# Patient Record
Sex: FEMALE | Race: WHITE | NOT HISPANIC OR LATINO | Employment: OTHER | URBAN - METROPOLITAN AREA
[De-identification: names, ages, dates, MRNs, and addresses within clinical notes are randomized per-mention and may not be internally consistent; named-entity substitution may affect disease eponyms.]

---

## 2017-04-27 ENCOUNTER — TRANSCRIBE ORDERS (OUTPATIENT)
Dept: ENDOCRINOLOGY | Facility: CLINIC | Age: 57
End: 2017-04-27

## 2017-04-27 DIAGNOSIS — E05.90 HYPERTHYROIDISM: Primary | ICD-10-CM

## 2019-01-01 ENCOUNTER — HOSPITAL ENCOUNTER (INPATIENT)
Facility: HOSPITAL | Age: 59
LOS: 11 days | Discharge: HOME HEALTH | End: 2019-12-27
Attending: INTERNAL MEDICINE | Admitting: INTERNAL MEDICINE

## 2019-01-01 ENCOUNTER — LAB REQUISITION (OUTPATIENT)
Dept: LAB | Facility: HOSPITAL | Age: 59
End: 2019-01-01

## 2019-01-01 VITALS
OXYGEN SATURATION: 98 % | RESPIRATION RATE: 18 BRPM | DIASTOLIC BLOOD PRESSURE: 69 MMHG | HEART RATE: 84 BPM | SYSTOLIC BLOOD PRESSURE: 112 MMHG | BODY MASS INDEX: 20.23 KG/M2 | TEMPERATURE: 98.2 F | WEIGHT: 114.2 LBS | HEIGHT: 63 IN

## 2019-01-01 DIAGNOSIS — J41.0 SIMPLE CHRONIC BRONCHITIS (HCC): ICD-10-CM

## 2019-01-01 DIAGNOSIS — I10 ESSENTIAL (PRIMARY) HYPERTENSION: ICD-10-CM

## 2019-01-01 DIAGNOSIS — K74.5 BILIARY CIRRHOSIS (HCC): Primary | ICD-10-CM

## 2019-01-01 LAB
ALBUMIN SERPL-MCNC: 2.01 G/DL (ref 3.5–5.2)
ALBUMIN SERPL-MCNC: 2.45 G/DL (ref 3.5–5.2)
ALBUMIN SERPL-MCNC: 2.56 G/DL (ref 3.5–5.2)
ALBUMIN SERPL-MCNC: 2.56 G/DL (ref 3.5–5.2)
ALBUMIN/GLOB SERPL: 0.5 G/DL
ALBUMIN/GLOB SERPL: 0.5 G/DL
ALBUMIN/GLOB SERPL: 0.7 G/DL
ALBUMIN/GLOB SERPL: 0.8 G/DL
ALP SERPL-CCNC: 245 U/L (ref 39–117)
ALP SERPL-CCNC: 250 U/L (ref 39–117)
ALP SERPL-CCNC: 260 U/L (ref 39–117)
ALP SERPL-CCNC: 321 U/L (ref 39–117)
ALT SERPL W P-5'-P-CCNC: 46 U/L (ref 1–33)
ALT SERPL W P-5'-P-CCNC: 52 U/L (ref 1–33)
ALT SERPL W P-5'-P-CCNC: 65 U/L (ref 1–33)
ALT SERPL W P-5'-P-CCNC: 80 U/L (ref 1–33)
ANION GAP SERPL CALCULATED.3IONS-SCNC: 10 MMOL/L (ref 5–15)
ANION GAP SERPL CALCULATED.3IONS-SCNC: 11.4 MMOL/L (ref 5–15)
ANION GAP SERPL CALCULATED.3IONS-SCNC: 13.2 MMOL/L (ref 5–15)
ANION GAP SERPL CALCULATED.3IONS-SCNC: 8.2 MMOL/L (ref 5–15)
ANISOCYTOSIS BLD QL: ABNORMAL
AST SERPL-CCNC: 110 U/L (ref 1–32)
AST SERPL-CCNC: 71 U/L (ref 1–32)
AST SERPL-CCNC: 78 U/L (ref 1–32)
AST SERPL-CCNC: 89 U/L (ref 1–32)
BASOPHILS # BLD AUTO: 0.14 10*3/MM3 (ref 0–0.2)
BASOPHILS # BLD AUTO: 0.14 10*3/MM3 (ref 0–0.2)
BASOPHILS # BLD AUTO: 0.2 10*3/MM3 (ref 0–0.2)
BASOPHILS # BLD MANUAL: 0.23 10*3/MM3 (ref 0–0.2)
BASOPHILS NFR BLD AUTO: 1.2 % (ref 0–1.5)
BASOPHILS NFR BLD AUTO: 1.6 % (ref 0–1.5)
BASOPHILS NFR BLD AUTO: 1.6 % (ref 0–1.5)
BASOPHILS NFR BLD AUTO: 3 % (ref 0–1.5)
BILIRUB SERPL-MCNC: 0.6 MG/DL (ref 0.2–1.2)
BILIRUB SERPL-MCNC: 0.7 MG/DL (ref 0.2–1.2)
BILIRUB SERPL-MCNC: 0.9 MG/DL (ref 0.2–1.2)
BILIRUB SERPL-MCNC: 1.2 MG/DL (ref 0.2–1.2)
BUN BLD-MCNC: 11 MG/DL (ref 6–20)
BUN BLD-MCNC: 14 MG/DL (ref 6–20)
BUN BLD-MCNC: 21 MG/DL (ref 6–20)
BUN BLD-MCNC: 21 MG/DL (ref 6–20)
BUN/CREAT SERPL: 13.9 (ref 7–25)
BUN/CREAT SERPL: 17.9 (ref 7–25)
BUN/CREAT SERPL: 25.6 (ref 7–25)
BUN/CREAT SERPL: 27.6 (ref 7–25)
CALCIUM SPEC-SCNC: 7.9 MG/DL (ref 8.6–10.5)
CALCIUM SPEC-SCNC: 8 MG/DL (ref 8.6–10.5)
CALCIUM SPEC-SCNC: 8.2 MG/DL (ref 8.6–10.5)
CALCIUM SPEC-SCNC: 8.2 MG/DL (ref 8.6–10.5)
CHLORIDE SERPL-SCNC: 88 MMOL/L (ref 98–107)
CHLORIDE SERPL-SCNC: 91 MMOL/L (ref 98–107)
CHLORIDE SERPL-SCNC: 93 MMOL/L (ref 98–107)
CHLORIDE SERPL-SCNC: 98 MMOL/L (ref 98–107)
CO2 SERPL-SCNC: 21.8 MMOL/L (ref 22–29)
CO2 SERPL-SCNC: 22.6 MMOL/L (ref 22–29)
CO2 SERPL-SCNC: 22.8 MMOL/L (ref 22–29)
CO2 SERPL-SCNC: 27 MMOL/L (ref 22–29)
CREAT BLD-MCNC: 0.76 MG/DL (ref 0.57–1)
CREAT BLD-MCNC: 0.78 MG/DL (ref 0.57–1)
CREAT BLD-MCNC: 0.79 MG/DL (ref 0.57–1)
CREAT BLD-MCNC: 0.82 MG/DL (ref 0.57–1)
DEPRECATED RDW RBC AUTO: 56.6 FL (ref 37–54)
DEPRECATED RDW RBC AUTO: 57.6 FL (ref 37–54)
DEPRECATED RDW RBC AUTO: 65.3 FL (ref 37–54)
DEPRECATED RDW RBC AUTO: 66.6 FL (ref 37–54)
EOSINOPHIL # BLD AUTO: 0.02 10*3/MM3 (ref 0–0.4)
EOSINOPHIL # BLD AUTO: 0.37 10*3/MM3 (ref 0–0.4)
EOSINOPHIL # BLD AUTO: 0.4 10*3/MM3 (ref 0–0.4)
EOSINOPHIL # BLD MANUAL: 0.08 10*3/MM3 (ref 0–0.4)
EOSINOPHIL NFR BLD AUTO: 0.2 % (ref 0.3–6.2)
EOSINOPHIL NFR BLD AUTO: 3.2 % (ref 0.3–6.2)
EOSINOPHIL NFR BLD AUTO: 4.3 % (ref 0.3–6.2)
EOSINOPHIL NFR BLD MANUAL: 1 % (ref 0.3–6.2)
ERYTHROCYTE [DISTWIDTH] IN BLOOD BY AUTOMATED COUNT: 18.5 % (ref 12.3–15.4)
ERYTHROCYTE [DISTWIDTH] IN BLOOD BY AUTOMATED COUNT: 19.6 % (ref 12.3–15.4)
ERYTHROCYTE [DISTWIDTH] IN BLOOD BY AUTOMATED COUNT: 21.1 % (ref 12.3–15.4)
ERYTHROCYTE [DISTWIDTH] IN BLOOD BY AUTOMATED COUNT: 21.3 % (ref 12.3–15.4)
GFR SERPL CREATININE-BSD FRML MDRD: 72 ML/MIN/1.73
GFR SERPL CREATININE-BSD FRML MDRD: 75 ML/MIN/1.73
GFR SERPL CREATININE-BSD FRML MDRD: 76 ML/MIN/1.73
GFR SERPL CREATININE-BSD FRML MDRD: 78 ML/MIN/1.73
GIANT PLATELETS: ABNORMAL
GLOBULIN UR ELPH-MCNC: 3.2 GM/DL
GLOBULIN UR ELPH-MCNC: 3.6 GM/DL
GLOBULIN UR ELPH-MCNC: 4.3 GM/DL
GLOBULIN UR ELPH-MCNC: 4.7 GM/DL
GLUCOSE BLD-MCNC: 245 MG/DL (ref 65–99)
GLUCOSE BLD-MCNC: 302 MG/DL (ref 65–99)
GLUCOSE BLD-MCNC: 414 MG/DL (ref 65–99)
GLUCOSE BLD-MCNC: 442 MG/DL (ref 65–99)
GLUCOSE BLD-MCNC: 615 MG/DL (ref 65–99)
GLUCOSE BLDC GLUCOMTR-MCNC: 120 MG/DL (ref 70–130)
GLUCOSE BLDC GLUCOMTR-MCNC: 165 MG/DL (ref 70–130)
GLUCOSE BLDC GLUCOMTR-MCNC: 178 MG/DL (ref 70–130)
GLUCOSE BLDC GLUCOMTR-MCNC: 191 MG/DL (ref 70–130)
GLUCOSE BLDC GLUCOMTR-MCNC: 207 MG/DL (ref 70–130)
GLUCOSE BLDC GLUCOMTR-MCNC: 213 MG/DL (ref 70–130)
GLUCOSE BLDC GLUCOMTR-MCNC: 220 MG/DL (ref 70–130)
GLUCOSE BLDC GLUCOMTR-MCNC: 220 MG/DL (ref 70–130)
GLUCOSE BLDC GLUCOMTR-MCNC: 223 MG/DL (ref 70–130)
GLUCOSE BLDC GLUCOMTR-MCNC: 228 MG/DL (ref 70–130)
GLUCOSE BLDC GLUCOMTR-MCNC: 238 MG/DL (ref 70–130)
GLUCOSE BLDC GLUCOMTR-MCNC: 242 MG/DL (ref 70–130)
GLUCOSE BLDC GLUCOMTR-MCNC: 253 MG/DL (ref 70–130)
GLUCOSE BLDC GLUCOMTR-MCNC: 254 MG/DL (ref 70–130)
GLUCOSE BLDC GLUCOMTR-MCNC: 254 MG/DL (ref 70–130)
GLUCOSE BLDC GLUCOMTR-MCNC: 258 MG/DL (ref 70–130)
GLUCOSE BLDC GLUCOMTR-MCNC: 262 MG/DL (ref 70–130)
GLUCOSE BLDC GLUCOMTR-MCNC: 263 MG/DL (ref 70–130)
GLUCOSE BLDC GLUCOMTR-MCNC: 269 MG/DL (ref 70–130)
GLUCOSE BLDC GLUCOMTR-MCNC: 269 MG/DL (ref 70–130)
GLUCOSE BLDC GLUCOMTR-MCNC: 274 MG/DL (ref 70–130)
GLUCOSE BLDC GLUCOMTR-MCNC: 289 MG/DL (ref 70–130)
GLUCOSE BLDC GLUCOMTR-MCNC: 299 MG/DL (ref 70–130)
GLUCOSE BLDC GLUCOMTR-MCNC: 304 MG/DL (ref 70–130)
GLUCOSE BLDC GLUCOMTR-MCNC: 318 MG/DL (ref 70–130)
GLUCOSE BLDC GLUCOMTR-MCNC: 326 MG/DL (ref 70–130)
GLUCOSE BLDC GLUCOMTR-MCNC: 331 MG/DL (ref 70–130)
GLUCOSE BLDC GLUCOMTR-MCNC: 331 MG/DL (ref 70–130)
GLUCOSE BLDC GLUCOMTR-MCNC: 338 MG/DL (ref 70–130)
GLUCOSE BLDC GLUCOMTR-MCNC: 340 MG/DL (ref 70–130)
GLUCOSE BLDC GLUCOMTR-MCNC: 343 MG/DL (ref 70–130)
GLUCOSE BLDC GLUCOMTR-MCNC: 344 MG/DL (ref 70–130)
GLUCOSE BLDC GLUCOMTR-MCNC: 344 MG/DL (ref 70–130)
GLUCOSE BLDC GLUCOMTR-MCNC: 349 MG/DL (ref 70–130)
GLUCOSE BLDC GLUCOMTR-MCNC: 350 MG/DL (ref 70–130)
GLUCOSE BLDC GLUCOMTR-MCNC: 350 MG/DL (ref 70–130)
GLUCOSE BLDC GLUCOMTR-MCNC: 354 MG/DL (ref 70–130)
GLUCOSE BLDC GLUCOMTR-MCNC: 357 MG/DL (ref 70–130)
GLUCOSE BLDC GLUCOMTR-MCNC: 362 MG/DL (ref 70–130)
GLUCOSE BLDC GLUCOMTR-MCNC: 363 MG/DL (ref 70–130)
GLUCOSE BLDC GLUCOMTR-MCNC: 367 MG/DL (ref 70–130)
GLUCOSE BLDC GLUCOMTR-MCNC: 368 MG/DL (ref 70–130)
GLUCOSE BLDC GLUCOMTR-MCNC: 379 MG/DL (ref 70–130)
GLUCOSE BLDC GLUCOMTR-MCNC: 380 MG/DL (ref 70–130)
GLUCOSE BLDC GLUCOMTR-MCNC: 382 MG/DL (ref 70–130)
GLUCOSE BLDC GLUCOMTR-MCNC: 382 MG/DL (ref 70–130)
GLUCOSE BLDC GLUCOMTR-MCNC: 398 MG/DL (ref 70–130)
GLUCOSE BLDC GLUCOMTR-MCNC: 421 MG/DL (ref 70–130)
GLUCOSE BLDC GLUCOMTR-MCNC: 425 MG/DL (ref 70–130)
GLUCOSE BLDC GLUCOMTR-MCNC: 445 MG/DL (ref 70–130)
GLUCOSE BLDC GLUCOMTR-MCNC: 457 MG/DL (ref 70–130)
GLUCOSE BLDC GLUCOMTR-MCNC: 468 MG/DL (ref 70–130)
HCT VFR BLD AUTO: 27.3 % (ref 34–46.6)
HCT VFR BLD AUTO: 28.8 % (ref 34–46.6)
HCT VFR BLD AUTO: 30.9 % (ref 34–46.6)
HCT VFR BLD AUTO: 33.4 % (ref 34–46.6)
HGB BLD-MCNC: 10.6 G/DL (ref 12–15.9)
HGB BLD-MCNC: 8.4 G/DL (ref 12–15.9)
HGB BLD-MCNC: 9.3 G/DL (ref 12–15.9)
HGB BLD-MCNC: 9.7 G/DL (ref 12–15.9)
HYPOCHROMIA BLD QL: ABNORMAL
IMM GRANULOCYTES # BLD AUTO: 0.11 10*3/MM3 (ref 0–0.05)
IMM GRANULOCYTES # BLD AUTO: 0.13 10*3/MM3 (ref 0–0.05)
IMM GRANULOCYTES # BLD AUTO: 0.14 10*3/MM3 (ref 0–0.05)
IMM GRANULOCYTES NFR BLD AUTO: 1 % (ref 0–0.5)
IMM GRANULOCYTES NFR BLD AUTO: 1.2 % (ref 0–0.5)
IMM GRANULOCYTES NFR BLD AUTO: 1.3 % (ref 0–0.5)
LYMPHOCYTES # BLD AUTO: 0.98 10*3/MM3 (ref 0.7–3.1)
LYMPHOCYTES # BLD AUTO: 1.81 10*3/MM3 (ref 0.7–3.1)
LYMPHOCYTES # BLD AUTO: 2.13 10*3/MM3 (ref 0.7–3.1)
LYMPHOCYTES # BLD MANUAL: 1.44 10*3/MM3 (ref 0.7–3.1)
LYMPHOCYTES NFR BLD AUTO: 14.6 % (ref 19.6–45.3)
LYMPHOCYTES NFR BLD AUTO: 24.5 % (ref 19.6–45.3)
LYMPHOCYTES NFR BLD AUTO: 8.4 % (ref 19.6–45.3)
LYMPHOCYTES NFR BLD MANUAL: 19 % (ref 19.6–45.3)
LYMPHOCYTES NFR BLD MANUAL: 9 % (ref 5–12)
MCH RBC QN AUTO: 26 PG (ref 26.6–33)
MCH RBC QN AUTO: 26.6 PG (ref 26.6–33)
MCH RBC QN AUTO: 26.9 PG (ref 26.6–33)
MCH RBC QN AUTO: 27 PG (ref 26.6–33)
MCHC RBC AUTO-ENTMCNC: 30.8 G/DL (ref 31.5–35.7)
MCHC RBC AUTO-ENTMCNC: 31.4 G/DL (ref 31.5–35.7)
MCHC RBC AUTO-ENTMCNC: 31.7 G/DL (ref 31.5–35.7)
MCHC RBC AUTO-ENTMCNC: 32.3 G/DL (ref 31.5–35.7)
MCV RBC AUTO: 82.3 FL (ref 79–97)
MCV RBC AUTO: 84.5 FL (ref 79–97)
MCV RBC AUTO: 84.8 FL (ref 79–97)
MCV RBC AUTO: 86.1 FL (ref 79–97)
MONOCYTES # BLD AUTO: 0.68 10*3/MM3 (ref 0.1–0.9)
MONOCYTES # BLD AUTO: 0.87 10*3/MM3 (ref 0.1–0.9)
MONOCYTES # BLD AUTO: 1.2 10*3/MM3 (ref 0.1–0.9)
MONOCYTES # BLD AUTO: 1.31 10*3/MM3 (ref 0.1–0.9)
MONOCYTES NFR BLD AUTO: 10.5 % (ref 5–12)
MONOCYTES NFR BLD AUTO: 13.8 % (ref 5–12)
MONOCYTES NFR BLD AUTO: 7.5 % (ref 5–12)
NEUTROPHILS # BLD AUTO: 4.74 10*3/MM3 (ref 1.7–7)
NEUTROPHILS # BLD AUTO: 5.16 10*3/MM3 (ref 1.7–7)
NEUTROPHILS # BLD AUTO: 8.58 10*3/MM3 (ref 1.7–7)
NEUTROPHILS # BLD AUTO: 9.47 10*3/MM3 (ref 1.7–7)
NEUTROPHILS NFR BLD AUTO: 54.5 % (ref 42.7–76)
NEUTROPHILS NFR BLD AUTO: 69.1 % (ref 42.7–76)
NEUTROPHILS NFR BLD AUTO: 81.5 % (ref 42.7–76)
NEUTROPHILS NFR BLD MANUAL: 65 % (ref 42.7–76)
NEUTS BAND NFR BLD MANUAL: 3 % (ref 0–5)
NRBC BLD AUTO-RTO: 0 /100 WBC (ref 0–0.2)
PLATELET # BLD AUTO: 153 10*3/MM3 (ref 140–450)
PLATELET # BLD AUTO: 178 10*3/MM3 (ref 140–450)
PLATELET # BLD AUTO: 182 10*3/MM3 (ref 140–450)
PLATELET # BLD AUTO: 209 10*3/MM3 (ref 140–450)
PMV BLD AUTO: 10.7 FL (ref 6–12)
PMV BLD AUTO: 10.9 FL (ref 6–12)
PMV BLD AUTO: 11.2 FL (ref 6–12)
PMV BLD AUTO: 11.5 FL (ref 6–12)
POIKILOCYTOSIS BLD QL SMEAR: ABNORMAL
POTASSIUM BLD-SCNC: 4.3 MMOL/L (ref 3.5–5.2)
POTASSIUM BLD-SCNC: 4.4 MMOL/L (ref 3.5–5.2)
POTASSIUM BLD-SCNC: 4.5 MMOL/L (ref 3.5–5.2)
POTASSIUM BLD-SCNC: 4.6 MMOL/L (ref 3.5–5.2)
PROT SERPL-MCNC: 5.6 G/DL (ref 6–8.5)
PROT SERPL-MCNC: 6.2 G/DL (ref 6–8.5)
PROT SERPL-MCNC: 6.3 G/DL (ref 6–8.5)
PROT SERPL-MCNC: 7.3 G/DL (ref 6–8.5)
RBC # BLD AUTO: 3.23 10*6/MM3 (ref 3.77–5.28)
RBC # BLD AUTO: 3.5 10*6/MM3 (ref 3.77–5.28)
RBC # BLD AUTO: 3.59 10*6/MM3 (ref 3.77–5.28)
RBC # BLD AUTO: 3.94 10*6/MM3 (ref 3.77–5.28)
SCAN SLIDE: NORMAL
SMALL PLATELETS BLD QL SMEAR: ADEQUATE
SODIUM BLD-SCNC: 124 MMOL/L (ref 136–145)
SODIUM BLD-SCNC: 127 MMOL/L (ref 136–145)
SODIUM BLD-SCNC: 128 MMOL/L (ref 136–145)
SODIUM BLD-SCNC: 128 MMOL/L (ref 136–145)
TSH SERPL DL<=0.05 MIU/L-ACNC: 3.42 UIU/ML (ref 0.27–4.2)
WBC NRBC COR # BLD: 11.62 10*3/MM3 (ref 3.4–10.8)
WBC NRBC COR # BLD: 12.43 10*3/MM3 (ref 3.4–10.8)
WBC NRBC COR # BLD: 7.59 10*3/MM3 (ref 3.4–10.8)
WBC NRBC COR # BLD: 8.69 10*3/MM3 (ref 3.4–10.8)

## 2019-01-01 PROCEDURE — 97530 THERAPEUTIC ACTIVITIES: CPT

## 2019-01-01 PROCEDURE — 97110 THERAPEUTIC EXERCISES: CPT

## 2019-01-01 PROCEDURE — 97535 SELF CARE MNGMENT TRAINING: CPT

## 2019-01-01 PROCEDURE — 85025 COMPLETE CBC W/AUTO DIFF WBC: CPT | Performed by: FAMILY MEDICINE

## 2019-01-01 PROCEDURE — 63710000001 INSULIN DETEMIR PER 5 UNITS: Performed by: INTERNAL MEDICINE

## 2019-01-01 PROCEDURE — 63710000001 INSULIN ASPART PER 5 UNITS: Performed by: INTERNAL MEDICINE

## 2019-01-01 PROCEDURE — 94799 UNLISTED PULMONARY SVC/PX: CPT

## 2019-01-01 PROCEDURE — 97116 GAIT TRAINING THERAPY: CPT

## 2019-01-01 PROCEDURE — 82962 GLUCOSE BLOOD TEST: CPT

## 2019-01-01 PROCEDURE — G0108 DIAB MANAGE TRN  PER INDIV: HCPCS

## 2019-01-01 PROCEDURE — 85025 COMPLETE CBC W/AUTO DIFF WBC: CPT | Performed by: INTERNAL MEDICINE

## 2019-01-01 PROCEDURE — 82947 ASSAY GLUCOSE BLOOD QUANT: CPT | Performed by: INTERNAL MEDICINE

## 2019-01-01 PROCEDURE — 97162 PT EVAL MOD COMPLEX 30 MIN: CPT

## 2019-01-01 PROCEDURE — 80053 COMPREHEN METABOLIC PANEL: CPT | Performed by: INTERNAL MEDICINE

## 2019-01-01 PROCEDURE — 94640 AIRWAY INHALATION TREATMENT: CPT

## 2019-01-01 PROCEDURE — 85007 BL SMEAR W/DIFF WBC COUNT: CPT | Performed by: INTERNAL MEDICINE

## 2019-01-01 PROCEDURE — 80050 GENERAL HEALTH PANEL: CPT | Performed by: INTERNAL MEDICINE

## 2019-01-01 PROCEDURE — 80053 COMPREHEN METABOLIC PANEL: CPT | Performed by: FAMILY MEDICINE

## 2019-01-01 PROCEDURE — 97166 OT EVAL MOD COMPLEX 45 MIN: CPT

## 2019-01-01 RX ORDER — LACTULOSE 10 G/15ML
10 SOLUTION ORAL 2 TIMES DAILY
Status: DISCONTINUED | OUTPATIENT
Start: 2019-01-01 | End: 2019-01-01 | Stop reason: HOSPADM

## 2019-01-01 RX ORDER — OXYCODONE HYDROCHLORIDE 10 MG/1
10 TABLET ORAL 3 TIMES DAILY
COMMUNITY
End: 2020-01-01 | Stop reason: SDUPTHER

## 2019-01-01 RX ORDER — PANTOPRAZOLE SODIUM 40 MG/1
40 TABLET, DELAYED RELEASE ORAL
Status: DISCONTINUED | OUTPATIENT
Start: 2019-01-01 | End: 2019-01-01 | Stop reason: HOSPADM

## 2019-01-01 RX ORDER — ACETAMINOPHEN 325 MG/1
650 TABLET ORAL EVERY 6 HOURS PRN
Qty: 30 TABLET | Refills: 0 | Status: SHIPPED | OUTPATIENT
Start: 2019-01-01 | End: 2020-01-01

## 2019-01-01 RX ORDER — FERROUS SULFATE 325(65) MG
325 TABLET ORAL
Status: DISCONTINUED | OUTPATIENT
Start: 2019-01-01 | End: 2019-01-01 | Stop reason: HOSPADM

## 2019-01-01 RX ORDER — OXYCODONE HYDROCHLORIDE 5 MG/1
5 TABLET ORAL EVERY 4 HOURS PRN
Qty: 21 TABLET | Refills: 0 | Status: SHIPPED | OUTPATIENT
Start: 2019-01-01 | End: 2020-01-01 | Stop reason: DRUGHIGH

## 2019-01-01 RX ORDER — BLOOD-GLUCOSE METER
1 KIT MISCELLANEOUS AS NEEDED
Qty: 1 EACH | Refills: 0 | Status: SHIPPED | OUTPATIENT
Start: 2019-01-01 | End: 2020-01-01

## 2019-01-01 RX ORDER — SPIRONOLACTONE 100 MG/1
100 TABLET, FILM COATED ORAL DAILY
Status: DISCONTINUED | OUTPATIENT
Start: 2019-01-01 | End: 2019-01-01 | Stop reason: HOSPADM

## 2019-01-01 RX ORDER — ONDANSETRON 4 MG/1
4 TABLET, FILM COATED ORAL EVERY 8 HOURS PRN
COMMUNITY
End: 2020-01-01 | Stop reason: HOSPADM

## 2019-01-01 RX ORDER — GUAIFENESIN 200 MG/10ML
100 LIQUID ORAL 3 TIMES DAILY PRN
Qty: 236 ML | Refills: 0 | Status: SHIPPED | OUTPATIENT
Start: 2019-01-01 | End: 2020-01-01

## 2019-01-01 RX ORDER — ALBUTEROL SULFATE 2.5 MG/3ML
2.5 SOLUTION RESPIRATORY (INHALATION) EVERY 6 HOURS PRN
COMMUNITY
End: 2019-01-01 | Stop reason: HOSPADM

## 2019-01-01 RX ORDER — PANTOPRAZOLE SODIUM 40 MG/1
40 TABLET, DELAYED RELEASE ORAL
Qty: 60 TABLET | Refills: 0 | Status: SHIPPED | OUTPATIENT
Start: 2019-01-01 | End: 2020-01-01

## 2019-01-01 RX ORDER — PEN NEEDLE, DIABETIC 31 GX5/16"
1 NEEDLE, DISPOSABLE MISCELLANEOUS 2 TIMES DAILY
Qty: 100 EACH | Refills: 0 | Status: SHIPPED | OUTPATIENT
Start: 2019-01-01 | End: 2020-01-01

## 2019-01-01 RX ORDER — BUSPIRONE HYDROCHLORIDE 15 MG/1
15 TABLET ORAL 2 TIMES DAILY
COMMUNITY
End: 2020-01-01 | Stop reason: HOSPADM

## 2019-01-01 RX ORDER — FERROUS SULFATE 325(65) MG
325 TABLET ORAL
Qty: 30 TABLET | Refills: 0 | Status: SHIPPED | OUTPATIENT
Start: 2019-01-01 | End: 2020-01-01

## 2019-01-01 RX ORDER — AMIODARONE HYDROCHLORIDE 200 MG/1
200 TABLET ORAL
Status: DISCONTINUED | OUTPATIENT
Start: 2019-01-01 | End: 2019-01-01 | Stop reason: HOSPADM

## 2019-01-01 RX ORDER — FUROSEMIDE 40 MG/1
40 TABLET ORAL DAILY
COMMUNITY
End: 2019-01-01 | Stop reason: HOSPADM

## 2019-01-01 RX ORDER — NICOTINE POLACRILEX 4 MG
15 LOZENGE BUCCAL
Status: DISCONTINUED | OUTPATIENT
Start: 2019-01-01 | End: 2019-01-01 | Stop reason: HOSPADM

## 2019-01-01 RX ORDER — CASTOR OIL AND BALSAM, PERU 788; 87 MG/G; MG/G
1 OINTMENT TOPICAL EVERY 12 HOURS SCHEDULED
Qty: 60 G | Refills: 0 | Status: SHIPPED | OUTPATIENT
Start: 2019-01-01 | End: 2020-01-01 | Stop reason: ALTCHOICE

## 2019-01-01 RX ORDER — DICYCLOMINE HYDROCHLORIDE 10 MG/1
10 CAPSULE ORAL DAILY
Status: DISCONTINUED | OUTPATIENT
Start: 2019-01-01 | End: 2019-01-01 | Stop reason: HOSPADM

## 2019-01-01 RX ORDER — OXYBUTYNIN CHLORIDE 5 MG/1
5 TABLET, EXTENDED RELEASE ORAL DAILY
COMMUNITY
End: 2019-01-01 | Stop reason: HOSPADM

## 2019-01-01 RX ORDER — INSULIN GLARGINE 100 [IU]/ML
40 INJECTION, SOLUTION SUBCUTANEOUS NIGHTLY
COMMUNITY
End: 2019-01-01 | Stop reason: HOSPADM

## 2019-01-01 RX ORDER — LISINOPRIL 20 MG/1
20 TABLET ORAL DAILY
COMMUNITY
End: 2019-01-01 | Stop reason: HOSPADM

## 2019-01-01 RX ORDER — ALBUTEROL SULFATE 90 UG/1
2 AEROSOL, METERED RESPIRATORY (INHALATION) EVERY 4 HOURS
COMMUNITY
End: 2020-01-01

## 2019-01-01 RX ORDER — DEXTROSE MONOHYDRATE 25 G/50ML
25 INJECTION, SOLUTION INTRAVENOUS
Status: DISCONTINUED | OUTPATIENT
Start: 2019-01-01 | End: 2019-01-01 | Stop reason: HOSPADM

## 2019-01-01 RX ORDER — GABAPENTIN 300 MG/1
600 CAPSULE ORAL 3 TIMES DAILY
Status: DISCONTINUED | OUTPATIENT
Start: 2019-01-01 | End: 2019-01-01 | Stop reason: HOSPADM

## 2019-01-01 RX ORDER — ALBUTEROL SULFATE 2.5 MG/3ML
2.5 SOLUTION RESPIRATORY (INHALATION) EVERY 6 HOURS PRN
Status: DISCONTINUED | OUTPATIENT
Start: 2019-01-01 | End: 2019-01-01

## 2019-01-01 RX ORDER — PEN NEEDLE, DIABETIC 31 GX5/16"
1 NEEDLE, DISPOSABLE MISCELLANEOUS 2 TIMES DAILY
Qty: 100 EACH | Refills: 0 | Status: SHIPPED | OUTPATIENT
Start: 2019-01-01 | End: 2019-01-01 | Stop reason: SDUPTHER

## 2019-01-01 RX ORDER — MONTELUKAST SODIUM 10 MG/1
10 TABLET ORAL NIGHTLY
COMMUNITY
End: 2019-01-01 | Stop reason: HOSPADM

## 2019-01-01 RX ORDER — FUROSEMIDE 40 MG/1
40 TABLET ORAL
Status: DISCONTINUED | OUTPATIENT
Start: 2019-01-01 | End: 2019-01-01 | Stop reason: HOSPADM

## 2019-01-01 RX ORDER — LACTULOSE 10 G/15ML
10 SOLUTION ORAL 2 TIMES DAILY
Qty: 236 ML | Refills: 0 | Status: SHIPPED | OUTPATIENT
Start: 2019-01-01 | End: 2020-01-01 | Stop reason: HOSPADM

## 2019-01-01 RX ORDER — AMIODARONE HYDROCHLORIDE 200 MG/1
200 TABLET ORAL
Qty: 30 TABLET | Refills: 0 | Status: SHIPPED | OUTPATIENT
Start: 2019-01-01 | End: 2020-01-01 | Stop reason: HOSPADM

## 2019-01-01 RX ORDER — DICYCLOMINE HYDROCHLORIDE 10 MG/1
10 CAPSULE ORAL 3 TIMES DAILY
COMMUNITY
End: 2019-01-01 | Stop reason: HOSPADM

## 2019-01-01 RX ORDER — ASPIRIN 81 MG/1
81 TABLET, CHEWABLE ORAL DAILY
Status: DISCONTINUED | OUTPATIENT
Start: 2019-01-01 | End: 2019-01-01 | Stop reason: HOSPADM

## 2019-01-01 RX ORDER — OMEPRAZOLE 20 MG/1
20 CAPSULE, DELAYED RELEASE ORAL DAILY
COMMUNITY
End: 2019-01-01 | Stop reason: HOSPADM

## 2019-01-01 RX ORDER — LANCETS
EACH MISCELLANEOUS
Qty: 102 EACH | Refills: 0 | Status: SHIPPED | OUTPATIENT
Start: 2019-01-01 | End: 2020-01-01 | Stop reason: ALTCHOICE

## 2019-01-01 RX ORDER — FLUTICASONE PROPIONATE 50 MCG
2 SPRAY, SUSPENSION (ML) NASAL DAILY
Status: DISCONTINUED | OUTPATIENT
Start: 2019-01-01 | End: 2019-01-01 | Stop reason: HOSPADM

## 2019-01-01 RX ORDER — CASTOR OIL AND BALSAM, PERU 788; 87 MG/G; MG/G
OINTMENT TOPICAL EVERY 12 HOURS SCHEDULED
Status: DISCONTINUED | OUTPATIENT
Start: 2019-01-01 | End: 2019-01-01 | Stop reason: HOSPADM

## 2019-01-01 RX ORDER — FUROSEMIDE 40 MG/1
40 TABLET ORAL 2 TIMES DAILY
Qty: 60 TABLET | Refills: 0 | Status: SHIPPED | OUTPATIENT
Start: 2019-01-01 | End: 2020-01-01

## 2019-01-01 RX ORDER — TRAZODONE HYDROCHLORIDE 100 MG/1
100 TABLET ORAL NIGHTLY
COMMUNITY
End: 2019-01-01 | Stop reason: HOSPADM

## 2019-01-01 RX ORDER — ACETAMINOPHEN 325 MG/1
650 TABLET ORAL EVERY 6 HOURS PRN
Status: DISCONTINUED | OUTPATIENT
Start: 2019-01-01 | End: 2019-01-01 | Stop reason: HOSPADM

## 2019-01-01 RX ORDER — HYDROCODONE BITARTRATE AND ACETAMINOPHEN 10; 325 MG/1; MG/1
1 TABLET ORAL EVERY 8 HOURS PRN
Status: DISCONTINUED | OUTPATIENT
Start: 2019-01-01 | End: 2019-01-01

## 2019-01-01 RX ORDER — OXYCODONE HYDROCHLORIDE 5 MG/1
10 TABLET ORAL EVERY 8 HOURS PRN
Status: DISCONTINUED | OUTPATIENT
Start: 2019-01-01 | End: 2019-01-01 | Stop reason: HOSPADM

## 2019-01-01 RX ORDER — GABAPENTIN 600 MG/1
600 TABLET ORAL 3 TIMES DAILY
COMMUNITY
End: 2020-01-01 | Stop reason: SDUPTHER

## 2019-01-01 RX ORDER — SPIRONOLACTONE 100 MG/1
100 TABLET, FILM COATED ORAL DAILY
COMMUNITY
End: 2020-01-01 | Stop reason: HOSPADM

## 2019-01-01 RX ORDER — ONDANSETRON 4 MG/1
4 TABLET, FILM COATED ORAL EVERY 6 HOURS PRN
Status: DISCONTINUED | OUTPATIENT
Start: 2019-01-01 | End: 2019-01-01 | Stop reason: HOSPADM

## 2019-01-01 RX ORDER — DICYCLOMINE HYDROCHLORIDE 10 MG/1
10 CAPSULE ORAL DAILY
Qty: 30 CAPSULE | Refills: 0 | Status: SHIPPED | OUTPATIENT
Start: 2019-01-01 | End: 2020-01-01 | Stop reason: HOSPADM

## 2019-01-01 RX ADMIN — CASTOR OIL AND BALSAM, PERU: 788; 87 OINTMENT TOPICAL at 08:33

## 2019-01-01 RX ADMIN — INSULIN ASPART 4 UNITS: 100 INJECTION, SOLUTION INTRAVENOUS; SUBCUTANEOUS at 20:38

## 2019-01-01 RX ADMIN — INSULIN DETEMIR 13 UNITS: 100 INJECTION, SOLUTION SUBCUTANEOUS at 20:20

## 2019-01-01 RX ADMIN — FERROUS SULFATE TAB 325 MG (65 MG ELEMENTAL FE) 325 MG: 325 (65 FE) TAB at 17:23

## 2019-01-01 RX ADMIN — OXYCODONE HYDROCHLORIDE 10 MG: 5 TABLET ORAL at 14:13

## 2019-01-01 RX ADMIN — LACTULOSE 10 G: 10 SOLUTION ORAL at 10:24

## 2019-01-01 RX ADMIN — INSULIN DETEMIR 13 UNITS: 100 INJECTION, SOLUTION SUBCUTANEOUS at 09:35

## 2019-01-01 RX ADMIN — OXYCODONE HYDROCHLORIDE 10 MG: 5 TABLET ORAL at 18:23

## 2019-01-01 RX ADMIN — GABAPENTIN 600 MG: 300 CAPSULE ORAL at 16:45

## 2019-01-01 RX ADMIN — INSULIN ASPART 7 UNITS: 100 INJECTION, SOLUTION INTRAVENOUS; SUBCUTANEOUS at 18:36

## 2019-01-01 RX ADMIN — FERROUS SULFATE TAB 325 MG (65 MG ELEMENTAL FE) 325 MG: 325 (65 FE) TAB at 09:28

## 2019-01-01 RX ADMIN — OXYCODONE HYDROCHLORIDE 10 MG: 5 TABLET ORAL at 06:17

## 2019-01-01 RX ADMIN — INSULIN ASPART 5 UNITS: 100 INJECTION, SOLUTION INTRAVENOUS; SUBCUTANEOUS at 21:36

## 2019-01-01 RX ADMIN — INSULIN ASPART 7 UNITS: 100 INJECTION, SOLUTION INTRAVENOUS; SUBCUTANEOUS at 06:32

## 2019-01-01 RX ADMIN — AMIODARONE HYDROCHLORIDE 200 MG: 200 TABLET ORAL at 08:30

## 2019-01-01 RX ADMIN — OXYCODONE HYDROCHLORIDE 10 MG: 5 TABLET ORAL at 08:32

## 2019-01-01 RX ADMIN — INSULIN DETEMIR 10 UNITS: 100 INJECTION, SOLUTION SUBCUTANEOUS at 09:06

## 2019-01-01 RX ADMIN — LACTULOSE 10 G: 10 SOLUTION ORAL at 20:15

## 2019-01-01 RX ADMIN — FERROUS SULFATE TAB 325 MG (65 MG ELEMENTAL FE) 325 MG: 325 (65 FE) TAB at 11:37

## 2019-01-01 RX ADMIN — DICYCLOMINE HYDROCHLORIDE 10 MG: 10 CAPSULE ORAL at 09:28

## 2019-01-01 RX ADMIN — ONDANSETRON 4 MG: 4 TABLET ORAL at 08:42

## 2019-01-01 RX ADMIN — CASTOR OIL AND BALSAM, PERU: 788; 87 OINTMENT TOPICAL at 08:24

## 2019-01-01 RX ADMIN — AMIODARONE HYDROCHLORIDE 200 MG: 200 TABLET ORAL at 09:28

## 2019-01-01 RX ADMIN — PANTOPRAZOLE SODIUM 40 MG: 40 TABLET, DELAYED RELEASE ORAL at 08:19

## 2019-01-01 RX ADMIN — GABAPENTIN 600 MG: 300 CAPSULE ORAL at 16:59

## 2019-01-01 RX ADMIN — INSULIN ASPART 4 UNITS: 100 INJECTION, SOLUTION INTRAVENOUS; SUBCUTANEOUS at 20:24

## 2019-01-01 RX ADMIN — FLUTICASONE PROPIONATE 2 SPRAY: 50 SPRAY, METERED NASAL at 08:52

## 2019-01-01 RX ADMIN — OXYCODONE HYDROCHLORIDE 10 MG: 5 TABLET ORAL at 01:51

## 2019-01-01 RX ADMIN — SPIRONOLACTONE 100 MG: 100 TABLET ORAL at 09:28

## 2019-01-01 RX ADMIN — FERROUS SULFATE TAB 325 MG (65 MG ELEMENTAL FE) 325 MG: 325 (65 FE) TAB at 21:49

## 2019-01-01 RX ADMIN — INSULIN ASPART 4 UNITS: 100 INJECTION, SOLUTION INTRAVENOUS; SUBCUTANEOUS at 11:37

## 2019-01-01 RX ADMIN — OXYCODONE HYDROCHLORIDE 10 MG: 5 TABLET ORAL at 22:28

## 2019-01-01 RX ADMIN — FUROSEMIDE 40 MG: 40 TABLET ORAL at 17:00

## 2019-01-01 RX ADMIN — FERROUS SULFATE TAB 325 MG (65 MG ELEMENTAL FE) 325 MG: 325 (65 FE) TAB at 17:14

## 2019-01-01 RX ADMIN — CASTOR OIL AND BALSAM, PERU 1 EACH: 788; 87 OINTMENT TOPICAL at 20:59

## 2019-01-01 RX ADMIN — FERROUS SULFATE TAB 325 MG (65 MG ELEMENTAL FE) 325 MG: 325 (65 FE) TAB at 08:19

## 2019-01-01 RX ADMIN — HYDROCODONE BITARTRATE AND ACETAMINOPHEN 1 TABLET: 10; 325 TABLET ORAL at 06:03

## 2019-01-01 RX ADMIN — LACTULOSE 10 G: 10 SOLUTION ORAL at 09:07

## 2019-01-01 RX ADMIN — PANTOPRAZOLE SODIUM 40 MG: 40 TABLET, DELAYED RELEASE ORAL at 09:28

## 2019-01-01 RX ADMIN — OXYCODONE HYDROCHLORIDE 10 MG: 5 TABLET ORAL at 14:03

## 2019-01-01 RX ADMIN — LACTULOSE 10 G: 10 SOLUTION ORAL at 08:24

## 2019-01-01 RX ADMIN — INSULIN ASPART 7 UNITS: 100 INJECTION, SOLUTION INTRAVENOUS; SUBCUTANEOUS at 06:30

## 2019-01-01 RX ADMIN — GABAPENTIN 600 MG: 300 CAPSULE ORAL at 21:36

## 2019-01-01 RX ADMIN — INSULIN ASPART 5 UNITS: 100 INJECTION, SOLUTION INTRAVENOUS; SUBCUTANEOUS at 17:06

## 2019-01-01 RX ADMIN — FLUTICASONE PROPIONATE 2 SPRAY: 50 SPRAY, METERED NASAL at 08:33

## 2019-01-01 RX ADMIN — PANTOPRAZOLE SODIUM 40 MG: 40 TABLET, DELAYED RELEASE ORAL at 16:46

## 2019-01-01 RX ADMIN — ONDANSETRON 4 MG: 4 TABLET ORAL at 20:45

## 2019-01-01 RX ADMIN — DICYCLOMINE HYDROCHLORIDE 10 MG: 10 CAPSULE ORAL at 09:00

## 2019-01-01 RX ADMIN — FUROSEMIDE 40 MG: 40 TABLET ORAL at 18:12

## 2019-01-01 RX ADMIN — INSULIN ASPART 6 UNITS: 100 INJECTION, SOLUTION INTRAVENOUS; SUBCUTANEOUS at 11:40

## 2019-01-01 RX ADMIN — GABAPENTIN 600 MG: 300 CAPSULE ORAL at 20:26

## 2019-01-01 RX ADMIN — FLUTICASONE PROPIONATE 2 SPRAY: 50 SPRAY, METERED NASAL at 08:24

## 2019-01-01 RX ADMIN — INSULIN ASPART 6 UNITS: 100 INJECTION, SOLUTION INTRAVENOUS; SUBCUTANEOUS at 07:40

## 2019-01-01 RX ADMIN — GABAPENTIN 600 MG: 300 CAPSULE ORAL at 21:12

## 2019-01-01 RX ADMIN — INSULIN DETEMIR 10 UNITS: 100 INJECTION, SOLUTION SUBCUTANEOUS at 20:00

## 2019-01-01 RX ADMIN — CASTOR OIL AND BALSAM, PERU: 788; 87 OINTMENT TOPICAL at 20:46

## 2019-01-01 RX ADMIN — GABAPENTIN 600 MG: 300 CAPSULE ORAL at 09:06

## 2019-01-01 RX ADMIN — INSULIN DETEMIR 13 UNITS: 100 INJECTION, SOLUTION SUBCUTANEOUS at 20:31

## 2019-01-01 RX ADMIN — OXYCODONE HYDROCHLORIDE 10 MG: 5 TABLET ORAL at 00:28

## 2019-01-01 RX ADMIN — INSULIN ASPART 3 UNITS: 100 INJECTION, SOLUTION INTRAVENOUS; SUBCUTANEOUS at 20:16

## 2019-01-01 RX ADMIN — ASPIRIN 81 MG: 81 TABLET, CHEWABLE ORAL at 08:24

## 2019-01-01 RX ADMIN — ASPIRIN 81 MG: 81 TABLET, CHEWABLE ORAL at 09:00

## 2019-01-01 RX ADMIN — DICYCLOMINE HYDROCHLORIDE 10 MG: 10 CAPSULE ORAL at 08:19

## 2019-01-01 RX ADMIN — FERROUS SULFATE TAB 325 MG (65 MG ELEMENTAL FE) 325 MG: 325 (65 FE) TAB at 18:29

## 2019-01-01 RX ADMIN — FERROUS SULFATE TAB 325 MG (65 MG ELEMENTAL FE) 325 MG: 325 (65 FE) TAB at 11:20

## 2019-01-01 RX ADMIN — FERROUS SULFATE TAB 325 MG (65 MG ELEMENTAL FE) 325 MG: 325 (65 FE) TAB at 12:34

## 2019-01-01 RX ADMIN — GABAPENTIN 600 MG: 300 CAPSULE ORAL at 08:24

## 2019-01-01 RX ADMIN — ASPIRIN 81 MG: 81 TABLET, CHEWABLE ORAL at 08:30

## 2019-01-01 RX ADMIN — FERROUS SULFATE TAB 325 MG (65 MG ELEMENTAL FE) 325 MG: 325 (65 FE) TAB at 11:39

## 2019-01-01 RX ADMIN — INSULIN DETEMIR 10 UNITS: 100 INJECTION, SOLUTION SUBCUTANEOUS at 22:05

## 2019-01-01 RX ADMIN — INSULIN ASPART 5 UNITS: 100 INJECTION, SOLUTION INTRAVENOUS; SUBCUTANEOUS at 20:58

## 2019-01-01 RX ADMIN — OXYCODONE HYDROCHLORIDE 10 MG: 5 TABLET ORAL at 09:35

## 2019-01-01 RX ADMIN — INSULIN ASPART 6 UNITS: 100 INJECTION, SOLUTION INTRAVENOUS; SUBCUTANEOUS at 08:19

## 2019-01-01 RX ADMIN — AMIODARONE HYDROCHLORIDE 200 MG: 200 TABLET ORAL at 08:39

## 2019-01-01 RX ADMIN — PANTOPRAZOLE SODIUM 40 MG: 40 TABLET, DELAYED RELEASE ORAL at 07:36

## 2019-01-01 RX ADMIN — INSULIN DETEMIR 10 UNITS: 100 INJECTION, SOLUTION SUBCUTANEOUS at 20:47

## 2019-01-01 RX ADMIN — OXYCODONE HYDROCHLORIDE 10 MG: 5 TABLET ORAL at 16:39

## 2019-01-01 RX ADMIN — PANTOPRAZOLE SODIUM 40 MG: 40 TABLET, DELAYED RELEASE ORAL at 17:07

## 2019-01-01 RX ADMIN — FERROUS SULFATE TAB 325 MG (65 MG ELEMENTAL FE) 325 MG: 325 (65 FE) TAB at 17:27

## 2019-01-01 RX ADMIN — BUSPIRONE HYDROCHLORIDE 15 MG: 10 TABLET ORAL at 09:07

## 2019-01-01 RX ADMIN — ONDANSETRON 4 MG: 4 TABLET ORAL at 09:44

## 2019-01-01 RX ADMIN — INSULIN ASPART 4 UNITS: 100 INJECTION, SOLUTION INTRAVENOUS; SUBCUTANEOUS at 12:01

## 2019-01-01 RX ADMIN — LACTULOSE 10 G: 10 SOLUTION ORAL at 21:41

## 2019-01-01 RX ADMIN — LACTULOSE 10 G: 10 SOLUTION ORAL at 20:59

## 2019-01-01 RX ADMIN — LACTULOSE 10 G: 10 SOLUTION ORAL at 21:52

## 2019-01-01 RX ADMIN — AMIODARONE HYDROCHLORIDE 200 MG: 200 TABLET ORAL at 08:24

## 2019-01-01 RX ADMIN — FUROSEMIDE 40 MG: 40 TABLET ORAL at 09:00

## 2019-01-01 RX ADMIN — INSULIN ASPART 6 UNITS: 100 INJECTION, SOLUTION INTRAVENOUS; SUBCUTANEOUS at 12:15

## 2019-01-01 RX ADMIN — PANTOPRAZOLE SODIUM 40 MG: 40 TABLET, DELAYED RELEASE ORAL at 08:39

## 2019-01-01 RX ADMIN — MAGNESIUM HYDROXIDE 5 ML: 2400 SUSPENSION ORAL at 10:10

## 2019-01-01 RX ADMIN — INSULIN ASPART 3 UNITS: 100 INJECTION, SOLUTION INTRAVENOUS; SUBCUTANEOUS at 12:40

## 2019-01-01 RX ADMIN — CASTOR OIL AND BALSAM, PERU: 788; 87 OINTMENT TOPICAL at 09:27

## 2019-01-01 RX ADMIN — LACTULOSE 10 G: 10 SOLUTION ORAL at 09:27

## 2019-01-01 RX ADMIN — FLUTICASONE PROPIONATE 2 SPRAY: 50 SPRAY, METERED NASAL at 10:24

## 2019-01-01 RX ADMIN — ASPIRIN 81 MG: 81 TABLET, CHEWABLE ORAL at 09:28

## 2019-01-01 RX ADMIN — FERROUS SULFATE TAB 325 MG (65 MG ELEMENTAL FE) 325 MG: 325 (65 FE) TAB at 09:00

## 2019-01-01 RX ADMIN — INSULIN ASPART 4 UNITS: 100 INJECTION, SOLUTION INTRAVENOUS; SUBCUTANEOUS at 12:34

## 2019-01-01 RX ADMIN — FLUTICASONE PROPIONATE 2 SPRAY: 50 SPRAY, METERED NASAL at 08:40

## 2019-01-01 RX ADMIN — ACETAMINOPHEN 650 MG: 325 TABLET ORAL at 20:14

## 2019-01-01 RX ADMIN — LACTULOSE 10 G: 10 SOLUTION ORAL at 20:24

## 2019-01-01 RX ADMIN — INSULIN ASPART 4 UNITS: 100 INJECTION, SOLUTION INTRAVENOUS; SUBCUTANEOUS at 16:44

## 2019-01-01 RX ADMIN — OXYCODONE HYDROCHLORIDE 10 MG: 5 TABLET ORAL at 01:08

## 2019-01-01 RX ADMIN — ACETAMINOPHEN 650 MG: 325 TABLET ORAL at 11:09

## 2019-01-01 RX ADMIN — GABAPENTIN 600 MG: 300 CAPSULE ORAL at 16:39

## 2019-01-01 RX ADMIN — GABAPENTIN 600 MG: 300 CAPSULE ORAL at 16:06

## 2019-01-01 RX ADMIN — INSULIN ASPART 5 UNITS: 100 INJECTION, SOLUTION INTRAVENOUS; SUBCUTANEOUS at 12:15

## 2019-01-01 RX ADMIN — PANTOPRAZOLE SODIUM 40 MG: 40 TABLET, DELAYED RELEASE ORAL at 16:42

## 2019-01-01 RX ADMIN — FERROUS SULFATE TAB 325 MG (65 MG ELEMENTAL FE) 325 MG: 325 (65 FE) TAB at 12:15

## 2019-01-01 RX ADMIN — INSULIN DETEMIR 13 UNITS: 100 INJECTION, SOLUTION SUBCUTANEOUS at 09:13

## 2019-01-01 RX ADMIN — INSULIN DETEMIR 10 UNITS: 100 INJECTION, SOLUTION SUBCUTANEOUS at 08:28

## 2019-01-01 RX ADMIN — FERROUS SULFATE TAB 325 MG (65 MG ELEMENTAL FE) 325 MG: 325 (65 FE) TAB at 18:12

## 2019-01-01 RX ADMIN — FUROSEMIDE 40 MG: 40 TABLET ORAL at 08:52

## 2019-01-01 RX ADMIN — FUROSEMIDE 40 MG: 40 TABLET ORAL at 09:07

## 2019-01-01 RX ADMIN — INSULIN DETEMIR 10 UNITS: 100 INJECTION, SOLUTION SUBCUTANEOUS at 20:46

## 2019-01-01 RX ADMIN — AMIODARONE HYDROCHLORIDE 200 MG: 200 TABLET ORAL at 08:19

## 2019-01-01 RX ADMIN — CASTOR OIL AND BALSAM, PERU: 788; 87 OINTMENT TOPICAL at 20:30

## 2019-01-01 RX ADMIN — LACTULOSE 10 G: 10 SOLUTION ORAL at 08:18

## 2019-01-01 RX ADMIN — INSULIN DETEMIR 10 UNITS: 100 INJECTION, SOLUTION SUBCUTANEOUS at 21:12

## 2019-01-01 RX ADMIN — PANTOPRAZOLE SODIUM 40 MG: 40 TABLET, DELAYED RELEASE ORAL at 08:33

## 2019-01-01 RX ADMIN — AMIODARONE HYDROCHLORIDE 200 MG: 200 TABLET ORAL at 08:51

## 2019-01-01 RX ADMIN — GABAPENTIN 600 MG: 300 CAPSULE ORAL at 21:49

## 2019-01-01 RX ADMIN — FERROUS SULFATE TAB 325 MG (65 MG ELEMENTAL FE) 325 MG: 325 (65 FE) TAB at 08:24

## 2019-01-01 RX ADMIN — INSULIN ASPART 7 UNITS: 100 INJECTION, SOLUTION INTRAVENOUS; SUBCUTANEOUS at 17:14

## 2019-01-01 RX ADMIN — FERROUS SULFATE TAB 325 MG (65 MG ELEMENTAL FE) 325 MG: 325 (65 FE) TAB at 08:54

## 2019-01-01 RX ADMIN — FUROSEMIDE 40 MG: 40 TABLET ORAL at 08:19

## 2019-01-01 RX ADMIN — OXYCODONE HYDROCHLORIDE 10 MG: 5 TABLET ORAL at 08:28

## 2019-01-01 RX ADMIN — GABAPENTIN 600 MG: 300 CAPSULE ORAL at 17:14

## 2019-01-01 RX ADMIN — FERROUS SULFATE TAB 325 MG (65 MG ELEMENTAL FE) 325 MG: 325 (65 FE) TAB at 12:01

## 2019-01-01 RX ADMIN — FLUTICASONE PROPIONATE 2 SPRAY: 50 SPRAY, METERED NASAL at 09:00

## 2019-01-01 RX ADMIN — CASTOR OIL AND BALSAM, PERU 1 EACH: 788; 87 OINTMENT TOPICAL at 09:02

## 2019-01-01 RX ADMIN — FERROUS SULFATE TAB 325 MG (65 MG ELEMENTAL FE) 325 MG: 325 (65 FE) TAB at 08:23

## 2019-01-01 RX ADMIN — INSULIN DETEMIR 13 UNITS: 100 INJECTION, SOLUTION SUBCUTANEOUS at 08:32

## 2019-01-01 RX ADMIN — AMIODARONE HYDROCHLORIDE 200 MG: 200 TABLET ORAL at 09:08

## 2019-01-01 RX ADMIN — SPIRONOLACTONE 100 MG: 100 TABLET ORAL at 08:30

## 2019-01-01 RX ADMIN — INSULIN ASPART 7 UNITS: 100 INJECTION, SOLUTION INTRAVENOUS; SUBCUTANEOUS at 11:02

## 2019-01-01 RX ADMIN — BUSPIRONE HYDROCHLORIDE 15 MG: 10 TABLET ORAL at 08:53

## 2019-01-01 RX ADMIN — CASTOR OIL AND BALSAM, PERU: 788; 87 OINTMENT TOPICAL at 21:36

## 2019-01-01 RX ADMIN — LACTULOSE 10 G: 10 SOLUTION ORAL at 10:10

## 2019-01-01 RX ADMIN — OXYCODONE HYDROCHLORIDE 10 MG: 5 TABLET ORAL at 00:29

## 2019-01-01 RX ADMIN — INSULIN ASPART 5 UNITS: 100 INJECTION, SOLUTION INTRAVENOUS; SUBCUTANEOUS at 07:37

## 2019-01-01 RX ADMIN — OXYCODONE HYDROCHLORIDE 10 MG: 5 TABLET ORAL at 16:40

## 2019-01-01 RX ADMIN — GABAPENTIN 600 MG: 300 CAPSULE ORAL at 20:24

## 2019-01-01 RX ADMIN — OXYCODONE HYDROCHLORIDE 10 MG: 5 TABLET ORAL at 00:44

## 2019-01-01 RX ADMIN — INSULIN ASPART 5 UNITS: 100 INJECTION, SOLUTION INTRAVENOUS; SUBCUTANEOUS at 16:39

## 2019-01-01 RX ADMIN — GABAPENTIN 600 MG: 300 CAPSULE ORAL at 20:14

## 2019-01-01 RX ADMIN — FUROSEMIDE 40 MG: 40 TABLET ORAL at 08:24

## 2019-01-01 RX ADMIN — LACTULOSE 10 G: 10 SOLUTION ORAL at 21:12

## 2019-01-01 RX ADMIN — INSULIN ASPART 3 UNITS: 100 INJECTION, SOLUTION INTRAVENOUS; SUBCUTANEOUS at 16:59

## 2019-01-01 RX ADMIN — GABAPENTIN 600 MG: 300 CAPSULE ORAL at 08:30

## 2019-01-01 RX ADMIN — FUROSEMIDE 40 MG: 40 TABLET ORAL at 17:22

## 2019-01-01 RX ADMIN — FLUTICASONE PROPIONATE 2 SPRAY: 50 SPRAY, METERED NASAL at 08:19

## 2019-01-01 RX ADMIN — SPIRONOLACTONE 100 MG: 100 TABLET ORAL at 08:19

## 2019-01-01 RX ADMIN — INSULIN ASPART 3 UNITS: 100 INJECTION, SOLUTION INTRAVENOUS; SUBCUTANEOUS at 20:41

## 2019-01-01 RX ADMIN — FUROSEMIDE 40 MG: 40 TABLET ORAL at 17:07

## 2019-01-01 RX ADMIN — GABAPENTIN 600 MG: 300 CAPSULE ORAL at 08:53

## 2019-01-01 RX ADMIN — FLUTICASONE PROPIONATE 2 SPRAY: 50 SPRAY, METERED NASAL at 08:53

## 2019-01-01 RX ADMIN — GABAPENTIN 600 MG: 300 CAPSULE ORAL at 09:27

## 2019-01-01 RX ADMIN — INSULIN DETEMIR 10 UNITS: 100 INJECTION, SOLUTION SUBCUTANEOUS at 08:33

## 2019-01-01 RX ADMIN — FERROUS SULFATE TAB 325 MG (65 MG ELEMENTAL FE) 325 MG: 325 (65 FE) TAB at 09:08

## 2019-01-01 RX ADMIN — GABAPENTIN 600 MG: 300 CAPSULE ORAL at 08:19

## 2019-01-01 RX ADMIN — GABAPENTIN 600 MG: 300 CAPSULE ORAL at 16:50

## 2019-01-01 RX ADMIN — ONDANSETRON 4 MG: 4 TABLET ORAL at 10:02

## 2019-01-01 RX ADMIN — LACTULOSE 10 G: 10 SOLUTION ORAL at 08:19

## 2019-01-01 RX ADMIN — PANTOPRAZOLE SODIUM 40 MG: 40 TABLET, DELAYED RELEASE ORAL at 17:14

## 2019-01-01 RX ADMIN — GABAPENTIN 600 MG: 300 CAPSULE ORAL at 20:43

## 2019-01-01 RX ADMIN — SPIRONOLACTONE 100 MG: 100 TABLET ORAL at 08:59

## 2019-01-01 RX ADMIN — FERROUS SULFATE TAB 325 MG (65 MG ELEMENTAL FE) 325 MG: 325 (65 FE) TAB at 17:07

## 2019-01-01 RX ADMIN — GABAPENTIN 600 MG: 300 CAPSULE ORAL at 20:39

## 2019-01-01 RX ADMIN — ASPIRIN 81 MG: 81 TABLET, CHEWABLE ORAL at 08:19

## 2019-01-01 RX ADMIN — FERROUS SULFATE TAB 325 MG (65 MG ELEMENTAL FE) 325 MG: 325 (65 FE) TAB at 11:57

## 2019-01-01 RX ADMIN — DICYCLOMINE HYDROCHLORIDE 10 MG: 10 CAPSULE ORAL at 08:39

## 2019-01-01 RX ADMIN — OXYCODONE HYDROCHLORIDE 10 MG: 5 TABLET ORAL at 01:06

## 2019-01-01 RX ADMIN — BUSPIRONE HYDROCHLORIDE 15 MG: 10 TABLET ORAL at 09:00

## 2019-01-01 RX ADMIN — INSULIN ASPART 5 UNITS: 100 INJECTION, SOLUTION INTRAVENOUS; SUBCUTANEOUS at 20:26

## 2019-01-01 RX ADMIN — BUSPIRONE HYDROCHLORIDE 15 MG: 10 TABLET ORAL at 08:33

## 2019-01-01 RX ADMIN — INSULIN ASPART 6 UNITS: 100 INJECTION, SOLUTION INTRAVENOUS; SUBCUTANEOUS at 11:57

## 2019-01-01 RX ADMIN — ALBUTEROL SULFATE 2.5 MG: 2.5 SOLUTION RESPIRATORY (INHALATION) at 19:43

## 2019-01-01 RX ADMIN — OXYCODONE HYDROCHLORIDE 10 MG: 5 TABLET ORAL at 10:02

## 2019-01-01 RX ADMIN — SPIRONOLACTONE 100 MG: 100 TABLET ORAL at 08:52

## 2019-01-01 RX ADMIN — BUSPIRONE HYDROCHLORIDE 15 MG: 10 TABLET ORAL at 08:19

## 2019-01-01 RX ADMIN — ONDANSETRON 4 MG: 4 TABLET ORAL at 10:11

## 2019-01-01 RX ADMIN — ASPIRIN 81 MG: 81 TABLET, CHEWABLE ORAL at 08:53

## 2019-01-01 RX ADMIN — PANTOPRAZOLE SODIUM 40 MG: 40 TABLET, DELAYED RELEASE ORAL at 16:39

## 2019-01-01 RX ADMIN — CASTOR OIL AND BALSAM, PERU: 788; 87 OINTMENT TOPICAL at 08:40

## 2019-01-01 RX ADMIN — BUSPIRONE HYDROCHLORIDE 15 MG: 10 TABLET ORAL at 08:39

## 2019-01-01 RX ADMIN — LACTULOSE 10 G: 10 SOLUTION ORAL at 20:38

## 2019-01-01 RX ADMIN — FUROSEMIDE 40 MG: 40 TABLET ORAL at 08:33

## 2019-01-01 RX ADMIN — PANTOPRAZOLE SODIUM 40 MG: 40 TABLET, DELAYED RELEASE ORAL at 08:23

## 2019-01-01 RX ADMIN — SPIRONOLACTONE 100 MG: 100 TABLET ORAL at 09:07

## 2019-01-01 RX ADMIN — CASTOR OIL AND BALSAM, PERU: 788; 87 OINTMENT TOPICAL at 20:15

## 2019-01-01 RX ADMIN — FERROUS SULFATE TAB 325 MG (65 MG ELEMENTAL FE) 325 MG: 325 (65 FE) TAB at 07:36

## 2019-01-01 RX ADMIN — OXYCODONE HYDROCHLORIDE 10 MG: 5 TABLET ORAL at 00:34

## 2019-01-01 RX ADMIN — ASPIRIN 81 MG: 81 TABLET, CHEWABLE ORAL at 09:07

## 2019-01-01 RX ADMIN — FUROSEMIDE 40 MG: 40 TABLET ORAL at 09:28

## 2019-01-01 RX ADMIN — OXYCODONE HYDROCHLORIDE 10 MG: 5 TABLET ORAL at 08:40

## 2019-01-01 RX ADMIN — GABAPENTIN 600 MG: 300 CAPSULE ORAL at 16:31

## 2019-01-01 RX ADMIN — INSULIN ASPART 6 UNITS: 100 INJECTION, SOLUTION INTRAVENOUS; SUBCUTANEOUS at 17:22

## 2019-01-01 RX ADMIN — FERROUS SULFATE TAB 325 MG (65 MG ELEMENTAL FE) 325 MG: 325 (65 FE) TAB at 17:22

## 2019-01-01 RX ADMIN — GABAPENTIN 600 MG: 300 CAPSULE ORAL at 20:59

## 2019-01-01 RX ADMIN — DICYCLOMINE HYDROCHLORIDE 10 MG: 10 CAPSULE ORAL at 08:33

## 2019-01-01 RX ADMIN — LACTULOSE 10 G: 10 SOLUTION ORAL at 08:23

## 2019-01-01 RX ADMIN — FUROSEMIDE 40 MG: 40 TABLET ORAL at 08:30

## 2019-01-01 RX ADMIN — GABAPENTIN 600 MG: 300 CAPSULE ORAL at 08:39

## 2019-01-01 RX ADMIN — GABAPENTIN 600 MG: 300 CAPSULE ORAL at 09:00

## 2019-01-01 RX ADMIN — INSULIN ASPART 6 UNITS: 100 INJECTION, SOLUTION INTRAVENOUS; SUBCUTANEOUS at 17:07

## 2019-01-01 RX ADMIN — FUROSEMIDE 40 MG: 40 TABLET ORAL at 08:23

## 2019-01-01 RX ADMIN — CASTOR OIL AND BALSAM, PERU 1 EACH: 788; 87 OINTMENT TOPICAL at 08:47

## 2019-01-01 RX ADMIN — INSULIN ASPART 6 UNITS: 100 INJECTION, SOLUTION INTRAVENOUS; SUBCUTANEOUS at 06:37

## 2019-01-01 RX ADMIN — HYDROCODONE BITARTRATE AND ACETAMINOPHEN 1 TABLET: 10; 325 TABLET ORAL at 21:51

## 2019-01-01 RX ADMIN — LACTULOSE 10 G: 10 SOLUTION ORAL at 20:27

## 2019-01-01 RX ADMIN — INSULIN ASPART 5 UNITS: 100 INJECTION, SOLUTION INTRAVENOUS; SUBCUTANEOUS at 16:41

## 2019-01-01 RX ADMIN — PANTOPRAZOLE SODIUM 40 MG: 40 TABLET, DELAYED RELEASE ORAL at 16:45

## 2019-01-01 RX ADMIN — INSULIN ASPART 5 UNITS: 100 INJECTION, SOLUTION INTRAVENOUS; SUBCUTANEOUS at 16:34

## 2019-01-01 RX ADMIN — GABAPENTIN 600 MG: 300 CAPSULE ORAL at 10:09

## 2019-01-01 RX ADMIN — INSULIN DETEMIR 13 UNITS: 100 INJECTION, SOLUTION SUBCUTANEOUS at 20:25

## 2019-01-01 RX ADMIN — CASTOR OIL AND BALSAM, PERU 1 EACH: 788; 87 OINTMENT TOPICAL at 20:25

## 2019-01-01 RX ADMIN — DICYCLOMINE HYDROCHLORIDE 10 MG: 10 CAPSULE ORAL at 08:24

## 2019-01-01 RX ADMIN — OXYCODONE HYDROCHLORIDE 10 MG: 5 TABLET ORAL at 16:59

## 2019-01-01 RX ADMIN — FUROSEMIDE 40 MG: 40 TABLET ORAL at 17:27

## 2019-01-01 RX ADMIN — FUROSEMIDE 40 MG: 40 TABLET ORAL at 17:13

## 2019-01-01 RX ADMIN — OXYCODONE HYDROCHLORIDE 10 MG: 5 TABLET ORAL at 08:18

## 2019-01-01 RX ADMIN — INSULIN DETEMIR 10 UNITS: 100 INJECTION, SOLUTION SUBCUTANEOUS at 08:21

## 2019-01-01 RX ADMIN — BUSPIRONE HYDROCHLORIDE 15 MG: 10 TABLET ORAL at 08:24

## 2019-01-01 RX ADMIN — PANTOPRAZOLE SODIUM 40 MG: 40 TABLET, DELAYED RELEASE ORAL at 08:24

## 2019-01-01 RX ADMIN — GABAPENTIN 600 MG: 300 CAPSULE ORAL at 20:45

## 2019-01-01 RX ADMIN — OXYCODONE HYDROCHLORIDE 10 MG: 5 TABLET ORAL at 17:07

## 2019-01-01 RX ADMIN — LACTULOSE 10 G: 10 SOLUTION ORAL at 08:53

## 2019-01-01 RX ADMIN — DICYCLOMINE HYDROCHLORIDE 10 MG: 10 CAPSULE ORAL at 08:29

## 2019-01-01 RX ADMIN — LACTULOSE 10 G: 10 SOLUTION ORAL at 20:43

## 2019-01-01 RX ADMIN — INSULIN ASPART 5 UNITS: 100 INJECTION, SOLUTION INTRAVENOUS; SUBCUTANEOUS at 09:11

## 2019-01-01 RX ADMIN — DICYCLOMINE HYDROCHLORIDE 10 MG: 10 CAPSULE ORAL at 08:23

## 2019-01-01 RX ADMIN — CASTOR OIL AND BALSAM, PERU 1 EACH: 788; 87 OINTMENT TOPICAL at 20:38

## 2019-01-01 RX ADMIN — INSULIN DETEMIR 13 UNITS: 100 INJECTION, SOLUTION SUBCUTANEOUS at 20:40

## 2019-01-01 RX ADMIN — FERROUS SULFATE TAB 325 MG (65 MG ELEMENTAL FE) 325 MG: 325 (65 FE) TAB at 11:58

## 2019-01-01 RX ADMIN — FERROUS SULFATE TAB 325 MG (65 MG ELEMENTAL FE) 325 MG: 325 (65 FE) TAB at 12:40

## 2019-01-01 RX ADMIN — INSULIN ASPART 3 UNITS: 100 INJECTION, SOLUTION INTRAVENOUS; SUBCUTANEOUS at 09:31

## 2019-01-01 RX ADMIN — PANTOPRAZOLE SODIUM 40 MG: 40 TABLET, DELAYED RELEASE ORAL at 08:53

## 2019-01-01 RX ADMIN — FERROUS SULFATE TAB 325 MG (65 MG ELEMENTAL FE) 325 MG: 325 (65 FE) TAB at 08:39

## 2019-01-01 RX ADMIN — SPIRONOLACTONE 100 MG: 100 TABLET ORAL at 08:24

## 2019-01-01 RX ADMIN — LACTULOSE 10 G: 10 SOLUTION ORAL at 20:45

## 2019-01-01 RX ADMIN — OXYCODONE HYDROCHLORIDE 10 MG: 5 TABLET ORAL at 16:45

## 2019-01-01 RX ADMIN — GABAPENTIN 600 MG: 300 CAPSULE ORAL at 16:33

## 2019-01-01 RX ADMIN — OXYCODONE HYDROCHLORIDE 10 MG: 5 TABLET ORAL at 09:00

## 2019-01-01 RX ADMIN — DICYCLOMINE HYDROCHLORIDE 10 MG: 10 CAPSULE ORAL at 08:53

## 2019-01-01 RX ADMIN — FERROUS SULFATE TAB 325 MG (65 MG ELEMENTAL FE) 325 MG: 325 (65 FE) TAB at 17:38

## 2019-01-01 RX ADMIN — SPIRONOLACTONE 100 MG: 100 TABLET ORAL at 08:39

## 2019-01-01 RX ADMIN — LACTULOSE 10 G: 10 SOLUTION ORAL at 10:23

## 2019-01-01 RX ADMIN — FLUTICASONE PROPIONATE 2 SPRAY: 50 SPRAY, METERED NASAL at 09:26

## 2019-01-01 RX ADMIN — FUROSEMIDE 40 MG: 40 TABLET ORAL at 17:38

## 2019-01-01 RX ADMIN — ONDANSETRON 4 MG: 4 TABLET ORAL at 09:57

## 2019-01-01 RX ADMIN — PANTOPRAZOLE SODIUM 40 MG: 40 TABLET, DELAYED RELEASE ORAL at 17:22

## 2019-01-01 RX ADMIN — INSULIN DETEMIR 13 UNITS: 100 INJECTION, SOLUTION SUBCUTANEOUS at 08:34

## 2019-01-01 RX ADMIN — FUROSEMIDE 40 MG: 40 TABLET ORAL at 08:39

## 2019-01-01 RX ADMIN — SPIRONOLACTONE 100 MG: 100 TABLET ORAL at 10:02

## 2019-01-01 RX ADMIN — OXYCODONE HYDROCHLORIDE 10 MG: 5 TABLET ORAL at 17:24

## 2019-01-01 RX ADMIN — GABAPENTIN 600 MG: 300 CAPSULE ORAL at 08:18

## 2019-01-01 RX ADMIN — AMIODARONE HYDROCHLORIDE 200 MG: 200 TABLET ORAL at 09:00

## 2019-01-01 RX ADMIN — FUROSEMIDE 40 MG: 40 TABLET ORAL at 17:23

## 2019-01-01 RX ADMIN — DICYCLOMINE HYDROCHLORIDE 10 MG: 10 CAPSULE ORAL at 09:07

## 2019-01-01 RX ADMIN — LACTULOSE 10 G: 10 SOLUTION ORAL at 09:00

## 2019-01-01 RX ADMIN — PANTOPRAZOLE SODIUM 40 MG: 40 TABLET, DELAYED RELEASE ORAL at 06:37

## 2019-01-01 RX ADMIN — SPIRONOLACTONE 100 MG: 100 TABLET ORAL at 08:33

## 2019-01-01 RX ADMIN — FLUTICASONE PROPIONATE 2 SPRAY: 50 SPRAY, METERED NASAL at 09:07

## 2019-01-01 RX ADMIN — OXYCODONE HYDROCHLORIDE 10 MG: 5 TABLET ORAL at 00:49

## 2019-01-01 RX ADMIN — BUSPIRONE HYDROCHLORIDE 15 MG: 10 TABLET ORAL at 08:29

## 2019-01-01 RX ADMIN — INSULIN ASPART 6 UNITS: 100 INJECTION, SOLUTION INTRAVENOUS; SUBCUTANEOUS at 08:23

## 2019-01-01 RX ADMIN — CASTOR OIL AND BALSAM, PERU: 788; 87 OINTMENT TOPICAL at 08:19

## 2019-01-01 RX ADMIN — LACTULOSE 10 G: 10 SOLUTION ORAL at 20:14

## 2019-01-01 RX ADMIN — ALBUTEROL SULFATE 2.5 MG: 2.5 SOLUTION RESPIRATORY (INHALATION) at 19:38

## 2019-01-01 RX ADMIN — SPIRONOLACTONE 100 MG: 100 TABLET ORAL at 08:18

## 2019-01-01 RX ADMIN — FERROUS SULFATE TAB 325 MG (65 MG ELEMENTAL FE) 325 MG: 325 (65 FE) TAB at 17:00

## 2019-01-01 RX ADMIN — OXYCODONE HYDROCHLORIDE 10 MG: 5 TABLET ORAL at 08:47

## 2019-01-01 RX ADMIN — ASPIRIN 81 MG: 81 TABLET, CHEWABLE ORAL at 08:33

## 2019-01-01 RX ADMIN — INSULIN DETEMIR 13 UNITS: 100 INJECTION, SOLUTION SUBCUTANEOUS at 08:19

## 2019-01-01 RX ADMIN — AMIODARONE HYDROCHLORIDE 200 MG: 200 TABLET ORAL at 08:34

## 2019-01-01 RX ADMIN — ACETAMINOPHEN 650 MG: 325 TABLET ORAL at 02:55

## 2019-01-01 RX ADMIN — INSULIN ASPART 4 UNITS: 100 INJECTION, SOLUTION INTRAVENOUS; SUBCUTANEOUS at 20:50

## 2019-01-01 RX ADMIN — ASPIRIN 81 MG: 81 TABLET, CHEWABLE ORAL at 08:39

## 2019-01-01 RX ADMIN — OXYCODONE HYDROCHLORIDE 10 MG: 5 TABLET ORAL at 01:57

## 2019-01-01 RX ADMIN — BUSPIRONE HYDROCHLORIDE 15 MG: 10 TABLET ORAL at 10:52

## 2019-01-01 RX ADMIN — PANTOPRAZOLE SODIUM 40 MG: 40 TABLET, DELAYED RELEASE ORAL at 21:50

## 2019-01-01 RX ADMIN — INSULIN ASPART 2 UNITS: 100 INJECTION, SOLUTION INTRAVENOUS; SUBCUTANEOUS at 07:19

## 2019-01-01 RX ADMIN — INSULIN DETEMIR 10 UNITS: 100 INJECTION, SOLUTION SUBCUTANEOUS at 08:18

## 2019-01-01 RX ADMIN — INSULIN ASPART 6 UNITS: 100 INJECTION, SOLUTION INTRAVENOUS; SUBCUTANEOUS at 08:34

## 2019-01-01 RX ADMIN — FERROUS SULFATE TAB 325 MG (65 MG ELEMENTAL FE) 325 MG: 325 (65 FE) TAB at 08:33

## 2019-01-01 RX ADMIN — INSULIN ASPART 7 UNITS: 100 INJECTION, SOLUTION INTRAVENOUS; SUBCUTANEOUS at 06:59

## 2019-01-01 RX ADMIN — PANTOPRAZOLE SODIUM 40 MG: 40 TABLET, DELAYED RELEASE ORAL at 08:59

## 2019-01-01 RX ADMIN — PANTOPRAZOLE SODIUM 40 MG: 40 TABLET, DELAYED RELEASE ORAL at 17:00

## 2019-01-01 RX ADMIN — ALBUTEROL SULFATE 2.5 MG: 2.5 SOLUTION RESPIRATORY (INHALATION) at 19:45

## 2019-01-01 RX ADMIN — CASTOR OIL AND BALSAM, PERU 1 EACH: 788; 87 OINTMENT TOPICAL at 08:31

## 2019-01-01 RX ADMIN — FUROSEMIDE 40 MG: 40 TABLET ORAL at 18:29

## 2019-01-01 RX ADMIN — PANTOPRAZOLE SODIUM 40 MG: 40 TABLET, DELAYED RELEASE ORAL at 16:33

## 2019-01-01 RX ADMIN — GABAPENTIN 600 MG: 300 CAPSULE ORAL at 20:15

## 2019-01-01 RX ADMIN — INSULIN ASPART 6 UNITS: 100 INJECTION, SOLUTION INTRAVENOUS; SUBCUTANEOUS at 11:20

## 2019-01-01 RX ADMIN — INSULIN DETEMIR 10 UNITS: 100 INJECTION, SOLUTION SUBCUTANEOUS at 21:01

## 2019-01-01 RX ADMIN — GABAPENTIN 600 MG: 300 CAPSULE ORAL at 16:40

## 2019-01-01 RX ADMIN — GABAPENTIN 600 MG: 300 CAPSULE ORAL at 16:44

## 2019-01-01 RX ADMIN — INSULIN ASPART 4 UNITS: 100 INJECTION, SOLUTION INTRAVENOUS; SUBCUTANEOUS at 13:01

## 2019-12-16 PROBLEM — R53.81 DEBILITY: Status: ACTIVE | Noted: 2019-01-01

## 2019-12-17 PROBLEM — K74.60 LIVER CIRRHOSIS (HCC): Status: ACTIVE | Noted: 2019-01-01

## 2019-12-17 NOTE — PROGRESS NOTES
Malnutrition Severity Assessment    Patient Name:  Gretta Giles  YOB: 1960  MRN: [id number]  Admit Date:  12/16/2019    Patient meets criteria for : Severe Malnutrition    Comments:  Please review and attest if agree with RD assessment    Malnutrition Severity Assessment  Malnutrition Type: Chronic Disease - Related Malnutrition     Malnutrition Type (last 8 hours)      Malnutrition Severity Assessment     Row Name 12/17/19 1437       Malnutrition Severity Assessment    Malnutrition Type  Chronic Disease - Related Malnutrition    Row Name 12/17/19 1438       Muscle Loss    Loss of Muscle Mass Findings  Severe    [place]  Severe - deep hollowing/scooping, lack of muscle to touch, facial bones well defined    Clavicle Bone Region  Severe - protruding prominent bone    Acromion Bone Region  Severe - squared shoulders, bones, and acromion process protrusion prominent    Scapular Bone Region  Severe - prominent bones, depressions easily visible between ribs, scapula, spine, shoulders    Dorsal Hand Region  Severe - prominent depression    Patellar Region  Severe - prominent bone, square looking, very little muscle definition    Anterior Thigh Region  Severe - line/depression along thigh, obviously thin    Posterior Calf Region  Severe - thin with very little definition/firmness    Row Name 12/17/19 1438       Fat Loss    Subcutaneous Fat Loss Findings  Severe    Orbital Region   Severe - pronounced hollowness/depression, dark circles, loose saggy skin    Upper Arm Region  Severe - mostly skin, very little space between folds, fingers touch    Thoracic & Lumbar Region  Severe - ribs visible with prominent depressions, iliac crest very prominent    Row Name 12/17/19 1438       Fluid Accumulation (Edema)    Fluid Acumulation Findings  Severe    Fluid Accumulation   Severe equals 3+ or 4+ pitting edema abdominal    Row Name 12/17/19 1438       Criteria Met (Must meet criteria for severity in at least 2  of these categories: M Wasting, Fat Loss, Fluid, Secondary Signs, Wt. Status, Intake)    Patient meets criteria for   Severe Malnutrition          Electronically signed by:  Nannette Owen RD  12/17/19 2:45 PM

## 2019-12-17 NOTE — THERAPY EVALUATION
Inpatient Rehabilitation - Occupational Therapy Initial Evaluation    [facility]     Patient Name: Gretta Giles  : 1960  MRN: [id number]    Today's Date: 2019                 Admit Date: 2019       No diagnosis found.    Patient Active Problem List   Diagnosis   • Gastroesophageal reflux disease   • Atopic rhinitis   • Chronic low back pain   • Chronic obstructive pulmonary disease (CMS/HCC)   • Chronic obstructive pulmonary disease with acute exacerbation (CMS/HCC)   • Diabetes mellitus (CMS/HCC)   • Diabetic peripheral neuropathy (CMS/HCC)   • Dyslipidemia   • Nonalcoholic fatty liver disease   • Gastrointestinal ulcer due to Helicobacter pylori   • Hypertension   • Menopausal symptom   • Mixed anxiety depressive disorder   • Seasonal allergic rhinitis   • Tobacco dependence syndrome   • Vitamin D deficiency   • Hyperlipidemia   • Anxiety and depression   • Menopause syndrome   • Allergic rhinitis   • Type 2 diabetes mellitus, controlled (CMS/HCC)   • GERD without esophagitis   • Nausea   • Debility   • Liver cirrhosis (CMS/HCC)       Past Medical History:   Diagnosis Date   • Acute renal failure (ARF) (CMS/HCC)    • Atrial fibrillation (CMS/HCC)    • CHF (congestive heart failure) (CMS/HCC)    • Chronic hyponatremia    • COPD (chronic obstructive pulmonary disease) (CMS/HCC)    • Diabetes mellitus (CMS/HCC)    • Esophageal varices (CMS/HCC)    • Hepatitis C    • History of abdominal paracentesis    • History of transfusion    • Hyperlipidemia    • Hypertension    • Liver cirrhosis (CMS/HCC)     medical record   • Seizure disorder (CMS/HCC)     grand mal   • Sepsis (CMS/HCC)        Past Surgical History:   Procedure Laterality Date   • ANKLE SURGERY     • CHOLECYSTECTOMY      about 20 years ago per patient   • GALLBLADDER SURGERY     • HYSTERECTOMY     • HYSTERECTOMY      about 20 years ago   • KNEE SURGERY     • REPLACEMENT TOTAL KNEE              IRF OT ASSESSMENT FLOWSHEET (last 72 hours)       IRF Occupational Therapy Evaluation     Row Name 12/17/19 1429                   Evaluation/Treatment Time and Intent    Subjective Information  complains of;pain c/o back and BLE pain.  RN notified.  Agreeable to therapy.  -        Existing Precautions/Restrictions  fall;other (see comments) ABD drain-ostomy bag; Hep C, decreased skin integrity  -        Document Type  evaluation;therapy note (daily note)  -        Mode of Treatment  occupational therapy  -        Row Name 12/17/19 1429                   Relationship/Environment    Lives With  grandchild(annamaria) lives with granddaughter  -        Row Name 12/17/19 1429                   Living Environment    Living Arrangements  house  -        Home Accessibility  tub/shower is not walk in  -        Row Name 12/17/19 1429                   Home Use of Assistive/Adaptive Equipment    Equipment Currently Used at Home  rollator;cane, straight  -        Row Name 12/17/19 1429                   Cognition/Psychosocial- PT/OT    Affect/Mental Status (Cognitive)  WFL  -        Follows Commands (Cognition)  verbal cues/prompting required  -        Personal Safety Interventions  nonskid shoes/slippers when out of bed;supervised activity;gait belt  -        Row Name 12/17/19 1429                   Bed-Chair Transfer    Bed-Chair [place] (Transfers)  minimum assist (75% patient effort);verbal cues  -        Assistive Device (Bed-Chair Transfers)  wheelchair  -        Row Name 12/17/19 1429                   Chair-Bed Transfer    Chair-Bed [place] (Transfers)  minimum assist (75% patient effort);verbal cues  -        Assistive Device (Chair-Bed Transfers)  wheelchair  -        Row Name 12/17/19 1429                   Toilet Transfer    [place] Level (Toilet Transfer)  minimum assist (75% patient effort);verbal cues  -        Assistive Device (Toilet Transfer)  grab bars/safety frame  -        Row Name 12/17/19 1424                    Basic Activities of Daily Living (BADLs)          Bathing Assessment/Treatment    Comment (Bathing)  ModA  -        Row Name 12/17/19 1429                   Upper Body Dressing Assessment/Treatment    Comment (Upper Body Dressing)  Set up/ supervision  -        Row Name 12/17/19 1429                   Lower Body Dressing Assessment/Treatment    Comment (Lower Body Dressing)  ModA  -        Row Name 12/17/19 1429                   Grooming Assessment/Treatment    Comment (Grooming)  Set up/ supervision  -        Row Name 12/17/19 1429                   Toileting Assessment/Treatment    Assistive Device Use (Toileting)  grab bar/safety frame  -        Comment (Toileting)  ModA  -        Row Name 12/17/19 1429                   Self-Feeding Assessment/Treatment    [place] Level (Self-Feeding)  set up  -        Row Name 12/17/19 1429                   General ROM    GENERAL ROM COMMENTS  BUE AROM- [facility]  -        Row Name 12/17/19 1429                   MMT (Manual Muscle Testing)    General MMT Comments  BUE - 3+ to 4-/5  -        Row Name 12/17/19 1429                   Sensory    Sensory General Assessment  -- BUE light touch - [facility]  -[facility] Name 12/17/19 1429                   Vision Assessment/Intervention    Visual Impairment/Limitations  corrective lenses for reading  -        Row Name 12/17/19 1429                   Therapeutic Exercise          Upper Extremity Seated Therapeutic Exercise    Exercise Type, Seated Upper Extremity (Therapeutic Exercise)  AROM (active range of motion) BUE ther ex/act, bilat coord ex, GMC/FMC  -        Expected Outcomes, Seated Upper Extremity (Therapeutic Exercise)  improve functional tolerance, self-care activity;improve performance, BADLs;improve performance, transfer skills  -        Row Name 12/17/19 1429                   OT Clinical Impression    General Observations of Patient  Diagnosis:  Debility- Afib, cirrhosis, sepsis, renal failure,  resp failure, abd. ascites  -        Patient's Goals For Discharge  return home  -        Rehab Potential/Prognosis: Occupational Therapy  adequate, monitor progress closely  -        Frequency of Treatment (OT Eval)  5 times per week  -        Estimated Length of Stay (OT Eval)  2 weeks 10-14 days  -        Problem List: Occupational Therapy  strength decreased;impaired balance impaired ADL's, fxal mobility and activity tolerance  -        Anticipated Equipment Needs At Discharge (OT Eval)  -- TBD  -        Expected Discharge Disposition (OT Eval)  -- home with assistance or SNF  -        Planned Therapy Interventions (OT Eval)  activity tolerance training;BADL retraining;adaptive equipment training;occupation/activity based interventions;patient/caregiver education/training;ROM/therapeutic exercise;strengthening exercise;transfer/mobility retraining  -        Row Name 12/17/19 1429                   IRF OT Goals    Bathing Goal Selection (OT-IRF)  bathing, OT goal 1  -        LB Dressing Goal Selection (OT-IRF)  LB dressing, OT goal 1;LB dressing, OT goal 2  -        Toileting Goal Selection (OT-IRF)  toileting, OT goal 1;toileting, OT goal 2  -        Additional Documentation  Toileting Goal Selection (OT-IRF) (Row)  -        Row Name 12/17/19 7559                   Bathing Goal 1 (OT-IRF)    [place] Level (Bathing Goal 1, OT-IRF)  minimum assist (75% or more patient effort)  -        Time Frame (Bathing Goal 1, OT-IRF)  long term goal (LTG);by discharge  -        Row Name 12/17/19 1424                   LB Dressing Goal 1 (OT-IRF)    [place] (LB Dressing Goal 1, OT-IRF)  minimum assist (75% or more patient effort)  -        Time Frame (LB Dressing Goal 1, OT-IRF)  short term goal (STG)  -        Row Name 12/17/19 1427                   LB Dressing Goal 2 (OT-IRF)    [place] (LB Dressing Goal 2, OT-IRF)  contact guard assist  -        Time Frame (LB Dressing Goal  2, OT-IRF)  long term goal (LTG);by discharge  -        Row Name 19 1429                   Toileting Goal 1 (OT-IRF)    [place] Level (Toileting Goal 1, OT-IRF)  minimum assist (75% or more patient effort)  -        Time Frame (Toileting Goal 1, OT-IRF)  short term goal (STG)  -        Row Name 19 1429                   Toileting Goal 2 (OT-IRF)    [place] Level (Toileting Goal 2, OT-IRF)  contact guard assist  -        Time Frame (Toileting Goal 2, OT-IRF)  long term goal (LTG);by discharge  -        Row Name 19 1429                   Positioning and Restraints    Post Treatment Position  bed  -CJ        In Bed  supine;call light within reach;encouraged to call for assist;heels elevated;with nsg post am tx  -CJ        In Wheelchair  sitting;call light within reach;encouraged to call for assist;with nsg post pm tx  -CJ          User Key  (r) = Recorded By, (t) = Taken By, (c) = Cosigned By    Initials Name Effective Dates     Anika Gutierrez OT 18 -                    OT Recommendation and Plan    Planned Therapy Interventions (OT Eval): activity tolerance training, BADL retraining, adaptive equipment training, occupation/activity based interventions, patient/caregiver education/training, ROM/therapeutic exercise, strengthening exercise, transfer/mobility retraining                    Time Calculation:     OT Start Time: 1235  OT Stop Time: 1330  OT Time Calculation (min): 55 min  OT Non-Billable Time (min): 40 min  Therapy Charges for Today     Code Description Service Date Service Provider Modifiers Qty    09805764165 HC OT EVAL MOD COMPLEXITY 3 2019 Anika Gutierrez OT GO 1    [id number]  OT THERAPEUTIC ACT EA 15 MIN 2019 Anika Gutierrez OT GO 3                   Anika Gutierrez OT  2019 and Inpatient Rehabilitation - Occupational Therapy Treatment Note    [facility]     Patient Name: Gretta Giles  : 1960  MRN: [id number]    Today's  Date: 12/17/2019                 Admit Date: 12/16/2019      Visit Dx:  No diagnosis found.    Patient Active Problem List   Diagnosis   • Gastroesophageal reflux disease   • Atopic rhinitis   • Chronic low back pain   • Chronic obstructive pulmonary disease (CMS/HCC)   • Chronic obstructive pulmonary disease with acute exacerbation (CMS/HCC)   • Diabetes mellitus (CMS/HCC)   • Diabetic peripheral neuropathy (CMS/HCC)   • Dyslipidemia   • Nonalcoholic fatty liver disease   • Gastrointestinal ulcer due to Helicobacter pylori   • Hypertension   • Menopausal symptom   • Mixed anxiety depressive disorder   • Seasonal allergic rhinitis   • Tobacco dependence syndrome   • Vitamin D deficiency   • Hyperlipidemia   • Anxiety and depression   • Menopause syndrome   • Allergic rhinitis   • Type 2 diabetes mellitus, controlled (CMS/HCC)   • GERD without esophagitis   • Nausea   • Debility   • Liver cirrhosis (CMS/HCC)         Therapy Treatment        Wound 12/16/19 midline coccyx Pressure Injury (Active)   Wound Image   12/16/2019  8:32 PM   Dressing Appearance dry;intact 12/17/2019 10:08 AM   Closure None 12/16/2019  8:32 PM   Base non-blanchable 12/17/2019 10:08 AM   Wound Length (cm) 1 cm 12/16/2019  8:32 PM   Wound Width (cm) 1 cm 12/16/2019  8:32 PM   Drainage Amount none 12/17/2019 10:08 AM   Dressing Care, Wound dressing applied 12/16/2019  8:32 PM       Wound 12/16/19 2000 Right coccyx Pressure Injury (Active)   Wound Image   12/16/2019  8:32 PM   Dressing Appearance dry;intact 12/17/2019 10:08 AM   Closure None 12/16/2019  8:32 PM   Base slough 12/17/2019 10:08 AM   Wound Length (cm) 0.3 cm 12/16/2019  8:32 PM   Wound Width (cm) 0.3 cm 12/16/2019  8:32 PM   Drainage Amount none 12/16/2019  8:32 PM   Care, Wound other (see comments) 12/16/2019  8:32 PM   Dressing Care, Wound dressing applied 12/16/2019  8:32 PM       Wound 12/16/19 2000 Left posterior heel (Active)   Wound Image   12/16/2019  8:32 PM   Dressing  Appearance dry;intact 12/17/2019 10:08 AM   Closure None 12/16/2019  8:32 PM   Base red 12/17/2019 10:08 AM   Wound Length (cm) 1 cm 12/16/2019  8:32 PM   Wound Width (cm) 0.4 cm 12/16/2019  8:32 PM   Drainage Amount none 12/17/2019 10:08 AM   Dressing Care, Wound dressing applied 12/16/2019  8:32 PM       Wound Right posterior heel Pressure Injury (Active)   Wound Image   12/16/2019  8:32 PM   Dressing Appearance dry;intact 12/17/2019 10:08 AM   Closure None 12/16/2019  8:32 PM   Wound Length (cm) 1 cm 12/16/2019  8:32 PM   Wound Width (cm) 0.4 cm 12/16/2019  8:32 PM   Drainage Amount none 12/17/2019 10:08 AM   Dressing Care, Wound dressing applied 12/16/2019  8:32 PM       Wound 12/16/19 2000 Right calf Abrasion (Active)   Wound Image   12/16/2019  8:32 PM   Dressing Appearance dry;intact 12/17/2019 10:08 AM   Drainage Amount none 12/17/2019 10:08 AM       Wound 12/16/19 2000 Left upper gluteal Pressure Injury (Active)   Wound Image   12/16/2019  8:32 PM   Dressing Appearance dry;intact 12/17/2019 10:08 AM   Closure None 12/16/2019  8:32 PM   Base red;yellow;non-blanchable 12/17/2019 10:08 AM   Edges irregular 12/17/2019 10:08 AM   Wound Length (cm) 1 cm 12/16/2019  8:32 PM   Wound Width (cm) 0.3 cm 12/16/2019  8:32 PM   Drainage Amount none 12/17/2019 10:08 AM   Dressing Care, Wound dressing applied 12/16/2019  8:32 PM       Wound 12/16/19 2000 Left lower gluteal Pressure Injury (Active)   Wound Image   12/16/2019  8:32 PM   Dressing Appearance open to air 12/17/2019 10:08 AM   Closure None 12/16/2019  8:32 PM   Base non-blanchable;red 12/17/2019 10:08 AM   Edges irregular 12/17/2019 10:08 AM   Wound Length (cm) 1 cm 12/16/2019  8:32 PM   Wound Width (cm) 1 cm 12/16/2019  8:32 PM       Wound 12/16/19 2000 Bilateral back Abrasion (Active)   Wound Image   12/16/2019  8:32 PM   Dressing Appearance open to air 12/17/2019 10:08 AM         OT Recommendation and Plan    Anticipated Equipment Needs At Discharge (OT  Eval): (TBD)  Planned Therapy Interventions (OT Eval): activity tolerance training, BADL retraining, adaptive equipment training, occupation/activity based interventions, patient/caregiver education/training, ROM/therapeutic exercise, strengthening exercise, transfer/mobility retraining            OT IRF GOALS     Row Name 12/17/19 1429             Bathing Goal 1 (OT-IRF)    [place] Level (Bathing Goal 1, OT-IRF)  minimum assist (75% or more patient effort)  -CJ      Time Frame (Bathing Goal 1, OT-IRF)  long term goal (LTG);by discharge  -CJ         LB Dressing Goal 1 (OT-IRF)    [place] (LB Dressing Goal 1, OT-IRF)  minimum assist (75% or more patient effort)  -CJ      Time Frame (LB Dressing Goal 1, OT-IRF)  short term goal (STG)  -CJ         LB Dressing Goal 2 (OT-IRF)    [place] (LB Dressing Goal 2, OT-IRF)  contact guard assist  -CJ      Time Frame (LB Dressing Goal 2, OT-IRF)  long term goal (LTG);by discharge  -CJ         Toileting Goal 1 (OT-IRF)    [place] Level (Toileting Goal 1, OT-IRF)  minimum assist (75% or more patient effort)  -CJ      Time Frame (Toileting Goal 1, OT-IRF)  short term goal (STG)  -CJ         Toileting Goal 2 (OT-IRF)    [place] Level (Toileting Goal 2, OT-IRF)  contact guard assist  -CJ      Time Frame (Toileting Goal 2, OT-IRF)  long term goal (LTG);by discharge  -CJ        User Key  (r) = Recorded By, (t) = Taken By, (c) = Cosigned By    Initials Name Provider Type    Anika Solis, OT Occupational Therapist                     Time Calculation:     Time Calculation- OT     Row Name 12/17/19 1459 12/17/19 1110          Time Calculation- OT    OT Start Time  1235  -  1110  -     OT Stop Time  1330  -  1145  -     OT Time Calculation (min)  55 min  -  35 min  -     Total Timed Code Minutes- OT  55 minute(s)  -CJ  35 minute(s)  -     OT Non-Billable Time (min)  --  40 min  -       User Key  (r) = Recorded By, (t) = Taken By, (c) =  Cosigned By    Initials Name Provider Type    CJ Anika Gutierrez, OT Occupational Therapist          Therapy Charges for Today     Code Description Service Date Service Provider Modifiers Qty    35078387552  OT EVAL MOD COMPLEXITY 3 12/17/2019 Anika Gutierrez, OT GO 1    [id number]  OT THERAPEUTIC ACT EA 15 MIN 12/17/2019 Anika Gutierrez OT GO 3                   Anika Gutierrez OT  12/17/2019

## 2019-12-17 NOTE — NURSING NOTE
Patient has 2 unstageable wounds to her midline and right coccyx.  She has 2 unstageable wounds to her left uper and left lower gluteal.  Patient has right and left heels that are non blanchable and have scabbing.  Patient has two linear red and non blanchable lines to R calf.  She states this was from boots she wore before.  Assessments are below and wound care orders are noted.     12/17/19 1650   Wound 12/16/19 midline coccyx Pressure Injury   Date first assessed: 12/16/19   Present on Hospital Admission: Yes  Orientation: midline  Location: coccyx  Primary Wound Type: Pressure Injury   Dressing Appearance dry;intact   Closure None   Base non-blanchable;moist;slough   Periwound intact;blanchable   Periwound Temperature warm   Periwound Skin Turgor soft   Edges rolled/closed   Drainage Amount none   Wound 12/16/19 2000 Right coccyx Pressure Injury   Date first assessed/Time first assessed: 12/16/19 2000   Side: Right  Location: coccyx  Primary Wound Type: Pressure Injury   Dressing Appearance intact;dry   Closure None   Base non-blanchable;slough   Periwound intact   Periwound Temperature warm   Periwound Skin Turgor soft   Drainage Amount none   Wound 12/16/19 2000 Left posterior heel   Date first assessed/Time first assessed: 12/16/19 2000   Side: Left  Orientation: posterior  Location: heel   Dressing Appearance dry;intact   Closure None   Base blanchable;red  (small amount of scab)   Periwound intact   Periwound Temperature warm   Periwound Skin Turgor soft   Edges rolled/closed   Drainage Amount none   Wound Right posterior heel Pressure Injury   No Date first assessed or Time first assessed found.   Side: Right  Orientation: posterior  Location: heel  Primary Wound Type: Pressure Injury   Dressing Appearance dry;intact   Closure None   Base red  (small amount of scabbing)   Periwound intact   Periwound Temperature warm   Periwound Skin Turgor soft   Edges rolled/closed   Wound 12/16/19 2000 Right calf  Abrasion   Date first assessed/Time first assessed: 12/16/19 2000   Side: Right  Location: calf  Primary Wound Type: Abrasion   Dressing Appearance dry;intact   Closure None   Base non-blanchable;red  (2 red linear areas that are intact.)   Periwound intact   Drainage Amount none

## 2019-12-17 NOTE — PROGRESS NOTES
Rehabilitation Nursing  Inpatient Rehabilitation Admission Assessment of Functional Measures  and Plan of Care    Plan of Care  Copy froSafety    Potential for Injury (Active)  Current Status (12/16/2019 12:00:00 AM): FREE FROM INJURY  Weekly Goal: FREE FROM INJURY  Discharge Goal: FREE FROM INJURY    Body Systems    Integumentary (Active)  Current Status (12/16/2019 12:00:00 AM): FREE FROM FURTHER BREAKDOWN  Weekly Goal: FREE FROM FURTHER BREAKDOWN  Discharge Goal: FREE FROM FURTHER BREAKDOWNm POC    Functional Measures  DENA Eating:  DENA Grooming:  DENA Bathing:  DENA Upper Body Dressing:  DENA Lower Body Dressing:  DENA Toileting:    DENA Bladder Management  Level of Assistance:  Bladder Score = 2. Patient performs 25-49% of tasks and  requires maximal assistance for bladder management. [place] provides most of the  assist to roll or bridge to place and remove bedpan. ALSO WEARS BRIEF  Frequency/Number of Accidents (4 days prior to admission):  Bladder accidents  prior to admission:  4  Frequency/Number of Accidents this Shift: Bladder accidents this shift:  0 .  Patient has not had an accident, but used a device/medication this shift  requiring: BEDPAN, BRIEF .    EDNA Bowel Management  Level of Assistance: Activity was not observed.  Frequency/Number of Accidents (4 days prior to admission): Bowel accidents prior  to admission:  1  Frequency/Number of Accidents this Shift: Bowel accidents this shift: 0 .  Patient has not had an accident this shift.    DENA Bed/Chair/Wheelchair Transfer:  DENA Toilet Transfer:  DENA Tub/Shower Transfer:    Previously Documented Mode of Locomotion at Discharge:  DENA Expected Mode of Locomotion at Discharge:  DENA Walk/Wheelchair:  DENA Stairs:    DENA Comprehension:  Both ( auditory and visual) modes of comprehension are used  equally. Comprehension Score = 7, Independent.  Patient comprehends  complex/abstract information in their primary language.  Patient is completely  independent for  auditory and visual comprehension.  There are no activity  limitations.  DENA Expression:  Vocal expression is the usual mode. Expression Score = 7,  Independent.  Patient expresses complex/abstract information in their primary  language.  Patient is completely independent for vocal expression.  There are no  activity limitations.  DENA Social Interaction:  Social Interaction Score = 7, Independent. Patient is  completely independent for social interaction.  There are no activity  limitations.  DENA Problem Solving:  Problem Solving Score = 7, Independent.  Patient makes  appropriate decisions in order to solve complex problems.  Patient is completely  independent for problem solving.  There are no activity limitations.  DENA Memory:  Memory Score = 7, Independent.  Patient is completely independent  for memory.  There are no activity limitations.    Discharge Functional Goals:  Mode of Comprehension: B  Comprehension: 7  Mode of Expression: V  Expression: 7  Problem Solvin  Social Interaction: 7  Memory: 7    Signed by: Irma Can Nurse

## 2019-12-17 NOTE — PLAN OF CARE
Mild  Currently at 5 4 with upper limit of normal at 5 3  ARB on hold  Hold  Potassium dose today  This should improve with IV Lasix and kaliuresis  Labs in a m  Patient is new admit. Will continue with current POC

## 2019-12-17 NOTE — PROGRESS NOTES
Patient Assessment Instrument  Quality Indicators - Admission    Section B. Hearing, Speech Vision      Section C. Cognitive Patterns      Section [id number]. Prior Functioning      Section [id number]. Prior Device Use      Section GO3325. Self Care Performance     Eating: [place] sets up or cleans up; patient completes activity. [place] assists  only prior to or following the activity.   Oral Hygiene: [place] sets up or cleans up; patient completes activity. [place]  assists only prior to or following the activity.   Toileting Hygiene: [place] does less than half the effort. [place] lifts, holds  or supports trunk or limbs but provides less than half the effort.   Shower/Bathe Self: [place] does less than half the effort. [place] lifts, holds  or supports trunk or limbs but provides less than half the effort.   Upper Body Dressing: [place] provides verbal cues and/or touching/steadying  and/or contact guard assistance as patient completes activity.   Lower Body Dressing: [place] does less than half the effort. [place] lifts, holds  or supports trunk or limbs but provides less than half the effort.   Putting On/Taking Off Footwear: [place] does less than half the effort. [place]  lifts, holds or supports trunk or limbs but provides less than half the effort.    Section [id number]. Self Care Discharge Goals     Eating: Patient completed the activities by him/herself with no assistance from  a helper.   Oral Hygiene: [place] sets up or cleans up; patient completes activity. [place]  assists only prior to or following the activity.   Toileting Hygiene: [place] provides verbal cues or touching/steadying assistance  as patient completes activity.   Shower/Bathe Self: [place] provides verbal cues or touching/steadying assistance  as patient completes activity.   Upper Body Dressing: [place] sets up or cleans up; patient completes activity.  [place] assists only prior to or following the activity.   Lower Body Dressing: [place] provides verbal cues or  touching/steadying  assistance as patient completes activity.   Putting On/Taking Off Footwear: [place] provides verbal cues or  touching/steadying assistance as patient completes activity.    Section [id number]. Mobility Performance      Section CF4470. Mobility Discharge Goals      Section H. Bladder and Bowel      Section I. Active Diagnosis      Section J. Health Conditions      Section K. Swallowing/Nutritional Status      Section M. Skin Conditions      Section N. Medication      Section O. Special Treatments, Procedures, and Programs      OPTIONAL BRANCH FOR TRACKING FALLS  Fall(s) During Shift:    Signed by: Anika Gutierrez, Occupational Therapist

## 2019-12-17 NOTE — H&P
HISTORY AND PHYSICAL        Patient Identification:  Name:  Gretta Giles  Age:  59 y.o.  Sex:  female  :  1960  MRN:  [id number]   Visit Number:  [id number]  Primary Care Physician:  Nighat Kincaid APRN       Subjective     Subjective     Chief complaint:     Debility      History of presenting illness:     This patient is seen today by me for post admission physician evaluation to the inpatient rehabilitation facility.  Mrs. Giles is a 59-year-old lady with past history significant for liver nonalcoholic cirrhosis and history of congestive heart failure who presented to [facility] with shortness of breath and abdominal swelling.  Her swelling and shortness of breath have been going on for at least 2 weeks prior to her admission with progressive worsening over the past week and she had been taking her Lasix but has been nonadherent with her insulin and her blood sugar in the ER was 900.  Insulin drip was started and patient became hypotensive in the ER and was given 500 cc bolus and was initiated on Zosyn for intra-abdominal coverage and the sepsis protocol was initiated.  She was later on diagnosed with septic shock and acute respiratory failure and had to be intubated and underwent paracentesis by GI.  Blood cultures and urine cultures were obtained and patient was found to have very elevated lactic acid as well as leukocytosis and a component of acute kidney injury.  Patient was started on IV albumin as well.  Her respiratory status was worsened and she had to be intubated and placed on mechanical ventilation with bilateral diffuse airspace disease consistent with ARDS from aspiration.  Her white count worsened to 37,000 and patient was initiated on broad-spectrum IV antibiotic therapy and Solu-Medrol.  DVT prophylaxis was not initiated but only with mechanical SCUDs due to her liver disease.  Her EF is at 65 to 70%.  Patient was able to pass swallowing value and she was placed on  chopped meat and thin liquids with NG tube removed.  Patient was went into A. fib with RVR and was initiated on IV amiodarone then p.o. amiodarone but was unable to coagulate due to her liver disease.  Patient continued to progress medically and physical therapy and Occupational Therapy evaluation completed which recommended acute inpatient rehabitation program for functional mobility.  Acute inpatient rehab referral was ordered for continued medical monitoring and intervention with patient's comorbidities.  Patient management needs, anticoagulation skin monitoring and breakdown prevention.    Patient's preadmission mini FIM score as performed by the referring facility was as follows:    Eating 3 grooming 2, bathing 2, upper body dressing 2, lower body dressing 2, toileting 3, bed to chair transfers 1, toilet transfers 3, locomotion 1, bladder management 3, bowel management 3, comprehension 7, expression 7, social interaction 7, problem-solving 7, memory 7.    Estimated length of stay 7 to 10 days    Prior level of function prior to her admission at [facility] she was independent and living at home.    Patient is going to require intensive inpatient physical therapy for therapeutic exercises, range of motion, endurance, gait training, safety, stairs training, strengthening for at least 1 to 2 hours a day for 5 to 6 days a week as well as occupational therapy for dressing, ADLs, feeding, home skills, safety and toileting techniques for 1 to 2 hours a day for 5 to 6 days a week.      ---------------------------------------------------------------------------------------------------------------------     Review Of Systems:    Constitutional: no fever, chills and night sweats. No appetite change or unexpected weight change. ` Generalized fatigue.  Eyes: no eye drainage, itching or redness.  HEENT: no mouth sores, dysphagia or nose bleed.  Respiratory: no for shortness of breath, cough or production of  sputum.  Cardiovascular: no chest pain, no palpitations, no orthopnea.  Gastrointestinal: no nausea, vomiting or diarrhea.  Mild chronic abdominal pain, no hematemesis or rectal bleeding.  Genitourinary: no dysuria or polyuria.  Hematologic/lymphatic: no lymph node abnormalities, no easy bruising or easy bleeding.  Musculoskeletal: no muscle or joint pain.  Skin: No rash and no itching.  Neurological: no loss of consciousness, no seizure, no headache.  Behavioral/Psych: no depression or suicidal ideation.  Endocrine: no hot flashes.  Immunologic: negative.    ---------------------------------------------------------------------------------------------------------------------     Past Medical History    Past Medical History:   Diagnosis Date   • Acute renal failure (ARF) (CMS/HCC)    • Atrial fibrillation (CMS/HCC)    • CHF (congestive heart failure) (CMS/HCC)    • Chronic hyponatremia    • COPD (chronic obstructive pulmonary disease) (CMS/HCC)    • Diabetes mellitus (CMS/HCC)    • Esophageal varices (CMS/HCC)    • Hepatitis C    • History of abdominal paracentesis    • History of transfusion    • Hyperlipidemia    • Hypertension    • Liver cirrhosis (CMS/HCC)     medical record   • Seizure disorder (CMS/HCC)     grand mal   • Sepsis (CMS/HCC)        Past Surgical History    Past Surgical History:   Procedure Laterality Date   • ANKLE SURGERY     • CHOLECYSTECTOMY      about 20 years ago per patient   • GALLBLADDER SURGERY     • HYSTERECTOMY     • HYSTERECTOMY      about 20 years ago   • KNEE SURGERY     • REPLACEMENT TOTAL KNEE         Family History    Family History   Problem Relation Age of Onset   • Diabetes Father    • Cancer Father         malignant neoplasm of brain   • Cancer Sister         bone   • Heart disease Sister        Social History    Social History     Tobacco Use   • Smoking status: Current Every Day Smoker   Substance Use Topics   • Alcohol use: Not on file   • Drug use: Not on file        Allergies    Codeine and Morphine  ---------------------------------------------------------------------------------------------------------------------     Home Medications:    Prior to Admission Medications     Prescriptions Last Dose Informant Patient Reported? Taking?    albuterol (PROVENTIL) (2.5 MG/3ML) 0.083% nebulizer solution Unknown Pharmacy Yes No    Take 2.5 mg by nebulization Every 6 (Six) Hours As Needed for Wheezing or Shortness of Air.    albuterol sulfate  (90 Base) MCG/ACT inhaler Unknown Pharmacy Yes No    Inhale 2 puffs Every 4 (Four) Hours.    aspirin (ASPIRIN LOW DOSE) 81 MG EC tablet Unknown Pharmacy Yes No    Take 1 tablet by mouth daily. For health maintenance    buPROPion XL (WELLBUTRIN XL) 150 MG 24 hr tablet Unknown Pharmacy No No    Take 1 tablet by mouth daily. As directed. For mixed anxiety depressive disorder.    busPIRone (BUSPAR) 15 MG tablet Unknown Pharmacy Yes No    Take 15 mg by mouth 2 (Two) Times a Day.    dicyclomine (BENTYL) 10 MG capsule Unknown Pharmacy Yes No    Take 10 mg by mouth 3 (Three) Times a Day.    fluticasone (FLONASE) 50 MCG/ACT nasal spray Unknown Pharmacy No No    2 sprays into each nostril daily. Use 1 to 2 sprays in each nostril once daily. For allergic rhinitis.    furosemide (LASIX) 40 MG tablet Unknown Pharmacy Yes No    Take 40 mg by mouth Daily.    gabapentin (NEURONTIN) 600 MG tablet Unknown Pharmacy Yes No    Take 600 mg by mouth 3 (Three) Times a Day.    Insulin Glargine (BASAGLAR KWIKPEN) 100 UNIT/ML injection pen Unknown Pharmacy Yes No    Inject 40 Units under the skin into the appropriate area as directed Every Night.    insulin lispro (ADMELOG) 100 UNIT/ML injection Unknown Pharmacy Yes No    Inject 4-17 Units under the skin into the appropriate area as directed 3 (Three) Times a Day Before Meals.    lisinopril (PRINIVIL,ZESTRIL) 20 MG tablet Unknown Pharmacy Yes No    Take 20 mg by mouth Daily.    montelukast (SINGULAIR) 10 MG  tablet Unknown Pharmacy Yes No    Take 10 mg by mouth Every Night.    omeprazole (priLOSEC) 20 MG capsule Unknown Pharmacy Yes No    Take 20 mg by mouth Daily.    ondansetron (ZOFRAN) 4 MG tablet Unknown Pharmacy Yes No    Take 4 mg by mouth Every 8 (Eight) Hours As Needed for Nausea or Vomiting.    oxybutynin XL (DITROPAN-XL) 5 MG 24 hr tablet Unknown Pharmacy Yes No    Take 5 mg by mouth Daily.    oxyCODONE (ROXICODONE) 10 MG tablet Unknown Pharmacy Yes No    Take 10 mg by mouth 3 (Three) Times a Day.    spironolactone (ALDACTONE) 100 MG tablet Unknown Pharmacy Yes No    Take 100 mg by mouth Daily.    traZODone (DESYREL) 100 MG tablet Unknown Pharmacy Yes No    Take 100 mg by mouth Every Night.        ---------------------------------------------------------------------------------------------------------------------    Objective     Objective     Hospital Scheduled Meds:    amiodarone 200 mg Oral Q24H   aspirin 81 mg Oral Daily   busPIRone 15 mg Oral Daily   dicyclomine 10 mg Oral Daily   ferrous sulfate 325 mg Oral TID With Meals   fluticasone 2 spray Each Nare Daily   furosemide 40 mg Oral BID   gabapentin 600 mg Oral TID   insulin aspart 0-7 Units Subcutaneous 4x Daily AC & at Bedtime   insulin detemir 18 Units Subcutaneous Q12H   lactulose 10 g Oral BID   pantoprazole 40 mg Oral BID AC   spironolactone 100 mg Oral Daily        ---------------------------------------------------------------------------------------------------------------------   Vital Signs:  Temp:  [98.1 °F (36.7 °C)-99.2 °F (37.3 °C)] 98.1 °F (36.7 °C)  Heart Rate:  [] 94  Resp:  [16-20] 16  BP: ()/(56-70) 103/70  No data found.  SpO2 Percentage    12/17/19 0136 12/17/19 0739 12/17/19 0852   SpO2: 96% 97% 96%     SpO2:  [96 %-98 %] 96 %  on   ;   Device (Oxygen Therapy): room air    Body mass index is 20.23 kg/m².  Wt Readings from Last 3 Encounters:   12/16/19 51.8 kg (114 lb 3.2 oz)   06/08/16 53.1 kg (117 lb)   02/19/16 56.3  kg (124 lb 0.1 oz)     ---------------------------------------------------------------------------------------------------------------------     Physical Exam:    General Appearance: alert, pleasant, appears stated age, interactive and  Cooperative.  Generalized weakness.    Head: normocephalic, without obvious abnormality and atraumatic    Eyes: lids and lashes normal, conjunctivae and sclerae normal, no icterus, no  pallor, corneas clear and PERRLA    Ears: ears appear intact with no abnormalities noted    Nose: nares normal, septum midline, mucosa normal and no drainage     Throat: no oral lesions, no thrush, oral mucosa moist and oopharynx normal    Neck: no adenopathy, supple, trachea midline, no thyromegaly, no carotid bruit  and no JVD    Back: no kyphosis present, no scoliosis present, no skin lesions, erythema, or  scars, no tenderness to percussion or palpation and range of motion normal    Lungs: clear to auscultation, respirations regular, respirations even and  respirations unlabored. No accessory muscle use.     Heart:: regular rhythm & normal rate, normal S1, S2, no murmur, no gallop, no  rub and no click.  Chest wall with no abnormalities observed. PMI nondisplaced    Abdomen: normal bowel sounds in all quadrants, no masses, no hepatomegaly,  no splenomegaly, soft non-tender, no guarding and no rebound tenderness    Extremities: no edema, no cyanosis, no redness, no tenderness, no clubbing    Musculoskeletal: joints with full range of motion and joints, no effusion.  Pedal  pulses palpable and equal bilaterally    Skin: no bleeding, bruising or rash and no lesions noted.  Mid line coccyx, Right coccyx, Left upper gluteal and left lower gluteal unstageable pressure ulcers all present on admission.    Lymph Nodes: no palpable adenopathy    Neurologic: Mental Status orientated to person, place, time and situation.   Speech is intelligible, Nonlabored.  Alertness alert and awake and mood/affect  normal,  Cranial Nerves 2 - 12 grossly intact as examined   Sensory intact in BLE and BUE.   Motor strength 4/5 in upper and lower extremities      ---------------------------------------------------------------------------------------------------------------------             Results from last 7 days   Lab Units 12/17/19  0542   WBC 10*3/mm3 7.59   HEMOGLOBIN g/dL 8.4*   HEMATOCRIT % 27.3*   MCV fL 84.5   MCHC g/dL 30.8*   PLATELETS 10*3/mm3 209     Results from last 7 days   Lab Units 12/17/19  0542   SODIUM mmol/L 128*   POTASSIUM mmol/L 4.5   CHLORIDE mmol/L 98   CO2 mmol/L 21.8*   BUN mg/dL 14   CREATININE mg/dL 0.78   EGFR IF NONAFRICN AM mL/min/1.73 76   CALCIUM mg/dL 7.9*   GLUCOSE mg/dL 442*   ALBUMIN g/dL 2.45*   BILIRUBIN mg/dL 0.7   ALK PHOS U/L 245*   AST (SGOT) U/L 71*   ALT (SGPT) U/L 46*   Estimated Creatinine Clearance: 65.1 mL/min (by C-G formula based on SCr of 0.78 mg/dL).  No results found for: AMMONIA    Glucose   Date/Time Value Ref Range Status   12/17/2019 1105 223 (H) 70 - 130 mg/dL Final   12/17/2019 0941 178 (H) 70 - 130 mg/dL Final   12/17/2019 0809 262 (H) 70 - 130 mg/dL Final   12/17/2019 0701 379 (H) 70 - 130 mg/dL Final   12/16/2019 2026 207 (H) 70 - 130 mg/dL Final     Lab Results   Component Value Date    HGBA1C 5.2 06/08/2016     Lab Results   Component Value Date    TSH 3.420 12/17/2019       No results found for: BLOODCX  No results found for: URINECX  No results found for: WOUNDCX  No results found for: STOOLCX  No results found for: RESPCX  Pain Management Panel     There is no flowsheet data to display.        I have personally reviewed the above laboratory results.   ---------------------------------------------------------------------------------------------------------------------  Imaging Results (Last 7 Days)     ** No results found for the last 168 hours. **        I have personally reviewed the above radiology results.    ---------------------------------------------------------------------------------------------------------------------      Assessment & Plan        Assessment/Plan       ASSESSMENT:    1.  A. fib  2.  Recurrent ascites  3.  Nonalcoholic liver cirrhosis, end-stage  4.  Hepatitis C  5.  Essential hypertension  6.  Diabetes mellitus insulin-dependent and nonadherent with insulin  7.  Neuropathy  8.  Anxiety  9.  Depression  10.  Chronic pain  11.  Hyponatremia  12.  COPD  13.  Acute respiratory failure with ARDS  14.  Aspiration pneumonia  15.  Chronic left-sided pleural effusion  16. Mid line coccyx, Right coccyx, Left upper gluteal and left lower gluteal unstageable pressure ulcers all present on admission      PLAN:    This patient is seen today by me for post admission physician evaluation to the inpatient rehabilitation facility.  Mrs. Giles is a 59-year-old lady with past history significant for liver nonalcoholic cirrhosis and history of congestive heart failure who presented to [facility] with shortness of breath and abdominal swelling.  Her swelling and shortness of breath have been going on for at least 2 weeks prior to her admission with progressive worsening over the past week and she had been taking her Lasix but has been nonadherent with her insulin and her blood sugar in the ER was 900.  Insulin drip was started and patient became hypotensive in the ER and was given 500 cc bolus and was initiated on Zosyn for intra-abdominal coverage and the sepsis protocol was initiated.  She was later on diagnosed with septic shock and acute respiratory failure and had to be intubated and underwent paracentesis by GI.  Blood cultures and urine cultures were obtained and patient was found to have very elevated lactic acid as well as leukocytosis and a component of acute kidney injury.  Patient was started on IV albumin as well.  Her respiratory status was worsened and she had to be intubated and placed on  mechanical ventilation with bilateral diffuse airspace disease consistent with ARDS from aspiration.  Her white count worsened to 37,000 and patient was initiated on broad-spectrum IV antibiotic therapy and Solu-Medrol.  DVT prophylaxis was not initiated but only with mechanical SCUDs due to her liver disease.  Her EF is at 65 to 70%.  Patient was able to pass swallowing value and she was placed on chopped meat and thin liquids with NG tube removed.  Patient was went into A. fib with RVR and was initiated on IV amiodarone then p.o. amiodarone but was unable to coagulate due to her liver disease.  Patient continued to progress medically and physical therapy and Occupational Therapy evaluation completed which recommended acute inpatient rehabitation program for functional mobility.  Acute inpatient rehab referral was ordered for continued medical monitoring and intervention with patient's comorbidities.  Patient management needs, anticoagulation skin monitoring and breakdown prevention.    Patient is unable to receive anticoagulation therapy due to her end-stage liver cirrhosis and high risk for bleeding but DVT prophylaxis was initiated with SCUDs and for now would try to avoid anticoagulation if at all possible as risk outweighs benefit as recommended by the referring facility.  Patient continues to refuse her insulin and her sugar is elevated and despite my discussion with her continues to consume high sugar candy at her bedside.    Patient's preadmission mini FIM score as performed by the referring facility was as follows:    Eating 3 grooming 2, bathing 2, upper body dressing 2, lower body dressing 2, toileting 3, bed to chair transfers 1, toilet transfers 3, locomotion 1, bladder management 3, bowel management 3, comprehension 7, expression 7, social interaction 7, problem-solving 7, memory 7.    Estimated length of stay 7 to 10 days    Prior level of function prior to her admission at [facility] she  was independent and living at home.    Patient is going to require intensive inpatient physical therapy for therapeutic exercises, range of motion, endurance, gait training, safety, stairs training, strengthening for at least 1 to 2 hours a day for 5 to 6 days a week as well as occupational therapy for dressing, ADLs, feeding, home skills, safety and toileting techniques for 1 to 2 hours a day for 5 to 6 days a week.        Patient's findings and recommendations were discussed with patient and nursing staff      Code Status:   Code Status and Medical Interventions:   Ordered at: 12/16/19 1626     Code Status:    CPR     Medical Interventions (Level of Support Prior to Arrest):    Full           Ravi Alcazar MD  12/17/19  12:18 PM

## 2019-12-17 NOTE — PROGRESS NOTES
Nutrition Services    Patient Name:  Gretta Giles  YOB: 1960  MRN: [id number]  Admit Date:  12/16/2019    Recommend MVI    Electronically signed by:  Nannette Owen RD  12/17/19 2:42 PM

## 2019-12-17 NOTE — PROGRESS NOTES
Inpatient Rehabilitation Functional Measures Assessment and Plan of Care    Plan of Care   Self Care    Dressing (Lower) (Active)  Current Status (12/17/2019 11:10:00 AM): ModA  Weekly Goal: Nydia  Discharge Goal: CGA    Toileting (Active)  Current Status (12/17/2019 11:10:00 AM): ModA  Weekly Goal: Nydia  Discharge Goal: CGA    Functional Measures  DENA Eating:  DENA Grooming:  DENA Bathing:  DENA Upper Body Dressing:  DENA Lower Body Dressing:  DENA Toileting:    DENA Bladder Management  Level of Assistance:  Frequency/Number of Accidents this Shift:    DENA Bowel Management  Level of Assistance:  Frequency/Number of Accidents this Shift:    DENA Bed/Chair/Wheelchair Transfer:  DENA Toilet Transfer:  DENA Tub/Shower Transfer:    Previously Documented Mode of Locomotion at Discharge:  [facility] Expected Mode of Locomotion at Discharge:  DENA Walk/Wheelchair:  DENA Stairs:    DENA Comprehension:  DENA Expression:  DENA Social Interaction:  DENA Problem Solving:  DENA Memory:    Therapy Mode Minutes  Occupational Therapy: Individual: 90 minutes.  Physical Therapy:  Speech Language Pathology:    Discharge Functional Goals:    Signed by: Anika Gutierrez Occupational Therapist

## 2019-12-17 NOTE — PROGRESS NOTES
Patient Assessment Instrument  Quality Indicators - Admission    Section B. Hearing, Speech Vision  Expression of Ideas and Wants: Expresses complex messages without difficulty and  with speech that is clear and easy to understand.  Understanding Verbal and Non-Verbal Content: Understands: Clear comprehension  without cues or repetitions.    Section C. Cognitive Patterns      Section [id number]. Prior Functioning      Section HS3423. Prior Device Use      Section ND0137. Self Care Performance      Section EX6615. Self Care Discharge Goals      Section [id number]. Mobility Performance      Section MD7776. Mobility Discharge Goals      Section H. Bladder and Bowel  Bladder Continence: Incontinent less than daily (e.g., once or twice during the  3-day assessment period).  Bowel Continence: Occasionally incontinent (one episode of bowel incontinence).    Section I. Active Diagnosis      Section J. Health Conditions      Section K. Swallowing/Nutritional Status      Section M. Skin Conditions      Section N. Medication      Section O. Special Treatments, Procedures, and Programs      OPTIONAL BRANCH FOR TRACKING FALLS  Fall(s) During Shift:    Signed by: Nurse Shirley

## 2019-12-17 NOTE — THERAPY EVALUATION
Inpatient Rehabilitation - Physical Therapy Initial Evaluation       DIONISIO Mahin     Patient Name: Gretta Giles  : 1960  MRN: [id number]    Today's Date: 2019                    Admit Date: 2019      Visit Dx: No diagnosis found.    Patient Active Problem List   Diagnosis   • Gastroesophageal reflux disease   • Atopic rhinitis   • Chronic low back pain   • Chronic obstructive pulmonary disease (CMS/HCC)   • Chronic obstructive pulmonary disease with acute exacerbation (CMS/HCC)   • Diabetes mellitus (CMS/HCC)   • Diabetic peripheral neuropathy (CMS/HCC)   • Dyslipidemia   • Nonalcoholic fatty liver disease   • Gastrointestinal ulcer due to Helicobacter pylori   • Hypertension   • Menopausal symptom   • Mixed anxiety depressive disorder   • Seasonal allergic rhinitis   • Tobacco dependence syndrome   • Vitamin D deficiency   • Hyperlipidemia   • Anxiety and depression   • Menopause syndrome   • Allergic rhinitis   • Type 2 diabetes mellitus, controlled (CMS/HCC)   • GERD without esophagitis   • Nausea   • Debility   • Liver cirrhosis (CMS/HCC)       Past Medical History:   Diagnosis Date   • Acute renal failure (ARF) (CMS/HCC)    • Atrial fibrillation (CMS/HCC)    • CHF (congestive heart failure) (CMS/HCC)    • Chronic hyponatremia    • COPD (chronic obstructive pulmonary disease) (CMS/HCC)    • Diabetes mellitus (CMS/HCC)    • Esophageal varices (CMS/HCC)    • Hepatitis C    • History of abdominal paracentesis    • History of transfusion    • Hyperlipidemia    • Hypertension    • Liver cirrhosis (CMS/HCC)     medical record   • Seizure disorder (CMS/HCC)     grand mal   • Sepsis (CMS/HCC)        Past Surgical History:   Procedure Laterality Date   • ANKLE SURGERY     • CHOLECYSTECTOMY      about 20 years ago per patient   • GALLBLADDER SURGERY     • HYSTERECTOMY     • HYSTERECTOMY      about 20 years ago   • KNEE SURGERY     • REPLACEMENT TOTAL KNEE            PT ASSESSMENT (last 12 hours)       IRF Physical Therapy Evaluation     Row Name 12/17/19 1411          Evaluation/Treatment Time and Intent    Existing Precautions/Restrictions  fall ABD draining to colostomy bag, Hep C+  -LB     Document Type  evaluation;therapy note (daily note)  -LB     Mode of Treatment  individual therapy;physical therapy  -LB     Patient/Family Observations  She has a frail build but a distended abdomen.  -LB     Row Name 12/17/19 1411          Cognition/Psychosocial- PT/OT    Affect/Mental Status (Cognitive)  WFL  -LB     Follows Commands (Cognition)  verbal cues/prompting required  -LB     Personal Safety Interventions  gait belt;nonskid shoes/slippers when out of bed;supervised activity  -LB     Row Name 12/17/19 1411          Bed Mobility Assessment/Treatment    Bed Mobility Assessment/Treatment  --  -LB     Supine-Sit [place] (Bed Mobility)  minimum assist (75% patient effort);verbal cues;nonverbal cues (demo/gesture)  -LB     Assistive Device (Bed Mobility)  bed rails  -LB     Row Name 12/17/19 1411          Transfer Assessment/Treatment    Transfer Assessment/Treatment  --  -LB     Row Name 12/17/19 1411          Bed-Chair Transfer    Bed-Chair [place] (Transfers)  minimum assist (75% patient effort);verbal cues;nonverbal cues (demo/gesture)  -LB     Assistive Device (Bed-Chair Transfers)  wheelchair  -LB     Row Name 12/17/19 1411          Chair-Bed Transfer    Chair-Bed [place] (Transfers)  minimum assist (75% patient effort);verbal cues;nonverbal cues (demo/gesture)  -LB     Assistive Device (Chair-Bed Transfers)  wheelchair  -LB     Row Name 12/17/19 1411          Sit-Stand Transfer    Sit-Stand [place] (Transfers)  minimum assist (75% patient effort);verbal cues;nonverbal cues (demo/gesture)  -LB     Assistive Device (Sit-Stand Transfers)  walker, front-wheeled  -LB     Row Name 12/17/19 1411          Stand-Sit Transfer    Stand-Sit [place] (Transfers)  minimum assist (75% patient  effort);verbal cues;nonverbal cues (demo/gesture)  -LB     Assistive Device (Stand-Sit Transfers)  walker, front-wheeled  -LB     Row Name 12/17/19 1411          Toilet Transfer    Type (Toilet Transfer)  stand pivot/stand step  -LB     [place] Level (Toilet Transfer)  minimum assist (75% patient effort);verbal cues;nonverbal cues (demo/gesture)  -LB     Assistive Device (Toilet Transfer)  grab bars/safety frame;raised toilet seat  -LB     Row Name 12/17/19 1411          Gait/Stairs Assessment/Training    [place] Level (Gait)  contact guard;verbal cues;nonverbal cues (demo/gesture)  -LB     Assistive Device (Gait)  walker, front-wheeled  -LB     Distance in Feet (Gait)  am-300', pm-150'  -LB     Deviations/Abnormal Patterns (Gait)  jason decreased;gait speed decreased;stride length decreased  -LB     Row Name 12/17/19 1411          Safety Issues, Functional Mobility    Impairments Affecting Function (Mobility)  balance;endurance/activity tolerance;pain;strength  -LB     Row Name 12/17/19 1411          General ROM    GENERAL ROM COMMENTS  BLE ROM WFL  -LB     Row Name 12/17/19 1411          MMT (Manual Muscle Testing)    General MMT Comments  BLE grossly 3/5 but fatigues after a few reps of ex.  -LB     Row Name 12/17/19 1411          Pain Assessment    Additional Documentation  --  -LB     Row Name 12/17/19 1411          Pain Scale: Numbers Pre/Post-Treatment    Pain Location  back and generalized  -LB     Pain Intervention(s)  Repositioned;Rest  -LB     Row Name 12/17/19 1411          Pain Scale: FACES Pre/Post-Treatment    Pain: FACES Scale, Pretreatment  6-->hurts even more  -LB     Pain: FACES Scale, Post-Treatment  6-->hurts even more  -LB     Row Name 12/17/19 1411          Balance    Additional Documentation  --  -LB     Row Name 12/17/19 1411          Therapeutic Exercise    Therapeutic Exercise  --  -LB     Additional Documentation  --  -LB     Row Name 12/17/19 1411          Lower Extremity  Seated Therapeutic Exercise    Performed, Seated Lower Extremity (Therapeutic Exercise)  hip flexion/extension;hip abduction/adduction;knee flexion/extension;ankle dorsiflexion/plantarflexion  -LB     Device, Seated Lower Extremity (Therapeutic Exercise)  elastic bands/tubing;small ball  -LB     Exercise Type, Seated Lower Extremity (Therapeutic Exercise)  AROM (active range of motion)  -LB     Expected Outcomes, Seated Lower Extremity (Therapeutic Exercise)  improve functional tolerance, household activity;improve functional stability;improve performance, gait skills;improve performance, transfer skills;strengthen, facilitate independent active range of motion  -LB     Sets/Reps Detail, Seated Lower Extremity (Therapeutic Exercise)  8 reps, bid  -LB     Comment, Seated Lower Extremity (Therapeutic Exercise)  she fatigued quickly  -LB     Row Name 12/17/19 1411          Lower Extremity Supine Therapeutic Exercise    Performed, Supine Lower Extremity (Therapeutic Exercise)  heel slides;ankle pumps;gluteal sets;quadriceps sets;SAQ (short arc quad) over bolster;hip abduction/adduction  -LB     Exercise Type, Supine Lower Extremity (Therapeutic Exercise)  AROM (active range of motion);AAROM (active assistive range of motion)  -LB     Expected Outcomes, Supine Lower Extremity (Therapeutic Exercise)  improve functional tolerance, household activity;improve functional stability;improve performance, gait skills on uneven ground;improve performance, transfer skills;strengthen, facilitate independent active range of motion  -LB     Sets/Reps Detail, Supine Lower Extremity (Therapeutic Exercise)  10 reps  -LB     Comment, Supine Lower Extremity (Therapeutic Exercise)  she became nauseous during am session. RN came to check on her and gave meds.  -LB     Row Name             Wound 12/16/19 midline coccyx Pressure Injury    Wound - Properties Group Date first assessed: 12/16/19  -FH Present on Hospital Admission: Y  -FH  Orientation: midline  -FH Location: coccyx  -FH Primary Wound Type: Pressure inj  -FH    Row Name             Wound 12/16/19 2000 Right coccyx Pressure Injury    Wound - Properties Group Date first assessed: 12/16/19  - Time first assessed: 2000 -FH Side: Right  -FH Location: coccyx  -FH Primary Wound Type: Pressure inj  -FH    Row Name             Wound 12/16/19 2000 Left posterior heel    Wound - Properties Group Date first assessed: 12/16/19  - Time first assessed: 2000  -FH Side: Left  -FH Orientation: posterior  -FH Location: heel  -FH    Row Name             Wound Right posterior heel Pressure Injury    Wound - Properties Group Side: Right  -FH Orientation: posterior  -FH Location: heel  -FH Primary Wound Type: Pressure inj  -FH    Row Name             Wound 12/16/19 2000 Right calf Abrasion    Wound - Properties Group Date first assessed: 12/16/19  - Time first assessed: 2000 -FH Side: Right  -FH Location: calf  -FH Primary Wound Type: Abrasion  -FH    Row Name             Wound 12/16/19 2000 Left upper gluteal Pressure Injury    Wound - Properties Group Date first assessed: 12/16/19  - Time first assessed: 2000 -FH Present on Hospital Admission: Y  -FH Side: Left  -FH Orientation: upper  -FH Location: gluteal  -FH Primary Wound Type: Pressure inj  -FH    Row Name             Wound 12/16/19 2000 Left lower gluteal Pressure Injury    Wound - Properties Group Date first assessed: 12/16/19  - Time first assessed: 2000 -FH Present on Hospital Admission: Y  -FH Side: Left  -FH Orientation: lower  -FH Location: gluteal  -FH Primary Wound Type: Pressure inj  -FH    Row Name             Wound 12/16/19 2000 Bilateral back Abrasion    Wound - Properties Group Date first assessed: 12/16/19  - Time first assessed: 2000 -FH Present on Hospital Admission: Y  -FH Side: Bilateral  -FH Location: back  -FH Primary Wound Type: Abrasion  -FH    Row Name 12/17/19 1411          PT Clinical Impression    General  Observations of Patient  Debility-cirrhosis, ascites, renal and resp failure  -LB     Patient's Goals For Discharge  return home  -LB     Plan For Care Reviewed: Physical Therapy  patient voices agreement with PT plan for care  -LB     Rehab Potential/Prognosis (PT Eval)  adequate, monitor progress closely  -LB     Frequency of Treatment (PT Eval)  5 times per week  -LB     Estimated Length of Stay, Weeks (PT Eval)  2 weeks  -LB     Problem List (PT Eval)  strength decreased;impaired balance;pain  -LB     Anticipated Equipment Needs at Discharge (PT Eval)  -- tbd  -LB     Expected Discharge Disposition (PT Eval)  home with home health care  -LB     Row Name 12/17/19 1411          Vital Signs    Intra SpO2 (%)  95 after amb  -LB     O2 Delivery Intra Treatment  room air  -LB     Row Name 12/17/19 1411          IRF PT Goals    Bed Mobility Goal Selection (PT-IRF)  bed mobility, PT goal 1  -LB     Transfer Goal Selection (PT-IRF)  transfers, PT goal 1  -LB     Gait (Walking Locomotion) Goal Selection (PT-IRF)  gait, PT goal 1  -LB     Row Name 12/17/19 1411          Bed Mobility Goal 1 (PT-IRF)    Activity/Assistive Device (Bed Mobility Goal 1, PT-IRF)  sit to supine/supine to sit  -LB     [place] Level (Bed Mobility Goal 1, PT-IRF)  independent  -LB     Time Frame (Bed Mobility Goal 1, PT-IRF)  by discharge  -LB     Row Name 12/17/19 1411          Transfer Goal 1 (PT-IRF)    Activity/Assistive Device (Transfer Goal 1, PT-IRF)  sit-to-stand/stand-to-sit;bed-to-chair/chair-to-bed  -LB     [place] Level (Transfer Goal 1, PT-IRF)  supervision required  -LB     Time Frame (Transfer Goal 1, PT-IRF)  by discharge  -LB     Row Name 12/17/19 1411          Gait/Walking Locomotion Goal 1 (PT-IRF)    Activity/Assistive Device (Gait/Walking Locomotion Goal 1, PT-IRF)  walker, rolling  -LB     Gait/Walking Locomotion Distance Goal 1 (PT-IRF)  300'  -LB     [place] Level (Gait/Walking Locomotion Goal 1, PT-IRF)   supervision required  -LB     Time Frame (Gait/Walking Locomotion Goal 1, PT-IRF)  by discharge  -LB     Row Name 12/17/19 1411          Positioning and Restraints    Pre-Treatment Position  -- am-bed, pm-w/c  -LB     In Wheelchair  call light within reach;encouraged to call for assist;notified nsg am  -LB     Bathroom  call light within reach;encouraged to call for assist;notified nsg pm  -LB       User Key  (r) = Recorded By, (t) = Taken By, (c) = Cosigned By    Initials Name Provider Type    Nina Flores, RN Registered Nurse    Monique Patel, PT Physical Therapist              PT Recommendation and Plan    Planned Therapy Interventions (PT Eval): balance training, bed mobility training, gait training, patient/family education, strengthening, transfer training  Frequency of Treatment (PT Eval): 5 times per week  Anticipated Equipment Needs at Discharge (PT Eval): (tbd)                  Time Calculation:     PT Charges     Row Name 12/17/19 1426 12/17/19 1425          Time Calculation    Start Time  1330  -LB  0900  -LB     Stop Time  1400  -LB  1000  -LB     Time Calculation (min)  30 min  -LB  60 min  -LB     PT Received On  --  12/17/19  -LB     PT Goal Re-Cert Due Date  --  12/24/19  -LB       User Key  (r) = Recorded By, (t) = Taken By, (c) = Cosigned By    Initials Name Provider Type    Monique Patel, PT Physical Therapist          Therapy Charges for Today     Code Description Service Date Service Provider Modifiers Qty    24958294012 HC PT EVAL MOD COMPLEXITY 1 12/17/2019 Monique Dockery, PT GP 1    21446861441 HC GAIT TRAINING EA 15 MIN 12/17/2019 Monique Dockery, PT GP 2    03754168777 HC PT THER PROC EA 15 MIN 12/17/2019 Monique Dockery, PT GP 3                   Monique Dockery, PT  12/17/2019

## 2019-12-17 NOTE — PROGRESS NOTES
Patient Assessment Instrument  Quality Indicators - Admission    Section B. Hearing, Speech Vision      Section C. Cognitive Patterns      Section [id number]. Prior Functioning      Section [id number]. Prior Device Use      Section [id number]. Self Care Performance      Section [id number]. Self Care Discharge Goals      Section [id number]. Mobility Performance     Roll Left and Right: [place] does less than half the effort. [place] lifts, holds  or supports trunk or limbs but provides less than half the effort.   Sit to Lying: [place] does less than half the effort. [place] lifts, holds or  supports trunk or limbs but provides less than half the effort.   Lying to Sitting on Side of Bed: [place] does less than half the effort. [place]  lifts, holds or supports trunk or limbs but provides less than half the effort.   Sit to Stand: [place] provides verbal cues and/or touching/steadying and/or  contact guard assistance as patient completes activity. Assistance may be  provided throughout the activity or intermittently.   Chair/Bed to Chair Transfer: [place] provides verbal cues and/or  touching/steadying and/or contact guard assistance as patient completes  activity. Assistance may be provided throughout the activity or intermittently.   Toilet Transfer [place] does less than half the effort. [place] lifts, holds or  supports trunk or limbs but provides less than half the effort.   Car Transfer: Not attempted due to medical or safety concerns.   Walk 10 Feet:   [place] provides verbal cues and/or touching/steadying and/or  contact guard assistance as patient completes activity. Assistance may be  provided throughout the activity or intermittently.  Walk 50 Feet with 2 Turns:   [place] provides verbal cues and/or  touching/steadying and/or contact guard assistance as patient completes  activity. Assistance may be provided throughout the activity or intermittently.  Walk 150 Feet:   [place] provides verbal cues and/or touching/steadying and/or  contact  guard assistance as patient completes activity. Assistance may be  provided throughout the activity or intermittently.  Walking 10 Feet on Uneven Surfaces:   Not attempted due to medical or safety  concerns.  1 Step Over Curb or Up/Down Stair:   Not attempted due to medical or safety  concerns.  Picking up an Object:   Not attempted due to medical or safety concerns.  Uses Wheelchair/Scooter: No    Section [id number]. Mobility Discharge Goals     Sit to Stand: [place] provides verbal cues or touching/steadying assistance as  patient completes activity.   Chair/Bed to Chair Transfer: [place] provides verbal cues or touching/steadying  assistance as patient completes activity.   Walk Discharge Goals:   Walk 150 Feet: [place] provides verbal cues or touching/steadying assistance as  patient completes activity.    Section H. Bladder and Bowel      Section I. Active Diagnosis      Section J. Health Conditions      Section K. Swallowing/Nutritional Status      Section M. Skin Conditions      Section N. Medication      Section O. Special Treatments, Procedures, and Programs      OPTIONAL BRANCH FOR TRACKING FALLS  Fall(s) During Shift:    Signed by: Monique Dockery, Physical Therapist

## 2019-12-17 NOTE — PROGRESS NOTES
Inpatient Rehabilitation Functional Measures Assessment and Plan of Care    Plan of Care      Functional Measures  DENA Eating:  DENA Grooming:  DENA Bathing:  DENA Upper Body Dressing:  DENA Lower Body Dressing:  DENA Toileting:    DENA Bladder Management  Level of Assistance:  Frequency/Number of Accidents this Shift:    DENA Bowel Management  Level of Assistance:  Frequency/Number of Accidents this Shift:    DENA Bed/Chair/Wheelchair Transfer:  Bed/chair/wheelchair Transfer Score = 4.  Patient performs 75% or more of effort and minimal assistance (little/incidental  help/lifting of one limb/steadying) for transferring to and from the  bed/chair/wheelchair, requiring: Patient requires the following assistive  device(s): Walker. Seating system of wheelchair.  DENA Toilet Transfer:  DENA Tub/Shower Transfer:    Previously Documented Mode of Locomotion at Discharge:  DENA Expected Mode of Locomotion at Discharge: The expected mode of most  frequently used locomotion, at discharge, is expected to be walking.  DENA Walk/Wheelchair:  WHEELCHAIR OBSERVATION   Wheelchair did not occur.    WALK OBSERVATION   Walk Distance Scale = 3.  Distance walked is greater than 150 feet. Walk Score  = 4.  Patient performs 75% or more of effort and requires minimal assistance.  Incidental help/contact guard/steadying was provided. Patient walked a distance  of  300 feet. Patient requires the following assistive device(s): Rolling  walker.  DENA Stairs:  Stairs did not occur.    DENA Comprehension:  DENA Expression:  DENA Social Interaction:  DENA Problem Solving:  DENA Memory:    Therapy Mode Minutes  Occupational Therapy:  Physical Therapy: Individual: 90 minutes.  Speech Language Pathology:    Discharge Functional Goals:  Bed, Chair, Wheelchair Transfers: 5  Walk: 5    Signed by: Monique Dockery, Physical Therapist

## 2019-12-17 NOTE — PROGRESS NOTES
Inpatient Rehabilitation Plan of Care Note    Plan of Care   Safety    Potential for Injury (Active)  Current Status (12/16/2019 12:00:00 AM): FREE FROM INJURY  Weekly Goal: FREE FROM INJURY  Discharge Goal: FREE FROM INJURY    Body Systems    Integumentary (Active)  Current Status (12/16/2019 12:00:00 AM): FREE FROM FURTHER BREAKDOWN  Weekly Goal: FREE FROM FURTHER BREAKDOWN  Discharge Goal: FREE FROM FURTHER BREAKDOWN    Signed by: Nina Gonzlaez, Nurse
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
## 2019-12-18 NOTE — NURSING NOTE
Rechecked blood sugar 340 will continue to monitor. blood sugar will be checked in 2 hours. Will continue to monitor

## 2019-12-18 NOTE — PROGRESS NOTES
"Patient Assessment Instrument  Quality Indicators - Admission    Section B. Hearing, Speech Vision      Section C. Cognitive Patterns  Brief Interview for Mental Status (BIMS) was conducted.  Repetition of Three Words: Three words  Able to report correct year: Correct  Able to report correct month: Accurate within 5 days  Able to report correct day of the week: Correct  Able to recall \"sock\": Yes, no cue required  Able to recall \"blue\": Yes, no cue required  Able to recall \"bed\": Yes, no cue required    BIMS SUMMARY SCORE: 15 Cognitively intact Patient was able to complete the Brief  Interview for Mental Status    Section [id number]. Prior Functioning    Self Care: Patient completed the activities by him/herself, with or without an  assistive device, with no assistance from a helper.  Indoor Mobility: Patient completed the activities by him/herself, with or  without an assistive device, with no assistance from a helper.  Stairs: Patient completed the activities by him/herself, with or without an  assistive device, with no assistance from a helper.  Functional Cognition: Patient completed the activities by him/herself, with or  without an assistive device, with no assistance from a helper.    Section [id number]. Prior Device Use      Section [id number]. Self Care Performance      Section GG8283. Self Care Discharge Goals      Section PU7506. Mobility Performance      Section GG7951. Mobility Discharge Goals      Section H. Bladder and Bowel      Section I. Active Diagnosis  Comorbidities and Co-existing Conditions:   Diabetes Mellitus (DM) - e.g.,  diabetic retinopathy, nephropathy, and neuropathy).    Section J. Health Conditions  Patient has not had any falls in the past year. Patient has not had major  surgery during the 100 days prior to admission.    Section K. Swallowing/Nutritional Status  Regular food (solids and liquids swallowed safely without supervision or  modified food or liquid consistency).    Section M. Skin " Conditions  Unhealed Pressure Ulcer/Injuries at Stage 1 or Higher on Admission:  Yes.  Number of Unhealed Stage 2: 0  Number of Unhealed Stage 3: 0  Number of Unhealed Stage 4: 0  Number of Unhealed Unstageable Due to Non-removable Dressing/Device: 0  Number of Unhealed Unstageable Due to Slough/Eschar: 4  Number of Unhealed Unstageable Pressure Injuries Presenting as Deep Tissue  Injury: 0    Section N. Medication    Potential Clinically Significant Medication Issues: No issues found during  review    Section O. Special Treatments, Procedures, and Programs  Patient did not receive total parenteral nutrition treatment at the time of  admission.    OPTIONAL BRANCH FOR TRACKING FALLS  Fall(s) During Shift: No falls.    Signed by: Shonna Finley, Supervisor

## 2019-12-18 NOTE — PROGRESS NOTES
Case Management  Inpatient Rehabilitation Plan of Care and Discharge Plan Note    Rehabilitation Diagnosis:  debility  Date of Onset:  11-21-19    Medical Summary:  Debility, A-fib, nonalcoholic cirrhosis, sepsis, acute renal  failure, hyperglycemia, lactic acidosis,  PMH:  liver cirrhosis, hepatitis C, diastolic CHF, recurrent paracentesis,  chronic left sided effusion and had thoracentesis, mild COPD, HTN, type 2 DM,  esophageal varices, cholecystectomy, hysterectomy, chest tube placement  paracentesis upper endoscopy, chronic hyponatremia    Plan of Care      Expected Intensity:  Average of 3 hours of therapy 5 days/week.  Interdisciplinary Team:  Interdisciplinary Team: Medical Supervision and 24 Hour Rehabilitation Nursing.,  Physical Therapy:, Occupational Therapy:, Social Work, Therapeutic Recreation.  Physical Therapy Intensity/Duration: PT 1.5 hours per day/5 days per week  Occupational Therapy Intensity/Duration: OT 1.5 hours per day/5 days per week  Estimated Length of Stay/Anticipated Discharge Date: 7-10 days  Anticipated Discharge Destination:  Anticipated discharge destination from inpatient rehabilitation is facility  setting. Pt says she does not have a caregiver and may need SNF placement.      Based on the patient's medical and functional status, their prognosis and  expected level of functional improvement is:  fair-good    Signed by: YURI Carlisle

## 2019-12-18 NOTE — PROGRESS NOTES
Patient Assessment Instrument  Quality Indicators - Admission    Section B. Hearing, Speech Vision      Section C. Cognitive Patterns      Section UK1724. Prior Functioning      Section [id number]. Prior Device Use  Patient does not use manual or motorized wheelchair or scooter, mechanical lift,  walker, or an orthotic/prosthesis.    Section PK5251. Self Care Performance      Section TP1869. Self Care Discharge Goals      Section OB5747. Mobility Performance      Section VU6572. Mobility Discharge Goals      Section H. Bladder and Bowel      Section I. Active Diagnosis      Section J. Health Conditions      Section K. Swallowing/Nutritional Status      Section M. Skin Conditions      Section N. Medication      Section O. Special Treatments, Procedures, and Programs      OPTIONAL BRANCH FOR TRACKING FALLS  Fall(s) During Shift:    Signed by: YURI Carlisle

## 2019-12-18 NOTE — PROGRESS NOTES
Occupational Therapy:    Physical Therapy: Individual: 90 minutes.    Speech Language Pathology:    Signed by: Bhumika Barnes PTA

## 2019-12-18 NOTE — THERAPY TREATMENT NOTE
Inpatient Rehabilitation - Occupational Therapy Treatment Note    [facility]     Patient Name: Gretta Giles  : 1960  MRN: [id number]    Today's Date: 2019                 Admit Date: 2019      Visit Dx:  No diagnosis found.    Patient Active Problem List   Diagnosis   • Gastroesophageal reflux disease   • Atopic rhinitis   • Chronic low back pain   • Chronic obstructive pulmonary disease (CMS/HCC)   • Chronic obstructive pulmonary disease with acute exacerbation (CMS/HCC)   • Diabetes mellitus (CMS/HCC)   • Diabetic peripheral neuropathy (CMS/HCC)   • Dyslipidemia   • Nonalcoholic fatty liver disease   • Gastrointestinal ulcer due to Helicobacter pylori   • Hypertension   • Menopausal symptom   • Mixed anxiety depressive disorder   • Seasonal allergic rhinitis   • Tobacco dependence syndrome   • Vitamin D deficiency   • Hyperlipidemia   • Anxiety and depression   • Menopause syndrome   • Allergic rhinitis   • Type 2 diabetes mellitus, controlled (CMS/HCC)   • GERD without esophagitis   • Nausea   • Debility   • Liver cirrhosis (CMS/HCC)         Therapy Treatment    IRF Treatment Summary     Row Name 19 1506             Evaluation/Treatment Time and Intent    Subjective Information  no complaints agreeable to therapy  -CJ      Existing Precautions/Restrictions  fall;other (see comments) ABD drain-ostomy bag, Hep C, decreased skin integrity  -CJ      Document Type  therapy note (daily note)  -CJ      Mode of Treatment  occupational therapy  -CJ      Recorded by [CJ] Anika Gutierrez OT      Row Name 19 1508             Cognition/Psychosocial- PT/OT    Affect/Mental Status (Cognitive)  WFL  -CJ      Follows Commands (Cognition)  verbal cues/prompting required  -CJ      Recorded by [CJ] Anika Gutierrez OT      Row Name 19 1506             Chair-Bed Transfer    Chair-Bed [place] (Transfers)  minimum assist (75% patient effort);verbal cues  -CJ      Assistive Device (Chair-Bed  Transfers)  wheelchair  -CJ      Recorded by [CJ] Anika Gutierrez OT      Row Name 12/18/19 1507             Toilet Transfer    [place] Level (Toilet Transfer)  minimum assist (75% patient effort);contact guard;verbal cues  -CJ      Assistive Device (Toilet Transfer)  grab bars/safety frame  -CJ      Recorded by [CJ] Anika Gutierrez, MINH      Row Name 12/18/19 1507             Basic Activities of Daily Living (BADLs)          Grooming Assessment/Treatment    Grooming [place] Level  set up  -CJ      Recorded by [] Anika Gutierrez, OT      Row Name 12/18/19 1507             Toileting Assessment/Treatment    Toileting [place] Level  minimum assist (75% patient effort);moderate assist (50% patient effort);verbal cues  -CJ      Assistive Device Use (Toileting)  grab bar/safety frame  -CJ      Recorded by [] Anika Gutierrez, OT      Row Name 12/18/19 1507             Upper Extremity Seated Therapeutic Exercise    Exercise Type, Seated Upper Extremity (Therapeutic Exercise)  AROM (active range of motion) BUE ther ex/act, bilat coord ex, fxal activity tolerance  -CJ      Expected Outcomes, Seated Upper Extremity (Therapeutic Exercise)  improve functional tolerance, self-care activity;improve performance, BADLs;improve performance, transfer skills  -CJ      Recorded by [CJ] Anika Gutierrez OT      Row Name 12/18/19 1507             Positioning and Restraints    Post Treatment Position  bed  -CJ      In Bed  supine;call light within reach;encouraged to call for assist;heels elevated  -CJ      Recorded by [CJ] Anika Gutierrez OT        User Key  (r) = Recorded By, (t) = Taken By, (c) = Cosigned By    Initials Name Effective Dates     Anika Gutierrez OT 04/03/18 -           Wound 12/16/19 midline coccyx Pressure Injury (Active)   Dressing Appearance dry;intact 12/18/2019  7:30 AM   Closure None 12/18/2019  4:01 AM   Base non-blanchable;slough;yellow 12/18/2019  7:30 AM   Periwound  intact;blanchable 12/17/2019  4:50 PM   Periwound Temperature warm 12/17/2019  4:50 PM   Periwound Skin Turgor soft 12/17/2019  4:50 PM   Edges rolled/closed 12/17/2019  4:50 PM   Drainage Amount none 12/18/2019  4:01 AM   Care, Wound cleansed with;soap and water;honey applied 12/18/2019  4:01 AM   Dressing Care, Wound dressing changed 12/18/2019  4:01 AM       Wound 12/16/19 2000 Right coccyx Pressure Injury (Active)   Dressing Appearance dry;intact 12/18/2019  7:30 AM   Closure None 12/18/2019  4:01 AM   Base non-blanchable;slough;yellow 12/18/2019  7:30 AM   Periwound intact 12/17/2019  4:50 PM   Periwound Temperature warm 12/17/2019  4:50 PM   Periwound Skin Turgor soft 12/17/2019  4:50 PM   Drainage Amount none 12/18/2019  4:01 AM   Care, Wound cleansed with;soap and water 12/18/2019  4:01 AM   Dressing Care, Wound dressing applied 12/18/2019  4:01 AM       Wound 12/16/19 2000 Left posterior heel (Active)   Dressing Appearance open to air 12/18/2019  7:30 AM   Closure None 12/17/2019  7:10 PM   Base scab;blanchable 12/18/2019  7:30 AM   Periwound intact 12/17/2019  4:50 PM   Periwound Temperature warm 12/17/2019  4:50 PM   Periwound Skin Turgor soft 12/17/2019  4:50 PM   Edges rolled/closed 12/17/2019  4:50 PM   Drainage Amount none 12/17/2019  7:10 PM       Wound Right posterior heel Pressure Injury (Active)   Dressing Appearance open to air 12/18/2019  7:30 AM   Closure None 12/17/2019  7:10 PM   Base scab 12/18/2019  7:30 AM   Periwound intact 12/17/2019  4:50 PM   Periwound Temperature warm 12/17/2019  4:50 PM   Periwound Skin Turgor soft 12/17/2019  4:50 PM   Edges rolled/closed 12/17/2019  4:50 PM   Drainage Amount none 12/17/2019  7:10 PM       Wound 12/16/19 2000 Right calf Abrasion (Active)   Dressing Appearance open to air 12/18/2019  7:30 AM   Closure None 12/18/2019  7:30 AM   Base non-blanchable 12/18/2019  7:30 AM   Periwound intact 12/17/2019  4:50 PM   Drainage Amount none 12/17/2019  7:10 PM        Wound 12/16/19 2000 Left upper gluteal Pressure Injury (Active)   Dressing Appearance dry;intact 12/18/2019  4:01 AM   Closure None 12/18/2019  4:01 AM   Base non-blanchable;slough 12/18/2019  4:01 AM   Periwound intact 12/17/2019  5:00 PM   Periwound Temperature warm 12/17/2019  5:00 PM   Periwound Skin Turgor soft 12/17/2019  5:00 PM   Edges rolled/closed 12/17/2019  5:00 PM   Drainage Amount none 12/18/2019  4:01 AM   Care, Wound cleansed with;soap and water 12/18/2019  4:01 AM   Dressing Care, Wound dressing changed 12/18/2019  4:01 AM       Wound 12/16/19 2000 Left lower gluteal Pressure Injury (Active)   Dressing Appearance dry;intact 12/18/2019  7:30 AM   Closure None 12/18/2019  4:01 AM   Base non-blanchable 12/18/2019  7:30 AM   Periwound intact 12/17/2019  5:00 PM   Periwound Temperature warm 12/17/2019  5:00 PM   Periwound Skin Turgor soft 12/17/2019  5:00 PM   Edges rolled/closed 12/17/2019  5:00 PM   Care, Wound cleansed with;soap and water;honey applied 12/18/2019  4:01 AM   Dressing Care, Wound dressing changed 12/18/2019  4:01 AM       Wound 12/16/19 2000 Bilateral back Abrasion (Active)   Dressing Appearance open to air 12/18/2019  7:30 AM         OT Recommendation and Plan    Anticipated Equipment Needs At Discharge (OT Eval): (TBD)  Planned Therapy Interventions (OT Eval): activity tolerance training, BADL retraining, adaptive equipment training, occupation/activity based interventions, patient/caregiver education/training, ROM/therapeutic exercise, strengthening exercise, transfer/mobility retraining            OT IRF GOALS     Row Name 12/17/19 1429             Bathing Goal 1 (OT-IRF)    [place] Level (Bathing Goal 1, OT-IRF)  minimum assist (75% or more patient effort)  -      Time Frame (Bathing Goal 1, OT-IRF)  long term goal (LTG);by discharge  -         LB Dressing Goal 1 (OT-IRF)    [place] (LB Dressing Goal 1, OT-IRF)  minimum assist (75% or more patient effort)  -CJ       Time Frame (LB Dressing Goal 1, OT-IRF)  short term goal (STG)  -CJ         LB Dressing Goal 2 (OT-IRF)    [place] (LB Dressing Goal 2, OT-IRF)  contact guard assist  -CJ      Time Frame (LB Dressing Goal 2, OT-IRF)  long term goal (LTG);by discharge  -         Toileting Goal 1 (OT-IRF)    [place] Level (Toileting Goal 1, OT-IRF)  minimum assist (75% or more patient effort)  -CJ      Time Frame (Toileting Goal 1, OT-IRF)  short term goal (STG)  -CJ         Toileting Goal 2 (OT-IRF)    [place] Level (Toileting Goal 2, OT-IRF)  contact guard assist  -CJ      Time Frame (Toileting Goal 2, OT-IRF)  long term goal (LTG);by discharge  -        User Key  (r) = Recorded By, (t) = Taken By, (c) = Cosigned By    Initials Name Provider Type    Anika Solis, OT Occupational Therapist                     Time Calculation:     Time Calculation- OT     Row Name 12/18/19 1514             Time Calculation- OT    OT Start Time  1020  -      OT Stop Time  1105  -      OT Time Calculation (min)  45 min  -      Total Timed Code Minutes- OT  45 minute(s)  -      OT Non-Billable Time (min)  15 min  -        User Key  (r) = Recorded By, (t) = Taken By, (c) = Cosigned By    Initials Name Provider Type    Anika Solis, OT Occupational Therapist          Therapy Charges for Today     Code Description Service Date Service Provider Modifiers Qty    91189320092 HC OT EVAL MOD COMPLEXITY 3 12/17/2019 Anika Gutierrez OT GO 1    08900627606 HC OT THERAPEUTIC ACT EA 15 MIN 12/17/2019 Anika Gutierrez OT GO 3    52513359350 HC OT SELF CARE/MGMT/TRAIN EA 15 MIN 12/18/2019 Anika Gutierrez OT GO 2    38307474898 HC OT THERAPEUTIC ACT EA 15 MIN 12/18/2019 Anika Gutierrez OT GO 1                   Anika Gutierrez OT  12/18/2019

## 2019-12-18 NOTE — THERAPY TREATMENT NOTE
Inpatient Rehabilitation - Occupational Therapy Treatment Note    DIONISIO [place]     Patient Name: Gretta Giles  : 1960  MRN: [id number]    Today's Date: 2019                 Admit Date: 2019      Visit Dx:  No diagnosis found.    Patient Active Problem List   Diagnosis   • Gastroesophageal reflux disease   • Atopic rhinitis   • Chronic low back pain   • Chronic obstructive pulmonary disease (CMS/HCC)   • Chronic obstructive pulmonary disease with acute exacerbation (CMS/HCC)   • Diabetes mellitus (CMS/HCC)   • Diabetic peripheral neuropathy (CMS/HCC)   • Dyslipidemia   • Nonalcoholic fatty liver disease   • Gastrointestinal ulcer due to Helicobacter pylori   • Hypertension   • Menopausal symptom   • Mixed anxiety depressive disorder   • Seasonal allergic rhinitis   • Tobacco dependence syndrome   • Vitamin D deficiency   • Hyperlipidemia   • Anxiety and depression   • Menopause syndrome   • Allergic rhinitis   • Type 2 diabetes mellitus, controlled (CMS/HCC)   • GERD without esophagitis   • Nausea   • Debility   • Liver cirrhosis (CMS/HCC)         Therapy Treatment    IRF Treatment Summary     Row Name 19 1533 19 1507          Evaluation/Treatment Time and Intent    Subjective Information  no complaints  -LM  no complaints agreeable to therapy  -CJ     Existing Precautions/Restrictions  fall;other (see comments) drain ostomy bag, HEP C  -LM  fall;other (see comments) ABD drain-ostomy bag, Hep C, decreased skin integrity  -CJ     Document Type  therapy note (daily note)  -LM  therapy note (daily note)  -     Mode of Treatment  occupational therapy  -LM  occupational therapy  -CJ     Patient/Family Observations  Patient seen for therex/TA with focus on BUE arom gmc/fmc and fxl activity tolerance.  Frequent rest breaks.    -LM  --     Recorded by [LM] Melanie Mai OT [CJ] Anika Gutierrez OT     Row Name 19 1533 19 1507          Cognition/Psychosocial- PT/OT     Affect/Mental Status (Cognitive)  WFL  -LM  WFL  -CJ     Orientation Status (Cognition)  oriented x 3  -LM  --     Follows Commands (Cognition)  --  verbal cues/prompting required  -CJ     Recorded by [LM] Melanie Mai OT [CJ] Anika Gutierrez, OT     Row Name 12/18/19 1507             Chair-Bed Transfer    Chair-Bed [place] (Transfers)  minimum assist (75% patient effort);verbal cues  -CJ      Assistive Device (Chair-Bed Transfers)  wheelchair  -CJ      Recorded by [CJ] Anika Gutierrez, OT      Row Name 12/18/19 1507             Toilet Transfer    [place] Level (Toilet Transfer)  minimum assist (75% patient effort);contact guard;verbal cues  -CJ      Assistive Device (Toilet Transfer)  grab bars/safety frame  -CJ      Recorded by [CJ] Anika Gutierrez, OT      Row Name 12/18/19 1507             Basic Activities of Daily Living (BADLs)    Basic Activities of Daily Living  --  -CJ      Recorded by [CJ] Anika Gutierrez, OT      Row Name 12/18/19 1507             Grooming Assessment/Treatment    Grooming [place]eno Level  set up  -CJ      Recorded by [CJ] Anika Gutierrez, OT      Row Name 12/18/19 1507             Toileting Assessment/Treatment    Toileting [place] Level  minimum assist (75% patient effort);moderate assist (50% patient effort);verbal cues  -CJ      Assistive Device Use (Toileting)  grab bar/safety frame  -CJ      Recorded by [CJ] Anika Gutierrez, OT      Row Name 12/18/19 1507             Upper Extremity Seated Therapeutic Exercise    Exercise Type, Seated Upper Extremity (Therapeutic Exercise)  AROM (active range of motion) BUE ther ex/act, bilat coord ex, fxal activity tolerance  -CJ      Expected Outcomes, Seated Upper Extremity (Therapeutic Exercise)  improve functional tolerance, self-care activity;improve performance, BADLs;improve performance, transfer skills  -CJ      Recorded by [CJ] Anika Gutierrez, MINH      Row Name 12/18/19 1533 12/18/19 1507          Positioning and  Restraints    Post Treatment Position  wheelchair  -LM  bed  -CJ     In Bed  with OT  -LM  supine;call light within reach;encouraged to call for assist;heels elevated  -CJ     Recorded by [LM] Melanie Mai, OT [CJ] Anika Gutierrez OT       User Key  (r) = Recorded By, (t) = Taken By, (c) = Cosigned By    Initials Name Effective Dates     Anika Gutierrez, OT 04/03/18 -     LM Melanie Mai OT 03/14/16 -           Wound 12/16/19 midline coccyx Pressure Injury (Active)   Dressing Appearance dry;intact 12/18/2019  7:30 AM   Closure None 12/18/2019  4:01 AM   Base non-blanchable;slough;yellow 12/18/2019  7:30 AM   Periwound intact;blanchable 12/17/2019  4:50 PM   Periwound Temperature warm 12/17/2019  4:50 PM   Periwound Skin Turgor soft 12/17/2019  4:50 PM   Edges rolled/closed 12/17/2019  4:50 PM   Drainage Amount none 12/18/2019  4:01 AM   Care, Wound cleansed with;soap and water;honey applied 12/18/2019  4:01 AM   Dressing Care, Wound dressing changed 12/18/2019  4:01 AM       Wound 12/16/19 2000 Right coccyx Pressure Injury (Active)   Dressing Appearance dry;intact 12/18/2019  7:30 AM   Closure None 12/18/2019  4:01 AM   Base non-blanchable;slough;yellow 12/18/2019  7:30 AM   Periwound intact 12/17/2019  4:50 PM   Periwound Temperature warm 12/17/2019  4:50 PM   Periwound Skin Turgor soft 12/17/2019  4:50 PM   Drainage Amount none 12/18/2019  4:01 AM   Care, Wound cleansed with;soap and water 12/18/2019  4:01 AM   Dressing Care, Wound dressing applied 12/18/2019  4:01 AM       Wound 12/16/19 2000 Left posterior heel (Active)   Dressing Appearance open to air 12/18/2019  7:30 AM   Closure None 12/17/2019  7:10 PM   Base scab;blanchable 12/18/2019  7:30 AM   Periwound intact 12/17/2019  4:50 PM   Periwound Temperature warm 12/17/2019  4:50 PM   Periwound Skin Turgor soft 12/17/2019  4:50 PM   Edges rolled/closed 12/17/2019  4:50 PM   Drainage Amount none 12/17/2019  7:10 PM       Wound Right posterior heel  Pressure Injury (Active)   Dressing Appearance open to air 12/18/2019  7:30 AM   Closure None 12/17/2019  7:10 PM   Base scab 12/18/2019  7:30 AM   Periwound intact 12/17/2019  4:50 PM   Periwound Temperature warm 12/17/2019  4:50 PM   Periwound Skin Turgor soft 12/17/2019  4:50 PM   Edges rolled/closed 12/17/2019  4:50 PM   Drainage Amount none 12/17/2019  7:10 PM       Wound 12/16/19 2000 Right calf Abrasion (Active)   Dressing Appearance open to air 12/18/2019  7:30 AM   Closure None 12/18/2019  7:30 AM   Base non-blanchable 12/18/2019  7:30 AM   Periwound intact 12/17/2019  4:50 PM   Drainage Amount none 12/17/2019  7:10 PM       Wound 12/16/19 2000 Left upper gluteal Pressure Injury (Active)   Dressing Appearance dry;intact 12/18/2019  4:01 AM   Closure None 12/18/2019  4:01 AM   Base non-blanchable;slough 12/18/2019  4:01 AM   Periwound intact 12/17/2019  5:00 PM   Periwound Temperature warm 12/17/2019  5:00 PM   Periwound Skin Turgor soft 12/17/2019  5:00 PM   Edges rolled/closed 12/17/2019  5:00 PM   Drainage Amount none 12/18/2019  4:01 AM   Care, Wound cleansed with;soap and water 12/18/2019  4:01 AM   Dressing Care, Wound dressing changed 12/18/2019  4:01 AM       Wound 12/16/19 2000 Left lower gluteal Pressure Injury (Active)   Dressing Appearance dry;intact 12/18/2019  7:30 AM   Closure None 12/18/2019  4:01 AM   Base non-blanchable 12/18/2019  7:30 AM   Periwound intact 12/17/2019  5:00 PM   Periwound Temperature warm 12/17/2019  5:00 PM   Periwound Skin Turgor soft 12/17/2019  5:00 PM   Edges rolled/closed 12/17/2019  5:00 PM   Care, Wound cleansed with;soap and water;honey applied 12/18/2019  4:01 AM   Dressing Care, Wound dressing changed 12/18/2019  4:01 AM       Wound 12/16/19 2000 Bilateral back Abrasion (Active)   Dressing Appearance open to air 12/18/2019  7:30 AM         OT Recommendation and Plan                 OT IRF GOALS     Row Name 12/17/19 1429             Bathing Goal 1 (OT-IRF)     [place] Level (Bathing Goal 1, OT-IRF)  minimum assist (75% or more patient effort)  -CJ      Time Frame (Bathing Goal 1, OT-IRF)  long term goal (LTG);by discharge  -CJ         LB Dressing Goal 1 (OT-IRF)    [place] (LB Dressing Goal 1, OT-IRF)  minimum assist (75% or more patient effort)  -CJ      Time Frame (LB Dressing Goal 1, OT-IRF)  short term goal (STG)  -CJ         LB Dressing Goal 2 (OT-IRF)    [place] (LB Dressing Goal 2, OT-IRF)  contact guard assist  -CJ      Time Frame (LB Dressing Goal 2, OT-IRF)  long term goal (LTG);by discharge  -CJ         Toileting Goal 1 (OT-IRF)    [place] Level (Toileting Goal 1, OT-IRF)  minimum assist (75% or more patient effort)  -CJ      Time Frame (Toileting Goal 1, OT-IRF)  short term goal (STG)  -CJ         Toileting Goal 2 (OT-IRF)    [place] Level (Toileting Goal 2, OT-IRF)  contact guard assist  -CJ      Time Frame (Toileting Goal 2, OT-IRF)  long term goal (LTG);by discharge  -        User Key  (r) = Recorded By, (t) = Taken By, (c) = Cosigned By    Initials Name Provider Type     Anika Gutierrez OT Occupational Therapist                     Time Calculation:     Time Calculation- OT     Row Name 12/18/19 1538 12/18/19 1514          Time Calculation- OT    OT Start Time  0930  -LM  1020  -     OT Stop Time  1015  -LM  1105  -     OT Time Calculation (min)  45 min  -LM  45 min  -     Total Timed Code Minutes- OT  45 minute(s)  -LM  45 minute(s)  -     OT Non-Billable Time (min)  --  15 min  -       User Key  (r) = Recorded By, (t) = Taken By, (c) = Cosigned By    Initials Name Provider Type     Anika Gutierrez OT Occupational Therapist    LM Melanie Mai, MINH Occupational Therapist          Therapy Charges for Today     Code Description Service Date Service Provider Modifiers Qty    26684681772 HC OT THER PROC EA 15 MIN 12/18/2019 Melanie Mai OT GO 1    05715796710 HC OT THERAPEUTIC ACT EA 15 MIN 12/18/2019 Pau  Melanie SOARES, OT GO 2                   Melanie Mai, OT  12/18/2019

## 2019-12-18 NOTE — PROGRESS NOTES
PPS CMG Coordinator  Inpatient Rehabilitation Admission    Ethnic Group: White.  Marital Status:  Marital Status: .    IRF Admission Date:  12/16/2019  Admission Class: Initial Rehab.  Admit From:  [facility]    Pre-Hospital Living: Home. Pre-Hospital Living  With: (2) Family/Relatives.    Payment Sources: Primary: Not Listed.  Secondary: Not Listed.  Impairment Group: 16 Debility (non-cardiac, non-pulmonary)  Date of Onset of Impairment: 11/21/2019    Etiologic Diagnosis Code(s):  Rank Code      Description  1    K72.90    Hepatic failure, unspecified without coma    Comorbidities:      Height on Admission: 63 inches.  Weight on Admission: 114 pounds.    Are there any arthritis conditions recorded for Impairment Group, Etiologic  Diagnosis, or Comorbid Conditions that meet all of the regulatory requirements  for IRF classification (in 42 .29(b)(2)(x), (xi), and xii))?    [facility]NA Bladder Accidents:  4 - Accidents.    Patient used medications/device this  shift.  12/16/2019 9:51:00 PM  Bladder Score = 2.  Four (4) bladder accidents.  [facility]NA Bowel Accident: 1 -Accidents.  Bowel Score = 5.  One (1) bowel accident.    Signed by: Shonna Finley, Supervisor

## 2019-12-18 NOTE — PROGRESS NOTES
Occupational Therapy: Individual: 90 minutes.    Physical Therapy:    Speech Language Pathology:    Signed by: Anika Gutierrez, Occupational Therapist

## 2019-12-18 NOTE — PROGRESS NOTES
LOS: 1 day     Chief Complaint:     Debility    Subjective     Interval History:     Patient was assessed by occupational and physical therapy and appropriate plan of care was put into place. Patient tolerated PT and OT sessions well on 12/17/19 but did have complaints of back and BLE pain.    Patient is unable to receive anticoagulation therapy due to her end-stage liver cirrhosis and high risk for bleeding but DVT prophylaxis was initiated with SCUDs and for now would try to avoid anticoagulation if at all possible as risk outweighs benefit as recommended by the referring facility.  Patient continues to refuse her insulin and her sugar is elevated and despite my discussion with her continues to consume high sugar candy at her bedside.    Estimated length of stay 7 to 10 days     Prior level of function prior to her admission at [facility] she was independent and living at home.     Patient is going to require intensive inpatient physical therapy for therapeutic exercises, range of motion, endurance, gait training, safety, stairs training, strengthening for at least 1 to 2 hours a day for 5 to 6 days a week as well as occupational therapy for dressing, ADLs, feeding, home skills, safety and toileting techniques for 1 to 2 hours a day for 5 to 6 days a week.    Review Of Systems:     Constitutional: no fever, chills and night sweats. No appetite change or unexpected weight change. ` Generalized fatigue.  Eyes: no eye drainage, itching or redness.  HEENT: no mouth sores, dysphagia or nose bleed.  Respiratory: no for shortness of breath, cough or production of sputum.  Cardiovascular: no chest pain, no palpitations, no orthopnea.  Gastrointestinal: no nausea, vomiting or diarrhea.  Mild chronic abdominal pain, no hematemesis or rectal bleeding.  Genitourinary: no dysuria or polyuria.  Hematologic/lymphatic: no lymph node abnormalities, no easy bruising or easy bleeding.  Musculoskeletal: no muscle or joint  "pain.  Skin: No rash and no itching.  Neurological: no loss of consciousness, no seizure, no headache.  Behavioral/Psych: no depression or suicidal ideation.  Endocrine: no hot flashes.  Immunologic: negative.    Objective     Vital Signs  /67 (BP Location: Left arm, Patient Position: Lying)   Pulse 89   Temp 98.5 °F (36.9 °C) (Oral)   Resp 18   Ht 160 cm (63\")   Wt 51.8 kg (114 lb 3.2 oz)   SpO2 98%   BMI 20.23 kg/m²   Intake & Output (last day)       12/17 0701 - 12/18 0700    P.O. 840    Total Intake(mL/kg) 840 (16.2)    Urine (mL/kg/hr) 0 (0)    Stool 1845    Total Output 1845    Net -1005         Urine Unmeasured Occurrence 8 x    Stool Unmeasured Occurrence 1 x            Physical Exam:     General Appearance: alert, pleasant, appears stated age, interactive and  Cooperative.  Generalized weakness.     Head: normocephalic, without obvious abnormality and atraumatic     Eyes: lids and lashes normal, conjunctivae and sclerae normal, no icterus, no  pallor, corneas clear and PERRLA     Ears: ears appear intact with no abnormalities noted     Nose: nares normal, septum midline, mucosa normal and no drainage                Throat: no oral lesions, no thrush, oral mucosa moist and oopharynx normal     Neck: no adenopathy, supple, trachea midline, no thyromegaly, no carotid bruit  and no JVD     Back: no kyphosis present, no scoliosis present, no skin lesions, erythema, or  scars, no tenderness to percussion or palpation and range of motion normal     Lungs: clear to auscultation, respirations regular, respirations even and  respirations unlabored. No accessory muscle use.      Heart:: regular rhythm & normal rate, normal S1, S2, no murmur, no gallop, no  rub and no click.  Chest wall with no abnormalities observed. PMI nondisplaced     Abdomen: normal bowel sounds in all quadrants, no masses, no hepatomegaly,  no splenomegaly, soft non-tender, no guarding and no rebound tenderness     Extremities: no " edema, no cyanosis, no redness, no tenderness, no clubbing     Musculoskeletal: joints with full range of motion and joints, no effusion.  Pedal  pulses palpable and equal bilaterally     Skin: no bleeding, bruising or rash and no lesions noted     Lymph Nodes: no palpable adenopathy     Neurologic: Mental Status orientated to person, place, time and situation.   Speech is intelligible, Nonlabored.  Alertness alert and awake and mood/affect  normal, Cranial Nerves 2 - 12 grossly intact as examined  Sensory intact in BLE and BUE.  Motor strength 4/5 in upper and lower extremities       Results Review:    Lab Results   Component Value Date    WBC 7.59 12/17/2019    HGB 8.4 (L) 12/17/2019    HCT 27.3 (L) 12/17/2019    MCV 84.5 12/17/2019     12/17/2019       Lab Results   Component Value Date    GLUCOSE 414 (C) 12/17/2019    BUN 14 12/17/2019    CREATININE 0.78 12/17/2019    EGFRIFNONA 76 12/17/2019    BCR 17.9 12/17/2019     (L) 12/17/2019    K 4.5 12/17/2019    CL 98 12/17/2019    CO2 21.8 (L) 12/17/2019    CALCIUM 7.9 (L) 12/17/2019    ALBUMIN 2.45 (L) 12/17/2019    LABIL2 1.2 (L) 06/08/2016    AST 71 (H) 12/17/2019    ALT 46 (H) 12/17/2019     No results found for: INR    Glucose   Date Value Ref Range Status   12/17/2019 191 (H) 70 - 130 mg/dL Final   12/17/2019 238 (H) 70 - 130 mg/dL Final   12/17/2019 340 (H) 70 - 130 mg/dL Final   12/17/2019 445 (C) 70 - 130 mg/dL Final   12/17/2019 468 (C) 70 - 130 mg/dL Final          Medication Review:     Current Facility-Administered Medications:   •  acetaminophen (TYLENOL) tablet 650 mg, 650 mg, Oral, Q6H PRN, Ravi Alcazar MD, 650 mg at 12/17/19 2014  •  albuterol (PROVENTIL) nebulizer solution 0.083% 2.5 mg/3mL, 2.5 mg, Nebulization, Q6H PRN, Ravi Alcazar MD, 2.5 mg at 12/17/19 1943  •  amiodarone (PACERONE) tablet 200 mg, 200 mg, Oral, Q24H, Raiv Alcazar MD, 200 mg at 12/17/19 0851  •  aspirin chewable tablet 81 mg, 81 mg, Oral,  Daily, Ravi Alcazar MD, 81 mg at 12/17/19 0853  •  busPIRone (BUSPAR) tablet 15 mg, 15 mg, Oral, Daily, Ravi Alcazar MD, 15 mg at 12/17/19 0853  •  dextrose (D50W) 25 g/ 50mL Intravenous Solution 25 g, 25 g, Intravenous, Q15 Min PRN, Ravi Alcazar MD  •  dextrose (GLUTOSE) oral gel 15 g, 15 g, Oral, Q15 Min PRN, Ravi Alcazar MD  •  dicyclomine (BENTYL) capsule 10 mg, 10 mg, Oral, Daily, Ravi Alcazar MD, 10 mg at 12/17/19 0853  •  ferrous sulfate tablet 325 mg, 325 mg, Oral, TID With Meals, Ravi Alcazar MD, 325 mg at 12/17/19 1714  •  fluticasone (FLONASE) 50 MCG/ACT nasal spray 2 spray, 2 spray, Each Nare, Daily, Ravi Alcazar MD, 2 spray at 12/17/19 0853  •  furosemide (LASIX) tablet 40 mg, 40 mg, Oral, BID, Ravi Alcazar MD, 40 mg at 12/17/19 1713  •  gabapentin (NEURONTIN) capsule 600 mg, 600 mg, Oral, TID, Ravi Alcazar MD, 600 mg at 12/17/19 2014  •  glucagon (human recombinant) (GLUCAGEN DIAGNOSTIC) injection 1 mg, 1 mg, Subcutaneous, Q15 Min PRN, Ravi Alcazar MD  •  insulin aspart (novoLOG) injection 0-7 Units, 0-7 Units, Subcutaneous, 4x Daily AC & at Bedtime, Ravi Alcazar MD, 7 Units at 12/17/19 1714  •  insulin detemir (LEVEMIR) injection 10 Units, 10 Units, Subcutaneous, Q12H, Ravi Alcazar MD, 10 Units at 12/17/19 2000  •  lactulose (CHRONULAC) 10 GM/15ML solution 10 g, 10 g, Oral, BID, Ravi Alcazar MD, 10 g at 12/17/19 2014  •  ondansetron (ZOFRAN) tablet 4 mg, 4 mg, Oral, Q6H PRN, Ravi Alcazar MD, 4 mg at 12/17/19 0944  •  oxyCODONE (ROXICODONE) immediate release tablet 10 mg, 10 mg, Oral, Q8H PRN, Ravi Alcazar MD, 10 mg at 12/17/19 1403  •  pantoprazole (PROTONIX) EC tablet 40 mg, 40 mg, Oral, BID AC, Ravi Alcazar MD, 40 mg at 12/17/19 1714  •  spironolactone (ALDACTONE) tablet 100 mg, 100 mg, Oral, Daily, Ravi Alcazar MD, 100 mg at 12/17/19 0552      Assessment/Plan        Debility    Chronic obstructive pulmonary disease (CMS/HCC)    Diabetes mellitus (CMS/HCC)    Diabetic peripheral neuropathy (CMS/HCC)    Hypertension    Anxiety and depression    Liver cirrhosis (CMS/HCC)    ASSESSMENT:     1.  Atrial fibrillation  2.  Recurrent ascites  3.  Nonalcoholic liver cirrhosis, end-stage  4.  Hepatitis C  5.  Essential hypertension  6.  Diabetes mellitus insulin-dependent and nonadherent with insulin  7.  Neuropathy  8.  Anxiety  9.  Depression  10.  Chronic pain  11.  Hyponatremia  12.  COPD  13.  Acute respiratory failure with ARDS  14.  Aspiration pneumonia  15.  Chronic left-sided pleural effusion     PLAN:    Lab are consistent with stable anemia and uncontrolled blood sugar as patient continues to refuse her insulin and I had a fairly prolonged discussion with her regarding taking her insulin as prescribed and she would be at risk for DKA and other complications.    Due to her hyponatremia I ordered her 800 cc water restriction per 24 hours and will continue to monitor very closely.    Patient was assessed by occupational and physical therapy and appropriate plan of care was put into place. Patient tolerated PT and OT sessions well on 12/17/19 but did have complaints of back and BLE pain.    Patient is unable to receive anticoagulation therapy due to her end-stage liver cirrhosis and high risk for bleeding but DVT prophylaxis was initiated with SCUDs and for now would try to avoid anticoagulation if at all possible as risk outweighs benefit as recommended by the referring facility.  Patient continues to refuse her insulin and her sugar is elevated and despite my discussion with her continues to consume high sugar candy at her bedside.    Estimated length of stay 7 to 10 days     Prior level of function prior to her admission at [facility] she was independent and living at home.     Patient is going to require intensive inpatient physical therapy for therapeutic exercises,  range of motion, endurance, gait training, safety, stairs training, strengthening for at least 1 to 2 hours a day for 5 to 6 days a week as well as occupational therapy for dressing, ADLs, feeding, home skills, safety and toileting techniques for 1 to 2 hours a day for 5 to 6 days a week.    Ravi Alcazar MD  12/18/19  6:59 AM

## 2019-12-18 NOTE — THERAPY TREATMENT NOTE
Inpatient Rehabilitation - Physical Therapy Treatment Note  [facility]     Patient Name: Gretta Giles  : 1960  MRN: [id number]    Today's Date: 2019                 Admit Date: 2019      Visit Dx:    No diagnosis found.    Patient Active Problem List   Diagnosis   • Gastroesophageal reflux disease   • Atopic rhinitis   • Chronic low back pain   • Chronic obstructive pulmonary disease (CMS/HCC)   • Chronic obstructive pulmonary disease with acute exacerbation (CMS/HCC)   • Diabetes mellitus (CMS/HCC)   • Diabetic peripheral neuropathy (CMS/HCC)   • Dyslipidemia   • Nonalcoholic fatty liver disease   • Gastrointestinal ulcer due to Helicobacter pylori   • Hypertension   • Menopausal symptom   • Mixed anxiety depressive disorder   • Seasonal allergic rhinitis   • Tobacco dependence syndrome   • Vitamin D deficiency   • Hyperlipidemia   • Anxiety and depression   • Menopause syndrome   • Allergic rhinitis   • Type 2 diabetes mellitus, controlled (CMS/HCC)   • GERD without esophagitis   • Nausea   • Debility   • Liver cirrhosis (CMS/HCC)       Therapy Treatment    IRF Treatment Summary     Row Name 19 1615 19 1533 19 1507       Evaluation/Treatment Time and Intent    Subjective Information  complains of;weakness;fatigue;pain  -RF  no complaints  -LM  no complaints agreeable to therapy  -CJ    Existing Precautions/Restrictions  fall ABD draining to colostomy bag, Hep C+  -RF  fall;other (see comments) drain ostomy bag, HEP C  -LM  fall;other (see comments) ABD drain-ostomy bag, Hep C, decreased skin integrity  -CJ    Document Type  therapy note (daily note)  -RF  therapy note (daily note)  -LM  therapy note (daily note)  -CJ    Mode of Treatment  individual therapy;physical therapy  -RF  occupational therapy  -LM  occupational therapy  -CJ    Patient/Family Observations  Patient demonstrates decreased activity tolerance and c/o pain with increased activity. Continued skilled care  required for improved functional mobility and ambulation quality.   -RF  Patient seen for therex/TA with focus on BUE arom gmc/fmc and fxl activity tolerance.  Frequent rest breaks.    -LM  --    Recorded by [RF] Bhumika Barnes, TANIYA [LM] Melanie Mai, OT [CJ] Anika Gutierrez, MINH    Row Name 12/18/19 1615 12/18/19 1533 12/18/19 1507       Cognition/Psychosocial- PT/OT    Affect/Mental Status (Cognitive)  WFL  -RF  WFL  -LM  WFL  -CJ    Orientation Status (Cognition)  --  oriented x 3  -LM  --    Follows Commands (Cognition)  verbal cues/prompting required  -RF  --  verbal cues/prompting required  -CJ    Recorded by [RF] Bhumika Barnes PTA [LM] Melanie Mai, OT [CJ] Anika Gutierrez, OT    Row Name 12/18/19 1615             Bed Mobility Assessment/Treatment    Supine-Sit [place] (Bed Mobility)  minimum assist (75% patient effort);verbal cues;nonverbal cues (demo/gesture)  -RF      Assistive Device (Bed Mobility)  bed rails  -RF      Recorded by [RF] Bhumika Barnes PTA      Row Name 12/18/19 1615             Transfer Assessment/Treatment    Comment (Transfers)  Cues for safety required.   -RF      Recorded by [RF] Bhumika Barnes PTA      Row Name 12/18/19 1615             Bed-Chair Transfer    Bed-Chair [place] (Transfers)  minimum assist (75% patient effort);contact guard;nonverbal cues (demo/gesture);verbal cues  -RF      Assistive Device (Bed-Chair Transfers)  wheelchair  -RF      Recorded by [RF] Bhumika Barnes PTA      Row Name 12/18/19 1615 12/18/19 1507          Chair-Bed Transfer    Chair-Bed [place] (Transfers)  minimum assist (75% patient effort);verbal cues;nonverbal cues (demo/gesture);contact guard  -RF  minimum assist (75% patient effort);verbal cues  -CJ     Assistive Device (Chair-Bed Transfers)  wheelchair  -RF  wheelchair  -CJ     Recorded by [RF] Bhumika Barnes, PTA [CJ] Anika Gutierrez, OT     Row Name 12/18/19 1615             Sit-Stand Transfer     Sit-Stand Benedict (Transfers)  minimum assist (75% patient effort);verbal cues;nonverbal cues (demo/gesture);contact guard  -RF      Assistive Device (Sit-Stand Transfers)  walker, front-wheeled  -RF      Recorded by [RF] Bhumika Barnes, TANIYA      Row Name 12/18/19 1615             Stand-Sit Transfer    Stand-Sit [place] (Transfers)  minimum assist (75% patient effort);verbal cues;nonverbal cues (demo/gesture);contact guard  -RF      Assistive Device (Stand-Sit Transfers)  walker, front-wheeled  -RF      Recorded by [RF] Bhumika Barnes, PTA      Row Name 12/18/19 1615 12/18/19 1507          Toilet Transfer    Type (Toilet Transfer)  stand pivot/stand step  -RF  --     Benedict Level (Toilet Transfer)  minimum assist (75% patient effort);verbal cues;nonverbal cues (demo/gesture)  -RF  minimum assist (75% patient effort);contact guard;verbal cues  -CJ     Assistive Device (Toilet Transfer)  grab bars/safety frame;raised toilet seat  -RF  grab bars/safety frame  -CJ     Recorded by [RF] Bhumika Barnes, TANIYA [CJ] Anika Gutierrez, OT     Row Name 12/18/19 1615             Gait/Stairs Assessment/Training    Benedict Level (Gait)  contact guard;verbal cues;nonverbal cues (demo/gesture)  -RF      Assistive Device (Gait)  other (see comments) rollator  -RF      Distance in Feet (Gait)  300 in AM, 100X2 in PM  -RF      Pattern (Gait)  step-through  -RF      Deviations/Abnormal Patterns (Gait)  jason decreased;gait speed decreased;stride length decreased  -RF      Recorded by [RF] Bhumika Barnes, TANIYA      Row Name 12/18/19 1615             Safety Issues, Functional Mobility    Impairments Affecting Function (Mobility)  balance;endurance/activity tolerance;pain;strength  -RF      Recorded by [RF] Bhumika Barnes, TANIYA      Row Name 12/18/19 1507             Basic Activities of Daily Living (BADLs)    Basic Activities of Daily Living  --  -CJ      Recorded by [CJ] Anika Gutierrez, OT      Row  Name 12/18/19 1507             Grooming Assessment/Treatment    Grooming Ernest Level  set up  -CJ      Recorded by [CJ] Anika Gutierrez, OT      Row Name 12/18/19 1507             Toileting Assessment/Treatment    Toileting Ernest Level  minimum assist (75% patient effort);moderate assist (50% patient effort);verbal cues  -CJ      Assistive Device Use (Toileting)  grab bar/safety frame  -CJ      Recorded by [CJ] Anika Gutierrez, OT      Row Name 12/18/19 1615             Pain Scale: Numbers Pre/Post-Treatment    Pain Location  back and generalized  -RF      Recorded by [RF] Bhumika Barnes, PTA      Row Name 12/18/19 1615             Pain Scale: FACES Pre/Post-Treatment    Pain: FACES Scale, Pretreatment  6-->hurts even more  -RF      Pain: FACES Scale, Post-Treatment  6-->hurts even more  -RF      Recorded by [RF] Bhumika Barnes, PTA      Row Name 12/18/19 1615             Therapeutic Exercise    Therapeutic Exercise  supine, lower extremities;seated, lower extremities  -RF      Recorded by [RF] Bhumika Barnes, PTA      Row Name 12/18/19 1507             Upper Extremity Seated Therapeutic Exercise    Exercise Type, Seated Upper Extremity (Therapeutic Exercise)  AROM (active range of motion) BUE ther ex/act, bilat coord ex, fxal activity tolerance  -CJ      Expected Outcomes, Seated Upper Extremity (Therapeutic Exercise)  improve functional tolerance, self-care activity;improve performance, BADLs;improve performance, transfer skills  -CJ      Recorded by [CJ] Anika Gutierrez, OT      Row Name 12/18/19 1615             Lower Extremity Seated Therapeutic Exercise    Performed, Seated Lower Extremity (Therapeutic Exercise)  hip flexion/extension;hip abduction/adduction;knee flexion/extension;ankle dorsiflexion/plantarflexion;LAQ (long arc quad), knee extension  -RF      Device, Seated Lower Extremity (Therapeutic Exercise)  elastic bands/tubing;small ball  -RF      Exercise Type, Seated Lower  Extremity (Therapeutic Exercise)  AROM (active range of motion);eccentric contraction;isotonic contraction, concentric  -RF      Expected Outcomes, Seated Lower Extremity (Therapeutic Exercise)  improve functional tolerance, community activity;improve functional tolerance, household activity;improve functional tolerance, self-care activity;improve performance, gait skills;strengthen normal movement patterns;strengthen, facilitate independent active range of motion  -RF      Sets/Reps Detail, Seated Lower Extremity (Therapeutic Exercise)  2X10  -RF      Comment, Seated Lower Extremity (Therapeutic Exercise)  Rest breaks required  -RF      Recorded by [RF] Bhumika Barnes PTA      Row Name 12/18/19 1615             Lower Extremity Supine Therapeutic Exercise    Performed, Supine Lower Extremity (Therapeutic Exercise)  hip flexion/extension;hip abduction/adduction;hip external/internal rotation;knee flexion/extension;ankle dorsiflexion/plantarflexion;SAQ (short arc quad) over bolster;quadriceps sets;gluteal sets;heel slides;ankle pumps  -RF      Device, Supine Lower Extremity (Therapeutic Exercise)  small ball;foam roll  -RF      Exercise Type, Supine Lower Extremity (Therapeutic Exercise)  AROM (active range of motion);eccentric contraction;isotonic contraction, concentric  -RF      Expected Outcomes, Supine Lower Extremity (Therapeutic Exercise)  improve functional tolerance, community activity;improve functional tolerance, household activity;improve functional tolerance, self-care activity;improve performance, gait skills;strengthen normal movement patterns;strengthen, facilitate independent active range of motion  -RF      Sets/Reps Detail, Supine Lower Extremity (Therapeutic Exercise)  2X10  -RF      Comment, Supine Lower Extremity (Therapeutic Exercise)  rest breaks required  -RF      Recorded by [RF] Bhumika Barnes PTA      Row Name 12/18/19 1615 12/18/19 1533 12/18/19 1507       Positioning and  Restraints    Pre-Treatment Position  in bed BID  -RF  --  --    Post Treatment Position  --  wheelchair  -LM  bed  -CJ    In Bed  supine;call light within reach;encouraged to call for assist BID  -RF  with OT  -LM  supine;call light within reach;encouraged to call for assist;heels elevated  -CJ    Recorded by [RF] Bhumika Barnes, PTA [LM] Melanie Mai, OT [CJ] Anika Gutierrez, OT      User Key  (r) = Recorded By, (t) = Taken By, (c) = Cosigned By    Initials Name Effective Dates     Anika Gutierrez, OT 04/03/18 -     LM Melanie Mai, OT 03/14/16 -     RF Bhumika Barnes, PTA 03/07/18 -         Wound 12/16/19 midline coccyx Pressure Injury (Active)   Dressing Appearance dry;intact 12/18/2019  7:30 AM   Closure None 12/18/2019  4:01 AM   Base non-blanchable;slough;yellow 12/18/2019  7:30 AM   Periwound intact;blanchable 12/17/2019  4:50 PM   Periwound Temperature warm 12/17/2019  4:50 PM   Periwound Skin Turgor soft 12/17/2019  4:50 PM   Edges rolled/closed 12/17/2019  4:50 PM   Drainage Amount none 12/18/2019  4:01 AM   Care, Wound cleansed with;soap and water;honey applied 12/18/2019  4:01 AM   Dressing Care, Wound dressing changed 12/18/2019  4:01 AM       Wound 12/16/19 2000 Right coccyx Pressure Injury (Active)   Dressing Appearance dry;intact 12/18/2019  7:30 AM   Closure None 12/18/2019  4:01 AM   Base non-blanchable;slough;yellow 12/18/2019  7:30 AM   Periwound intact 12/17/2019  4:50 PM   Periwound Temperature warm 12/17/2019  4:50 PM   Periwound Skin Turgor soft 12/17/2019  4:50 PM   Drainage Amount none 12/18/2019  4:01 AM   Care, Wound cleansed with;soap and water 12/18/2019  4:01 AM   Dressing Care, Wound dressing applied 12/18/2019  4:01 AM       Wound 12/16/19 2000 Left posterior heel (Active)   Dressing Appearance open to air 12/18/2019  7:30 AM   Closure None 12/17/2019  7:10 PM   Base scab;blanchable 12/18/2019  7:30 AM   Periwound intact 12/17/2019  4:50 PM   Periwound Temperature warm  12/17/2019  4:50 PM   Periwound Skin Turgor soft 12/17/2019  4:50 PM   Edges rolled/closed 12/17/2019  4:50 PM   Drainage Amount none 12/17/2019  7:10 PM       Wound Right posterior heel Pressure Injury (Active)   Dressing Appearance open to air 12/18/2019  7:30 AM   Closure None 12/17/2019  7:10 PM   Base scab 12/18/2019  7:30 AM   Periwound intact 12/17/2019  4:50 PM   Periwound Temperature warm 12/17/2019  4:50 PM   Periwound Skin Turgor soft 12/17/2019  4:50 PM   Edges rolled/closed 12/17/2019  4:50 PM   Drainage Amount none 12/17/2019  7:10 PM       Wound 12/16/19 2000 Right calf Abrasion (Active)   Dressing Appearance open to air 12/18/2019  7:30 AM   Closure None 12/18/2019  7:30 AM   Base non-blanchable 12/18/2019  7:30 AM   Periwound intact 12/17/2019  4:50 PM   Drainage Amount none 12/17/2019  7:10 PM       Wound 12/16/19 2000 Left upper gluteal Pressure Injury (Active)   Dressing Appearance dry;intact 12/18/2019  4:01 AM   Closure None 12/18/2019  4:01 AM   Base non-blanchable;slough 12/18/2019  4:01 AM   Periwound intact 12/17/2019  5:00 PM   Periwound Temperature warm 12/17/2019  5:00 PM   Periwound Skin Turgor soft 12/17/2019  5:00 PM   Edges rolled/closed 12/17/2019  5:00 PM   Drainage Amount none 12/18/2019  4:01 AM   Care, Wound cleansed with;soap and water 12/18/2019  4:01 AM   Dressing Care, Wound dressing changed 12/18/2019  4:01 AM       Wound 12/16/19 2000 Left lower gluteal Pressure Injury (Active)   Dressing Appearance dry;intact 12/18/2019  7:30 AM   Closure None 12/18/2019  4:01 AM   Base non-blanchable 12/18/2019  7:30 AM   Periwound intact 12/17/2019  5:00 PM   Periwound Temperature warm 12/17/2019  5:00 PM   Periwound Skin Turgor soft 12/17/2019  5:00 PM   Edges rolled/closed 12/17/2019  5:00 PM   Care, Wound cleansed with;soap and water;honey applied 12/18/2019  4:01 AM   Dressing Care, Wound dressing changed 12/18/2019  4:01 AM       Wound 12/16/19 2000 Bilateral back Abrasion (Active)    Dressing Appearance open to air 12/18/2019  7:30 AM           PT Recommendation and Plan                        Time Calculation:     PT Charges     Row Name 12/18/19 1623 12/18/19 1622          Time Calculation    Start Time  1245  -RF  0830  -RF     Stop Time  1330  -RF  0915  -RF     Time Calculation (min)  45 min  -RF  45 min  -RF     PT Received On  12/18/19  -RF  12/18/19  -RF     PT - Next Appointment  12/19/19  -RF  12/18/19  -RF     PT Goal Re-Cert Due Date  12/24/19  -RF  12/24/19  -RF        Time Calculation- PT    Total Timed Code Minutes- PT  45 minute(s)  -RF  45 minute(s)  -RF       User Key  (r) = Recorded By, (t) = Taken By, (c) = Cosigned By    Initials Name Provider Type    RF Bhumika Barnes, TANIYA Physical Therapy Assistant          Therapy Charges for Today     Code Description Service Date Service Provider Modifiers Qty    96638322441 HC GAIT TRAINING EA 15 MIN 12/18/2019 Bhumika Barnes, PTA GP 2    52225382858 HC PT THER PROC EA 15 MIN 12/18/2019 Bhumika Barnes, PTA GP 3    24478291323 HC PT THERAPEUTIC ACT EA 15 MIN 12/18/2019 Bhumika Barnes, TANIYA GP 1                   Bhumika Barnes PTA  12/18/2019

## 2019-12-18 NOTE — PROGRESS NOTES
Rehabilitation Nursing  Inpatient Rehabilitation Plan of Care Note    Plan of Care  Safety    Potential for Injury (Active)  Current Status (12/16/2019 12:00:00 AM): FREE FROM INJURY  Weekly Goal: FREE FROM INJURY  Discharge Goal: FREE FROM INJURY    Body Systems    Integumentary (Active)  Current Status (12/16/2019 12:00:00 AM): FREE FROM FURTHER BREAKDOWN  Weekly Goal: FREE FROM FURTHER BREAKDOWN  Discharge Goal: FREE FROM FURTHER BREAKDOWN    Signed by: Shonna Finley, Supervisor

## 2019-12-19 NOTE — PROGRESS NOTES
LOS: 4 days     Chief Complaint:     Debility    Subjective     Interval History:      held a team conference on 12/19/19.   spoke to patient about plans for discharge on 12-27-19 and how she is doing in therapy.  Educated patient about option of going to women's shelter temporarily until she can find a different home to live in instead of nursing home placement.  Patient is agreeable to consider this option.    Patient participated in 100 minutes on occupational therapy on 12/19/19. She did have complaints of back pain but was agreeable to OT session. Her bed-chair transfer independence is with contact guard at minimum assist (75% patient effort) with verbal cues.  She uses a wheelchair for assistive device.     On 12/18/19 she spent 90 minutes with OT and PT. Patient's chair-bed transfer independence level is at minimum assist (75% patient effort), using verbal cues with a wheelchair to assist.  PT noted that patient demonstrates decreased activity tolerance and complains of pain with increased activity. Continued skilled care required for improved functional mobility and ambulation quality. She did ambulate 300 feet x2 in the PM with an assistive device.    Patient was assessed by occupational and physical therapy and appropriate plan of care was put into place. Patient tolerated PT and OT sessions well on 12/17/19 but did have complaints of back and BLE pain.     Patient is unable to receive anticoagulation therapy due to her end-stage liver cirrhosis and high risk for bleeding but DVT prophylaxis was initiated with SCUDs and for now would try to avoid anticoagulation if at all possible as risk outweighs benefit as recommended by the referring facility.  Patient continues to refuse her insulin and her sugar is elevated and despite my discussion with her continues to consume high sugar candy at her bedside.      Review Of Systems:     Constitutional: no fever, chills and night  "sweats. No appetite change or unexpected weight change. ` Generalized fatigue.  Eyes: no eye drainage, itching or redness.  HEENT: no mouth sores, dysphagia or nose bleed.  Respiratory: no for shortness of breath, cough or production of sputum.  Cardiovascular: no chest pain, no palpitations, no orthopnea.  Gastrointestinal: no nausea, vomiting or diarrhea.  Mild chronic abdominal pain, no hematemesis or rectal bleeding.  Genitourinary: no dysuria or polyuria.  Hematologic/lymphatic: no lymph node abnormalities, no easy bruising or easy bleeding.  Musculoskeletal: no muscle or joint pain.  Skin: No rash and no itching.  Neurological: no loss of consciousness, no seizure, no headache.  Behavioral/Psych: no depression or suicidal ideation.  Endocrine: no hot flashes.  Immunologic: negative.    Objective     Vital Signs  BP 98/68 (BP Location: Right arm, Patient Position: Sitting)   Pulse 95   Temp 99.2 °F (37.3 °C)   Resp 18   Ht 160 cm (63\")   Wt 51.8 kg (114 lb 3.2 oz)   SpO2 97%   BMI 20.23 kg/m²   Intake & Output (last day)       12/19 0701 - 12/20 0700    P.O. 1640    Total Intake(mL/kg) 1640 (31.7)    Urine (mL/kg/hr) 0 (0)    Other 200    Stool 1375    Total Output 1575    Net +65         Urine Unmeasured Occurrence 7 x    Stool Unmeasured Occurrence 1 x            Physical Exam:     General Appearance: alert, pleasant, appears stated age, interactive and  Cooperative.  Generalized weakness.     Head: normocephalic, without obvious abnormality and atraumatic     Eyes: lids and lashes normal, conjunctivae and sclerae normal, no icterus, no  pallor, corneas clear and PERRLA     Ears: ears appear intact with no abnormalities noted     Nose: nares normal, septum midline, mucosa normal and no drainage                Throat: no oral lesions, no thrush, oral mucosa moist and oopharynx normal     Neck: no adenopathy, supple, trachea midline, no thyromegaly, no carotid bruit  and no JVD     Back: no kyphosis " present, no scoliosis present, no skin lesions, erythema, or  scars, no tenderness to percussion or palpation and range of motion normal     Lungs: clear to auscultation, respirations regular, respirations even and  respirations unlabored. No accessory muscle use.      Heart:: regular rhythm & normal rate, normal S1, S2, no murmur, no gallop, no  rub and no click.  Chest wall with no abnormalities observed. PMI nondisplaced     Abdomen: normal bowel sounds in all quadrants, no masses, no hepatomegaly,  no splenomegaly, soft non-tender, no guarding and no rebound tenderness     Extremities: no edema, no cyanosis, no redness, no tenderness, no clubbing     Musculoskeletal: joints with full range of motion and joints, no effusion.  Pedal  pulses palpable and equal bilaterally     Skin: no bleeding, bruising or rash and no lesions noted.  Mid line coccyx, Right coccyx, Left upper gluteal and left lower gluteal unstageable pressure ulcers all present on admission.     Lymph Nodes: no palpable adenopathy     Neurologic: Mental Status orientated to person, place, time and situation.   Speech is intelligible, Nonlabored.  Alertness alert and awake and mood/affect  normal, Cranial Nerves 2 - 12 grossly intact as examined  Sensory intact in BLE and BUE.  Motor strength 4/5 in upper and lower extremities       Results Review:    Lab Results   Component Value Date    WBC 7.59 12/17/2019    HGB 8.4 (L) 12/17/2019    HCT 27.3 (L) 12/17/2019    MCV 84.5 12/17/2019     12/17/2019       Lab Results   Component Value Date    GLUCOSE 414 (C) 12/17/2019    BUN 14 12/17/2019    CREATININE 0.78 12/17/2019    EGFRIFNONA 76 12/17/2019    BCR 17.9 12/17/2019     (L) 12/17/2019    K 4.5 12/17/2019    CL 98 12/17/2019    CO2 21.8 (L) 12/17/2019    CALCIUM 7.9 (L) 12/17/2019    ALBUMIN 2.45 (L) 12/17/2019    LABIL2 1.2 (L) 06/08/2016    AST 71 (H) 12/17/2019    ALT 46 (H) 12/17/2019     No results found for: INR    Glucose   Date  Value Ref Range Status   12/19/2019 269 (H) 70 - 130 mg/dL Final   12/19/2019 258 (H) 70 - 130 mg/dL Final   12/19/2019 274 (H) 70 - 130 mg/dL Final   12/19/2019 421 (C) 70 - 130 mg/dL Final   12/18/2019 289 (H) 70 - 130 mg/dL Final          Medication Review:     Current Facility-Administered Medications:   •  acetaminophen (TYLENOL) tablet 650 mg, 650 mg, Oral, Q6H PRN, Ravi Alcazar MD, 650 mg at 12/17/19 2014  •  albuterol (PROVENTIL) nebulizer solution 0.083% 2.5 mg/3mL, 2.5 mg, Nebulization, Q6H PRN, Ravi Alcazar MD, 2.5 mg at 12/19/19 1945  •  amiodarone (PACERONE) tablet 200 mg, 200 mg, Oral, Q24H, Ravi Alcazar MD, 200 mg at 12/19/19 0824  •  aspirin chewable tablet 81 mg, 81 mg, Oral, Daily, Ravi Alcazar MD, 81 mg at 12/19/19 0824  •  busPIRone (BUSPAR) tablet 15 mg, 15 mg, Oral, Daily, Ravi Alcazar MD, 15 mg at 12/19/19 0824  •  castor oil-balsam peru (VENELEX) ointment, , Topical, Q12H, Ravi Alcazar MD  •  dextrose (D50W) 25 g/ 50mL Intravenous Solution 25 g, 25 g, Intravenous, Q15 Min PRN, Ravi Alcazar MD  •  dextrose (GLUTOSE) oral gel 15 g, 15 g, Oral, Q15 Min PRN, Ravi Alcazar MD  •  dicyclomine (BENTYL) capsule 10 mg, 10 mg, Oral, Daily, Ravi Alcazar MD, 10 mg at 12/19/19 0824  •  ferrous sulfate tablet 325 mg, 325 mg, Oral, TID With Meals, Ravi Alcazar MD, 325 mg at 12/19/19 1738  •  fluticasone (FLONASE) 50 MCG/ACT nasal spray 2 spray, 2 spray, Each Nare, Daily, Ravi Alcazar MD, 2 spray at 12/19/19 0824  •  furosemide (LASIX) tablet 40 mg, 40 mg, Oral, BID, Ravi Alcazar MD, 40 mg at 12/19/19 1738  •  gabapentin (NEURONTIN) capsule 600 mg, 600 mg, Oral, TID, Ravi Alcazar MD, 600 mg at 12/19/19 2045  •  glucagon (human recombinant) (GLUCAGEN DIAGNOSTIC) injection 1 mg, 1 mg, Subcutaneous, Q15 Min PRN, Ravi Alcazar MD  •  insulin aspart (novoLOG) injection 0-7 Units, 0-7 Units, Subcutaneous,  4x Daily AC & at Bedtime, Ravi Alcazar MD, 4 Units at 12/19/19 2050  •  insulin detemir (LEVEMIR) injection 10 Units, 10 Units, Subcutaneous, Q12H, Ravi Alcazar MD, 10 Units at 12/19/19 2046  •  lactulose (CHRONULAC) 10 GM/15ML solution 10 g, 10 g, Oral, BID, Ravi Alcazar MD, 10 g at 12/19/19 2045  •  ondansetron (ZOFRAN) tablet 4 mg, 4 mg, Oral, Q6H PRN, Ravi Alcazar MD, 4 mg at 12/19/19 2045  •  oxyCODONE (ROXICODONE) immediate release tablet 10 mg, 10 mg, Oral, Q8H PRN, Ravi Alcazar MD, 10 mg at 12/20/19 0049  •  pantoprazole (PROTONIX) EC tablet 40 mg, 40 mg, Oral, BID AC, Ravi Alcazar MD, 40 mg at 12/19/19 1646  •  spironolactone (ALDACTONE) tablet 100 mg, 100 mg, Oral, Daily, Ravi Alcazar MD, 100 mg at 12/19/19 0824      Assessment/Plan       Debility    Chronic obstructive pulmonary disease (CMS/HCC)    Diabetes mellitus (CMS/HCC)    Diabetic peripheral neuropathy (CMS/HCC)    Hypertension    Anxiety and depression    Liver cirrhosis (CMS/HCC)    ASSESSMENT:     1.  Atrial fibrillation  2.  Recurrent ascites  3.  Nonalcoholic liver cirrhosis, end-stage  4.  Hepatitis C  5.  Essential hypertension  6.  Diabetes mellitus insulin-dependent and nonadherent with insulin  7.  Neuropathy  8.  Anxiety  9.  Depression  10.  Chronic pain  11.  Hyponatremia  12.  COPD  13.  Acute respiratory failure with ARDS  14.  Aspiration pneumonia  15.  Chronic left-sided pleural effusion  16. Mid line coccyx, Right coccyx, Left upper gluteal and left lower gluteal unstageable pressure ulcers all present on admission     PLAN:    I ordered labs for am to follow on her hyponatremia and electrolyte imbalance as well as her anemia.  She continues not to be compliant with her diet and water restriction but at least she is taking her insulin with better control blood sugar at this time.     held a team conference on 12/19/19.   spoke to patient about  plans for discharge on 12-27-19 and how she is doing in therapy.  Educated patient about option of going to women's shelter temporarily until she can find a different home to live in instead of nursing home placement.  Patient is agreeable to consider this option.    Patient participated in 100 minutes on occupational therapy on 12/19/19. She did have complaints of back pain but was agreeable to OT session. Her bed-chair transfer independence is with contact guard at minimum assist (75% patient effort) with verbal cues.  She uses a wheelchair for assistive device.     On 12/18/19 she spent 90 minutes with OT and PT. Patient's chair-bed transfer independence level is at minimum assist (75% patient effort), using verbal cues with a wheelchair to assist.  PT noted that patient demonstrates decreased activity tolerance and complains of pain with increased activity. Continued skilled care required for improved functional mobility and ambulation quality. She did ambulate 300 feet x2 in the PM with an assistive device.    Patient was assessed by occupational and physical therapy and appropriate plan of care was put into place. Patient tolerated PT and OT sessions well on 12/17/19 but did have complaints of back and BLE pain.     Patient is unable to receive anticoagulation therapy due to her end-stage liver cirrhosis and high risk for bleeding but DVT prophylaxis was initiated with SCUDs and for now would try to avoid anticoagulation if at all possible as risk outweighs benefit as recommended by the referring facility.  Patient continues to refuse her insulin and her sugar is elevated and despite my discussion with her continues to consume high sugar candy at her bedside.    Estimated length of stay 7 to 10 days     Prior level of function prior to her admission at [facility] she was independent and living at home.     Patient is going to require intensive inpatient physical therapy for therapeutic exercises,  range of motion, endurance, gait training, safety, stairs training, strengthening for at least 1 to 2 hours a day for 5 to 6 days a week as well as occupational therapy for dressing, ADLs, feeding, home skills, safety and toileting techniques for 1 to 2 hours a day for 5 to 6 days a week.    Code Status and Medical Interventions:   Ordered at: 12/17/19 1254     Code Status:    No CPR     Medical Interventions (Level of Support Prior to Arrest):    Full           Ravi Alcazar MD  12/20/19  6:58 AM

## 2019-12-19 NOTE — PROGRESS NOTES
LOS: 2 days     Chief Complaint:     Debility    Subjective     Interval History:     Patient continues to exceed her water restriction limit and refuses her insulin and eats a lot candy throughout the day.  Patient was assessed by occupational and physical therapy and appropriate plan of care was put into place. Patient tolerated PT and OT sessions well on 12/17/19 but did have complaints of back and BLE pain.     On 12/18/19 she spent 90 minutes with OT and PT. Patient's chair-bed transfer independence level is at minimum assist (75% patient effort), using verbal cues with a wheelchair to assist.  PT noted that patient demonstrates decreased activity tolerance and complains of pain with increased activity. Continued skilled care required for improved functional mobility and ambulation quality. She did ambulate 300 feet x2 in the PM with an assistive device.    Patient is unable to receive anticoagulation therapy due to her end-stage liver cirrhosis and high risk for bleeding but DVT prophylaxis was initiated with SCUDs and for now would try to avoid anticoagulation if at all possible as risk outweighs benefit as recommended by the referring facility.  Patient continues to refuse her insulin and her sugar is elevated and despite my discussion with her continues to consume high sugar candy at her bedside.      Review Of Systems:     Constitutional: no fever, chills and night sweats. No appetite change or unexpected weight change. ` Generalized fatigue.  Eyes: no eye drainage, itching or redness.  HEENT: no mouth sores, dysphagia or nose bleed.  Respiratory: no for shortness of breath, cough or production of sputum.  Cardiovascular: no chest pain, no palpitations, no orthopnea.  Gastrointestinal: no nausea, vomiting or diarrhea.  Mild chronic abdominal pain, no hematemesis or rectal bleeding.  Genitourinary: no dysuria or polyuria.  Hematologic/lymphatic: no lymph node abnormalities, no easy bruising or easy  "bleeding.  Musculoskeletal: no muscle or joint pain.  Skin: No rash and no itching.  Neurological: no loss of consciousness, no seizure, no headache.  Behavioral/Psych: no depression or suicidal ideation.  Endocrine: no hot flashes.  Immunologic: negative.    Objective     Vital Signs  /63 (Patient Position: Sitting)   Pulse 92   Temp 98.3 °F (36.8 °C) (Oral)   Resp 18   Ht 160 cm (63\")   Wt 51.8 kg (114 lb 3.2 oz)   SpO2 96%   BMI 20.23 kg/m²   Intake & Output (last day)       12/18 0701 - 12/19 0700    P.O. 960    Total Intake(mL/kg) 960 (18.5)    Urine (mL/kg/hr) 1 (0)    Stool 1750    Total Output 1751    Net -791         Urine Unmeasured Occurrence 2 x            Physical Exam:     General Appearance: alert, pleasant, appears stated age, interactive and  Cooperative.  Generalized weakness.     Head: normocephalic, without obvious abnormality and atraumatic     Eyes: lids and lashes normal, conjunctivae and sclerae normal, no icterus, no  pallor, corneas clear and PERRLA     Ears: ears appear intact with no abnormalities noted     Nose: nares normal, septum midline, mucosa normal and no drainage                Throat: no oral lesions, no thrush, oral mucosa moist and oopharynx normal     Neck: no adenopathy, supple, trachea midline, no thyromegaly, no carotid bruit  and no JVD     Back: no kyphosis present, no scoliosis present, no skin lesions, erythema, or  scars, no tenderness to percussion or palpation and range of motion normal     Lungs: clear to auscultation, respirations regular, respirations even and  respirations unlabored. No accessory muscle use.      Heart:: regular rhythm & normal rate, normal S1, S2, no murmur, no gallop, no  rub and no click.  Chest wall with no abnormalities observed. PMI nondisplaced     Abdomen: normal bowel sounds in all quadrants, no masses, no hepatomegaly,  no splenomegaly, soft non-tender, no guarding and no rebound tenderness     Extremities: no edema, no " cyanosis, no redness, no tenderness, no clubbing     Musculoskeletal: joints with full range of motion and joints, no effusion.  Pedal  pulses palpable and equal bilaterally     Skin: no bleeding, bruising or rash and no lesions noted.  Mid line coccyx, Right coccyx, Left upper gluteal and left lower gluteal unstageable pressure ulcers all present on admission.     Lymph Nodes: no palpable adenopathy     Neurologic: Mental Status orientated to person, place, time and situation.   Speech is intelligible, Nonlabored.  Alertness alert and awake and mood/affect  normal, Cranial Nerves 2 - 12 grossly intact as examined  Sensory intact in BLE and BUE.  Motor strength 4/5 in upper and lower extremities       Results Review:    Lab Results   Component Value Date    WBC 7.59 12/17/2019    HGB 8.4 (L) 12/17/2019    HCT 27.3 (L) 12/17/2019    MCV 84.5 12/17/2019     12/17/2019       Lab Results   Component Value Date    GLUCOSE 414 (C) 12/17/2019    BUN 14 12/17/2019    CREATININE 0.78 12/17/2019    EGFRIFNONA 76 12/17/2019    BCR 17.9 12/17/2019     (L) 12/17/2019    K 4.5 12/17/2019    CL 98 12/17/2019    CO2 21.8 (L) 12/17/2019    CALCIUM 7.9 (L) 12/17/2019    ALBUMIN 2.45 (L) 12/17/2019    LABIL2 1.2 (L) 06/08/2016    AST 71 (H) 12/17/2019    ALT 46 (H) 12/17/2019     No results found for: INR    Glucose   Date Value Ref Range Status   12/18/2019 331 (H) 70 - 130 mg/dL Final   12/18/2019 344 (H) 70 - 130 mg/dL Final   12/18/2019 368 (H) 70 - 130 mg/dL Final   12/18/2019 350 (H) 70 - 130 mg/dL Final   12/17/2019 220 (H) 70 - 130 mg/dL Final          Medication Review:     Current Facility-Administered Medications:   •  acetaminophen (TYLENOL) tablet 650 mg, 650 mg, Oral, Q6H PRN, Inge, Ravi Edwards MD, 650 mg at 12/17/19 2014  •  albuterol (PROVENTIL) nebulizer solution 0.083% 2.5 mg/3mL, 2.5 mg, Nebulization, Q6H PRN, Ravi Alcazar MD, 2.5 mg at 12/18/19 1938  •  amiodarone (PACERONE) tablet 200 mg,  200 mg, Oral, Q24H, Ravi Alcazar MD, 200 mg at 12/18/19 0908  •  aspirin chewable tablet 81 mg, 81 mg, Oral, Daily, Ravi Alcazar MD, 81 mg at 12/18/19 0907  •  busPIRone (BUSPAR) tablet 15 mg, 15 mg, Oral, Daily, Ravi Alcazar MD, 15 mg at 12/18/19 0907  •  dextrose (D50W) 25 g/ 50mL Intravenous Solution 25 g, 25 g, Intravenous, Q15 Min PRN, Ravi Alcazar MD  •  dextrose (GLUTOSE) oral gel 15 g, 15 g, Oral, Q15 Min PRN, Ravi Alcazar MD  •  dicyclomine (BENTYL) capsule 10 mg, 10 mg, Oral, Daily, Ravi Alcazar MD, 10 mg at 12/18/19 0907  •  ferrous sulfate tablet 325 mg, 325 mg, Oral, TID With Meals, Ravi Alcazar MD, 325 mg at 12/18/19 1707  •  fluticasone (FLONASE) 50 MCG/ACT nasal spray 2 spray, 2 spray, Each Nare, Daily, Ravi Alcazar MD, 2 spray at 12/18/19 0907  •  furosemide (LASIX) tablet 40 mg, 40 mg, Oral, BID, Ravi Alcazar MD, 40 mg at 12/18/19 0907  •  gabapentin (NEURONTIN) capsule 600 mg, 600 mg, Oral, TID, Ravi Alcazar MD, 600 mg at 12/18/19 1631  •  glucagon (human recombinant) (GLUCAGEN DIAGNOSTIC) injection 1 mg, 1 mg, Subcutaneous, Q15 Min PRN, Ravi Alcazar MD  •  insulin aspart (novoLOG) injection 0-7 Units, 0-7 Units, Subcutaneous, 4x Daily AC & at Bedtime, Ravi Alcazar MD, 5 Units at 12/18/19 1706  •  insulin detemir (LEVEMIR) injection 10 Units, 10 Units, Subcutaneous, Q12H, Ravi Alcazar MD, 10 Units at 12/18/19 0906  •  lactulose (CHRONULAC) 10 GM/15ML solution 10 g, 10 g, Oral, BID, Ravi Alcazar MD, 10 g at 12/18/19 0907  •  ondansetron (ZOFRAN) tablet 4 mg, 4 mg, Oral, Q6H PRN, Ravi Alcazar MD, 4 mg at 12/17/19 0944  •  oxyCODONE (ROXICODONE) immediate release tablet 10 mg, 10 mg, Oral, Q8H PRN, Ravi Alcazar MD, 10 mg at 12/18/19 1413  •  pantoprazole (PROTONIX) EC tablet 40 mg, 40 mg, Oral, BID AC, Ravi Alcazar MD, 40 mg at 12/18/19 1707  •  spironolactone  (ALDACTONE) tablet 100 mg, 100 mg, Oral, Daily, Ravi Alcazar MD, 100 mg at 12/18/19 0907      Assessment/Plan       Debility    Chronic obstructive pulmonary disease (CMS/HCC)    Diabetes mellitus (CMS/HCC)    Diabetic peripheral neuropathy (CMS/HCC)    Hypertension    Anxiety and depression    Liver cirrhosis (CMS/HCC)    ASSESSMENT:     1.  Atrial fibrillation  2.  Recurrent ascites  3.  Nonalcoholic liver cirrhosis, end-stage  4.  Hepatitis C  5.  Essential hypertension  6.  Diabetes mellitus insulin-dependent and nonadherent with insulin  7.  Neuropathy  8.  Anxiety  9.  Depression  10.  Chronic pain  11.  Hyponatremia  12.  COPD  13.  Acute respiratory failure with ARDS  14.  Aspiration pneumonia  15.  Chronic left-sided pleural effusion  16. Mid line coccyx, Right coccyx, Left upper gluteal and left lower gluteal unstageable pressure ulcers all present on admission     PLAN:    Patient was assessed by occupational and physical therapy and appropriate plan of care was put into place. Patient tolerated PT and OT sessions well on 12/17/19 but did have complaints of back and BLE pain.     On 12/18/19 she spent 90 minutes with OT and PT. Patient's chair-bed transfer independence level is at minimum assist (75% patient effort), using verbal cues with a wheelchair to assist.  PT noted that patient demonstrates decreased activity tolerance and complains of pain with increased activity. Continued skilled care required for improved functional mobility and ambulation quality. She did ambulate 300 feet x2 in the PM with an assistive device.    Patient is unable to receive anticoagulation therapy due to her end-stage liver cirrhosis and high risk for bleeding but DVT prophylaxis was initiated with SCUDs and for now would try to avoid anticoagulation if at all possible as risk outweighs benefit as recommended by the referring facility.  Patient continues to refuse her insulin and her sugar is elevated and despite my  discussion with her continues to consume high sugar candy at her bedside.    Estimated length of stay 7 to 10 days     Prior level of function prior to her admission at [facility] she was independent and living at home.     Patient is going to require intensive inpatient physical therapy for therapeutic exercises, range of motion, endurance, gait training, safety, stairs training, strengthening for at least 1 to 2 hours a day for 5 to 6 days a week as well as occupational therapy for dressing, ADLs, feeding, home skills, safety and toileting techniques for 1 to 2 hours a day for 5 to 6 days a week.      Ravi Alcazar MD  12/19/19  7:36 AM

## 2019-12-19 NOTE — THERAPY TREATMENT NOTE
Inpatient Rehabilitation - Physical Therapy Treatment Note  [facility] [place]     Patient Name: Gretta Giles  : 1960  MRN: [id number]    Today's Date: 2019                 Admit Date: 2019      Visit Dx:    No diagnosis found.    Patient Active Problem List   Diagnosis   • Gastroesophageal reflux disease   • Atopic rhinitis   • Chronic low back pain   • Chronic obstructive pulmonary disease (CMS/HCC)   • Chronic obstructive pulmonary disease with acute exacerbation (CMS/HCC)   • Diabetes mellitus (CMS/HCC)   • Diabetic peripheral neuropathy (CMS/HCC)   • Dyslipidemia   • Nonalcoholic fatty liver disease   • Gastrointestinal ulcer due to Helicobacter pylori   • Hypertension   • Menopausal symptom   • Mixed anxiety depressive disorder   • Seasonal allergic rhinitis   • Tobacco dependence syndrome   • Vitamin D deficiency   • Hyperlipidemia   • Anxiety and depression   • Menopause syndrome   • Allergic rhinitis   • Type 2 diabetes mellitus, controlled (CMS/HCC)   • GERD without esophagitis   • Nausea   • Debility   • Liver cirrhosis (CMS/HCC)       Therapy Treatment    IRF Treatment Summary     Row Name 19 1558 19 1456          Evaluation/Treatment Time and Intent    Subjective Information  complains of;weakness;fatigue;pain  -RF  complains of;pain c/o back  pain.  RN notified.  Agreeable to therapy.  -CJ     Existing Precautions/Restrictions  fall ABD draining to colostomy bag, Hep C+  -RF  fall;other (see comments) ABD draining to ostomy bag, Hep C, decreased skin integrity  -CJ     Document Type  therapy note (daily note)  -RF  therapy note (daily note)  -CJ     Mode of Treatment  individual therapy;physical therapy  -RF  occupational therapy  -CJ     Patient/Family Observations  Patient conitnues to demonstrate improving functional mobility and ambulation quality, however decreased adtivity tolerance is observed.   -RF  --     Recorded by [RF] Bhumika Barnes PTA [CJ] Anika Gutierrez,  OT     Row Name 12/19/19 1558 12/19/19 1456          Cognition/Psychosocial- PT/OT    Affect/Mental Status (Cognitive)  WFL  -RF  WFL  -CJ     Follows Commands (Cognition)  verbal cues/prompting required  -RF  verbal cues/prompting required  -CJ     Recorded by [RF] Bhumika Barnes PTA [CJ] Anika Gutierrez, OT     Row Name 12/19/19 1558             Bed Mobility Assessment/Treatment    Supine-Sit [place] (Bed Mobility)  verbal cues;nonverbal cues (demo/gesture);contact guard  -RF      Assistive Device (Bed Mobility)  bed rails  -RF      Comment (Bed Mobility)  Requires assistance with supine>sit without BR  -RF      Recorded by [RF] Bhumika Barnes, TANIYA      Row Name 12/19/19 1558             Transfer Assessment/Treatment    Comment (Transfers)  Poor safety awareness noted with transferring and functional mobility; cues required for safety.   -RF      Recorded by [RF] Bhumika Barnes PTA      Row Name 12/19/19 1558 12/19/19 1456          Bed-Chair Transfer    Bed-Chair [place] (Transfers)  contact guard;nonverbal cues (demo/gesture);verbal cues  -RF  contact guard;minimum assist (75% patient effort);verbal cues  -CJ     Assistive Device (Bed-Chair Transfers)  wheelchair  -RF  wheelchair  -CJ     Recorded by [RF] Bhumika Barnes PTA [CJ] Anika Gutierrez, OT     Row Name 12/19/19 1558 12/19/19 1456          Chair-Bed Transfer    Chair-Bed [place] (Transfers)  verbal cues;nonverbal cues (demo/gesture);contact guard  -RF  contact guard;minimum assist (75% patient effort);verbal cues  -CJ     Assistive Device (Chair-Bed Transfers)  wheelchair  -RF  wheelchair  -CJ     Recorded by [RF] Bhumika Barnes PTA [CJ] Anika Gutierrez, OT     Row Name 12/19/19 1558             Sit-Stand Transfer    Sit-Stand [place] (Transfers)  verbal cues;nonverbal cues (demo/gesture);contact guard  -RF      Assistive Device (Sit-Stand Transfers)  walker, front-wheeled  -RF      Recorded by [RF] Cameron  Bhumika SPAIN PTA      Row Name 12/19/19 1558             Stand-Sit Transfer    Stand-Sit [place] (Transfers)  verbal cues;nonverbal cues (demo/gesture);contact guard  -RF      Assistive Device (Stand-Sit Transfers)  walker, front-wheeled  -RF      Recorded by [RF] Bhumika Barnes PTA      Row Name 12/19/19 1558 12/19/19 1456          Toilet Transfer    Type (Toilet Transfer)  stand pivot/stand step  -RF  --     [place] Level (Toilet Transfer)  verbal cues;nonverbal cues (demo/gesture);contact guard  -RF  contact guard;verbal cues  -CJ     Assistive Device (Toilet Transfer)  grab bars/safety frame;raised toilet seat  -RF  grab bars/safety frame  -CJ     Recorded by [RF] Bhumika Barnes PTA [CJ] Anika Gutierrez, OT     Row Name 12/19/19 1558             Gait/Stairs Assessment/Training    [place] Level (Gait)  contact guard;verbal cues;nonverbal cues (demo/gesture)  -RF      Assistive Device (Gait)  other (see comments) rollator  -RF      Distance in Feet (Gait)  300 in AM and PM  -RF      Pattern (Gait)  step-through  -RF      Deviations/Abnormal Patterns (Gait)  jason decreased;gait speed decreased;stride length decreased  -RF      Comment (Gait/Stairs)  Pt states she prefers front wheeled RW rather than rollator.   -RF      Recorded by [RF] Bhumika Barnes PTA      Row Name 12/19/19 1558             Safety Issues, Functional Mobility    Impairments Affecting Function (Mobility)  balance;endurance/activity tolerance;pain;strength  -RF      Recorded by [RF] Bhumika Barnes PTA      Row Name 12/19/19 1456             Bathing Assessment/Treatment    Bathing [place] Level  minimum assist (75% patient effort);verbal cues  -CJ      Recorded by [CJ] Anika Gutierrez, OT      Row Name 12/19/19 1456             Upper Body Dressing Assessment/Treatment    Upper Body Dressing Task  set up assistance  -CJ      Recorded by [CJ] Anika Gutierrez, OT      Row Name 12/19/19 1456             Lower  Body Dressing Assessment/Treatment    Lower Body Dressing [place] Level  minimum assist (75% patient effort);verbal cues  -CJ      Recorded by [CJ] Anika Gutierrez, OT      Row Name 12/19/19 1456             Grooming Assessment/Treatment    Grooming [place] Level  set up  -CJ      Recorded by [CJ] Anika Gutierrez, OT      Row Name 12/19/19 1456             Toileting Assessment/Treatment    Toileting [place] Level  minimum assist (75% patient effort);verbal cues  -CJ      Assistive Device Use (Toileting)  grab bar/safety frame  -CJ      Recorded by [CJ] Anika Gutierrez, OT      Row Name 12/19/19 1558             Pain Scale: Numbers Pre/Post-Treatment    Pain Location  back and generalized  -RF      Recorded by [RF] Bhumika Barnes, TANIYA      Row Name 12/19/19 1558             Pain Scale: FACES Pre/Post-Treatment    Pain: FACES Scale, Pretreatment  6-->hurts even more  -RF      Pain: FACES Scale, Post-Treatment  6-->hurts even more  -RF      Recorded by [RF] Bhumika Barnes, PTA      Row Name 12/19/19 1558 12/19/19 1456          Therapeutic Exercise    Therapeutic Exercise  standing, lower extremities  -RF  --  -CJ     Recorded by [RF] Bhumika Barnes, TANIYA [CJ] Anika Gutierrez, OT     Row Name 12/19/19 1456             Upper Extremity Seated Therapeutic Exercise    Device, Seated Upper Extremity (Therapeutic Exercise)  -- dowel ex- wrist rolls X 2, 0 lbs  -CJ      Exercise Type, Seated Upper Extremity (Therapeutic Exercise)  AROM (active range of motion) BUE ther ex/act, bilat coord ex, GMC/FMC, hand exerciser  -CJ      Expected Outcomes, Seated Upper Extremity (Therapeutic Exercise)  improve functional tolerance, self-care activity;improve performance, BADLs;improve performance, transfer skills  -CJ      Recorded by [CJ] Anika Gutierrez, OT      Row Name 12/19/19 1558             Lower Extremity Standing Therapeutic Exercise    Performed, Standing Lower Extremity (Therapeutic Exercise)  hip  flexion/extension;hip abduction/adduction;hip/knee flexion/extension;knee flexion/extension;mini-squats;heel raises;other (see comments) side stepping  -RF      Device, Standing Lower Extremity (Therapeutic Exercise)  parallel bars  -RF      Exercise Type, Standing Lower Extremity (Therapeutic Exercise)  AROM (active range of motion);eccentric contraction;isotonic contraction, concentric  -RF      Expected Outcomes, Standing Lower Extremity (Therapeutic Exercise)  improve functional tolerance, community activity;improve functional tolerance, household activity;improve functional tolerance, self-care activity;improve performance, gait skills;strengthen normal movement patterns;strengthen, facilitate independent active range of motion  -RF      Sets/Reps Detail, Standing Lower Extremity (Therapeutic Exercise)  2X10  -RF      Comment, Standing Lower Extremity (Therapeutic Exercise)  rest breaks required  -RF      Recorded by [RF] Bhumika Barnes [facility]      Row Name 12/19/19 1558             Lower Extremity Seated Therapeutic Exercise    Performed, Seated Lower Extremity (Therapeutic Exercise)  hip flexion/extension;hip abduction/adduction;knee flexion/extension;ankle dorsiflexion/plantarflexion;LAQ (long arc quad), knee extension  -RF      Device, Seated Lower Extremity (Therapeutic Exercise)  elastic bands/tubing;small ball;free weights, cuff;other (see comments) #2  -RF      Exercise Type, Seated Lower Extremity (Therapeutic Exercise)  AROM (active range of motion);eccentric contraction;isotonic contraction, concentric  -RF      Expected Outcomes, Seated Lower Extremity (Therapeutic Exercise)  improve functional tolerance, community activity;improve functional tolerance, household activity;improve functional tolerance, self-care activity;improve performance, gait skills;strengthen normal movement patterns;strengthen, facilitate independent active range of motion  -RF      Sets/Reps Detail, Seated Lower Extremity  (Therapeutic Exercise)  2X10  -RF      Recorded by [RF] Bhumika Barnes PTA      Row Name 12/19/19 1558             Lower Extremity Supine Therapeutic Exercise    Performed, Supine Lower Extremity (Therapeutic Exercise)  hip flexion/extension;hip abduction/adduction;hip external/internal rotation;knee flexion/extension;ankle dorsiflexion/plantarflexion;SAQ (short arc quad) over bolster;SLR (straight leg raise);quadriceps sets;gluteal sets;ankle pumps;heel slides  -RF      Device, Supine Lower Extremity (Therapeutic Exercise)  elastic bands/tubing;foam roll;free weights, cuff #2  -RF      Exercise Type, Supine Lower Extremity (Therapeutic Exercise)  AROM (active range of motion);eccentric contraction;isotonic contraction, concentric  -RF      Expected Outcomes, Supine Lower Extremity (Therapeutic Exercise)  improve functional tolerance, community activity;improve functional tolerance, household activity;improve functional tolerance, self-care activity;improve performance, gait skills;strengthen normal movement patterns;strengthen, facilitate independent active range of motion  -RF      Sets/Reps Detail, Supine Lower Extremity (Therapeutic Exercise)  2X10  -RF      Recorded by [RF] Bhumika Barnes PTA      Row Name 12/19/19 1558 12/19/19 1456          Positioning and Restraints    Pre-Treatment Position  in bed BID  -RF  --     Post Treatment Position  bed  -RF  wheelchair  -CJ     In Bed  supine;call light within reach;encouraged to call for assist  -RF  supine;call light within reach;encouraged to call for assist;heels elevated post pm tx  -CJ     In Wheelchair  --  with PT am tx  -CJ     Recorded by [RF] Bhumika Barnes PTA [CJ] Anika Gutierrez OT       User Key  (r) = Recorded By, (t) = Taken By, (c) = Cosigned By    Initials Name Effective Dates    CJ Anika Gutierrez OT 04/03/18 -     RF Bhumika Barnes PTA 03/07/18 -         Wound 12/16/19 midline coccyx Pressure Injury (Active)   Dressing  Appearance dry;intact 12/19/2019  7:30 AM   Closure WILLIAN 12/18/2019  7:15 PM   Base non-blanchable 12/19/2019  7:30 AM   Periwound intact;blanchable 12/19/2019  7:30 AM   Periwound Temperature warm 12/19/2019  7:30 AM   Periwound Skin Turgor soft 12/19/2019  7:30 AM   Edges rolled/closed 12/19/2019  7:30 AM   Drainage Amount none 12/19/2019  7:30 AM   Care, Wound cleansed with;sterile normal saline 12/19/2019  7:30 AM   Dressing Care, Wound dressing changed 12/19/2019  7:30 AM       Wound 12/16/19 2000 Right coccyx Pressure Injury (Active)   Dressing Appearance dry;intact 12/19/2019  7:30 AM   Closure WILLIAN 12/18/2019  7:15 PM   Base non-blanchable 12/19/2019  7:30 AM   Periwound intact 12/19/2019  7:30 AM   Periwound Temperature warm 12/19/2019  7:30 AM   Periwound Skin Turgor soft 12/19/2019  7:30 AM   Drainage Amount none 12/19/2019  7:30 AM   Care, Wound cleansed with;sterile normal saline 12/19/2019  7:30 AM   Dressing Care, Wound dressing changed 12/19/2019  7:30 AM       Wound 12/16/19 2000 Left posterior heel (Active)   Dressing Appearance open to air 12/19/2019  7:30 AM   Closure None 12/19/2019  7:30 AM   Base blanchable;red;scab 12/19/2019  7:30 AM   Periwound intact 12/18/2019  6:00 PM   Periwound Temperature warm 12/18/2019  6:00 PM   Periwound Skin Turgor soft 12/18/2019  6:00 PM   Edges rolled/closed 12/18/2019  6:00 PM   Drainage Amount none 12/19/2019  7:30 AM   Care, Wound barrier applied 12/19/2019  7:30 AM       Wound Right posterior heel Pressure Injury (Active)   Dressing Appearance open to air 12/19/2019  7:30 AM   Closure None 12/19/2019  7:30 AM   Base red;scab 12/19/2019  7:30 AM   Periwound intact 12/18/2019  6:00 PM   Periwound Temperature warm 12/18/2019  6:00 PM   Periwound Skin Turgor soft 12/18/2019  6:00 PM   Edges rolled/closed 12/18/2019  6:00 PM   Drainage Amount none 12/19/2019  7:30 AM   Care, Wound barrier applied 12/19/2019  7:30 AM   Dressing Care, Wound open to air 12/19/2019   7:30 AM       Wound 12/16/19 2000 Right calf Abrasion (Active)   Dressing Appearance open to air 12/19/2019  7:30 AM   Closure None 12/19/2019  7:30 AM   Base non-blanchable;red 12/19/2019  7:30 AM   Periwound intact 12/19/2019  7:30 AM   Drainage Amount none 12/19/2019  7:30 AM   Care, Wound barrier applied 12/19/2019  7:30 AM       Wound 12/16/19 2000 Left upper gluteal Pressure Injury (Active)   Dressing Appearance dry;intact 12/19/2019  7:30 AM   Closure WILLIAN 12/18/2019  7:15 PM   Base non-blanchable;slough 12/19/2019  7:30 AM   Periwound intact 12/19/2019  7:30 AM   Periwound Temperature warm 12/19/2019  7:30 AM   Periwound Skin Turgor soft 12/19/2019  7:30 AM   Edges rolled/closed 12/19/2019  7:30 AM   Drainage Amount none 12/19/2019  7:30 AM   Care, Wound cleansed with 12/19/2019  7:30 AM   Dressing Care, Wound dressing changed 12/19/2019  7:30 AM       Wound 12/16/19 2000 Left lower gluteal Pressure Injury (Active)   Dressing Appearance dry;intact 12/19/2019  7:30 AM   Closure WILLIAN 12/18/2019  7:15 PM   Base non-blanchable 12/19/2019  7:30 AM   Periwound intact 12/19/2019  7:30 AM   Periwound Temperature warm 12/19/2019  7:30 AM   Periwound Skin Turgor soft 12/19/2019  7:30 AM   Edges rolled/closed 12/19/2019  7:30 AM   Care, Wound cleansed with 12/19/2019  7:30 AM   Dressing Care, Wound dressing changed 12/19/2019  7:30 AM       Wound 12/16/19 2000 Bilateral back Abrasion (Active)   Dressing Appearance open to air 12/19/2019  7:30 AM           PT Recommendation and Plan                        Time Calculation:     PT Charges     Row Name 12/19/19 5081 12/19/19 2218          Time Calculation    Start Time  1245  -RF  0915  -RF     Stop Time  1330  -RF  1000  -RF     Time Calculation (min)  45 min  -RF  45 min  -RF     PT Received On  12/19/19  -RF  12/19/19  -RF     PT - Next Appointment  12/20/19  -RF  12/19/19  -RF     PT Goal Re-Cert Due Date  12/24/19  -RF  12/24/19  -RF        Time Calculation- PT     Total Timed Code Minutes- PT  45 minute(s)  -RF  45 minute(s)  -RF       User Key  (r) = Recorded By, (t) = Taken By, (c) = Cosigned By    Initials Name Provider Type    RF Bhuimka Barnes, TANIYA Physical Therapy Assistant          Therapy Charges for Today     Code Description Service Date Service Provider Modifiers Qty    42144669008 HC GAIT TRAINING EA 15 MIN 12/18/2019 Bhumika Barnes, PTA GP 2    64550863973 HC PT THER PROC EA 15 MIN 12/18/2019 Bhumika Barnes, PTA GP 3    40033563988 HC PT THERAPEUTIC ACT EA 15 MIN 12/18/2019 Bhumika Barnes, PTA GP 1    52192972308 HC GAIT TRAINING EA 15 MIN 12/19/2019 Bhumika Barnes, PTA GP 2    82153719657 HC PT THER PROC EA 15 MIN 12/19/2019 Bhumika Barnes, PTA GP 3    13107878421 HC PT THERAPEUTIC ACT EA 15 MIN 12/19/2019 Bhumika Barnes, PTA GP 1                   Bhumika Barnes, TANIYA  12/19/2019

## 2019-12-19 NOTE — THERAPY TREATMENT NOTE
Inpatient Rehabilitation - Occupational Therapy Treatment Note    D[facility] [place]     Patient Name: Gretta Giles  : 1960  MRN: [id number]    Today's Date: 2019                 Admit Date: 2019      Visit Dx:  No diagnosis found.    Patient Active Problem List   Diagnosis   • Gastroesophageal reflux disease   • Atopic rhinitis   • Chronic low back pain   • Chronic obstructive pulmonary disease (CMS/HCC)   • Chronic obstructive pulmonary disease with acute exacerbation (CMS/HCC)   • Diabetes mellitus (CMS/HCC)   • Diabetic peripheral neuropathy (CMS/HCC)   • Dyslipidemia   • Nonalcoholic fatty liver disease   • Gastrointestinal ulcer due to Helicobacter pylori   • Hypertension   • Menopausal symptom   • Mixed anxiety depressive disorder   • Seasonal allergic rhinitis   • Tobacco dependence syndrome   • Vitamin D deficiency   • Hyperlipidemia   • Anxiety and depression   • Menopause syndrome   • Allergic rhinitis   • Type 2 diabetes mellitus, controlled (CMS/HCC)   • GERD without esophagitis   • Nausea   • Debility   • Liver cirrhosis (CMS/HCC)         Therapy Treatment    IRF Treatment Summary     Row Name 19 1456             Evaluation/Treatment Time and Intent    Subjective Information  complains of;pain c/o back  pain.  RN notified.  Agreeable to therapy.  -CJ      Existing Precautions/Restrictions  fall;other (see comments) ABD draining to ostomy bag, Hep C, decreased skin integrity  -CJ      Document Type  therapy note (daily note)  -CJ      Mode of Treatment  occupational therapy  -CJ      Recorded by [CJ] Anika Gutierrez OT      Row Name 19 1456             Cognition/Psychosocial- PT/OT    Affect/Mental Status (Cognitive)  WFL  -CJ      Follows Commands (Cognition)  verbal cues/prompting required  -CJ      Recorded by [CJ] Anika Gutierrez OT      Row Name 19 1456             Bed-Chair Transfer    Bed-Chair [place] (Transfers)  contact guard;minimum assist (75% patient  effort);verbal cues  -CJ      Assistive Device (Bed-Chair Transfers)  wheelchair  -CJ      Recorded by [CJ] Anika Gutierrez, OT      Row Name 12/19/19 1456             Chair-Bed Transfer    Chair-Bed [place] (Transfers)  contact guard;minimum assist (75% patient effort);verbal cues  -CJ      Assistive Device (Chair-Bed Transfers)  wheelchair  -CJ      Recorded by [] Anika Gutierrez, OT      Row Name 12/19/19 1456             Toilet Transfer    [place] Level (Toilet Transfer)  contact guard;verbal cues  -CJ      Assistive Device (Toilet Transfer)  grab bars/safety frame  -CJ      Recorded by [CJ] Anika Gutierrez, OT      Row Name 12/19/19 1456             Bathing Assessment/Treatment    Bathing [place] Level  minimum assist (75% patient effort);verbal cues  -CJ      Recorded by [] Anika Gutierrez, OT      Row Name 12/19/19 1456             Upper Body Dressing Assessment/Treatment    Upper Body Dressing Task  set up assistance  -CJ      Recorded by [] Anika uGtierrez, OT      Row Name 12/19/19 1456             Lower Body Dressing Assessment/Treatment    Lower Body Dressing [place] Level  minimum assist (75% patient effort);verbal cues  -CJ      Recorded by [] Anika Gutierrez, OT      Row Name 12/19/19 1456             Grooming Assessment/Treatment    Grooming [place] Level  set up  -CJ      Recorded by [] Aniak Gutierrez, OT      Row Name 12/19/19 1456             Toileting Assessment/Treatment    Toileting [place] Level  minimum assist (75% patient effort);verbal cues  -CJ      Assistive Device Use (Toileting)  grab bar/safety frame  -CJ      Recorded by [CJ] Anika Gutierrez, OT      Row Name 12/19/19 1456             Therapeutic Exercise          Upper Extremity Seated Therapeutic Exercise    Device, Seated Upper Extremity (Therapeutic Exercise)  -- dowel ex- wrist rolls X 2, 0 lbs  -      Exercise Type, Seated Upper Extremity (Therapeutic Exercise)  AROM (active  range of motion) BUE ther ex/act, bilat coord ex, GMC/FMC, hand exerciser  -CJ      Expected Outcomes, Seated Upper Extremity (Therapeutic Exercise)  improve functional tolerance, self-care activity;improve performance, BADLs;improve performance, transfer skills  -CJ      Recorded by [CJ] Anika Gutierrez OT      Row Name 12/19/19 1456             Positioning and Restraints    Post Treatment Position  wheelchair  -CJ      In Bed  supine;call light within reach;encouraged to call for assist;heels elevated post pm tx  -CJ      In Wheelchair  with PT am tx  -CJ      Recorded by [CJ] Anika Gutierrez OT        User Key  (r) = Recorded By, (t) = Taken By, (c) = Cosigned By    Initials Name Effective Dates    CJ Anika Gutierrez OT 04/03/18 -           Wound 12/16/19 midline coccyx Pressure Injury (Active)   Dressing Appearance dry;intact 12/19/2019  7:30 AM   Closure WILLIAN 12/18/2019  7:15 PM   Base non-blanchable 12/19/2019  7:30 AM   Periwound intact;blanchable 12/19/2019  7:30 AM   Periwound Temperature warm 12/19/2019  7:30 AM   Periwound Skin Turgor soft 12/19/2019  7:30 AM   Edges rolled/closed 12/19/2019  7:30 AM   Drainage Amount none 12/19/2019  7:30 AM   Care, Wound cleansed with;sterile normal saline 12/19/2019  7:30 AM   Dressing Care, Wound dressing changed 12/19/2019  7:30 AM       Wound 12/16/19 2000 Right coccyx Pressure Injury (Active)   Dressing Appearance dry;intact 12/19/2019  7:30 AM   Closure WILLIAN 12/18/2019  7:15 PM   Base non-blanchable 12/19/2019  7:30 AM   Periwound intact 12/19/2019  7:30 AM   Periwound Temperature warm 12/19/2019  7:30 AM   Periwound Skin Turgor soft 12/19/2019  7:30 AM   Drainage Amount none 12/19/2019  7:30 AM   Care, Wound cleansed with;sterile normal saline 12/19/2019  7:30 AM   Dressing Care, Wound dressing changed 12/19/2019  7:30 AM       Wound 12/16/19 2000 Left posterior heel (Active)   Dressing Appearance open to air 12/19/2019  7:30 AM   Closure None 12/19/2019   7:30 AM   Base blanchable;red;scab 12/19/2019  7:30 AM   Periwound intact 12/18/2019  6:00 PM   Periwound Temperature warm 12/18/2019  6:00 PM   Periwound Skin Turgor soft 12/18/2019  6:00 PM   Edges rolled/closed 12/18/2019  6:00 PM   Drainage Amount none 12/19/2019  7:30 AM   Care, Wound barrier applied 12/19/2019  7:30 AM       Wound Right posterior heel Pressure Injury (Active)   Dressing Appearance open to air 12/19/2019  7:30 AM   Closure None 12/19/2019  7:30 AM   Base red;scab 12/19/2019  7:30 AM   Periwound intact 12/18/2019  6:00 PM   Periwound Temperature warm 12/18/2019  6:00 PM   Periwound Skin Turgor soft 12/18/2019  6:00 PM   Edges rolled/closed 12/18/2019  6:00 PM   Drainage Amount none 12/19/2019  7:30 AM   Care, Wound barrier applied 12/19/2019  7:30 AM   Dressing Care, Wound open to air 12/19/2019  7:30 AM       Wound 12/16/19 2000 Right calf Abrasion (Active)   Dressing Appearance open to air 12/19/2019  7:30 AM   Closure None 12/19/2019  7:30 AM   Base non-blanchable;red 12/19/2019  7:30 AM   Periwound intact 12/19/2019  7:30 AM   Drainage Amount none 12/19/2019  7:30 AM   Care, Wound barrier applied 12/19/2019  7:30 AM       Wound 12/16/19 2000 Left upper gluteal Pressure Injury (Active)   Dressing Appearance dry;intact 12/19/2019  7:30 AM   Closure WILLIAN 12/18/2019  7:15 PM   Base non-blanchable;slough 12/19/2019  7:30 AM   Periwound intact 12/19/2019  7:30 AM   Periwound Temperature warm 12/19/2019  7:30 AM   Periwound Skin Turgor soft 12/19/2019  7:30 AM   Edges rolled/closed 12/19/2019  7:30 AM   Drainage Amount none 12/19/2019  7:30 AM   Care, Wound cleansed with 12/19/2019  7:30 AM   Dressing Care, Wound dressing changed 12/19/2019  7:30 AM       Wound 12/16/19 2000 Left lower gluteal Pressure Injury (Active)   Dressing Appearance dry;intact 12/19/2019  7:30 AM   Closure WILLIAN 12/18/2019  7:15 PM   Base non-blanchable 12/19/2019  7:30 AM   Periwound intact 12/19/2019  7:30 AM   Periwound  Temperature warm 12/19/2019  7:30 AM   Periwound Skin Turgor soft 12/19/2019  7:30 AM   Edges rolled/closed 12/19/2019  7:30 AM   Care, Wound cleansed with 12/19/2019  7:30 AM   Dressing Care, Wound dressing changed 12/19/2019  7:30 AM       Wound 12/16/19 2000 Bilateral back Abrasion (Active)   Dressing Appearance open to air 12/19/2019  7:30 AM         OT Recommendation and Plan    Anticipated Equipment Needs At Discharge (OT Eval): (TBD)  Planned Therapy Interventions (OT Eval): activity tolerance training, BADL retraining, adaptive equipment training, occupation/activity based interventions, patient/caregiver education/training, ROM/therapeutic exercise, strengthening exercise, transfer/mobility retraining            OT IRF GOALS     Row Name 12/17/19 1429             Bathing Goal 1 (OT-IRF)    [place] Level (Bathing Goal 1, OT-IRF)  minimum assist (75% or more patient effort)  -CJ      Time Frame (Bathing Goal 1, OT-IRF)  long term goal (LTG);by discharge  -CJ         LB Dressing Goal 1 (OT-IRF)    [place] (LB Dressing Goal 1, OT-IRF)  minimum assist (75% or more patient effort)  -CJ      Time Frame (LB Dressing Goal 1, OT-IRF)  short term goal (STG)  -CJ         LB Dressing Goal 2 (OT-IRF)    [place] (LB Dressing Goal 2, OT-IRF)  contact guard assist  -CJ      Time Frame (LB Dressing Goal 2, OT-IRF)  long term goal (LTG);by discharge  -CJ         Toileting Goal 1 (OT-IRF)    [place] Level (Toileting Goal 1, OT-IRF)  minimum assist (75% or more patient effort)  -CJ      Time Frame (Toileting Goal 1, OT-IRF)  short term goal (STG)  -CJ         Toileting Goal 2 (OT-IRF)    [place] Level (Toileting Goal 2, OT-IRF)  contact guard assist  -CJ      Time Frame (Toileting Goal 2, OT-IRF)  long term goal (LTG);by discharge  -CJ        User Key  (r) = Recorded By, (t) = Taken By, (c) = Cosigned By    Initials Name Provider Type    Anika Solis, OT Occupational Therapist                      Time Calculation:     Time Calculation- OT     Row Name 12/19/19 1503 12/19/19 0805          Time Calculation- OT    OT Start Time  1330  -  0805  -     OT Stop Time  1400  -  0915  -     OT Time Calculation (min)  30 min  -  70 min  -     Total Timed Code Minutes- OT  30 minute(s)  -  70 minute(s)  -     OT Non-Billable Time (min)  --  15 min  -       User Key  (r) = Recorded By, (t) = Taken By, (c) = Cosigned By    Initials Name Provider Type     Anika Gutierrez OT Occupational Therapist          Therapy Charges for Today     Code Description Service Date Service Provider Modifiers Qty    86505663726 HC OT SELF CARE/MGMT/TRAIN EA 15 MIN 12/18/2019 Anika Gutierrez OT GO 2    13615930526 HC OT THERAPEUTIC ACT EA 15 MIN 12/18/2019 Anika Gutierrez OT GO 1    32117659519 HC OT SELF CARE/MGMT/TRAIN EA 15 MIN 12/19/2019 Anika Gutierrez OT GO 3    89192106110 HC OT THER PROC EA 15 MIN 12/19/2019 Anika Gutierrez OT GO 2    27670234551 HC OT THERAPEUTIC ACT EA 15 MIN 12/19/2019 Anika Gutierrez OT GO 2                   Anika Gutierrez OT  12/19/2019

## 2019-12-19 NOTE — PROGRESS NOTES
Occupational Therapy: Individual: 100 minutes.    Physical Therapy:    Speech Language Pathology:    Signed by: Anika Gutierrez, Occupational Therapist

## 2019-12-19 NOTE — DISCHARGE INSTR - APPOINTMENTS
MS. AMARO HAS AN APPOINTMENT TO SEE SANDRA PETTIT ON: 01- 02 @ 1:30  [phone]    MS. AMARO HAS AN APPOINTMENT TO SEE DR. TITUS ON: 12- 30 @ 1:45  [phone]   ( G.I. )    MS. AMARO HAS AN APPOINTMENT TO SEE DR. ROBERTSON ON: 01- 16 @ 2:00  [phone]  ( cardio )

## 2019-12-19 NOTE — PROGRESS NOTES
Physical Medicine and Rehabilitation  Inpatient Rehabilitation Interdisciplinary Plan of Care    Demographics            Age: 59Y            Gender: Female    Admission Date: 12/16/2019 8:00:28 PM  Rehabilitation Diagnosis:  debility    Plan of Care  Anticipated Discharge Date/Estimated Length of Stay: 7-10 days  Anticipated Discharge Destination: Facility setting  Medical Necessity Expected Level Rationale: fair-good  Intensity and Duration: an average of 3 hours/5 days per week  Medical Supervision and 24 Hour Rehab Nursing: x  Physical Therapy: x  PT Intensity/Duration: PT 1.5 hours per day/5 days per week  Occupational Therapy: x  OT Intensity/Duration: OT 1.5 hours per day/5 days per week  Social Work: x  Therapeutic Recreation: x  Updated (if changes indicated)  No changes to plan.    Based on the patient's medical and functional status, their prognosis and  expected level of functional improvement is: fair-good    Interdisciplinary Problem/Goals/Status    Mobility    [PT] Bed/Chair/Wheelchair (Active)  Current Status (12/17/2019 12:00:00 AM): min A  Weekly Goal: Sup  Discharge Goal: Sup    [PT] Walk (Active)  Current Status (12/17/2019 12:00:00 AM): amb 300' RW CGA  Weekly Goal: amb 300' RW Sup  Discharge Goal: amb 300' RW Sup    Self Care    [OT] Dressing (Lower) (Active)  Current Status (12/17/2019 11:10:00 AM): ModA  Weekly Goal: Nydia  Discharge Goal: CGA    [OT] Toileting (Active)  Current Status (12/17/2019 11:10:00 AM): ModA  Weekly Goal: Nydia  Discharge Goal: CGA    Safety    [RN] Potential for Injury (Active)  Current Status (12/16/2019 12:00:00 AM): FREE FROM INJURY  Weekly Goal: FREE FROM INJURY  Discharge Goal: FREE FROM INJURY    Body Systems    [RN] Integumentary (Active)  Current Status (12/16/2019 12:00:00 AM): FREE FROM FURTHER BREAKDOWN  Weekly Goal: FREE FROM FURTHER BREAKDOWN  Discharge Goal: FREE FROM FURTHER BREAKDOWN    Medical Problems    1. ?Atrial fibrillation  2. ?Recurrent  ascites  3. ?Nonalcoholic liver cirrhosis, end-stage  4. ?Hepatitis C  5. ?Essential hypertension  6. ?Diabetes mellitus insulin-dependent and nonadherent with insulin  7. ?Neuropathy  8. ?Anxiety  9. ?Depression  10. ?Chronic pain  11. ?Hyponatremia  12. ?COPD  13. ?Acute respiratory failure with ARDS  14. ?Aspiration pneumonia  15. ?Chronic left-sided pleural effusion    Comments:    Signed by: Ravi Alcazar, Physician

## 2019-12-19 NOTE — SIGNIFICANT NOTE
12/19/19 0289   Plan   Plan Team conference held today.  Spoke to pt about plans for discharge on 12-27-19 and how she is doing in therapy.  Educated pt about option of going to women's shelter temporarily until she can find a different home to live in instead of NH placement.  Pt is agreeable to consider this option.  Pt says family do not have the space for her to live with them.  Pt agreeable to [facility] talking to collin Rothman about discharge date and plans.  Contacted collin Rothman [phone] about recommendation for pt to be discharged on 12-27-19, how she is doing in therapy and option of going to shelter until she can find a new home.  Daughter says she is limited with time and may not be able to assist very much with finding pt an apartment; she will talk to pt's siblings about assisting or letting pt stay in one of their homes.  Daughter says pt is unable to stay with her due to having three children at home.  SS will provide pt with housing resources in [facility] and follow-up with local women's shelter.     Patient/Family in Agreement with Plan yes

## 2019-12-20 NOTE — PROGRESS NOTES
Rehabilitation Nursing  Inpatient Rehabilitation Plan of Care Note    Plan of Care  Copy from POCSafety    Potential for Injury (Active)  Current Status (12/16/2019 12:00:00 AM): FREE FROM INJURY  Weekly Goal: FREE FROM INJURY  Discharge Goal: FREE FROM INJURY    Body Systems    Integumentary (Active)  Current Status (12/16/2019 12:00:00 AM): FREE FROM FURTHER BREAKDOWN  Weekly Goal: FREE FROM FURTHER BREAKDOWN  Discharge Goal: FREE FROM FURTHER BREAKDOWN    Signed by: Angeline Theodore, Nurse

## 2019-12-20 NOTE — PROGRESS NOTES
[facility] CM[facility] Coordinator  Inpatient Rehabilitation Admission    Ethnic Group:  Marital Status:    IRF Admission Date:  12/16/2019  Admission Class:  Admit From:    Pre-Hospital Living:    Payment Sources:  Impairment Group:  Date of Onset of Impairment:    Etiologic Diagnosis Code(s):  Rank Code      Description  1    K72.90    Hepatic failure, unspecified without coma    Comorbidities:  Rank Code      Description    1    E87.1     Hypo-osmolality and hyponatremia  2    K74.60    Unspecified cirrhosis of liver  3    B19.20    Unspecified viral hepatitis C without hepatic                 coma  4    Z79.4     Long term (current) use of insulin  5    E11.40    Type 2 diabetes mellitus with diabetic                 neuropathy, unspecified  6    F41.9     Anxiety disorder, unspecified  7    F32.9     Major depressive disorder, single episode,                 unspecified  8    J44.9     Chronic obstructive pulmonary disease,                 unspecified  9    L89.150   Pressure ulcer of sacral region, unstageable  10   L89.320   Pressure ulcer of left buttock, unstageable  11   L89.310   Pressure ulcer of right buttock, unstageable    Height on Admission:  Weight on Admission:    Are there any arthritis conditions recorded for Impairment Group, Etiologic  Diagnosis, or Comorbid Conditions that meet all of the regulatory requirements  for IRF classification (in 42 .29(b)(2)(x), (xi), and xii))?  No    DENA Bladder Accidents:   - Accidents.    DENA Bowel Accident:  -Accidents.    Signed by: Codi Castaneda, Supervisor

## 2019-12-20 NOTE — PROGRESS NOTES
Rehabilitation Nursing  Inpatient Rehabilitation Plan of Care Note    Plan of Care  Copy from POCSafety    Potential for Injury (Active)  Current Status (12/16/2019 12:00:00 AM): FREE FROM INJURY  Weekly Goal: FREE FROM INJURY  Discharge Goal: FREE FROM INJURY    Body Systems    Integumentary (Active)  Current Status (12/16/2019 12:00:00 AM): FREE FROM FURTHER BREAKDOWN  Weekly Goal: FREE FROM FURTHER BREAKDOWN  Discharge Goal: FREE FROM FURTHER BREAKDOWN    Signed by: Amanda Davey, Nurse

## 2019-12-20 NOTE — THERAPY TREATMENT NOTE
Inpatient Rehabilitation - Occupational Therapy Treatment Note    [facility] [place]     Patient Name: Gretta Giles  : 1960  MRN: [id number]    Today's Date: 2019                 Admit Date: 2019      Visit Dx:  No diagnosis found.    Patient Active Problem List   Diagnosis   • Gastroesophageal reflux disease   • Atopic rhinitis   • Chronic low back pain   • Chronic obstructive pulmonary disease (CMS/HCC)   • Chronic obstructive pulmonary disease with acute exacerbation (CMS/HCC)   • Diabetes mellitus (CMS/HCC)   • Diabetic peripheral neuropathy (CMS/HCC)   • Dyslipidemia   • Nonalcoholic fatty liver disease   • Gastrointestinal ulcer due to Helicobacter pylori   • Hypertension   • Menopausal symptom   • Mixed anxiety depressive disorder   • Seasonal allergic rhinitis   • Tobacco dependence syndrome   • Vitamin D deficiency   • Hyperlipidemia   • Anxiety and depression   • Menopause syndrome   • Allergic rhinitis   • Type 2 diabetes mellitus, controlled (CMS/HCC)   • GERD without esophagitis   • Nausea   • Debility   • Liver cirrhosis (CMS/HCC)         Therapy Treatment    IRF Treatment Summary     Row Name 19 1134             Evaluation/Treatment Time and Intent    Subjective Information  complains of;pain c/o abd pain, BLE pain.  RN notified.  Agreeable to therapy.  -CJ      Existing Precautions/Restrictions  fall;other (see comments) ABD draining to ostomy bag, Hep C, decreased skin integrity  -CJ      Document Type  therapy note (daily note)  -CJ      Mode of Treatment  occupational therapy  -CJ      Recorded by [CJ] Anika Gutierrez OT      Row Name 19 1134             Cognition/Psychosocial- PT/OT    Affect/Mental Status (Cognitive)  WFL  -CJ      Follows Commands (Cognition)  verbal cues/prompting required  -CJ      Recorded by [CJ] Anika Gutierrez OT      Row Name 19 1134             Bed-Chair Transfer    Bed-Chair [place] (Transfers)  contact guard;verbal cues  -CJ       Assistive Device (Bed-Chair Transfers)  wheelchair  -CJ      Recorded by [CJ] Anika Gutierrez, OT      Row Name 12/20/19 1134             Chair-Bed Transfer    Chair-Bed [place] (Transfers)  contact guard;verbal cues  -CJ      Assistive Device (Chair-Bed Transfers)  wheelchair  -CJ      Recorded by [CJ] Anika Gutirerez, OT      Row Name 12/20/19 1134             Toilet Transfer    [place] Level (Toilet Transfer)  contact guard;verbal cues  -CJ      Assistive Device (Toilet Transfer)  grab bars/safety frame  -CJ      Recorded by [CJ] Anika Gutierrez, OT      Row Name 12/20/19 1134             Bathing Assessment/Treatment    Bathing [place] Level  contact guard assist;verbal cues  -CJ      Recorded by [CJ] Anika Gutierrez, OT      Row Name 12/20/19 1134             Upper Body Dressing Assessment/Treatment    Upper Body Dressing Task  set up assistance  -CJ      Recorded by [CJ] Anika Gutierrez, OT      Row Name 12/20/19 1134             Lower Body Dressing Assessment/Treatment    Lower Body Dressing [place] Level  contact guard assist;minimum assist (75% patient effort);verbal cues  -CJ      Recorded by [CJ] Anika Gutierrez, OT      Row Name 12/20/19 1134             Grooming Assessment/Treatment    Grooming [place] Level  set up  -CJ      Recorded by [CJ] Anika Gutierrez, OT      Row Name 12/20/19 1134             Toileting Assessment/Treatment    Toileting [place] Level  contact guard assist;minimum assist (75% patient effort);verbal cues  -CJ      Assistive Device Use (Toileting)  grab bar/safety frame  -CJ      Recorded by [CJ] Anika Gutierrez, OT      Row Name 12/20/19 1134             Instrumental Activities of Daily Living          Home Management    Home Management/Maintenance  home management activities;adaptive techniques for home maintenance home mgt re-trng- Pt CGA with laundry/home mgt tasks w/RW.  -CJ      Recorded by [CJ] Anika Gutierrez, OT      Row Name 12/20/19  1134             Upper Extremity Seated Therapeutic Exercise    Exercise Type, Seated Upper Extremity (Therapeutic Exercise)  AROM (active range of motion) BUE ther ex/act, bilat coord ex, GMC/FMC  -CJ      Expected Outcomes, Seated Upper Extremity (Therapeutic Exercise)  improve functional tolerance, self-care activity;improve performance, BADLs;improve performance, transfer skills  -CJ      Recorded by [CJ] Anika Gutierrez OT      Row Name 12/20/19 1134             Positioning and Restraints    Post Treatment Position  wheelchair  -CJ      In Bed  supine;call light within reach;encouraged to call for assist;heels elevated post second tx  -CJ      In Wheelchair  sitting;call light within reach;encouraged to call for assist post first tx  -CJ      Recorded by [CJ] Anika Gutierrez OT        User Key  (r) = Recorded By, (t) = Taken By, (c) = Cosigned By    Initials Name Effective Dates     Anika Gutierrez OT 04/03/18 -           Wound 12/16/19 midline coccyx Pressure Injury (Active)   Dressing Appearance dry;intact 12/20/2019  7:40 AM   Closure WILLIAN 12/19/2019  7:01 PM   Base non-blanchable 12/20/2019  7:40 AM   Periwound intact;blanchable 12/20/2019  6:10 AM   Drainage Amount none 12/20/2019  6:10 AM   Care, Wound cleansed with;sterile half normal saline;honey applied 12/20/2019  6:10 AM   Dressing Care, Wound dressing changed 12/20/2019  6:10 AM       Wound 12/16/19 2000 Right coccyx Pressure Injury (Active)   Dressing Appearance dry;intact 12/20/2019  7:40 AM   Closure [place]IAN 12/19/2019  7:01 PM   Base non-blanchable 12/20/2019  7:40 AM   Drainage Amount none 12/20/2019  6:10 AM   Care, Wound cleansed with;sterile normal saline;honey applied 12/20/2019  6:10 AM   Dressing Care, Wound dressing changed 12/20/2019  6:10 AM       Wound 12/16/19 2000 Left posterior heel (Active)   Dressing Appearance open to air 12/20/2019  7:40 AM   Base blanchable;red 12/20/2019  7:40 AM   Periwound intact 12/19/2019  7:01 PM    Drainage Amount none 12/19/2019  7:01 PM   Care, Wound barrier applied 12/20/2019  7:40 AM       Wound Right posterior heel Pressure Injury (Active)   Dressing Appearance open to air 12/20/2019  7:40 AM   Base red 12/20/2019  7:40 AM   Periwound intact 12/19/2019  7:01 PM   Drainage Amount none 12/19/2019  7:01 PM   Care, Wound barrier applied 12/20/2019  7:40 AM   Dressing Care, Wound open to air 12/19/2019  7:01 PM       Wound 12/16/19 2000 Right calf Abrasion (Active)   Dressing Appearance open to air 12/20/2019  7:40 AM   Base non-blanchable;red 12/20/2019  7:40 AM   Periwound intact 12/20/2019  7:40 AM   Drainage Amount none 12/19/2019  7:01 PM   Care, Wound barrier applied 12/20/2019  7:40 AM       Wound 12/16/19 2000 Left upper gluteal Pressure Injury (Active)   Dressing Appearance dry;intact 12/20/2019  7:40 AM   Closure [place] 12/19/2019  7:01 PM   Base non-blanchable;slough 12/20/2019  7:40 AM   Drainage Amount none 12/20/2019  6:10 AM   Care, Wound cleansed with;sterile normal saline;honey applied 12/20/2019  6:10 AM   Dressing Care, Wound dressing changed 12/20/2019  6:10 AM       Wound 12/16/19 2000 Left lower gluteal Pressure Injury (Active)   Dressing Appearance dry;intact 12/20/2019  7:40 AM   Closure [place] 12/19/2019  7:01 PM   Base non-blanchable 12/20/2019  7:40 AM   Care, Wound cleansed with;sterile normal saline;honey applied 12/20/2019  6:10 AM   Dressing Care, Wound dressing changed 12/20/2019  6:10 AM       Wound 12/16/19 2000 Bilateral back Abrasion (Active)   Dressing Appearance open to air 12/20/2019  7:40 AM         OT Recommendation and Plan    Anticipated Equipment Needs At Discharge (OT Eval): (TBD)  Planned Therapy Interventions (OT Eval): activity tolerance training, BADL retraining, adaptive equipment training, occupation/activity based interventions, patient/caregiver education/training, ROM/therapeutic exercise, strengthening exercise, transfer/mobility retraining            OT IRF  GOALS     Row Name 12/17/19 1429             Bathing Goal 1 (OT-IRF)    [place] Level (Bathing Goal 1, OT-IRF)  minimum assist (75% or more patient effort)  -CJ      Time Frame (Bathing Goal 1, OT-IRF)  long term goal (LTG);by discharge  -CJ         LB Dressing Goal 1 (OT-IRF)    [place] (LB Dressing Goal 1, OT-IRF)  minimum assist (75% or more patient effort)  -CJ      Time Frame (LB Dressing Goal 1, OT-IRF)  short term goal (STG)  -CJ         LB Dressing Goal 2 (OT-IRF)    [place] (LB Dressing Goal 2, OT-IRF)  contact guard assist  -CJ      Time Frame (LB Dressing Goal 2, OT-IRF)  long term goal (LTG);by discharge  -CJ         Toileting Goal 1 (OT-IRF)    [place] Level (Toileting Goal 1, OT-IRF)  minimum assist (75% or more patient effort)  -CJ      Time Frame (Toileting Goal 1, OT-IRF)  short term goal (STG)  -CJ         Toileting Goal 2 (OT-IRF)    [place] Level (Toileting Goal 2, OT-IRF)  contact guard assist  -CJ      Time Frame (Toileting Goal 2, OT-IRF)  long term goal (LTG);by discharge  -        User Key  (r) = Recorded By, (t) = Taken By, (c) = Cosigned By    Initials Name Provider Type    Anika Solis OT Occupational Therapist                     Time Calculation:     Time Calculation- OT     Row Name 12/20/19 1146 12/20/19 0805          Time Calculation- OT    OT Start Time  1045  -  0805  -     OT Stop Time  1115  -  0915  -     OT Time Calculation (min)  30 min  -  70 min  -     Total Timed Code Minutes- OT  30 minute(s)  -  70 minute(s)  -     OT Non-Billable Time (min)  --  15 min  -       User Key  (r) = Recorded By, (t) = Taken By, (c) = Cosigned By    Initials Name Provider Type    Anika Solis OT Occupational Therapist          Therapy Charges for Today     Code Description Service Date Service Provider Modifiers Qty    11264456808  OT SELF CARE/MGMT/TRAIN EA 15 MIN 12/19/2019 Anika Gutierrez OT GO 3    [id number]  OT THER PROC  EA 15 MIN 12/19/2019 Anika Gutierrez OT GO 2    88241592818 HC OT THERAPEUTIC ACT EA 15 MIN 12/19/2019 Anika Gutierrez OT GO 2    49154265257 HC OT SELF CARE/MGMT/TRAIN EA 15 MIN 12/20/2019 Anika Gutierrez, OT GO 5    80842675911 HC OT THERAPEUTIC ACT EA 15 MIN 12/20/2019 Anika Gutierrez, OT GO 2                   Anika Gutierrez OT  12/20/2019

## 2019-12-20 NOTE — PROGRESS NOTES
Occupational Therapy:    Physical Therapy: Individual: 90 minutes.    Speech Language Pathology:    Signed by: Glenn Cobian PTA
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
## 2019-12-20 NOTE — THERAPY TREATMENT NOTE
Inpatient Rehabilitation - Physical Therapy Treatment Note  [facility] [place]     Patient Name: Gretta Giles  : 1960  MRN: [id number]    Today's Date: 2019                 Admit Date: 2019      Visit Dx:    No diagnosis found.    Patient Active Problem List   Diagnosis   • Gastroesophageal reflux disease   • Atopic rhinitis   • Chronic low back pain   • Chronic obstructive pulmonary disease (CMS/HCC)   • Chronic obstructive pulmonary disease with acute exacerbation (CMS/HCC)   • Diabetes mellitus (CMS/HCC)   • Diabetic peripheral neuropathy (CMS/HCC)   • Dyslipidemia   • Nonalcoholic fatty liver disease   • Gastrointestinal ulcer due to Helicobacter pylori   • Hypertension   • Menopausal symptom   • Mixed anxiety depressive disorder   • Seasonal allergic rhinitis   • Tobacco dependence syndrome   • Vitamin D deficiency   • Hyperlipidemia   • Anxiety and depression   • Menopause syndrome   • Allergic rhinitis   • Type 2 diabetes mellitus, controlled (CMS/HCC)   • GERD without esophagitis   • Nausea   • Debility   • Liver cirrhosis (CMS/HCC)       Therapy Treatment    IRF Treatment Summary     Row Name 19 1550 19 1134          Evaluation/Treatment Time and Intent    Subjective Information  complains of;weakness;fatigue;pain  -RG  complains of;pain c/o abd pain, BLE pain.  RN notified.  Agreeable to therapy.  -CJ     Existing Precautions/Restrictions  fall ABD draining to colostomy bag, Hep C+  -RG  fall;other (see comments) ABD draining to ostomy bag, Hep C, decreased skin integrity  -CJ     Document Type  therapy note (daily note)  -RG  therapy note (daily note)  -CJ     Mode of Treatment  individual therapy;physical therapy  -RG  occupational therapy  -CJ     Patient/Family Observations  Pt was agreeable for skilled PT on this date.  Pt demonstrated decreased activity tolerance and was given frequent rest breaks.   -RG  --     Recorded by [RG] Glenn Cobian PTA [CJ] Anika Gutierrez, OT      Row Name 12/20/19 1550 12/20/19 1134          Cognition/Psychosocial- PT/OT    Affect/Mental Status (Cognitive)  WFL  -RG  WFL  -CJ     Orientation Status (Cognition)  oriented x 3  -RG  --     Follows Commands (Cognition)  verbal cues/prompting required  -RG  verbal cues/prompting required  -CJ     Personal Safety Interventions  gait belt;nonskid shoes/slippers when out of bed;supervised activity  -RG  --     Recorded by [RG] Glenn Cobian PTA [CJ] Anika Gutierrez, OT     Row Name 12/20/19 1550             Bed Mobility Assessment/Treatment    Supine-Sit [place] (Bed Mobility)  verbal cues;nonverbal cues (demo/gesture);contact guard  -RG      Assistive Device (Bed Mobility)  bed rails  -RG      Comment (Bed Mobility)  Pt required assistance with supine to sit.   -RG      Recorded by [RG] Glenn Cobian PTA      Row Name 12/20/19 1550             Transfer Assessment/Treatment    Comment (Transfers)  Verbal cues for safety awareness given to reduce fall risk.   -RG      Recorded by [RG] Glenn Cobian PTA      Row Name 12/20/19 1550 12/20/19 1134          Bed-Chair Transfer    Bed-Chair [place] (Transfers)  contact guard;nonverbal cues (demo/gesture);verbal cues  -RG  contact guard;verbal cues  -CJ     Assistive Device (Bed-Chair Transfers)  wheelchair  -RG  wheelchair  -CJ     Recorded by [RG] Glenn Cobian PTA [CJ] Anika Gutierrez, OT     Row Name 12/20/19 1550 12/20/19 1134          Chair-Bed Transfer    Chair-Bed [place] (Transfers)  verbal cues;nonverbal cues (demo/gesture);contact guard  -RG  contact guard;verbal cues  -CJ     Assistive Device (Chair-Bed Transfers)  wheelchair  -RG  wheelchair  -CJ     Recorded by [RG] Glenn Cobian PTA [CJ] Anika Gutierrez, OT     Row Name 12/20/19 1550             Sit-Stand Transfer    Sit-Stand [place] (Transfers)  verbal cues;nonverbal cues (demo/gesture);contact guard  -RG      Assistive Device (Sit-Stand Transfers)  walker, front-wheeled   -RG      Recorded by [RG] Glenn Cobian PTA      Row Name 12/20/19 1550             Stand-Sit Transfer    Stand-Sit [place] (Transfers)  verbal cues;nonverbal cues (demo/gesture);contact guard  -RG      Assistive Device (Stand-Sit Transfers)  walker, front-wheeled  -RG      Recorded by [RG] Glenn Cobian PTA      Row Name 12/20/19 1550 12/20/19 1134          Toilet Transfer    Type (Toilet Transfer)  stand pivot/stand step  -RG  --     [place] Level (Toilet Transfer)  verbal cues;nonverbal cues (demo/gesture);contact guard  -RG  contact guard;verbal cues  -CJ     Assistive Device (Toilet Transfer)  grab bars/safety frame;raised toilet seat  -RG  grab bars/safety frame  -CJ     Recorded by [RG] Glenn Cobian PTA [CJ] Anika Gutierrez, OT     Row Name 12/20/19 1550             Gait/Stairs Assessment/Training    [place] Level (Gait)  contact guard;verbal cues;nonverbal cues (demo/gesture)  -RG      Assistive Device (Gait)  other (see comments) rollator  -RG      Distance in Feet (Gait)  320' in am, 320' in pm  -RG      Pattern (Gait)  step-through  -RG      Deviations/Abnormal Patterns (Gait)  jason decreased;gait speed decreased;stride length decreased  -RG      Recorded by [RG] Glenn Cobian PTA      Row Name 12/20/19 1550             Safety Issues, Functional Mobility    Impairments Affecting Function (Mobility)  balance;endurance/activity tolerance;pain;strength  -RG      Recorded by [RG] lGenn Cobian PTA      Row Name 12/20/19 1134             Bathing Assessment/Treatment    Bathing [place] Level  contact guard assist;verbal cues  -CJ      Recorded by [CJ] Anika Gutierrez, OT      Row Name 12/20/19 1134             Upper Body Dressing Assessment/Treatment    Upper Body Dressing Task  set up assistance  -CJ      Recorded by [CJ] Anika Gutierrez, OT      Row Name 12/20/19 1134             Lower Body Dressing Assessment/Treatment    Lower Body Dressing [place] Level  contact  guard assist;minimum assist (75% patient effort);verbal cues  -CJ      Recorded by [CJ] Anika Gutierrez, OT      Row Name 12/20/19 1134             Grooming Assessment/Treatment    Grooming [place] Level  set up  -CJ      Recorded by [CJ] Anika Gutierrez, OT      Row Name 12/20/19 1550 12/20/19 1134          Toileting Assessment/Treatment    Toileting [place] Level  contact guard assist  -RG  contact guard assist;minimum assist (75% patient effort);verbal cues  -CJ     Assistive Device Use (Toileting)  grab bar/safety frame;raised toilet seat  -RG  grab bar/safety frame  -CJ     Comment (Toileting)  Min assist   -RG  --     Recorded by [RG] Glenn Cobian PTA [CJ] Anika Gutierrez, OT     Row Name 12/20/19 1134             Instrumental Activities of Daily Living    Additional Documentation  --  -CJ      Recorded by [CJ] Anika Gutierrez, OT      Row Name 12/20/19 1134             Home Management    Home Management/Maintenance  home management activities;adaptive techniques for home maintenance home mgt re-trng- Pt CGA with laundry/home mgt tasks w/RW.  -CJ      Recorded by [CJ] Anika Gutierrez, OT      Row Name 12/20/19 1550             Pain Scale: Numbers Pre/Post-Treatment    Pain Location  back and generalized  -RG      Recorded by [RG] Glenn Cobian PTA      Row Name 12/20/19 1550             Pain Scale: FACES Pre/Post-Treatment    Pain: FACES Scale, Pretreatment  6-->hurts even more  -RG      Pain: FACES Scale, Post-Treatment  6-->hurts even more  -RG      Recorded by [RG] Glenn Cobian PTA      Row Name 12/20/19 113             Upper Extremity Seated Therapeutic Exercise    Exercise Type, Seated Upper Extremity (Therapeutic Exercise)  AROM (active range of motion) BUE ther ex/act, bilat coord ex, GMC/FMC  -CJ      Expected Outcomes, Seated Upper Extremity (Therapeutic Exercise)  improve functional tolerance, self-care activity;improve performance, BADLs;improve performance, transfer skills   -CJ      Recorded by [CJ] Anika Gutierrez OT      Row Name 12/20/19 1550             Lower Extremity Standing Therapeutic Exercise    Performed, Standing Lower Extremity (Therapeutic Exercise)  hip flexion/extension;hip abduction/adduction;heel raises  -RG      Device, Standing Lower Extremity (Therapeutic Exercise)  parallel bars  -RG      Exercise Type, Standing Lower Extremity (Therapeutic Exercise)  AROM (active range of motion)  -RG      Expected Outcomes, Standing Lower Extremity (Therapeutic Exercise)  improve functional tolerance, community activity;improve functional tolerance, household activity;improve functional tolerance, self-care activity;improve performance, gait skills;improve performance, transfer skills  -RG      Sets/Reps Detail, Standing Lower Extremity (Therapeutic Exercise)  2x10  -RG      Comment, Standing Lower Extremity (Therapeutic Exercise)  Pt was given adequate rest breaks as needed.   -RG      Recorded by [RG] Glenn Cobian PTA      Row Name 12/20/19 1550             Lower Extremity Seated Therapeutic Exercise    Performed, Seated Lower Extremity (Therapeutic Exercise)  hip flexion/extension;hip abduction/adduction;knee flexion/extension;LAQ (long arc quad), knee extension;ankle dorsiflexion/plantarflexion  -RG      Device, Seated Lower Extremity (Therapeutic Exercise)  elastic bands/tubing;free weights, cuff;small ball  -RG      Exercise Type, Seated Lower Extremity (Therapeutic Exercise)  AROM (active range of motion)  -RG      Expected Outcomes, Seated Lower Extremity (Therapeutic Exercise)  improve functional tolerance, community activity;improve functional tolerance, household activity;improve functional tolerance, self-care activity;improve performance, gait skills;improve performance, transfer skills  -RG      Sets/Reps Detail, Seated Lower Extremity (Therapeutic Exercise)  2x10  -RG      Comment, Seated Lower Extremity (Therapeutic Exercise)  Rest breaks given frequently  secondary to fatigue.   -RG      Recorded by [RG] Glenn Cobian PTA      Row Name 12/20/19 1550             Lower Extremity Supine Therapeutic Exercise    Performed, Supine Lower Extremity (Therapeutic Exercise)  SAQ (short arc quad) over bolster;ankle pumps;heel slides;hip abduction/adduction;hip external/internal rotation  -RG      Device, Supine Lower Extremity (Therapeutic Exercise)  foam roll  -RG      Exercise Type, Supine Lower Extremity (Therapeutic Exercise)  AROM (active range of motion)  -RG      Expected Outcomes, Supine Lower Extremity (Therapeutic Exercise)  improve functional tolerance, household activity;improve functional tolerance, community activity;improve functional tolerance, self-care activity;improve performance, gait skills;improve performance, transfer skills  -RG      Sets/Reps Detail, Supine Lower Extremity (Therapeutic Exercise)  2x10  -RG      Comment, Supine Lower Extremity (Therapeutic Exercise)  w/ rest breaks secondary to fatigue  -RG      Recorded by [RG] Glenn Cobian PTA      Row Name 12/20/19 1550 12/20/19 1134          Positioning and Restraints    Pre-Treatment Position  in bed  -RG  --     Post Treatment Position  --  wheelchair  -CJ     In Bed  notified nsg;supine;call light within reach;encouraged to call for assist  -RG  supine;call light within reach;encouraged to call for assist;heels elevated post second tx  -CJ     In Wheelchair  --  sitting;call light within reach;encouraged to call for assist post first tx  -CJ     Recorded by [RG] Glenn Cobian PTA [CJ] Anika Gutierrez OT       User Key  (r) = Recorded By, (t) = Taken By, (c) = Cosigned By    Initials Name Effective Dates    CJ Anika Gutierrez OT 04/03/18 -     RG Glenn Cobian PTA 10/31/19 -         Wound 12/16/19 midline coccyx Pressure Injury (Active)   Dressing Appearance dry;intact 12/20/2019  7:40 AM   Closure WILLIAN 12/19/2019  7:01 PM   Base non-blanchable 12/20/2019  7:40 AM   Periwound  intact;blanchable 12/20/2019  6:10 AM   Drainage Amount none 12/20/2019  6:10 AM   Care, Wound cleansed with;sterile half normal saline;honey applied 12/20/2019  6:10 AM   Dressing Care, Wound dressing changed 12/20/2019  6:10 AM       Wound 12/16/19 2000 Right coccyx Pressure Injury (Active)   Dressing Appearance dry;intact 12/20/2019  7:40 AM   Closure WILLIAN 12/19/2019  7:01 PM   Base non-blanchable 12/20/2019  7:40 AM   Drainage Amount none 12/20/2019  6:10 AM   Care, Wound cleansed with;sterile normal saline;honey applied 12/20/2019  6:10 AM   Dressing Care, Wound dressing changed 12/20/2019  6:10 AM       Wound 12/16/19 2000 Left posterior heel (Active)   Dressing Appearance open to air 12/20/2019  7:40 AM   Base blanchable;red 12/20/2019  7:40 AM   Periwound intact 12/19/2019  7:01 PM   Drainage Amount none 12/19/2019  7:01 PM   Care, Wound barrier applied 12/20/2019  7:40 AM       Wound Right posterior heel Pressure Injury (Active)   Dressing Appearance open to air 12/20/2019  7:40 AM   Base red 12/20/2019  7:40 AM   Periwound intact 12/19/2019  7:01 PM   Drainage Amount none 12/19/2019  7:01 PM   Care, Wound barrier applied 12/20/2019  7:40 AM   Dressing Care, Wound open to air 12/19/2019  7:01 PM       Wound 12/16/19 2000 Right calf Abrasion (Active)   Dressing Appearance open to air 12/20/2019  7:40 AM   Base non-blanchable;red 12/20/2019  7:40 AM   Periwound intact 12/20/2019  7:40 AM   Drainage Amount none 12/19/2019  7:01 PM   Care, Wound barrier applied 12/20/2019  7:40 AM       Wound 12/16/19 2000 Left upper gluteal Pressure Injury (Active)   Dressing Appearance dry;intact 12/20/2019  7:40 AM   Closure [place] 12/19/2019  7:01 PM   Base non-blanchable;slough 12/20/2019  7:40 AM   Drainage Amount none 12/20/2019  6:10 AM   Care, Wound cleansed with;sterile normal saline;honey applied 12/20/2019  6:10 AM   Dressing Care, Wound dressing changed 12/20/2019  6:10 AM       Wound 12/16/19 2000 Left lower gluteal  Pressure Injury (Active)   Dressing Appearance dry;intact 12/20/2019  7:40 AM   Closure [place] 12/19/2019  7:01 PM   Base non-blanchable 12/20/2019  7:40 AM   Care, Wound cleansed with;sterile normal saline;honey applied 12/20/2019  6:10 AM   Dressing Care, Wound dressing changed 12/20/2019  6:10 AM       Wound 12/16/19 2000 Bilateral back Abrasion (Active)   Dressing Appearance open to air 12/20/2019  7:40 AM           PT Recommendation and Plan                        Time Calculation:     PT Charges     Row Name 12/20/19 1600 12/20/19 1559          Time Calculation    Start Time  1420  -RG  1000  -RG     Stop Time  1505  -RG  1045  -RG     Time Calculation (min)  45 min  -RG  45 min  -RG     PT Received On  --  12/20/19  -RG     PT Goal Re-Cert Due Date  --  12/24/19  -RG        Time Calculation- PT    Total Timed Code Minutes- PT  45 minute(s)  -RG  45 minute(s)  -RG       User Key  (r) = Recorded By, (t) = Taken By, (c) = Cosigned By    Initials Name Provider Type    Glenn Foster PTA Physical Therapy Assistant          Therapy Charges for Today     Code Description Service Date Service Provider Modifiers Qty    54433019058 HC GAIT TRAINING EA 15 MIN 12/20/2019 Glenn Cobian PTA GP 2    37689753386 HC PT THERAPEUTIC ACT EA 15 MIN 12/20/2019 Glenn Cobian PTA GP 1    16853050816 HC PT THER PROC EA 15 MIN 12/20/2019 Glenn Cobian PTA GP 3                   Glenn Cobian PTA  12/20/2019

## 2019-12-20 NOTE — CONSULTS
Ms. Giles is resting in bed, alert and oriented. Counseled patient on diabetes basic handout which discusses exactly what diabetes is and how it affects the body.  Counseled patient on complications of hyperglycemia and ways to prevent it.  Counseled patient on hypoglycemia and treatment by using the rule of 15.  Counseled patient on the importance of checking blood glucose levels frequently and keeping a log to take to all MD appointments.  Counseled patient on how to care for themselves during sick day.  Stressed the importance of healthy eating with a limit of carbohydrates to 180 per day.  Counseled on importance of complying with medications as ordered by provider.  Counseled on the importance of daily foot inspections.  Counseled patient to seek medical attention if blood glucose above 300.  Counseled on importance of daily exercise. Patient properly stephen up insulin and can describe appropriate sites for injections.  Patient verbalizes understanding of all information given. Encouraged patient to attend an outpatient diabetes smart class or attend diabetes support group.  Left time and dates of classes with patient along with my name and contact information, will continue to monitor patient as needed.

## 2019-12-21 NOTE — PROGRESS NOTES
Inpatient Rehabilitation Functional Measures Assessment    Functional Measures  DENA Eating:  DENA Grooming:  DENA Bathing:  DENA Upper Body Dressing:  DENA Lower Body Dressing:  DENA Toileting:  Toileting Score = 5.  Patient is supervision/set-up for  toileting, requiring: Stand by assistance. Patient requires the following  assistive device(s): Grab bar.    DENA Bladder Management  Level of Assistance:  Frequency/Number of Accidents this Shift:    DENA Bowel Management  Level of Assistance:  Frequency/Number of Accidents this Shift:    DENA Bed/Chair/Wheelchair Transfer:  DENA Toilet Transfer:  Toilet Transfer Score = 5.  Patient is supervision/set-up  for transferring to and from the toilet/commode, requiring: No assistive devices  were required.  DENA Tub/Shower Transfer:    Previously Documented Mode of Locomotion at Discharge:  [facility] Expected Mode of Locomotion at Discharge:  DENA Walk/Wheelchair:  DENA Stairs:    DENA Comprehension:  DENA Expression:  DENA Social Interaction:  DENA Problem Solving:  DENA Memory:    Therapy Mode Minutes  Occupational Therapy: Individual: 90 minutes.  Physical Therapy:  Speech Language Pathology:    Discharge Functional Goals:    Signed by: Su Mccormick OT

## 2019-12-21 NOTE — PROGRESS NOTES
Rehabilitation Nursing  Inpatient Rehabilitation Plan of Care Note    Plan of Care  Copy from POCSafety    Potential for Injury (Active)  Current Status (12/16/2019 12:00:00 AM): FREE FROM INJURY  Weekly Goal: FREE FROM INJURY  Discharge Goal: FREE FROM INJURY    Body Systems    Integumentary (Active)  Current Status (12/16/2019 12:00:00 AM): FREE FROM FURTHER BREAKDOWN  Weekly Goal: FREE FROM FURTHER BREAKDOWN  Discharge Goal: FREE FROM FURTHER BREAKDOWN    Signed by: Dolores Webb, Nurse

## 2019-12-21 NOTE — PROGRESS NOTES
Occupational Therapy:    Physical Therapy: Individual: 90 minutes.    Speech Language Pathology:    Signed by: Deysi Monge, Physical Therapist

## 2019-12-21 NOTE — THERAPY TREATMENT NOTE
Inpatient Rehabilitation - Occupational Therapy Treatment Note    [facility]     Patient Name: Gretta Giles  : 1960  MRN: [id number]    Today's Date: 2019                 Admit Date: 2019      Visit Dx:  No diagnosis found.    Patient Active Problem List   Diagnosis   • Gastroesophageal reflux disease   • Atopic rhinitis   • Chronic low back pain   • Chronic obstructive pulmonary disease (CMS/HCC)   • Chronic obstructive pulmonary disease with acute exacerbation (CMS/HCC)   • Diabetes mellitus (CMS/HCC)   • Diabetic peripheral neuropathy (CMS/HCC)   • Dyslipidemia   • Nonalcoholic fatty liver disease   • Gastrointestinal ulcer due to Helicobacter pylori   • Hypertension   • Menopausal symptom   • Mixed anxiety depressive disorder   • Seasonal allergic rhinitis   • Tobacco dependence syndrome   • Vitamin D deficiency   • Hyperlipidemia   • Anxiety and depression   • Menopause syndrome   • Allergic rhinitis   • Type 2 diabetes mellitus, controlled (CMS/HCC)   • GERD without esophagitis   • Nausea   • Debility   • Liver cirrhosis (CMS/HCC)         Therapy Treatment    IRF Treatment Summary     Row Name 19 1016             Evaluation/Treatment Time and Intent    Subjective Information  no complaints  -BC      Existing Precautions/Restrictions  fall  -BC      Document Type  therapy note (daily note)  -BC      Mode of Treatment  individual therapy;occupational therapy  -BC      Patient/Family Observations  Pt. asked to start treatment when she completed her breakfast.   -BC      Recorded by [BC] Su Mccormick OT      Row Name 19 1016             Basic Activities of Daily Living (BADLs)    35353 - OT Self Care/Mgmt Minutes  30  -BC      Basic Activities of Daily Living  grooming;toileting  -BC      Energy Conservation Techniques  activity planning ahead of time;ask for assistance with difficult tasks  -BC      Recorded by [BC] Su Mccormick OT      Row Name 19 1016              Grooming Assessment/Treatment    Comment (Grooming)  Set up A  -BC      Recorded by [BC] Su Mccormick OT      Row Name 12/21/19 1016             Toileting Assessment/Treatment    Toileting [place] Level  set up assistance;supervision  -BC      Assistive Device Use (Toileting)  grab bar/safety frame  -BC      Comment (Toileting)  Stand by A  -BC      Recorded by [BC] Su Mccormick OT      Row Name 12/21/19 1016             Therapeutic Exercise    Therapeutic Exercise  seated, upper extremities  -BC      Recorded by [BC] Su Mccormick OT      Row Name 12/21/19 1016             Upper Extremity Seated Therapeutic Exercise    Performed, Seated Upper Extremity (Therapeutic Exercise)  shoulder flexion/extension;elbow flexion/extension;forearm supination/pronation  -BC      Device, Seated Upper Extremity (Therapeutic Exercise)  other (see comments) med.pegs, resistive clothes pens  -BC      Exercise Type, Seated Upper Extremity (Therapeutic Exercise)  AROM (active range of motion) BUE gross/fine motor coordination activities.  -BC      Expected Outcomes, Seated Upper Extremity (Therapeutic Exercise)  improve manipulation of objects, self care activity  -BC      Recorded by [BC] Su Mccormick OT      Row Name 12/21/19 1016             Daily Summary of Progress (OT)    Functional Goal Overall Progress: Occupational Therapy  progressing toward functional goals as expected  -BC      Recommendations: Occupational Therapy  Cont. OT POC  -BC      Recorded by [BC] Su Mccormick OT        User Key  (r) = Recorded By, (t) = Taken By, (c) = Cosigned By    Initials Name Effective Dates    BC Su Mccormick OT 04/03/18 -           Wound 12/16/19 midline coccyx Pressure Injury (Active)   Dressing Appearance dry;intact 12/21/2019  7:57 AM   Closure None 12/20/2019  8:06 PM   Base non-blanchable 12/21/2019  7:57 AM   Periwound intact;blanchable 12/20/2019  6:00 PM   Periwound Temperature warm 12/20/2019  8:06 PM    Periwound Skin Turgor soft 12/20/2019  8:06 PM   Drainage Amount none 12/20/2019  8:06 PM   Care, Wound cleansed with;sterile normal saline;honey applied 12/21/2019  7:57 AM   Dressing Care, Wound dressing changed 12/21/2019  7:57 AM       Wound 12/16/19 2000 Right coccyx Pressure Injury (Active)   Dressing Appearance dry;intact 12/21/2019  7:57 AM   Base non-blanchable 12/21/2019  7:57 AM   Periwound Temperature warm 12/20/2019  8:06 PM   Periwound Skin Turgor soft 12/20/2019  8:06 PM   Drainage Amount none 12/20/2019  8:06 PM   Care, Wound cleansed with;sterile normal saline;honey applied 12/21/2019  7:57 AM   Dressing Care, Wound dressing changed 12/21/2019  7:57 AM       Wound 12/16/19 2000 Left posterior heel (Active)   Dressing Appearance open to air 12/21/2019  7:57 AM   Closure None 12/20/2019  8:06 PM   Base blanchable;red 12/21/2019  7:57 AM   Periwound Temperature warm 12/20/2019  8:06 PM   Periwound Skin Turgor soft 12/20/2019  8:06 PM   Drainage Amount none 12/20/2019  8:06 PM   Care, Wound barrier applied 12/20/2019  8:06 PM       Wound Right posterior heel Pressure Injury (Active)   Dressing Appearance open to air 12/21/2019  7:57 AM   Closure None 12/20/2019  8:06 PM   Base red 12/21/2019  7:57 AM   Periwound intact 12/20/2019  8:06 PM   Periwound Temperature warm 12/20/2019  8:06 PM   Periwound Skin Turgor soft 12/20/2019  8:06 PM   Drainage Amount none 12/20/2019  8:06 PM   Care, Wound barrier applied 12/20/2019  8:06 PM       Wound 12/16/19 2000 Right calf Abrasion (Active)   Dressing Appearance open to air 12/21/2019  7:57 AM   Closure None 12/20/2019  8:06 PM   Base non-blanchable;red 12/21/2019  7:57 AM   Periwound intact 12/21/2019  7:57 AM   Drainage Amount none 12/20/2019  8:06 PM   Care, Wound barrier applied 12/20/2019  8:06 PM       Wound 12/16/19 2000 Left upper gluteal Pressure Injury (Active)   Dressing Appearance dry;intact 12/21/2019  7:57 AM   Base non-blanchable;slough 12/21/2019   7:57 AM   Periwound Temperature warm 12/20/2019  8:06 PM   Periwound Skin Turgor soft 12/20/2019  8:06 PM   Drainage Amount none 12/20/2019  8:06 PM   Care, Wound cleansed with;sterile normal saline;honey applied 12/21/2019  7:57 AM   Dressing Care, Wound dressing changed 12/21/2019  7:57 AM       Wound 12/16/19 2000 Left lower gluteal Pressure Injury (Active)   Dressing Appearance dry;intact 12/21/2019  7:57 AM   Base non-blanchable 12/21/2019  7:57 AM   Periwound intact 12/20/2019  6:00 PM   Periwound Temperature warm 12/20/2019  8:06 PM   Periwound Skin Turgor soft 12/20/2019  8:06 PM   Edges rolled/closed 12/20/2019  8:06 PM   Care, Wound cleansed with;sterile normal saline;honey applied 12/21/2019  7:57 AM   Dressing Care, Wound dressing changed 12/21/2019  7:57 AM       Wound 12/16/19 2000 Bilateral back Abrasion (Active)   Dressing Appearance open to air 12/21/2019  7:57 AM         OT Recommendation and Plan         Plan of Care Review  Plan of Care Reviewed With: patient  Daily Summary of Progress (OT)  Functional Goal Overall Progress: Occupational Therapy: progressing toward functional goals as expected  Recommendations: Occupational Therapy: Cont. OT POC  Plan of Care Reviewed With: patient  Outcome Summary: Demo good progress with POC, cont. OT as tolerated.    OT IRF GOALS     Row Name 12/17/19 1429             Bathing Goal 1 (OT-IRF)    [place] Level (Bathing Goal 1, OT-IRF)  minimum assist (75% or more patient effort)  -      Time Frame (Bathing Goal 1, OT-IRF)  long term goal (LTG);by discharge  -         LB Dressing Goal 1 (OT-IRF)    [place] (LB Dressing Goal 1, OT-IRF)  minimum assist (75% or more patient effort)  -      Time Frame (LB Dressing Goal 1, OT-IRF)  short term goal (STG)  -         LB Dressing Goal 2 (OT-IRF)    [place] (LB Dressing Goal 2, OT-IRF)  contact guard assist  -      Time Frame (LB Dressing Goal 2, OT-IRF)  long term goal (LTG);by discharge  -          Toileting Goal 1 (OT-IRF)    [place] Level (Toileting Goal 1, OT-IRF)  minimum assist (75% or more patient effort)  -      Time Frame (Toileting Goal 1, OT-IRF)  short term goal (STG)  -         Toileting Goal 2 (OT-IRF)    [place] Level (Toileting Goal 2, OT-IRF)  contact guard assist  -      Time Frame (Toileting Goal 2, OT-IRF)  long term goal (LTG);by discharge  -        User Key  (r) = Recorded By, (t) = Taken By, (c) = Cosigned By    Initials Name Provider Type     Anika Gutierrez, OT Occupational Therapist                     Time Calculation:     Time Calculation- OT     Row Name 12/21/19 1027 12/21/19 1016          Time Calculation- OT    OT Start Time  0730  -BC  --     OT Stop Time  0900  -BC  --     OT Time Calculation (min)  90 min  -BC  --     OT Non-Billable Time (min)  15 min  -BC  --        Timed Charges    56831 - OT Self Care/Mgmt Minutes  --  30  -BC       User Key  (r) = Recorded By, (t) = Taken By, (c) = Cosigned By    Initials Name Provider Type    BC Su Mccormick OT Occupational Therapist          Therapy Charges for Today     Code Description Service Date Service Provider Modifiers Qty    82902916096  OT SELF CARE/MGMT/TRAIN EA 15 MIN 12/21/2019 Su Mccormick OT GO 2    44829091758 HC OT THERAPEUTIC ACT EA 15 MIN 12/21/2019 Su Mccormick OT GO 4                   uS Mccormick OT  12/21/2019

## 2019-12-21 NOTE — THERAPY TREATMENT NOTE
Acute Care - Physical Therapy Treatment Note  [facility]     Patient Name: Gretta Giles  : 1960  MRN: [id number]  Today's Date: 2019             Admit Date: 2019    Visit Dx:  No diagnosis found.  Patient Active Problem List   Diagnosis   • Gastroesophageal reflux disease   • Atopic rhinitis   • Chronic low back pain   • Chronic obstructive pulmonary disease (CMS/HCC)   • Chronic obstructive pulmonary disease with acute exacerbation (CMS/HCC)   • Diabetes mellitus (CMS/HCC)   • Diabetic peripheral neuropathy (CMS/HCC)   • Dyslipidemia   • Nonalcoholic fatty liver disease   • Gastrointestinal ulcer due to Helicobacter pylori   • Hypertension   • Menopausal symptom   • Mixed anxiety depressive disorder   • Seasonal allergic rhinitis   • Tobacco dependence syndrome   • Vitamin D deficiency   • Hyperlipidemia   • Anxiety and depression   • Menopause syndrome   • Allergic rhinitis   • Type 2 diabetes mellitus, controlled (CMS/HCC)   • GERD without esophagitis   • Nausea   • Debility   • Liver cirrhosis (CMS/HCC)       Therapy Treatment        Wound 19 midline coccyx Pressure Injury (Active)   Dressing Appearance dry;intact 2019  7:57 AM   Closure None 2019  8:06 PM   Base non-blanchable 2019  7:57 AM   Periwound intact;blanchable 2019  6:00 PM   Periwound Temperature warm 2019  8:06 PM   Periwound Skin Turgor soft 2019  8:06 PM   Drainage Amount none 2019  8:06 PM   Care, Wound cleansed with;sterile normal saline;honey applied 2019  7:57 AM   Dressing Care, Wound dressing changed 2019  7:57 AM       Wound 19 2000 Right coccyx Pressure Injury (Active)   Dressing Appearance dry;intact 2019  7:57 AM   Base non-blanchable 2019  7:57 AM   Periwound Temperature warm 2019  8:06 PM   Periwound Skin Turgor soft 2019  8:06 PM   Drainage Amount none 2019  8:06 PM   Care, Wound cleansed with;sterile normal saline;honey  applied 12/21/2019  7:57 AM   Dressing Care, Wound dressing changed 12/21/2019  7:57 AM       Wound 12/16/19 2000 Left posterior heel (Active)   Dressing Appearance open to air 12/21/2019  7:57 AM   Closure None 12/20/2019  8:06 PM   Base blanchable;red 12/21/2019  7:57 AM   Periwound Temperature warm 12/20/2019  8:06 PM   Periwound Skin Turgor soft 12/20/2019  8:06 PM   Drainage Amount none 12/20/2019  8:06 PM   Care, Wound barrier applied 12/20/2019  8:06 PM       Wound Right posterior heel Pressure Injury (Active)   Dressing Appearance open to air 12/21/2019  7:57 AM   Closure None 12/20/2019  8:06 PM   Base red 12/21/2019  7:57 AM   Periwound intact 12/20/2019  8:06 PM   Periwound Temperature warm 12/20/2019  8:06 PM   Periwound Skin Turgor soft 12/20/2019  8:06 PM   Drainage Amount none 12/20/2019  8:06 PM   Care, Wound barrier applied 12/20/2019  8:06 PM       Wound 12/16/19 2000 Right calf Abrasion (Active)   Dressing Appearance open to air 12/21/2019  7:57 AM   Closure None 12/20/2019  8:06 PM   Base non-blanchable;red 12/21/2019  7:57 AM   Periwound intact 12/21/2019  7:57 AM   Drainage Amount none 12/20/2019  8:06 PM   Care, Wound barrier applied 12/20/2019  8:06 PM       Wound 12/16/19 2000 Left upper gluteal Pressure Injury (Active)   Dressing Appearance dry;intact 12/21/2019  7:57 AM   Base non-blanchable;slough 12/21/2019  7:57 AM   Periwound Temperature warm 12/20/2019  8:06 PM   Periwound Skin Turgor soft 12/20/2019  8:06 PM   Drainage Amount none 12/20/2019  8:06 PM   Care, Wound cleansed with;sterile normal saline;honey applied 12/21/2019  7:57 AM   Dressing Care, Wound dressing changed 12/21/2019  7:57 AM       Wound 12/16/19 2000 Left lower gluteal Pressure Injury (Active)   Dressing Appearance dry;intact 12/21/2019  7:57 AM   Base non-blanchable 12/21/2019  7:57 AM   Periwound intact 12/20/2019  6:00 PM   Periwound Temperature warm 12/20/2019  8:06 PM   Periwound Skin Turgor soft 12/20/2019   8:06 PM   Edges rolled/closed 12/20/2019  8:06 PM   Care, Wound cleansed with;sterile normal saline;honey applied 12/21/2019  7:57 AM   Dressing Care, Wound dressing changed 12/21/2019  7:57 AM       Wound 12/16/19 2000 Bilateral back Abrasion (Active)   Dressing Appearance open to air 12/21/2019  7:57 AM               PT Recommendation and Plan           Time Calculation:   PT Charges     Row Name 12/21/19 1247             Time Calculation    Start Time  0900  -BC      Stop Time  1030  -BC      Time Calculation (min)  90 min  -BC      PT Received On  12/21/19  -BC         Time Calculation- PT    Total Timed Code Minutes- PT  90 minute(s)  -BC         Timed Charges    29689 - PT Therapeutic Exercise Minutes  45  -BC      24552 - Gait Training Minutes   30  -BC      21078 - PT Therapeutic Activity Minutes  15  -BC        User Key  (r) = Recorded By, (t) = Taken By, (c) = Cosigned By    Initials Name Provider Type    BC Deysi Monge, PT Physical Therapist        Therapy Charges for Today     Code Description Service Date Service Provider Modifiers Qty    18540670741 HC GAIT TRAINING EA 15 MIN 12/21/2019 Deysi Monge, PT GP 2    41134805540 HC PT THERAPEUTIC ACT EA 15 MIN 12/21/2019 Deysi Monge, PT GP 1    86204147707 HC PT THER PROC EA 15 MIN 12/21/2019 Deysi Monge, PT GP 3    52667351625 HC GAIT TRAINING EA 15 MIN 12/21/2019 Deysi Monge, PT GP 2    12154038200 HC PT THERAPEUTIC ACT EA 15 MIN 12/21/2019 Deysi Monge, PT GP 1               Deysi Monge PT  12/21/2019

## 2019-12-22 NOTE — PROGRESS NOTES
Safety    Potential for Injury (Active)  Current Status (12/16/2019 12:00:00 AM): FREE FROM INJURY  Weekly Goal: FREE FROM INJURY  Discharge Goal: FREE FROM INJURY    Body Systems    Integumentary (Active)  Current Status (12/16/2019 12:00:00 AM): FREE FROM FURTHER BREAKDOWN  Weekly Goal: FREE FROM FURTHER BREAKDOWN  Discharge Goal: FREE FROM FURTHER BREAKDOWN    Signed by: Xuan Mclaughlin, Nurse

## 2019-12-23 NOTE — PROGRESS NOTES
LOS: 6 days     Chief Complaint:     Debility    Subjective     Interval History:     Patient participated in 100 minutes of occupational therapy on 12/20/19.  Patient did have complaints of abdominal pain and BLE pain but patient was agreeable to treatment. The RN as notified. Patient participated in 90 minutes of physical therapy. Patient did ambulate in the morning 320 feet and in the afternoon 320 feet.      held a team conference on 12/19/19.   spoke to patient about plans for discharge on 12-27-19 and how she is doing in therapy.  Educated patient about option of going to women's shelter temporarily until she can find a different home to live in instead of nursing home placement.  Patient is agreeable to consider this option.    Patient participated in 100 minutes on occupational therapy on 12/19/19. She did have complaints of back pain but was agreeable to OT session. Her bed-chair transfer independence is with contact guard at minimum assist (75% patient effort) with verbal cues.  She uses a wheelchair for assistive device.     On 12/18/19 she spent 90 minutes with OT and PT. Patient's chair-bed transfer independence level is at minimum assist (75% patient effort), using verbal cues with a wheelchair to assist.  PT noted that patient demonstrates decreased activity tolerance and complains of pain with increased activity. Continued skilled care required for improved functional mobility and ambulation quality. She did ambulate 300 feet x2 in the PM with an assistive device.    Patient was assessed by occupational and physical therapy and appropriate plan of care was put into place. Patient tolerated PT and OT sessions well on 12/17/19 but did have complaints of back and BLE pain.     Patient is unable to receive anticoagulation therapy due to her end-stage liver cirrhosis and high risk for bleeding but DVT prophylaxis was initiated with SCUDs and for now would try to avoid  "anticoagulation if at all possible as risk outweighs benefit as recommended by the referring facility.  Patient continues to refuse her insulin and her sugar is elevated and despite my discussion with her continues to consume high sugar candy at her bedside.      Review Of Systems:     Constitutional: no fever, chills and night sweats. No appetite change or unexpected weight change. ` Generalized fatigue.  Eyes: no eye drainage, itching or redness.  HEENT: no mouth sores, dysphagia or nose bleed.  Respiratory: no for shortness of breath, cough or production of sputum.  Cardiovascular: no chest pain, no palpitations, no orthopnea.  Gastrointestinal: no nausea, vomiting or diarrhea.  Mild chronic abdominal pain, no hematemesis or rectal bleeding.  Genitourinary: no dysuria or polyuria.  Hematologic/lymphatic: no lymph node abnormalities, no easy bruising or easy bleeding.  Musculoskeletal: no muscle or joint pain.  Skin: No rash and no itching.  Neurological: no loss of consciousness, no seizure, no headache.  Behavioral/Psych: no depression or suicidal ideation.  Endocrine: no hot flashes.  Immunologic: negative.    Objective     Vital Signs  /68 (BP Location: Left arm, Patient Position: Lying)   Pulse 87   Temp 97.5 °F (36.4 °C) (Oral)   Resp 20   Ht 160 cm (63\")   Wt 51.8 kg (114 lb 3.2 oz)   SpO2 97%   BMI 20.23 kg/m²   Intake & Output (last day)       12/22 0701 - 12/23 0700    P.O. 720    Total Intake(mL/kg) 720 (13.9)    Urine (mL/kg/hr)     Stool     Total Output     Net +720         Urine Unmeasured Occurrence 6 x    Stool Unmeasured Occurrence 1 x            Physical Exam:     General Appearance: alert, pleasant, appears stated age, interactive and  Cooperative.  Generalized weakness.     Head: normocephalic, without obvious abnormality and atraumatic     Eyes: lids and lashes normal, conjunctivae and sclerae normal, no icterus, no  pallor, corneas clear and PERRLA     Ears: ears appear intact " with no abnormalities noted     Nose: nares normal, septum midline, mucosa normal and no drainage                Throat: no oral lesions, no thrush, oral mucosa moist and oopharynx normal     Neck: no adenopathy, supple, trachea midline, no thyromegaly, no carotid bruit  and no JVD     Back: no kyphosis present, no scoliosis present, no skin lesions, erythema, or  scars, no tenderness to percussion or palpation and range of motion normal     Lungs: clear to auscultation, respirations regular, respirations even and  respirations unlabored. No accessory muscle use.      Heart:: regular rhythm & normal rate, normal S1, S2, no murmur, no gallop, no  rub and no click.  Chest wall with no abnormalities observed. PMI nondisplaced     Abdomen: normal bowel sounds in all quadrants, no masses, no hepatomegaly,  no splenomegaly, soft non-tender, no guarding and no rebound tenderness     Extremities: no edema, no cyanosis, no redness, no tenderness, no clubbing     Musculoskeletal: joints with full range of motion and joints, no effusion.  Pedal  pulses palpable and equal bilaterally     Skin: no bleeding, bruising or rash and no lesions noted.  Mid line coccyx, Right coccyx, Left upper gluteal and left lower gluteal unstageable pressure ulcers all present on admission.     Lymph Nodes: no palpable adenopathy     Neurologic: Mental Status orientated to person, place, time and situation.   Speech is intelligible, Nonlabored.  Alertness alert and awake and mood/affect  normal, Cranial Nerves 2 - 12 grossly intact as examined  Sensory intact in BLE and BUE.  Motor strength 4/5 in upper and lower extremities       Results Review:    Lab Results   Component Value Date    WBC 8.69 12/21/2019    HGB 9.3 (L) 12/21/2019    HCT 28.8 (L) 12/21/2019    MCV 82.3 12/21/2019     12/21/2019       Lab Results   Component Value Date    GLUCOSE 302 (H) 12/21/2019    BUN 21 (H) 12/21/2019    CREATININE 0.76 12/21/2019    EGFRIFNONA 78  12/21/2019    BCR 27.6 (H) 12/21/2019     (L) 12/21/2019    K 4.4 12/21/2019    CL 91 (L) 12/21/2019    CO2 27.0 12/21/2019    CALCIUM 8.2 (L) 12/21/2019    ALBUMIN 2.56 (L) 12/21/2019    LABIL2 1.2 (L) 06/08/2016    AST 78 (H) 12/21/2019    ALT 52 (H) 12/21/2019     No results found for: INR    Glucose   Date Value Ref Range Status   12/22/2019 326 (H) 70 - 130 mg/dL Final   12/22/2019 338 (H) 70 - 130 mg/dL Final   12/22/2019 253 (H) 70 - 130 mg/dL Final   12/22/2019 363 (H) 70 - 130 mg/dL Final   12/21/2019 213 (H) 70 - 130 mg/dL Final          Medication Review:     Current Facility-Administered Medications:   •  acetaminophen (TYLENOL) tablet 650 mg, 650 mg, Oral, Q6H PRN, Ravi Alcazar MD, 650 mg at 12/17/19 2014  •  albuterol (PROVENTIL) nebulizer solution 0.083% 2.5 mg/3mL, 2.5 mg, Nebulization, Q6H PRN, Ravi Alcazar MD, 2.5 mg at 12/19/19 1945  •  amiodarone (PACERONE) tablet 200 mg, 200 mg, Oral, Q24H, Ravi Alcazar MD, 200 mg at 12/22/19 0819  •  aspirin chewable tablet 81 mg, 81 mg, Oral, Daily, Ravi Alcazar MD, 81 mg at 12/22/19 0819  •  busPIRone (BUSPAR) tablet 15 mg, 15 mg, Oral, Daily, Ravi Alcazar MD, 15 mg at 12/22/19 0819  •  castor oil-balsam peru (VENELEX) ointment, , Topical, Q12H, Ravi Alcazar MD, 1 each at 12/22/19 2059  •  dextrose (D50W) 25 g/ 50mL Intravenous Solution 25 g, 25 g, Intravenous, Q15 Min PRN, Ravi Alcazar MD  •  dextrose (GLUTOSE) oral gel 15 g, 15 g, Oral, Q15 Min PRN, Ravi Alcazar MD  •  dicyclomine (BENTYL) capsule 10 mg, 10 mg, Oral, Daily, Ravi Alcazar MD, 10 mg at 12/22/19 0819  •  ferrous sulfate tablet 325 mg, 325 mg, Oral, TID With Meals, Ravi Alcazar MD, 325 mg at 12/22/19 1723  •  fluticasone (FLONASE) 50 MCG/ACT nasal spray 2 spray, 2 spray, Each Nare, Daily, Ravi Alcazar MD, 2 spray at 12/22/19 0852  •  furosemide (LASIX) tablet 40 mg, 40 mg, Oral, BID, Ravi Alcazar  MD Jerry, 40 mg at 12/22/19 1723  •  gabapentin (NEURONTIN) capsule 600 mg, 600 mg, Oral, TID, Ravi Alcazar MD, 600 mg at 12/22/19 2059  •  glucagon (human recombinant) (GLUCAGEN DIAGNOSTIC) injection 1 mg, 1 mg, Subcutaneous, Q15 Min PRN, Ravi Alcazar MD  •  insulin aspart (novoLOG) injection 0-7 Units, 0-7 Units, Subcutaneous, 4x Daily AC & at Bedtime, Ravi Alcazar MD, 5 Units at 12/22/19 2058  •  insulin detemir (LEVEMIR) injection 10 Units, 10 Units, Subcutaneous, Q12H, Ravi Alcazar MD, 10 Units at 12/22/19 2101  •  lactulose (CHRONULAC) 10 GM/15ML solution 10 g, 10 g, Oral, BID, Ravi Alcazar MD, 10 g at 12/22/19 2059  •  magnesium hydroxide (MILK OF MAGNESIA) suspension 2400 mg/10mL 5 mL, 5 mL, Oral, Daily PRN, Ravi Alcazar MD, 5 mL at 12/20/19 1010  •  ondansetron (ZOFRAN) tablet 4 mg, 4 mg, Oral, Q6H PRN, Ravi Alcazar MD, 4 mg at 12/21/19 0957  •  oxyCODONE (ROXICODONE) immediate release tablet 10 mg, 10 mg, Oral, Q8H PRN, Ravi Alcazar MD, 10 mg at 12/22/19 1724  •  pantoprazole (PROTONIX) EC tablet 40 mg, 40 mg, Oral, BID AC, Ravi Alcazar MD, 40 mg at 12/22/19 1633  •  spironolactone (ALDACTONE) tablet 100 mg, 100 mg, Oral, Daily, Ravi Alcazar MD, 100 mg at 12/22/19 0818      Assessment/Plan       Debility    Chronic obstructive pulmonary disease (CMS/HCC)    Diabetes mellitus (CMS/HCC)    Diabetic peripheral neuropathy (CMS/HCC)    Hypertension    Anxiety and depression    Liver cirrhosis (CMS/HCC)    ASSESSMENT:     1.  Atrial fibrillation  2.  Recurrent ascites  3.  Nonalcoholic liver cirrhosis, end-stage  4.  Hepatitis C  5.  Essential hypertension  6.  Diabetes mellitus insulin-dependent and nonadherent with insulin  7.  Neuropathy  8.  Anxiety  9.  Depression  10.  Chronic pain  11.  Hyponatremia  12.  COPD  13.  Acute respiratory failure with ARDS  14.  Aspiration pneumonia  15.  Chronic left-sided pleural effusion  16.  Mid line coccyx, Right coccyx, Left upper gluteal and left lower gluteal unstageable pressure ulcers all present on admission     PLAN:    Patient continues to consume large amount of candy and I increased her Levemir to 13 units q12h.    Patient participated in 100 minutes of occupational therapy on 12/20/19.  Patient did have complaints of abdominal pain and BLE pain but patient was agreeable to treatment. The RN as notified. Patient participated in 90 minutes of physical therapy. Patient did ambulate in the morning 320 feet and in the afternoon 320 feet.      held a team conference on 12/19/19.   spoke to patient about plans for discharge on 12-27-19 and how she is doing in therapy.  Educated patient about option of going to women's shelter temporarily until she can find a different home to live in instead of nursing home placement.  Patient is agreeable to consider this option.    Patient participated in 100 minutes on occupational therapy on 12/19/19. She did have complaints of back pain but was agreeable to OT session. Her bed-chair transfer independence is with contact guard at minimum assist (75% patient effort) with verbal cues.  She uses a wheelchair for assistive device.     On 12/18/19 she spent 90 minutes with OT and PT. Patient's chair-bed transfer independence level is at minimum assist (75% patient effort), using verbal cues with a wheelchair to assist.  PT noted that patient demonstrates decreased activity tolerance and complains of pain with increased activity. Continued skilled care required for improved functional mobility and ambulation quality. She did ambulate 300 feet x2 in the PM with an assistive device.    Patient was assessed by occupational and physical therapy and appropriate plan of care was put into place. Patient tolerated PT and OT sessions well on 12/17/19 but did have complaints of back and BLE pain.     Patient is unable to receive anticoagulation  therapy due to her end-stage liver cirrhosis and high risk for bleeding but DVT prophylaxis was initiated with SCUDs and for now would try to avoid anticoagulation if at all possible as risk outweighs benefit as recommended by the referring facility.  Patient continues to refuse her insulin and her sugar is elevated and despite my discussion with her continues to consume high sugar candy at her bedside.    Estimated length of stay 7 to 10 days     Prior level of function prior to her admission at [facility] she was independent and living at home.     Patient is going to require intensive inpatient physical therapy for therapeutic exercises, range of motion, endurance, gait training, safety, stairs training, strengthening for at least 1 to 2 hours a day for 5 to 6 days a week as well as occupational therapy for dressing, ADLs, feeding, home skills, safety and toileting techniques for 1 to 2 hours a day for 5 to 6 days a week.    Code Status and Medical Interventions:   Ordered at: 12/17/19 1254     Code Status:    No CPR     Medical Interventions (Level of Support Prior to Arrest):    Full       Ravi Alcazar MD  12/23/19  6:58 AM

## 2019-12-23 NOTE — SIGNIFICANT NOTE
12/23/19 0393   Plan   Plan Spoke to pt who is still agreeable to go to [facility] at discharge.  Pt prefers to use rollator at discharge; this item is new and paid for by insurance.  Contacted Shelter [phone] per Iqra who states they are full at this time/unable to hold a bed and unsure if any other shelters will have a bed.  Discussed pt being able to receive home health nursing services at their shelter and Iqra says yes.  Pt has dressing changes to left hand, right and left heel, back of left calf, coccyx and left buttock.  Shelter may have a bed on 12-27-19 if pt is discharged on this date.  Shelter worker will need to talk to pt before she leaves if bed is available at discharge.  Daughter can transport pt to shelter at discharge per Iqra.  Reviewed this information with pt.   Patient/Family in Agreement with Plan yes

## 2019-12-23 NOTE — SIGNIFICANT NOTE
12/23/19 1415   Plan   Plan MD informed SS he does not plan to discharge pt before 12-27-19.  Spoke to Iqra at [facility] [phone] who talked to Lois, Assistance Director about letting pt sleep on an air mattress that sits 2 feet from the floor in their toy room if bed is not available at discharge.  SS to contact Shelter on 12-26-19.  RN says pt should be able to sleep on air mattress since it is not on the floor.  Reviewed this information with pt and discussed other options if bed is not available.  Pt says to contact her sister Mai about staying with her temporarily if she cannot go to shelter at discharge and her daughter Lorna.  Pt says she can sleep on daughter's recliner if needed.  Shelter is agreeable to pt to family member's home if needed until they get a bed if this is the situation when she is ready for discharge.  Spoke to sister Mai [phone] who states she is going out of town today and may not be back until next week.  Left message for daughter Lorna [phone].     Patient/Family in Agreement with Plan yes

## 2019-12-23 NOTE — PROGRESS NOTES
Occupational Therapy:    Physical Therapy: Individual: 100 minutes.    Speech Language Pathology:    Signed by: Alesia Gonzalez PTA

## 2019-12-23 NOTE — PROGRESS NOTES
Case Management  Inpatient Rehabilitation Team Conference    Conference Date/Time: 12/19/2019 8:54:04 AM    Team Conference Attendees:  MD Belem Valdes SW Jessica Bill, RN,   MONICA Berger, PT  Anika Gutierrez OT    Demographics            Age: 59Y            Gender: Female    Admission Date: 12/16/2019 8:00:28 PM  Rehabilitation Diagnosis:  debility  Comorbidities:      Plan of Care  Anticipated Discharge Date/Estimated Length of Stay: 7-10 days  Anticipated Discharge Destination: Facility setting  Medical Necessity Expected Level Rationale: fair-good  Intensity and Duration: an average of 3 hours/5 days per week  Medical Supervision and 24 Hour Rehab Nursing: x  Physical Therapy: x  PT Intensity/Duration: PT 1.5 hours per day/5 days per week  Occupational Therapy: x  OT Intensity/Duration: OT 1.5 hours per day/5 days per week  Social Work: x  Therapeutic Recreation: x  Updated (if changes indicated)    Anticipated Discharge Date/Estimated Length of Stay:   12-27-19      Discharge Plan of Care:    Based on the patient's medical and functional status, their prognosis and  expected level of functional improvement is: good      Interdisciplinary Problem/Goals/Status  Body Systems    [RN] Integumentary(Active)  Current Status(12/16/2019): FREE FROM FURTHER BREAKDOWN  Weekly Goal(01/31/2020): FREE FROM FURTHER BREAKDOWN  Discharge Goal: FREE FROM FURTHER BREAKDOWN        Mobility    [PT] Bed/Chair/Wheelchair(Active)  Current Status(12/17/2019): min A  Weekly Goal(12/24/2019): Sup  Discharge Goal: Sup    [PT] Walk(Active)  Current Status(12/17/2019): amb 300' RW CGA  Weekly Goal(12/24/2019): amb 300' RW Sup  Discharge Goal: amb 300' RW Sup        Safety    [RN] Potential for Injury(Active)  Current Status(12/16/2019): FREE FROM INJURY  Weekly Goal(01/31/2020): FREE FROM INJURY  Discharge Goal: FREE FROM INJURY        Self Care    [OT] Dressing  (Lower)(Active)  Current Status(12/17/2019): ModA  Weekly Goal(12/24/2019): Nydia  Discharge Goal: CGA    [OT] Toileting(Active)  Current Status(12/17/2019): ModA  Weekly Goal(12/24/2019): Nydia  Discharge Goal: CGA    Comments: Pt does not want to return to her home at discharge because ex-spouse  lives closeby.  Pt says family does not have any room for her to live with them.   Pt may need to go to a shelter until she can make other living arrangements.    Signed by: YURI Carlisle    Physician CoSigned By: Ravi Alcazar 12/23/2019 06:54:57

## 2019-12-23 NOTE — PROGRESS NOTES
Inpatient Rehabilitation Functional Measures Assessment and Plan of Care    Plan of Care   Self Care    Dressing (Lower) (Active)  Current Status (12/23/2019 12:00:00 AM): Nydia  Weekly Goal: CGA  Discharge Goal: CGA    Toileting (Active)  Current Status (12/23/2019 12:00:00 AM): CGA  Weekly Goal: met  Discharge Goal: CGA    Functional Measures  DENA Eating:  DENA Grooming:  DENA Bathing:  DENA Upper Body Dressing:  DENA Lower Body Dressing:  DENA Toileting:    DENA Bladder Management  Level of Assistance:  Frequency/Number of Accidents this Shift:    DENA Bowel Management  Level of Assistance:  Frequency/Number of Accidents this Shift:    DENA Bed/Chair/Wheelchair Transfer:  DENA Toilet Transfer:  DENA Tub/Shower Transfer:    Previously Documented Mode of Locomotion at Discharge:  DENA Expected Mode of Locomotion at Discharge:  DENA Walk/Wheelchair:  DENA Stairs:    DENA Comprehension:  DENA Expression:  DENA Social Interaction:  DENA Problem Solving:  DENA Memory:    Therapy Mode Minutes  Occupational Therapy:  Physical Therapy:  Speech Language Pathology:    Discharge Functional Goals:    Signed by: Melanie Mai, Occupational Therapist

## 2019-12-23 NOTE — SIGNIFICANT NOTE
12/23/19 3133   Plan   Plan Spoke to daughter Lorna [phone] who says if shelter does not have a bed when pt is discharge she can come to her home and sleep in recliner until shelter can accept her.  Daughter says she can provide transportation to shelter if pt has a bed to go to at discharge.  Will follow.   Patient/Family in Agreement with Plan yes

## 2019-12-23 NOTE — THERAPY TREATMENT NOTE
Inpatient Rehabilitation - Occupational Therapy Treatment Note    [facility] [place]     Patient Name: Gretta Giles  : 1960  MRN: [id number]    Today's Date: 2019                 Admit Date: 2019      Visit Dx:  No diagnosis found.    Patient Active Problem List   Diagnosis   • Gastroesophageal reflux disease   • Atopic rhinitis   • Chronic low back pain   • Chronic obstructive pulmonary disease (CMS/HCC)   • Chronic obstructive pulmonary disease with acute exacerbation (CMS/HCC)   • Diabetes mellitus (CMS/HCC)   • Diabetic peripheral neuropathy (CMS/HCC)   • Dyslipidemia   • Nonalcoholic fatty liver disease   • Gastrointestinal ulcer due to Helicobacter pylori   • Hypertension   • Menopausal symptom   • Mixed anxiety depressive disorder   • Seasonal allergic rhinitis   • Tobacco dependence syndrome   • Vitamin D deficiency   • Hyperlipidemia   • Anxiety and depression   • Menopause syndrome   • Allergic rhinitis   • Type 2 diabetes mellitus, controlled (CMS/HCC)   • GERD without esophagitis   • Nausea   • Debility   • Liver cirrhosis (CMS/HCC)         Therapy Treatment    IRF Treatment Summary     Row Name 19 0934             Evaluation/Treatment Time and Intent    Subjective Information  complains of;weakness;fatigue;pain  -LM      Existing Precautions/Restrictions  fall abd drain, hep c  -LM      Document Type  therapy note (daily note)  -LM      Mode of Treatment  occupational therapy  -LM      Patient/Family Observations  Participated in ADL retraining, fxl mobility, bue arom, fxl activity tolerance, gmc/fmc table top tasks, hand strengthening.  Good tolerance.  CGA with bed transfers, cga with toilet transfers, supervision with toileting.    -LM      Recorded by [LM] Melanie Mai OT      Row Name 19 0934             Cognition/Psychosocial- PT/OT    Affect/Mental Status (Cognitive)  WFL  -LM      Orientation Status (Cognition)  oriented x 4  -LM      Recorded by [LM] Melanie Mai,  OT      Row Name 12/23/19 0934             Toilet Transfer    Type (Toilet Transfer)  stand pivot/stand step  -LM      [place] Level (Toilet Transfer)  contact guard  -LM      Assistive Device (Toilet Transfer)  grab bars/safety frame;commode, 3-in-1  -LM      Recorded by [LM] Melanie Mai OT      Row Name 12/23/19 0934             Toileting Assessment/Treatment    Toileting [place] Level  contact guard assist  -LM      Assistive Device Use (Toileting)  grab bar/safety frame  -LM      Recorded by [LM] Melanie Mai OT      Row Name 12/23/19 0934             Positioning and Restraints    Post Treatment Position  wheelchair  -LM      In Wheelchair  call light within reach;encouraged to call for assist  -LM      Recorded by [LM] Melanie Mai OT        User Key  (r) = Recorded By, (t) = Taken By, (c) = Cosigned By    Initials Name Effective Dates    LM Melanie Mai OT 03/14/16 -           Wound 12/16/19 midline coccyx Pressure Injury (Active)   Dressing Appearance dry;intact 12/23/2019  8:32 AM   Closure None 12/23/2019  5:00 AM   Base non-blanchable 12/23/2019  8:32 AM   Periwound redness;pink;non-blanchable 12/23/2019  8:32 AM   Periwound Temperature warm 12/23/2019  8:32 AM   Periwound Skin Turgor soft 12/23/2019  8:32 AM   Edges irregular 12/23/2019  8:32 AM   Drainage Amount none 12/23/2019  5:00 AM   Care, Wound cleansed with;sterile normal saline;honey applied 12/23/2019  8:32 AM   Dressing Care, Wound dressing changed 12/23/2019  8:32 AM       Wound 12/16/19 2000 Right coccyx Pressure Injury (Active)   Dressing Appearance dry;intact 12/23/2019  9:00 AM   Closure None 12/23/2019  5:00 AM   Base non-blanchable 12/23/2019  9:00 AM   Periwound Temperature warm 12/23/2019  9:00 AM   Periwound Skin Turgor soft 12/23/2019  9:00 AM   Drainage Amount none 12/23/2019  5:00 AM   Care, Wound honey applied 12/23/2019  9:00 AM   Dressing Care, Wound dressing changed 12/23/2019  9:00 AM       Wound  12/16/19 2000 Left posterior heel (Active)   Dressing Appearance open to air 12/23/2019  9:00 AM   Closure None 12/22/2019  7:15 PM   Base non-blanchable 12/23/2019  9:00 AM   Periwound Temperature warm 12/23/2019  9:00 AM   Periwound Skin Turgor soft 12/23/2019  9:00 AM   Edges irregular 12/23/2019  9:00 AM   Drainage Amount none 12/22/2019  7:15 PM   Care, Wound other (see comments) 12/22/2019  7:15 PM   Dressing Care, Wound open to air 12/23/2019  9:00 AM       Wound Right posterior heel Pressure Injury (Active)   Dressing Appearance open to air 12/23/2019  9:00 AM   Closure None 12/22/2019  7:15 PM   Base red 12/23/2019  9:00 AM   Periwound Temperature warm 12/23/2019  9:00 AM   Periwound Skin Turgor soft 12/23/2019  9:00 AM   Edges irregular 12/23/2019  9:00 AM   Drainage Amount none 12/22/2019  7:15 PM   Care, Wound other (see comments) 12/22/2019  7:15 PM   Dressing Care, Wound open to air 12/23/2019  9:00 AM       Wound 12/16/19 2000 Right calf Abrasion (Active)   Dressing Appearance open to air 12/23/2019  9:00 AM   Closure None 12/22/2019  7:15 PM   Base non-blanchable 12/23/2019  9:00 AM   Drainage Amount none 12/22/2019  7:15 PM   Care, Wound other (see comments) 12/22/2019  7:15 PM   Dressing Care, Wound open to air 12/23/2019  9:00 AM       Wound 12/16/19 2000 Left upper gluteal Pressure Injury (Active)   Dressing Appearance dry;intact 12/23/2019  9:00 AM   Closure None 12/23/2019  5:00 AM   Base non-blanchable 12/23/2019  9:00 AM   Periwound Temperature warm 12/23/2019  9:00 AM   Edges rolled/closed 12/23/2019  9:00 AM   Drainage Amount none 12/23/2019  5:00 AM   Care, Wound honey applied 12/23/2019  9:00 AM   Dressing Care, Wound dressing changed 12/23/2019  9:00 AM       Wound 12/16/19 2000 Left lower gluteal Pressure Injury (Active)   Dressing Appearance dry;intact 12/23/2019  9:00 AM   Closure None 12/23/2019  5:00 AM   Base non-blanchable 12/23/2019  9:00 AM   Periwound intact 12/23/2019  9:00 AM    Periwound Temperature warm 12/23/2019  9:00 AM   Periwound Skin Turgor soft 12/23/2019  9:00 AM   Drainage Amount none 12/23/2019  5:00 AM   Care, Wound honey applied 12/23/2019  9:00 AM   Dressing Care, Wound dressing changed 12/23/2019  9:00 AM       Wound 12/16/19 2000 Bilateral back Abrasion (Active)   Dressing Appearance open to air 12/23/2019  9:00 AM   Closure None 12/22/2019  7:15 PM   Base other (see comments) 12/22/2019  7:15 PM   Drainage Amount none 12/22/2019  7:15 PM   Care, Wound other (see comments) 12/22/2019  7:15 PM   Dressing Care, Wound open to air 12/23/2019  9:00 AM         OT Recommendation and Plan                 OT IRF GOALS     Row Name 12/17/19 1429             Bathing Goal 1 (OT-IRF)    [place] Level (Bathing Goal 1, OT-IRF)  minimum assist (75% or more patient effort)  -CJ      Time Frame (Bathing Goal 1, OT-IRF)  long term goal (LTG);by discharge  -CJ         LB Dressing Goal 1 (OT-IRF)    [place] (LB Dressing Goal 1, OT-IRF)  minimum assist (75% or more patient effort)  -CJ      Time Frame (LB Dressing Goal 1, OT-IRF)  short term goal (STG)  -CJ         LB Dressing Goal 2 (OT-IRF)    [place] (LB Dressing Goal 2, OT-IRF)  contact guard assist  -CJ      Time Frame (LB Dressing Goal 2, OT-IRF)  long term goal (LTG);by discharge  -CJ         Toileting Goal 1 (OT-IRF)    [place] Level (Toileting Goal 1, OT-IRF)  minimum assist (75% or more patient effort)  -CJ      Time Frame (Toileting Goal 1, OT-IRF)  short term goal (STG)  -CJ         Toileting Goal 2 (OT-IRF)    [place] Level (Toileting Goal 2, OT-IRF)  contact guard assist  -CJ      Time Frame (Toileting Goal 2, OT-IRF)  long term goal (LTG);by discharge  -CJ        User Key  (r) = Recorded By, (t) = Taken By, (c) = Cosigned By    Initials Name Provider Type    Anika Solis OT Occupational Therapist                     Time Calculation:     Time Calculation- OT     Row Name 12/23/19 0913              Time Calculation- OT    OT Start Time  0800  -LM      OT Stop Time  0930  -LM      OT Time Calculation (min)  90 min  -LM      Total Timed Code Minutes- OT  90 minute(s)  -LM        User Key  (r) = Recorded By, (t) = Taken By, (c) = Cosigned By    Initials Name Provider Type    LM Melanie Mai, OT Occupational Therapist          Therapy Charges for Today     Code Description Service Date Service Provider Modifiers Qty    21185670150 HC OT SELF CARE/MGMT/TRAIN EA 15 MIN 12/23/2019 Melanie Mai, OT GO 2    42477970982 HC OT THER PROC EA 15 MIN 12/23/2019 Melanie Mai, OT GO 2    91577769043 HC OT THERAPEUTIC ACT EA 15 MIN 12/23/2019 Melanie Mai OT GO 2                   Melanie Mai OT  12/23/2019

## 2019-12-23 NOTE — PROGRESS NOTES
Rehabilitation Nursing  Inpatient Rehabilitation Plan of Care Note    Plan of Care  Copy from [facility]    Potential for Injury (Active)  Current Status (12/16/2019 12:00:00 AM): FREE FROM INJURY  Weekly Goal: FREE FROM INJURY  Discharge Goal: FREE FROM INJURY    Body Systems    Integumentary (Active)  Current Status (12/16/2019 12:00:00 AM): FREE FROM FURTHER BREAKDOWN  Weekly Goal: FREE FROM FURTHER BREAKDOWN  Discharge Goal: FREE FROM FURTHER BREAKDOWN    Signed by: Irma Can, Nurse

## 2019-12-23 NOTE — SIGNIFICANT NOTE
12/23/19 3818   Plan   Plan Spoke to pt about talking to Lorna who will let her stay at her home until shelter has a bed if the shelter does not have a bed or air mattress for her at discharge.  Discussed OT recommendation for BSC.  Pt says she does not want to take BSC to shelter and will let her PCP order BSC when she finds different housing.  Pt does not have preference for home health provider.  [facility] [place] [facility] at 4:00 pm; daughter can transport.  Pt says she wants to get meds filled at [facility] at discharge.  Will follow.   Patient/Family in Agreement with Plan yes

## 2019-12-23 NOTE — PROGRESS NOTES
Inpatient Rehabilitation Functional Measures Assessment    Functional Measures  DENA Eating:  DENA Grooming:  DENA Bathing:  DENA Upper Body Dressing:  DENA Lower Body Dressing:  DENA Toileting:    DENA Bladder Management  Level of Assistance:  Frequency/Number of Accidents this Shift:    DENA Bowel Management  Level of Assistance:  Frequency/Number of Accidents this Shift:    DENA Bed/Chair/Wheelchair Transfer:  DENA Toilet Transfer:  DENA Tub/Shower Transfer:    Previously Documented Mode of Locomotion at Discharge:  [facility] Expected Mode of Locomotion at Discharge:  [facility] Walk/Wheelchair:  [facility] Stairs:    [facility] Comprehension:  [facility] Expression:  [facility] Social Interaction:  [facility] Problem Solving:  DENA Memory:    Therapy Mode Minutes  Occupational Therapy: Individual: 90 minutes.  Physical Therapy:  Speech Language Pathology:    Discharge Functional Goals:    Signed by: Melanie Mai, Occupational Therapist

## 2019-12-23 NOTE — SIGNIFICANT NOTE
12/23/19 5202   Plan   Plan Spoke to pt who is still agreeable to go to [facility] at discharge.  Contacted [facility] [phone] per Iqra who states they are full at this time/unable to hold a bed and unsure if any other shelters will have a bed.  Discussed pt being able to receive home health nursing services at their shelter and Iqra says yes.  Pt has dressing changes to left hand, right and left heel, back of left calf, coccyx and left buttock.  Shelter may have a bed on 12-27-19 if pt is discharged on this date.  Shelter worker will need to talk to pt before she leaves if bed is available at discharge.  Daughter can transport pt to shelter at discharge per Iqra.  Reviewed this information with pt.   Patient/Family in Agreement with Plan yes

## 2019-12-23 NOTE — SIGNIFICANT NOTE
12/23/19 8052   Plan   Plan Spoke to pt who is still agreeable to go to [facility] at discharge.  Pt prefers to use [facility] at discharge; this item is new and paid for by insurance.  Discussed receiving home health nursing if shelter allows pt to receive services; pt is agreeable.  Contacted Shelter [phone] per Iqra who states they are full at this time/unable to hold a bed and unsure if any other shelters will have a bed.  Discussed pt being able to receive home health nursing services at their shelter and Iqra says yes.  Pt has dressing changes to left hand, right and left heel, back of left calf, coccyx and left buttock.  Shelter may have a bed on 12-27-19 if pt is discharged on this date.  Shelter worker will need to talk to pt before she leaves if bed is available at discharge.  Daughter can transport pt to shelter at discharge per Iqra.  Reviewed this information with pt.   Patient/Family in Agreement with Plan yes

## 2019-12-23 NOTE — THERAPY TREATMENT NOTE
Inpatient Rehabilitation - Physical Therapy Treatment Note  [facility]     Patient Name: Gretta Giles  : 1960  MRN: [id number]    Today's Date: 2019                 Admit Date: 2019      Visit Dx:    No diagnosis found.    Patient Active Problem List   Diagnosis   • Gastroesophageal reflux disease   • Atopic rhinitis   • Chronic low back pain   • Chronic obstructive pulmonary disease (CMS/HCC)   • Chronic obstructive pulmonary disease with acute exacerbation (CMS/HCC)   • Diabetes mellitus (CMS/HCC)   • Diabetic peripheral neuropathy (CMS/HCC)   • Dyslipidemia   • Nonalcoholic fatty liver disease   • Gastrointestinal ulcer due to Helicobacter pylori   • Hypertension   • Menopausal symptom   • Mixed anxiety depressive disorder   • Seasonal allergic rhinitis   • Tobacco dependence syndrome   • Vitamin D deficiency   • Hyperlipidemia   • Anxiety and depression   • Menopause syndrome   • Allergic rhinitis   • Type 2 diabetes mellitus, controlled (CMS/HCC)   • GERD without esophagitis   • Nausea   • Debility   • Liver cirrhosis (CMS/HCC)       Therapy Treatment    IRF Treatment Summary     Row Name 19 1427 19 0934          Evaluation/Treatment Time and Intent    Subjective Information  complains of;weakness;fatigue  -LL  complains of;weakness;fatigue;pain  -LM     Existing Precautions/Restrictions  fall ABD draining to colostomy bag, Hep C+  -LL  fall abd drain, hep c  -LM     Document Type  therapy note (daily note)  -LL  therapy note (daily note)  -LM     Mode of Treatment  individual therapy;physical therapy  -LL  occupational therapy  -LM     Patient/Family Observations  Patient agreeable to PT session this date.  -LL  Participated in ADL retraining, fxl mobility, bue arom, fxl activity tolerance, gmc/fmc table top tasks, hand strengthening.  Good tolerance.  CGA with bed transfers, cga with toilet transfers, supervision with toileting.    -LM     Recorded by [LL] Alesia Gonzalez, TANIYA  [LM] Melanie Mai OT     Row Name 12/23/19 1427 12/23/19 0934          Cognition/Psychosocial- PT/OT    Affect/Mental Status (Cognitive)  WFL  -LL  WFL  -LM     Orientation Status (Cognition)  oriented x 3  -LL  oriented x 4  -LM     Follows Commands (Cognition)  verbal cues/prompting required  -LL  --     Personal Safety Interventions  gait belt;nonskid shoes/slippers when out of bed;supervised activity  -LL  --     Recorded by [LL] Alesia Gonzalez PTA [LM] Melanie Mai OT     Row Name 12/23/19 1427             Bed Mobility Assessment/Treatment    Supine-Sit [place] (Bed Mobility)  verbal cues;nonverbal cues (demo/gesture);supervision  -LL      Assistive Device (Bed Mobility)  bed rails  -LL      Recorded by [LL] Alesia Gonzalez PTA      Row Name 12/23/19 1427             Transfer Assessment/Treatment    Transfer Assessment/Treatment  car transfer  -LL      Recorded by [LL] Alesia Gonzalez PTA      Row Name 12/23/19 1427             Bed-Chair Transfer    Bed-Chair [place] (Transfers)  contact guard;nonverbal cues (demo/gesture);verbal cues;supervision  -LL      Assistive Device (Bed-Chair Transfers)  wheelchair  -LL      Recorded by [LL] Alesia Gonzalez PTA      Row Name 12/23/19 1427             Chair-Bed Transfer    Chair-Bed [place] (Transfers)  verbal cues;nonverbal cues (demo/gesture);contact guard;supervision  -LL      Assistive Device (Chair-Bed Transfers)  wheelchair  -LL      Recorded by [LL] Alesia Gonzalez PTA      Row Name 12/23/19 1427             Sit-Stand Transfer    Sit-Stand [place] (Transfers)  verbal cues;nonverbal cues (demo/gesture);contact guard;supervision  -LL      Assistive Device (Sit-Stand Transfers)  walker, front-wheeled  -LL      Recorded by [LL] Alesia Gonzalez PTA      Row Name 12/23/19 1427             Stand-Sit Transfer    Stand-Sit [place] (Transfers)  verbal cues;nonverbal cues (demo/gesture);contact guard;supervision  -LL      Assistive  Device (Stand-Sit Transfers)  walker, front-wheeled  -LL      Recorded by [LL] Alesia Gonzalez PTA      Row Name 12/23/19 1427 12/23/19 0934          Toilet Transfer    Type (Toilet Transfer)  stand pivot/stand step  -LL  stand pivot/stand step  -LM     [place] Level (Toilet Transfer)  verbal cues;nonverbal cues (demo/gesture);contact guard;supervision  -LL  contact guard  -LM     Assistive Device (Toilet Transfer)  grab bars/safety frame;raised toilet seat  -LL  grab bars/safety frame;commode, 3-in-1  -LM     Recorded by [LL] Alesia Gonzalez PTA [LM] Melanie Mai OT     Row Name 12/23/19 1427             Car Transfer    Type (Car Transfer)  stand pivot/stand step  -LL      [place] Level (Car Transfer)  contact guard;supervision;verbal cues  -LL      Assistive Device (Car Transfer)  wheelchair  -LL      Recorded by [LL] Alesia Gonzalez PTA      Row Name 12/23/19 1427             Gait/Stairs Assessment/Training    [place] Level (Gait)  contact guard;verbal cues;nonverbal cues (demo/gesture);supervision  -LL      Assistive Device (Gait)  other (see comments) rollator  -LL      Distance in Feet (Gait)  320  -LL      Pattern (Gait)  step-through  -LL      Deviations/Abnormal Patterns (Gait)  jason decreased;gait speed decreased;stride length decreased  -LL      Recorded by [LL] Alesia Gonzalez PTA      Row Name 12/23/19 1427             Safety Issues, Functional Mobility    Impairments Affecting Function (Mobility)  balance;endurance/activity tolerance;pain;strength  -LL      Recorded by [LL] Alesia Gonzalez PTA      Row Name 12/23/19 0934             Toileting Assessment/Treatment    Toileting [place] Level  contact guard assist  -LM      Assistive Device Use (Toileting)  grab bar/safety frame  -LM      Recorded by [LM] Melanie Mai OT      Row Name 12/23/19 1427             Pain Scale: Numbers Pre/Post-Treatment    Pain Location  back and generalized  -LL      Recorded by [LL] Carlos  TANIYA Dumas      Row Name 12/23/19 1427             Pain Scale: FACES Pre/Post-Treatment    Pain: FACES Scale, Pretreatment  4-->hurts little more  -LL      Pain: FACES Scale, Post-Treatment  6-->hurts even more  -LL      Recorded by [LL] Alesia Gonzalez PTA      Row Name 12/23/19 1427             Lower Extremity Seated Therapeutic Exercise    Performed, Seated Lower Extremity (Therapeutic Exercise)  hip flexion/extension;hip abduction/adduction;ankle dorsiflexion/plantarflexion;LAQ (long arc quad), knee extension Ball squeeze, GS  -LL      Device, Seated Lower Extremity (Therapeutic Exercise)  elastic bands/tubing;small ball;free weights, cuff 2#  -LL      Sets/Reps Detail, Seated Lower Extremity (Therapeutic Exercise)  20 reps  -LL      Recorded by [LL] Alesia Gonzalez PTA      Row Name 12/23/19 1427             Lower Extremity Supine Therapeutic Exercise    Performed, Supine Lower Extremity (Therapeutic Exercise)  hip abduction/adduction;SAQ (short arc quad) over bolster;quadriceps sets;gluteal sets;heel slides Bridging  -LL      Device, Supine Lower Extremity (Therapeutic Exercise)  foam roll  -LL      Sets/Reps Detail, Supine Lower Extremity (Therapeutic Exercise)  29 reps  -LL      Recorded by [LL] Alesia Gonzalez PTA      Row Name 12/23/19 1427 12/23/19 0934          Positioning and Restraints    Pre-Treatment Position  -- WC in common area  -LL  --     Post Treatment Position  bed  -LL  wheelchair  -LM     In Bed  supine;call light within reach;encouraged to call for assist;exit alarm on;side rails up x2  -LL  --     In Wheelchair  --  call light within reach;encouraged to call for assist  -LM     Recorded by [LL] Alesia Gonzalez PTA [LM] Melanie Mai OT       User Key  (r) = Recorded By, (t) = Taken By, (c) = Cosigned By    Initials Name Effective Dates    LL Alesia Gonzalez PTA 05/02/16 -     LM Melanie Mai OT 03/14/16 -         Wound 12/16/19 midline coccyx Pressure Injury (Active)   Dressing  Appearance dry;intact 12/23/2019  8:32 AM   Closure None 12/23/2019  5:00 AM   Base non-blanchable 12/23/2019  8:32 AM   Periwound redness;pink;non-blanchable 12/23/2019  8:32 AM   Periwound Temperature warm 12/23/2019  8:32 AM   Periwound Skin Turgor soft 12/23/2019  8:32 AM   Edges irregular 12/23/2019  8:32 AM   Drainage Amount none 12/23/2019  5:00 AM   Care, Wound cleansed with;sterile normal saline;honey applied 12/23/2019  8:32 AM   Dressing Care, Wound dressing changed 12/23/2019  8:32 AM       Wound 12/16/19 2000 Right coccyx Pressure Injury (Active)   Dressing Appearance dry;intact 12/23/2019  9:00 AM   Closure None 12/23/2019  5:00 AM   Base non-blanchable 12/23/2019  9:00 AM   Periwound Temperature warm 12/23/2019  9:00 AM   Periwound Skin Turgor soft 12/23/2019  9:00 AM   Drainage Amount none 12/23/2019  5:00 AM   Care, Wound honey applied 12/23/2019  9:00 AM   Dressing Care, Wound dressing changed 12/23/2019  9:00 AM       Wound 12/16/19 2000 Left posterior heel (Active)   Dressing Appearance open to air 12/23/2019  9:00 AM   Closure None 12/22/2019  7:15 PM   Base non-blanchable 12/23/2019  9:00 AM   Periwound Temperature warm 12/23/2019  9:00 AM   Periwound Skin Turgor soft 12/23/2019  9:00 AM   Edges irregular 12/23/2019  9:00 AM   Drainage Amount none 12/22/2019  7:15 PM   Care, Wound other (see comments) 12/22/2019  7:15 PM   Dressing Care, Wound open to air 12/23/2019  9:00 AM       Wound Right posterior heel Pressure Injury (Active)   Dressing Appearance open to air 12/23/2019  9:00 AM   Closure None 12/22/2019  7:15 PM   Base red 12/23/2019  9:00 AM   Periwound Temperature warm 12/23/2019  9:00 AM   Periwound Skin Turgor soft 12/23/2019  9:00 AM   Edges irregular 12/23/2019  9:00 AM   Drainage Amount none 12/22/2019  7:15 PM   Care, Wound other (see comments) 12/22/2019  7:15 PM   Dressing Care, Wound open to air 12/23/2019  9:00 AM       Wound 12/16/19 2000 Right calf Abrasion (Active)    Dressing Appearance open to air 12/23/2019  9:00 AM   Closure None 12/22/2019  7:15 PM   Base non-blanchable 12/23/2019  9:00 AM   Drainage Amount none 12/22/2019  7:15 PM   Care, Wound other (see comments) 12/22/2019  7:15 PM   Dressing Care, Wound open to air 12/23/2019  9:00 AM       Wound 12/16/19 2000 Left upper gluteal Pressure Injury (Active)   Dressing Appearance dry;intact 12/23/2019  9:00 AM   Closure None 12/23/2019  5:00 AM   Base non-blanchable 12/23/2019  9:00 AM   Periwound Temperature warm 12/23/2019  9:00 AM   Edges rolled/closed 12/23/2019  9:00 AM   Drainage Amount none 12/23/2019  5:00 AM   Care, Wound honey applied 12/23/2019  9:00 AM   Dressing Care, Wound dressing changed 12/23/2019  9:00 AM       Wound 12/16/19 2000 Left lower gluteal Pressure Injury (Active)   Dressing Appearance dry;intact 12/23/2019  9:00 AM   Closure None 12/23/2019  5:00 AM   Base non-blanchable 12/23/2019  9:00 AM   Periwound intact 12/23/2019  9:00 AM   Periwound Temperature warm 12/23/2019  9:00 AM   Periwound Skin Turgor soft 12/23/2019  9:00 AM   Drainage Amount none 12/23/2019  5:00 AM   Care, Wound honey applied 12/23/2019  9:00 AM   Dressing Care, Wound dressing changed 12/23/2019  9:00 AM       Wound 12/16/19 2000 Bilateral back Abrasion (Active)   Dressing Appearance open to air 12/23/2019  9:00 AM   Closure None 12/22/2019  7:15 PM   Base other (see comments) 12/22/2019  7:15 PM   Drainage Amount none 12/22/2019  7:15 PM   Care, Wound other (see comments) 12/22/2019  7:15 PM   Dressing Care, Wound open to air 12/23/2019  9:00 AM           PT Recommendation and Plan                        Time Calculation:     PT Charges     Row Name 12/23/19 1436             Time Calculation    Start Time  1010  -LL      Stop Time  1150  -LL      Time Calculation (min)  100 min  -LL      PT Received On  12/23/19  -LL         Time Calculation- PT    Total Timed Code Minutes- PT  100 minute(s)  -LL        User Key  (r) =  Recorded By, (t) = Taken By, (c) = Cosigned By    Initials Name Provider Type     Alesia Gonzalez PTA Physical Therapy Assistant          Therapy Charges for Today     Code Description Service Date Service Provider Modifiers Qty    10368131237 HC PT THER PROC EA 15 MIN 12/23/2019 Alesia Gonzalez PTA GP 3    88732603575 HC GAIT TRAINING EA 15 MIN 12/23/2019 Alesia Gonzalez, TANIYA GP 1    29820546100 HC PT THERAPEUTIC ACT EA 15 MIN 12/23/2019 Alesia Gonzalez PTA GP 3                   Alesia. TANIYA Gonzalez  12/23/2019

## 2019-12-24 NOTE — SIGNIFICANT NOTE
12/24/19 0954   Plan   Plan Team conference was held this AM and discharge date is planned for 12-27-19.  She will be discharged to the [facility] in [place] with home health nursing.  She will need BSC ordered.

## 2019-12-24 NOTE — THERAPY TREATMENT NOTE
Inpatient Rehabilitation - Occupational Therapy Treatment Note    D[facility] [place]     Patient Name: Gretta Giles  : 1960  MRN: [id number]    Today's Date: 2019                 Admit Date: 2019      Visit Dx:  No diagnosis found.    Patient Active Problem List   Diagnosis   • Gastroesophageal reflux disease   • Atopic rhinitis   • Chronic low back pain   • Chronic obstructive pulmonary disease (CMS/HCC)   • Chronic obstructive pulmonary disease with acute exacerbation (CMS/HCC)   • Diabetes mellitus (CMS/HCC)   • Diabetic peripheral neuropathy (CMS/HCC)   • Dyslipidemia   • Nonalcoholic fatty liver disease   • Gastrointestinal ulcer due to Helicobacter pylori   • Hypertension   • Menopausal symptom   • Mixed anxiety depressive disorder   • Seasonal allergic rhinitis   • Tobacco dependence syndrome   • Vitamin D deficiency   • Hyperlipidemia   • Anxiety and depression   • Menopause syndrome   • Allergic rhinitis   • Type 2 diabetes mellitus, controlled (CMS/HCC)   • GERD without esophagitis   • Nausea   • Debility   • Liver cirrhosis (CMS/HCC)         Therapy Treatment    IRF Treatment Summary     Row Name 19 1142 19 1018          Evaluation/Treatment Time and Intent    Subjective Information  complains of;pain Pain in back; Did not rate; RN aware and treating as necessa  -  complains of;nausea/vomiting;dizziness;weakness;fatigue  -RF     Existing Precautions/Restrictions  fall ABD draining to colostomy bag; Hep C+  -KH  fall ABD draining to colostomy bag, Hep C+  -RF     Document Type  therapy note (daily note)  -  progress note/recertification;therapy note (daily note)  -RF     Mode of Treatment  occupational therapy;individual therapy  -KH  individual therapy;physical therapy  -RF     Patient/Family Observations  Patient and RN agreeable to OT treatment  -  Patient demonstrates poor tolerance to treatment this date as she c/o nausea/vomiting and dizziness with activity. MONICA Govea  aware. Pt requested to return to bed due to symptoms.   -RF     Start Time (Evaluation/Treatment)  0745  -KH  --     Stop Time (Evaluation/Treatment)  0915  -KH  --     Recorded by [KH] Sarah Villatoro OT [RF] Bhumika Barnes, TANIYA     Row Name 12/24/19 1142 12/24/19 1018          Cognition/Psychosocial- PT/OT    Affect/Mental Status (Cognitive)  WFL  -KH  WFL  -RF     Orientation Status (Cognition)  oriented x 3  -KH  oriented x 3  -RF     Follows Commands (Cognition)  follows one step commands;WFL  -KH  verbal cues/prompting required  -RF     Personal Safety Interventions  fall prevention program maintained;gait belt;nonskid shoes/slippers when out of bed;supervised activity  -KH  --     Recorded by [KH] Sarah Villatoro OT [RF] Bhuimka Barnes, PTA     Row Name 12/24/19 1018             Bed Mobility Assessment/Treatment    Supine-Sit Blue (Bed Mobility)  verbal cues;nonverbal cues (demo/gesture);supervision  -RF      Assistive Device (Bed Mobility)  bed rails  -RF      Recorded by [RF] Bhumika Barnes, TANIYA      Row Name 12/24/19 1018             Transfer Assessment/Treatment    Transfer Assessment/Treatment  car transfer  -RF      Recorded by [RF] Bhumika Barnes, TANIYA      Row Name 12/24/19 1018             Bed-Chair Transfer    Bed-Chair Blue (Transfers)  contact guard;nonverbal cues (demo/gesture);verbal cues;supervision  -RF      Assistive Device (Bed-Chair Transfers)  wheelchair  -RF      Recorded by [RF] Bhumika Barnes, TANIYA      Row Name 12/24/19 1018             Chair-Bed Transfer    Chair-Bed Blue (Transfers)  verbal cues;nonverbal cues (demo/gesture);contact guard;supervision  -RF      Assistive Device (Chair-Bed Transfers)  wheelchair  -RF      Recorded by [RF] Bhumika Barnes, TANIYA      Row Name 12/24/19 1018             Sit-Stand Transfer    Sit-Stand Blue (Transfers)  verbal cues;nonverbal cues (demo/gesture);contact guard;supervision   -RF      Assistive Device (Sit-Stand Transfers)  walker, front-wheeled  -RF      Recorded by [RF] Bhumika Barnes, PTA      Row Name 12/24/19 1018             Stand-Sit Transfer    Stand-Sit [place] (Transfers)  verbal cues;nonverbal cues (demo/gesture);contact guard;supervision  -RF      Assistive Device (Stand-Sit Transfers)  walker, front-wheeled  -RF      Recorded by [RF] Bhumika Barnes, PTA      Row Name 12/24/19 1018             Toilet Transfer    Type (Toilet Transfer)  stand pivot/stand step  -RF      [place] Level (Toilet Transfer)  verbal cues;nonverbal cues (demo/gesture);contact guard;supervision  -RF      Assistive Device (Toilet Transfer)  grab bars/safety frame;raised toilet seat  -RF      Recorded by [RF] Bhumika Barnes, PTA      Row Name 12/24/19 1018             Car Transfer    Type (Car Transfer)  stand pivot/stand step  -RF      [place] Level (Car Transfer)  contact guard;supervision;verbal cues  -RF      Assistive Device (Car Transfer)  wheelchair  -RF      Recorded by [RF] Bhumika Barnes, PTA      Row Name 12/24/19 1018             Gait/Stairs Assessment/Training    [place] Level (Gait)  contact guard;verbal cues;nonverbal cues (demo/gesture);supervision  -RF      Assistive Device (Gait)  other (see comments) rollator  -RF      Distance in Feet (Gait)  150, 80  -RF      Pattern (Gait)  step-through  -RF      Deviations/Abnormal Patterns (Gait)  jason decreased;gait speed decreased;stride length decreased  -RF      Comment (Gait/Stairs)  Pt c/o dizziness with ambulation; BP WNL.   -RF      Recorded by [RF] Bhumika Barnes, TANIYA      Row Name 12/24/19 1018             Safety Issues, Functional Mobility    Impairments Affecting Function (Mobility)  balance;endurance/activity tolerance;pain;strength  -RF      Recorded by [RF] Bhumika Barnes, TANIYA      Row Name 12/24/19 1018             Pain Scale: Numbers Pre/Post-Treatment    Pain Location  back and generalized   -RF      Recorded by [RF] Bhumika Barnes, [facility]      Row Name 12/24/19 1018             Pain Scale: FACES Pre/Post-Treatment    Pain: FACES Scale, Pretreatment  4-->hurts little more  -RF      Pain: FACES Scale, Post-Treatment  6-->hurts even more  -RF      Recorded by [RF] Bhumika Barnes, [facility]      Row Name 12/24/19 1142             Upper Extremity Seated Therapeutic Exercise    Performed, Seated Upper Extremity (Therapeutic Exercise)  shoulder flexion/extension;scapular protraction/retraction;elbow flexion/extension;wrist flexion/extension;digit flexion/extension  -      Device, Seated Upper Extremity (Therapeutic Exercise)  restorator/arm bike 2 Min x 2 Reps, 0 Resistance  -      Exercise Type, Seated Upper Extremity (Therapeutic Exercise)  AROM (active range of motion) BUE TherEx/Act, Bilateral Coordination, BUE GMC/FMC  -      Expected Outcomes, Seated Upper Extremity (Therapeutic Exercise)  improve functional tolerance, self-care activity;improve performance, BADLs;improve performance, reaching/manipulating objects;improve performance, fine motor coordination skills  -      Recorded by [KH] Sarah Villatoro OT      Row Name 12/24/19 1018             Lower Extremity Supine Therapeutic Exercise    Performed, Supine Lower Extremity (Therapeutic Exercise)  hip flexion/extension;hip abduction/adduction;hip external/internal rotation;knee flexion/extension;ankle dorsiflexion/plantarflexion;SAQ (short arc quad) over bolster;quadriceps sets;gluteal sets;ankle pumps;heel slides;bridging (bilateral w/bolster);other (see comments) hooklying ABD with GTB, hooklying ball sqz  -RF      Device, Supine Lower Extremity (Therapeutic Exercise)  elastic bands/tubing;small ball;foam roll  -RF      Exercise Type, Supine Lower Extremity (Therapeutic Exercise)  AROM (active range of motion);eccentric contraction;isotonic contraction, concentric  -RF      Expected Outcomes, Supine Lower Extremity (Therapeutic  Exercise)  improve functional tolerance, community activity;improve functional tolerance, household activity;improve functional tolerance, self-care activity;improve performance, gait skills;strengthen normal movement patterns;strengthen, facilitate independent active range of motion  -RF      Sets/Reps Detail, Supine Lower Extremity (Therapeutic Exercise)  20  -RF      Recorded by [RF] Bhumika Barnes PTA      Row Name 12/24/19 1018             Vital Signs    Intra Systolic BP Rehab  118  -RF      Intra Treatment Diastolic BP  71  -RF      Recorded by [RF] Bhumika Barnes PTA      Row Name 12/24/19 1142 12/24/19 1018          Positioning and Restraints    Pre-Treatment Position  sitting in chair/recliner  -  sitting in chair/recliner  -RF     Post Treatment Position  wheelchair  -  bed  -RF     In Bed  --  supine;call light within reach;encouraged to call for assist  -RF     In Wheelchair  notified nsg;sitting;with PT  -  --     Recorded by [KH] Sarah Villatoro OT [RF] Bhumika Barnes PTA     Row Name 12/24/19 1142             Daily Summary of Progress (OT)    Functional Goal Overall Progress: Occupational Therapy  progressing toward functional goals as expected  -      Recommendations: Occupational Therapy  -- Continue OT POC  -KH      Recorded by [KH] Sarah Villatoro OT        User Key  (r) = Recorded By, (t) = Taken By, (c) = Cosigned By    Initials Name Effective Dates    RF Bhumika Barnes PTA 03/07/18 -     Sarah Mittal OT 04/17/18 -           Wound 12/16/19 midline coccyx Pressure Injury (Active)   Dressing Appearance intact;dry 12/24/2019  8:59 AM   Closure None 12/24/2019  4:07 AM   Base non-blanchable 12/24/2019  8:59 AM   Periwound redness 12/24/2019  8:59 AM   Periwound Temperature warm 12/24/2019  8:59 AM   Periwound Skin Turgor soft 12/24/2019  8:59 AM   Edges irregular 12/24/2019  8:59 AM   Drainage Amount none 12/24/2019  4:07 AM   Care,  Wound cleansed with;sterile half normal saline 12/24/2019  8:59 AM   Dressing Care, Wound dressing changed 12/24/2019  8:59 AM       Wound 12/16/19 2000 Right coccyx Pressure Injury (Active)   Dressing Appearance dry;intact 12/24/2019  8:59 AM   Closure None 12/24/2019  4:07 AM   Base non-blanchable 12/24/2019  8:59 AM   Periwound redness 12/24/2019  8:59 AM   Periwound Temperature warm 12/24/2019  8:59 AM   Periwound Skin Turgor soft 12/24/2019  8:59 AM   Drainage Amount none 12/24/2019  4:07 AM   Care, Wound cleansed with;sterile half normal saline;honey applied 12/24/2019  8:59 AM   Dressing Care, Wound dressing changed 12/24/2019  8:59 AM       Wound 12/16/19 2000 Left posterior heel (Active)   Dressing Appearance open to air 12/24/2019  8:59 AM   Closure None 12/23/2019  7:10 PM   Base non-blanchable 12/24/2019  8:59 AM   Periwound Temperature warm 12/24/2019  8:59 AM   Periwound Skin Turgor soft 12/24/2019  8:59 AM   Edges irregular 12/24/2019  8:59 AM   Drainage Amount none 12/23/2019  7:10 PM   Care, Wound other (see comments) 12/23/2019  7:10 PM   Dressing Care, Wound open to air 12/24/2019  8:59 AM       Wound Right posterior heel Pressure Injury (Active)   Dressing Appearance open to air 12/24/2019  8:59 AM   Closure None 12/23/2019  7:10 PM   Base red 12/24/2019  8:59 AM   Periwound intact 12/24/2019  8:59 AM   Periwound Temperature warm 12/24/2019  8:59 AM   Periwound Skin Turgor soft 12/24/2019  8:59 AM   Edges irregular 12/24/2019  8:59 AM   Drainage Amount none 12/23/2019  7:10 PM   Care, Wound other (see comments) 12/23/2019  7:10 PM   Dressing Care, Wound open to air 12/24/2019  8:59 AM       Wound 12/16/19 2000 Right calf Abrasion (Active)   Dressing Appearance open to air 12/24/2019  8:59 AM   Closure None 12/23/2019  7:10 PM   Base non-blanchable 12/24/2019  8:59 AM   Periwound intact 12/24/2019  8:59 AM   Drainage Amount none 12/23/2019  7:10 PM   Care, Wound other (see comments) 12/23/2019   7:10 PM   Dressing Care, Wound open to air 12/24/2019  8:59 AM       Wound 12/16/19 2000 Left upper gluteal Pressure Injury (Active)   Dressing Appearance dry;intact 12/24/2019  8:59 AM   Closure None 12/24/2019  4:07 AM   Base non-blanchable 12/24/2019  8:59 AM   Periwound redness 12/24/2019  8:59 AM   Periwound Temperature warm 12/24/2019  8:59 AM   Periwound Skin Turgor soft 12/24/2019  8:59 AM   Edges irregular 12/24/2019  8:59 AM   Drainage Amount none 12/24/2019  4:07 AM   Care, Wound honey applied 12/24/2019  8:59 AM   Dressing Care, Wound dressing changed 12/24/2019  8:59 AM       Wound 12/16/19 2000 Left lower gluteal Pressure Injury (Active)   Dressing Appearance dry;intact 12/24/2019  8:59 AM   Closure None 12/24/2019  4:07 AM   Base non-blanchable 12/24/2019  8:59 AM   Periwound redness 12/24/2019  8:59 AM   Periwound Temperature warm 12/24/2019  8:59 AM   Periwound Skin Turgor soft 12/24/2019  8:59 AM   Edges irregular 12/24/2019  8:59 AM   Drainage Amount none 12/24/2019  4:07 AM   Care, Wound cleansed with;sterile normal saline;honey applied 12/24/2019  8:59 AM   Dressing Care, Wound dressing changed 12/24/2019  4:07 AM       Wound 12/16/19 2000 Bilateral back Abrasion (Active)   Dressing Appearance open to air 12/24/2019  8:59 AM   Closure None 12/23/2019  7:10 PM   Base other (see comments) 12/23/2019  7:10 PM   Drainage Amount none 12/23/2019  7:10 PM   Care, Wound other (see comments) 12/23/2019  7:10 PM   Dressing Care, Wound open to air 12/24/2019  8:59 AM       Wound 12/23/19 1000 Left hand Skin Tear (Active)   Dressing Appearance intact;dry 12/24/2019  8:59 AM   Closure WILLIAN 12/23/2019  7:10 PM   Base dressing in place, unable to visualize 12/24/2019  8:59 AM   Periwound Temperature warm 12/23/2019  5:15 PM   Periwound Skin Turgor soft 12/23/2019  5:15 PM   Edges irregular 12/23/2019  5:15 PM   Wound Length (cm) 1 cm 12/23/2019  5:15 PM   Wound Width (cm) 1 cm 12/23/2019  5:15 PM         OT  Recommendation and Plan            Daily Summary of Progress (OT)  Functional Goal Overall Progress: Occupational Therapy: progressing toward functional goals as expected  Recommendations: Occupational Therapy: (Continue OT POC)    OT IRF GOALS     Row Name 12/17/19 1429             Bathing Goal 1 (OT-IRF)    [place] Level (Bathing Goal 1, OT-IRF)  minimum assist (75% or more patient effort)  -CJ      Time Frame (Bathing Goal 1, OT-IRF)  long term goal (LTG);by discharge  -CJ         LB Dressing Goal 1 (OT-IRF)    [place] (LB Dressing Goal 1, OT-IRF)  minimum assist (75% or more patient effort)  -CJ      Time Frame (LB Dressing Goal 1, OT-IRF)  short term goal (STG)  -CJ         LB Dressing Goal 2 (OT-IRF)    [place] (LB Dressing Goal 2, OT-IRF)  contact guard assist  -CJ      Time Frame (LB Dressing Goal 2, OT-IRF)  long term goal (LTG);by discharge  -CJ         Toileting Goal 1 (OT-IRF)    [place] Level (Toileting Goal 1, OT-IRF)  minimum assist (75% or more patient effort)  -CJ      Time Frame (Toileting Goal 1, OT-IRF)  short term goal (STG)  -CJ         Toileting Goal 2 (OT-IRF)    [place] Level (Toileting Goal 2, OT-IRF)  contact guard assist  -CJ      Time Frame (Toileting Goal 2, OT-IRF)  long term goal (LTG);by discharge  -CJ        User Key  (r) = Recorded By, (t) = Taken By, (c) = Cosigned By    Initials Name Provider Type    Anika Solis OT Occupational Therapist                     Time Calculation:     Time Calculation- OT     Row Name 12/24/19 1149             Time Calculation- OT    OT Start Time  0745  -KH      OT Stop Time  0915  -      OT Time Calculation (min)  90 min  -KH      Total Timed Code Minutes- OT  90 minute(s)  -KH      OT Non-Billable Time (min)  15 min  -        User Key  (r) = Recorded By, (t) = Taken By, (c) = Cosigned By    Initials Name Provider Type    Sarah Mittal, OT Occupational Therapist          Therapy Charges for Today      Code Description Service Date Service Provider Modifiers Qty    39084543640  OT THERAPEUTIC ACT EA 15 MIN 12/24/2019 Sarah Villatoro, OT GO 3    [id number]  OT THER PROC EA 15 MIN 12/24/2019 Sarah Villatoro, OT GO 3                   Sarah Villatoro, OT  12/24/2019

## 2019-12-24 NOTE — PROGRESS NOTES
Rehabilitation Nursing  Inpatient Rehabilitation Plan of Care Note    Plan of Care  Copy from POCSafety    Potential for Injury (Active)  Current Status (12/16/2019 12:00:00 AM): FREE FROM INJURY  Weekly Goal: FREE FROM INJURY  Discharge Goal: FREE FROM INJURY    Body Systems    Integumentary (Active)  Current Status (12/16/2019 12:00:00 AM): FREE FROM FURTHER BREAKDOWN  Weekly Goal: FREE FROM FURTHER BREAKDOWN  Discharge Goal: FREE FROM FURTHER BREAKDOWN    Signed by: Xuan Mclaughlin, Nurse

## 2019-12-24 NOTE — PROGRESS NOTES
Occupational Therapy: Individual: 90 minutes.    Physical Therapy:    Speech Language Pathology:    Signed by: Sarah Villatoro OT

## 2019-12-24 NOTE — PROGRESS NOTES
Occupational Therapy:    Physical Therapy: Individual: 55 minutes.    Speech Language Pathology:    Signed by: Bhumika Barnes PTA

## 2019-12-24 NOTE — THERAPY TREATMENT NOTE
Inpatient Rehabilitation - Physical Therapy Progress Note   [facility]     Patient Name: Gretta Giles  : 1960  MRN: [id number]    Today's Date: 2019                 Admit Date: 2019      Visit Dx:    No diagnosis found.    Patient Active Problem List   Diagnosis   • Gastroesophageal reflux disease   • Atopic rhinitis   • Chronic low back pain   • Chronic obstructive pulmonary disease (CMS/HCC)   • Chronic obstructive pulmonary disease with acute exacerbation (CMS/HCC)   • Diabetes mellitus (CMS/HCC)   • Diabetic peripheral neuropathy (CMS/HCC)   • Dyslipidemia   • Nonalcoholic fatty liver disease   • Gastrointestinal ulcer due to Helicobacter pylori   • Hypertension   • Menopausal symptom   • Mixed anxiety depressive disorder   • Seasonal allergic rhinitis   • Tobacco dependence syndrome   • Vitamin D deficiency   • Hyperlipidemia   • Anxiety and depression   • Menopause syndrome   • Allergic rhinitis   • Type 2 diabetes mellitus, controlled (CMS/HCC)   • GERD without esophagitis   • Nausea   • Debility   • Liver cirrhosis (CMS/HCC)       Therapy Treatment    IRF Treatment Summary     Row Name 19 1018             Evaluation/Treatment Time and Intent    Subjective Information  complains of;nausea/vomiting;dizziness;weakness;fatigue  -RF      Existing Precautions/Restrictions  fall ABD draining to colostomy bag, Hep C+  -RF      Document Type  progress note/recertification;therapy note (daily note)  -RF      Mode of Treatment  individual therapy;physical therapy  -RF      Patient/Family Observations  Patient demonstrates poor tolerance to treatment this date as she c/o nausea/vomiting and dizziness with activity. RN Xuan aware. Pt requested to return to bed due to symptoms.   -RF      Recorded by [RF] Bhumika Barnes PTA      Row Name 19 1018             Cognition/Psychosocial- PT/OT    Affect/Mental Status (Cognitive)  WFL  -RF      Orientation Status (Cognition)  oriented x 3   -RF      Follows Commands (Cognition)  verbal cues/prompting required  -RF      Recorded by [RF] Bhumika Barnes, PTA      Row Name 12/24/19 1018             Bed Mobility Assessment/Treatment    Supine-Sit [place] (Bed Mobility)  verbal cues;nonverbal cues (demo/gesture);supervision  -RF      Assistive Device (Bed Mobility)  bed rails  -RF      Recorded by [RF] Bhumika Barnes, PTA      Row Name 12/24/19 1018             Transfer Assessment/Treatment    Transfer Assessment/Treatment  car transfer  -RF      Recorded by [RF] Bhumika Barnes, PTA      Row Name 12/24/19 1018             Bed-Chair Transfer    Bed-Chair [place] (Transfers)  contact guard;nonverbal cues (demo/gesture);verbal cues;supervision  -RF      Assistive Device (Bed-Chair Transfers)  wheelchair  -RF      Recorded by [RF] Bhumika Barnes, PTA      Row Name 12/24/19 1018             Chair-Bed Transfer    Chair-Bed [place] (Transfers)  verbal cues;nonverbal cues (demo/gesture);contact guard;supervision  -RF      Assistive Device (Chair-Bed Transfers)  wheelchair  -RF      Recorded by [RF] Bhumika Barnes, PTA      Row Name 12/24/19 1018             Sit-Stand Transfer    Sit-Stand [place] (Transfers)  verbal cues;nonverbal cues (demo/gesture);contact guard;supervision  -RF      Assistive Device (Sit-Stand Transfers)  walker, front-wheeled  -RF      Recorded by [RF] Bhumika Barnes, PTA      Row Name 12/24/19 1018             Stand-Sit Transfer    Stand-Sit [place] (Transfers)  verbal cues;nonverbal cues (demo/gesture);contact guard;supervision  -RF      Assistive Device (Stand-Sit Transfers)  walker, front-wheeled  -RF      Recorded by [RF] Bhumika Barnes, PTA      Row Name 12/24/19 1018             Toilet Transfer    Type (Toilet Transfer)  stand pivot/stand step  -RF      [place] Level (Toilet Transfer)  verbal cues;nonverbal cues (demo/gesture);contact guard;supervision  -RF      Assistive Device (Toilet  Transfer)  grab bars/safety frame;raised toilet seat  -RF      Recorded by [RF] Bhumika Barnes, [employer]      Row Name 12/24/19 1018             Car Transfer    Type (Car Transfer)  stand pivot/stand step  -RF      [place] Level (Car Transfer)  contact guard;supervision;verbal cues  -RF      Assistive Device (Car Transfer)  wheelchair  -RF      Recorded by [RF] Bhumika Barnes, [employer]      Row Name 12/24/19 1018             Gait/Stairs Assessment/Training    [place] Level (Gait)  contact guard;verbal cues;nonverbal cues (demo/gesture);supervision  -RF      Assistive Device (Gait)  other (see comments) rollator  -RF      Distance in Feet (Gait)  150, 80  -RF      Pattern (Gait)  step-through  -RF      Deviations/Abnormal Patterns (Gait)  jason decreased;gait speed decreased;stride length decreased  -RF      Comment (Gait/Stairs)  Pt c/o dizziness with ambulation; BP WNL.   -RF      Recorded by [RF] Bhumika Barnes, [employer]      Row Name 12/24/19 1018             Safety Issues, Functional Mobility    Impairments Affecting Function (Mobility)  balance;endurance/activity tolerance;pain;strength  -RF      Recorded by [RF] Bhumika Barnes, [employer]      Row Name 12/24/19 1018             Pain Scale: Numbers Pre/Post-Treatment    Pain Location  back and generalized  -RF      Recorded by [RF] Bhumika Barnes, [employer]      Row Name 12/24/19 1018             Pain Scale: FACES Pre/Post-Treatment    Pain: FACES Scale, Pretreatment  4-->hurts little more  -RF      Pain: FACES Scale, Post-Treatment  6-->hurts even more  -RF      Recorded by [RF] Bhumika Barnes, [employer]      Row Name 12/24/19 1018             Lower Extremity Supine Therapeutic Exercise    Performed, Supine Lower Extremity (Therapeutic Exercise)  hip flexion/extension;hip abduction/adduction;hip external/internal rotation;knee flexion/extension;ankle dorsiflexion/plantarflexion;SAQ (short arc quad) over bolster;quadriceps sets;gluteal sets;ankle pumps;heel  slides;bridging (bilateral w/bolster);other (see comments) hooklying ABD with GTB, hooklying ball sqz  -RF      Device, Supine Lower Extremity (Therapeutic Exercise)  elastic bands/tubing;small ball;foam roll  -RF      Exercise Type, Supine Lower Extremity (Therapeutic Exercise)  AROM (active range of motion);eccentric contraction;isotonic contraction, concentric  -RF      Expected Outcomes, Supine Lower Extremity (Therapeutic Exercise)  improve functional tolerance, community activity;improve functional tolerance, household activity;improve functional tolerance, self-care activity;improve performance, gait skills;strengthen normal movement patterns;strengthen, facilitate independent active range of motion  -RF      Sets/Reps Detail, Supine Lower Extremity (Therapeutic Exercise)  20  -RF      Recorded by [RF] Bhumika Barnes PTA      Row Name 12/24/19 1018             Vital Signs    Intra Systolic BP Rehab  118  -RF      Intra Treatment Diastolic BP  71  -RF      Recorded by [RF] Bhumika Barnes PTA      Row Name 12/24/19 1018             Positioning and Restraints    Pre-Treatment Position  sitting in chair/recliner  -RF      Post Treatment Position  bed  -RF      In Bed  supine;call light within reach;encouraged to call for assist  -RF      Recorded by [RF] Bhumika Barnes PTA        User Key  (r) = Recorded By, (t) = Taken By, (c) = Cosigned By    Initials Name Effective Dates    RF Bhumika Barnes PTA 03/07/18 -         Wound 12/16/19 midline coccyx Pressure Injury (Active)   Dressing Appearance intact;dry 12/24/2019  8:59 AM   Closure None 12/24/2019  4:07 AM   Base non-blanchable 12/24/2019  8:59 AM   Periwound redness 12/24/2019  8:59 AM   Periwound Temperature warm 12/24/2019  8:59 AM   Periwound Skin Turgor soft 12/24/2019  8:59 AM   Edges irregular 12/24/2019  8:59 AM   Drainage Amount none 12/24/2019  4:07 AM   Care, Wound cleansed with;sterile half normal saline 12/24/2019  8:59 AM   Dressing  Care, Wound dressing changed 12/24/2019  8:59 AM       Wound 12/16/19 2000 Right coccyx Pressure Injury (Active)   Dressing Appearance dry;intact 12/24/2019  8:59 AM   Closure None 12/24/2019  4:07 AM   Base non-blanchable 12/24/2019  8:59 AM   Periwound redness 12/24/2019  8:59 AM   Periwound Temperature warm 12/24/2019  8:59 AM   Periwound Skin Turgor soft 12/24/2019  8:59 AM   Drainage Amount none 12/24/2019  4:07 AM   Care, Wound cleansed with;sterile half normal saline;honey applied 12/24/2019  8:59 AM   Dressing Care, Wound dressing changed 12/24/2019  8:59 AM       Wound 12/16/19 2000 Left posterior heel (Active)   Dressing Appearance open to air 12/24/2019  8:59 AM   Closure None 12/23/2019  7:10 PM   Base non-blanchable 12/24/2019  8:59 AM   Periwound Temperature warm 12/24/2019  8:59 AM   Periwound Skin Turgor soft 12/24/2019  8:59 AM   Edges irregular 12/24/2019  8:59 AM   Drainage Amount none 12/23/2019  7:10 PM   Care, Wound other (see comments) 12/23/2019  7:10 PM   Dressing Care, Wound open to air 12/24/2019  8:59 AM       Wound Right posterior heel Pressure Injury (Active)   Dressing Appearance open to air 12/24/2019  8:59 AM   Closure None 12/23/2019  7:10 PM   Base red 12/24/2019  8:59 AM   Periwound intact 12/24/2019  8:59 AM   Periwound Temperature warm 12/24/2019  8:59 AM   Periwound Skin Turgor soft 12/24/2019  8:59 AM   Edges irregular 12/24/2019  8:59 AM   Drainage Amount none 12/23/2019  7:10 PM   Care, Wound other (see comments) 12/23/2019  7:10 PM   Dressing Care, Wound open to air 12/24/2019  8:59 AM       Wound 12/16/19 2000 Right calf Abrasion (Active)   Dressing Appearance open to air 12/24/2019  8:59 AM   Closure None 12/23/2019  7:10 PM   Base non-blanchable 12/24/2019  8:59 AM   Periwound intact 12/24/2019  8:59 AM   Drainage Amount none 12/23/2019  7:10 PM   Care, Wound other (see comments) 12/23/2019  7:10 PM   Dressing Care, Wound open to air 12/24/2019  8:59 AM       Wound  12/16/19 2000 Left upper gluteal Pressure Injury (Active)   Dressing Appearance dry;intact 12/24/2019  8:59 AM   Closure None 12/24/2019  4:07 AM   Base non-blanchable 12/24/2019  8:59 AM   Periwound redness 12/24/2019  8:59 AM   Periwound Temperature warm 12/24/2019  8:59 AM   Periwound Skin Turgor soft 12/24/2019  8:59 AM   Edges irregular 12/24/2019  8:59 AM   Drainage Amount none 12/24/2019  4:07 AM   Care, Wound honey applied 12/24/2019  8:59 AM   Dressing Care, Wound dressing changed 12/24/2019  8:59 AM       Wound 12/16/19 2000 Left lower gluteal Pressure Injury (Active)   Dressing Appearance dry;intact 12/24/2019  8:59 AM   Closure None 12/24/2019  4:07 AM   Base non-blanchable 12/24/2019  8:59 AM   Periwound redness 12/24/2019  8:59 AM   Periwound Temperature warm 12/24/2019  8:59 AM   Periwound Skin Turgor soft 12/24/2019  8:59 AM   Edges irregular 12/24/2019  8:59 AM   Drainage Amount none 12/24/2019  4:07 AM   Care, Wound cleansed with;sterile normal saline;honey applied 12/24/2019  8:59 AM   Dressing Care, Wound dressing changed 12/24/2019  4:07 AM       Wound 12/16/19 2000 Bilateral back Abrasion (Active)   Dressing Appearance open to air 12/24/2019  8:59 AM   Closure None 12/23/2019  7:10 PM   Base other (see comments) 12/23/2019  7:10 PM   Drainage Amount none 12/23/2019  7:10 PM   Care, Wound other (see comments) 12/23/2019  7:10 PM   Dressing Care, Wound open to air 12/24/2019  8:59 AM       Wound 12/23/19 1000 Left hand Skin Tear (Active)   Dressing Appearance intact;dry 12/24/2019  8:59 AM   Closure [place] 12/23/2019  7:10 PM   Base dressing in place, unable to visualize 12/24/2019  8:59 AM   Periwound Temperature warm 12/23/2019  5:15 PM   Periwound Skin Turgor soft 12/23/2019  5:15 PM   Edges irregular 12/23/2019  5:15 PM   Wound Length (cm) 1 cm 12/23/2019  5:15 PM   Wound Width (cm) 1 cm 12/23/2019  5:15 PM           PT Recommendation and Plan               PT IRF GOALS     Row Name 12/24/19  1000             Bed Mobility Goal 1 (PT-IRF)    Progress/Outcomes (Bed Mobility Goal 1, PT-IRF)  goal ongoing  -RF         Transfer Goal 1 (PT-IRF)    Progress/Outcomes (Transfer Goal 1, PT-IRF)  goal met  -RF         Gait/Walking Locomotion Goal 1 (PT-IRF)    Progress/Outcomes (Gait/Walking Locomotion Goal 1, PT-IRF)  goal met  -RF        User Key  (r) = Recorded By, (t) = Taken By, (c) = Cosigned By    Initials Name Provider Type    Bhumika Perdomo PTA Physical Therapy Assistant               Time Calculation:     PT Charges     Row Name 12/24/19 1028             Time Calculation    Start Time  0915  -RF      Stop Time  1010  -RF      Time Calculation (min)  55 min  -RF      PT Received On  12/24/19  -RF      PT - Next Appointment  12/26/19  -RF      PT Goal Re-Cert Due Date  12/31/19  -RF         Time Calculation- PT    Total Timed Code Minutes- PT  55 minute(s)  -RF        User Key  (r) = Recorded By, (t) = Taken By, (c) = Cosigned By    Initials Name Provider Type    Bhumika Perdomo PTA Physical Therapy Assistant          Therapy Charges for Today     Code Description Service Date Service Provider Modifiers Qty    64272907852 HC GAIT TRAINING EA 15 MIN 12/24/2019 Bhumika Barnes, TANIYA GP 1    72988565202 HC PT THER PROC EA 15 MIN 12/24/2019 Bhumika Barnes, PTA GP 2    97715866392 HC PT THERAPEUTIC ACT EA 15 MIN 12/24/2019 Bhumika Barnes, TANIYA GP 1                   Bhumika Barnes PTA  12/24/2019

## 2019-12-26 NOTE — SIGNIFICANT NOTE
12/26/19 0845   Plan   Plan Contacted [facility] [phone] per Mariah who says to call back in am to verify bed availability for pt and she says can call her today before 4:00 pm to complete evaluation.   reviewed this information with pt and provided phone number for Mariah.  Discussed need for home health nursing care and explained  [facility] is the only home health agency in [facility] who accepts [employer]; pt is agreeable to SS sending referral to them.  [facility]ed [facility] [phone] per Nighat with referral, discharge on 12-27-19, plans for pt to go to [facility], and orders for nursing evaluation, wound care to midline coccyx and left gluteal: clean with NS, apply therahoney and mepilex BID, right coccyx: clean with NS, apply Venolex and mepilex BID, left arm skin tear: clean with NS, apply Vaseline gauze, wrap with gauze dressing every three days; will need to be changed on 12-29-19, right calf, right and left heel: apply Venolex BID then open to air, apply mepilex PRN to left abdomen for protection.  Faxed  referral with orders, face sheet, PT/OT notes, MD progress note, H&P, and labs to  [phone].   to be contacted at discharge.     Patient/Family in Agreement with Plan yes

## 2019-12-26 NOTE — THERAPY TREATMENT NOTE
Inpatient Rehabilitation - Occupational Therapy Treatment Note    DIONISIO [place]     Patient Name: Gretta Giles  : 1960  MRN: [id number]    Today's Date: 2019                 Admit Date: 2019      Visit Dx:  No diagnosis found.    Patient Active Problem List   Diagnosis   • Gastroesophageal reflux disease   • Atopic rhinitis   • Chronic low back pain   • Chronic obstructive pulmonary disease (CMS/HCC)   • Chronic obstructive pulmonary disease with acute exacerbation (CMS/HCC)   • Diabetes mellitus (CMS/HCC)   • Diabetic peripheral neuropathy (CMS/HCC)   • Dyslipidemia   • Nonalcoholic fatty liver disease   • Gastrointestinal ulcer due to Helicobacter pylori   • Hypertension   • Menopausal symptom   • Mixed anxiety depressive disorder   • Seasonal allergic rhinitis   • Tobacco dependence syndrome   • Vitamin D deficiency   • Hyperlipidemia   • Anxiety and depression   • Menopause syndrome   • Allergic rhinitis   • Type 2 diabetes mellitus, controlled (CMS/HCC)   • GERD without esophagitis   • Nausea   • Debility   • Liver cirrhosis (CMS/HCC)         Therapy Treatment    IRF Treatment Summary     Row Name 19 1413 19 1149          Evaluation/Treatment Time and Intent    Subjective Information  complains of;nausea/vomiting c/o nausea in am.  RN notified.  Agreeable to therapy.  -CJ  no complaints  -LL     Existing Precautions/Restrictions  fall ABD draining to ostomy bag, Hep C, decreased skin integrity  -CJ  fall ABD draining to colostomy bag, Hep C+  -LL     Document Type  therapy note (daily note)  -CJ  therapy note (daily note)  -LL     Mode of Treatment  occupational therapy  -CJ  individual therapy;physical therapy  -LL     Patient/Family Observations  --  Patient agreeable to PT session this date.  She demontrated improve endurance with activities this date.  -LL     Recorded by [CJ] Anika Gutierrez OT [LL] Alesia Gonzalez PTA     Row Name 19 1413 19 5267           Cognition/Psychosocial- PT/OT    Affect/Mental Status (Cognitive)  WFL  -CJ  WFL  -LL     Orientation Status (Cognition)  --  oriented x 3  -LL     Follows Commands (Cognition)  verbal cues/prompting required  -CJ  verbal cues/prompting required  -LL     Personal Safety Interventions  --  gait belt;nonskid shoes/slippers when out of bed;supervised activity  -LL     Recorded by [CJ] Anika Gutierrez OT [LL] Alesia Gonzalez PTA     Row Name 12/26/19 1144             Bed Mobility Assessment/Treatment    Supine-Sit [place] (Bed Mobility)  verbal cues;nonverbal cues (demo/gesture);supervision  -      Assistive Device (Bed Mobility)  bed rails  -LL      Recorded by [LL] Alesia Gonzalez PTA      Row Name 12/26/19 1144             Transfer Assessment/Treatment    Transfer Assessment/Treatment  car transfer  -LL      Recorded by [LL] Alesia Gonzalez PTA      Row Name 12/26/19 1413 12/26/19 1144          Bed-Chair Transfer    Bed-Chair [place] (Transfers)  stand by assist;verbal cues  -  nonverbal cues (demo/gesture);verbal cues;supervision;conditional independence  -LL     Assistive Device (Bed-Chair Transfers)  wheelchair  -CJ  wheelchair  -LL     Recorded by [CJ] Anika Gutierrez OT [LL] Alesia Gonzalez PTA     Row Name 12/26/19 1144             Chair-Bed Transfer    Chair-Bed [place] (Transfers)  verbal cues;nonverbal cues (demo/gesture);supervision;conditional independence  -LL      Assistive Device (Chair-Bed Transfers)  wheelchair  -LL      Recorded by [LL] Alesia Gonzalez PTA      Row Name 12/26/19 1144             Sit-Stand Transfer    Sit-Stand [place] (Transfers)  verbal cues;nonverbal cues (demo/gesture);supervision;conditional independence  -LL      Assistive Device (Sit-Stand Transfers)  walker, front-wheeled  -LL      Recorded by [LL] Alesia Gonzalez PTA      Row Name 12/26/19 1144             Stand-Sit Transfer    Stand-Sit [place] (Transfers)  verbal cues;nonverbal cues  (demo/gesture);supervision;conditional independence  -LL      Assistive Device (Stand-Sit Transfers)  walker, front-wheeled  -LL      Recorded by [LL] Alesia Gonzalez PTA      Row Name 12/26/19 1413 12/26/19 1144          Toilet Transfer    Type (Toilet Transfer)  --  stand pivot/stand step  -LL     [place] Level (Toilet Transfer)  stand by assist;verbal cues  -CJ  verbal cues;nonverbal cues (demo/gesture);supervision;conditional independence  -LL     Assistive Device (Toilet Transfer)  grab bars/safety frame  -CJ  grab bars/safety frame;raised toilet seat  -LL     Recorded by [CJ] Anika Gutierrez OT [LL] Alesia Gonzalez PTA     Row Name 12/26/19 1144             Gait/Stairs Assessment/Training    [place] Level (Gait)  verbal cues;nonverbal cues (demo/gesture);supervision  -LL      Assistive Device (Gait)  other (see comments) rollator  -LL      Distance in Feet (Gait)  300  -LL      Pattern (Gait)  step-through  -LL      Deviations/Abnormal Patterns (Gait)  jason decreased;gait speed decreased;stride length decreased  -LL      Recorded by [LL] Alesia Gonzalez PTA      Row Name 12/26/19 1144             Safety Issues, Functional Mobility    Impairments Affecting Function (Mobility)  balance;endurance/activity tolerance;pain;strength  -LL      Recorded by [LL] Alesia Gonzalez PTA      Row Name 12/26/19 1413             Lower Body Dressing Assessment/Treatment    Lower Body Dressing [place] Level  standby assist;verbal cues  -CJ      Recorded by [CJ] Anika Gutierrez OT      Row Name 12/26/19 1413             Grooming Assessment/Treatment    Grooming [place] Level  set up  -CJ      Recorded by [CJ] Anika Gutierrez OT      Row Name 12/26/19 1413             Toileting Assessment/Treatment    Toileting [place] Level  standby assist;verbal cues  -CJ      Assistive Device Use (Toileting)  grab bar/safety frame  -CJ      Recorded by [CJ] Anika Gutierrez OT      Row Name 12/26/19 1144              Pain Scale: Numbers Pre/Post-Treatment    Pain Location  back and generalized  -LL      Recorded by [LL] Alesia Gonzalez PTA      Row Name 12/26/19 1144             Pain Scale: FACES Pre/Post-Treatment    Pain: FACES Scale, Pretreatment  4-->hurts little more  -LL      Pain: FACES Scale, Post-Treatment  6-->hurts even more  -LL      Recorded by [LL] Alesia Gonzalez PTA      Row Name 12/26/19 1413             Upper Extremity Seated Therapeutic Exercise    Device, Seated Upper Extremity (Therapeutic Exercise)  restorator/arm bike 3 mins X 3, 0 resistance  -      Exercise Type, Seated Upper Extremity (Therapeutic Exercise)  AROM (active range of motion) BUE ther ex/act, bilat coord ex, GMC/FMC, hand exerciser  -      Expected Outcomes, Seated Upper Extremity (Therapeutic Exercise)  improve functional tolerance, self-care activity;improve performance, BADLs;improve performance, transfer skills  -      Recorded by [CJ] Anika Gutierrez OT      Row Name 12/26/19 1144             Lower Extremity Seated Therapeutic Exercise    Performed, Seated Lower Extremity (Therapeutic Exercise)  hip flexion/extension;hip abduction/adduction;ankle dorsiflexion/plantarflexion;LAQ (long arc quad), knee extension Ball squeeze  -      Device, Seated Lower Extremity (Therapeutic Exercise)  elastic bands/tubing;small ball;free weights, cuff 2#  -LL      Sets/Reps Detail, Seated Lower Extremity (Therapeutic Exercise)  25 reps  -LL      Recorded by [LL] Alesia Gonzalez PTA      Row Name 12/26/19 1144             Lower Extremity Supine Therapeutic Exercise    Performed, Supine Lower Extremity (Therapeutic Exercise)  hip abduction/adduction;hip external/internal rotation;SAQ (short arc quad) over bolster;quadriceps sets;gluteal sets;ankle pumps;heel slides Bridging  -LL      Device, Supine Lower Extremity (Therapeutic Exercise)  half foam roll;free weights, cuff 2#  -LL      Sets/Reps Detail, Supine Lower Extremity  (Therapeutic Exercise)  25  -LL      Recorded by [LL] Alesia Gonzalez PTA      Row Name 12/26/19 1413 12/26/19 1144          Positioning and Restraints    Pre-Treatment Position  --  bathroom  -LL     Post Treatment Position  wheelchair  -  bed  -LL     In Bed  --  sitting EOB;call light within reach;encouraged to call for assist;exit alarm on;side rails up x2;notified nsg  -LL     In Wheelchair  sitting;call light within reach;encouraged to call for assist;with other staff in room- post am tx;  with activities staff- pm tx  -  --     Recorded by [CJ] Anika Gutierrez, OT [LL] Alesia Gonzalez PTA       User Key  (r) = Recorded By, (t) = Taken By, (c) = Cosigned By    Initials Name Effective Dates     Anika Gutierrez, OT 04/03/18 -     LL Alesia Gonzalez PTA 05/02/16 -           Wound 12/16/19 midline coccyx Pressure Injury (Active)   Dressing Appearance dry;intact 12/26/2019  7:25 AM   Closure None 12/25/2019  7:51 PM   Base non-blanchable;dry;slough;yellow 12/26/2019  7:25 AM   Periwound redness 12/26/2019  7:25 AM   Drainage Amount none 12/25/2019  7:51 PM   Care, Wound cleansed with;sterile normal saline;honey applied 12/25/2019  7:51 PM   Dressing Care, Wound dressing changed 12/25/2019  7:51 PM       Wound 12/16/19 2000 Right coccyx Pressure Injury (Active)   Dressing Appearance dry;intact 12/26/2019  7:25 AM   Closure None 12/26/2019  7:25 AM   Base non-blanchable 12/26/2019  7:25 AM   Drainage Amount none 12/25/2019  7:51 PM   Care, Wound cleansed with;sterile normal saline;honey applied 12/25/2019  7:51 PM   Dressing Care, Wound dressing changed 12/25/2019  7:51 PM       Wound 12/16/19 2000 Left posterior heel (Active)   Dressing Appearance open to air 12/26/2019  7:25 AM   Closure None 12/26/2019  7:25 AM   Base non-blanchable;blanchable 12/26/2019  7:25 AM   Drainage Amount none 12/26/2019  7:25 AM   Dressing Care, Wound open to air 12/26/2019  7:25 AM       Wound Right posterior heel Pressure  Injury (Active)   Dressing Appearance open to air 12/26/2019  7:25 AM   Closure None 12/25/2019  7:51 PM   Base red 12/25/2019  7:51 PM   Drainage Amount none 12/25/2019  7:51 PM   Dressing Care, Wound open to air 12/26/2019  7:25 AM       Wound 12/16/19 2000 Right calf Abrasion (Active)   Dressing Appearance open to air 12/26/2019  7:25 AM   Closure None 12/26/2019  7:25 AM   Base non-blanchable 12/26/2019  7:25 AM   Periwound intact 12/26/2019  7:25 AM   Drainage Amount none 12/25/2019  7:51 PM       Wound 12/16/19 2000 Left upper gluteal Pressure Injury (Active)   Dressing Appearance dry;intact 12/26/2019  7:25 AM   Closure None 12/26/2019  7:25 AM   Base non-blanchable 12/26/2019  7:25 AM   Periwound redness 12/25/2019  7:51 PM   Periwound Temperature warm 12/25/2019  7:51 PM   Periwound Skin Turgor soft 12/25/2019  7:51 PM   Edges irregular 12/25/2019  7:51 PM   Drainage Amount none 12/25/2019  7:51 PM   Care, Wound cleansed with;sterile normal saline;honey applied 12/25/2019  7:51 PM   Dressing Care, Wound dressing changed 12/25/2019  7:51 PM       Wound 12/16/19 2000 Left lower gluteal Pressure Injury (Active)   Dressing Appearance dry;intact 12/26/2019  7:25 AM   Closure None 12/26/2019  7:25 AM   Base non-blanchable;red;white 12/26/2019  7:25 AM   Periwound redness 12/26/2019  7:25 AM   Periwound Temperature warm 12/26/2019  7:25 AM   Periwound Skin Turgor soft 12/26/2019  7:25 AM   Edges irregular 12/26/2019  7:25 AM   Drainage Amount none 12/26/2019  7:25 AM   Care, Wound cleansed with;sterile normal saline;honey applied 12/25/2019  7:51 PM   Dressing Care, Wound dressing changed 12/25/2019  7:51 PM       Wound 12/16/19 2000 Bilateral back Abrasion (Active)   Dressing Appearance open to air 12/26/2019  7:25 AM   Closure None 12/26/2019  7:25 AM   Base red 12/25/2019  7:51 PM   Drainage Amount none 12/25/2019  7:51 PM       Wound 12/23/19 1000 Left hand Skin Tear (Active)   Dressing Appearance intact  12/26/2019  7:25 AM   Closure WILLIAN 12/25/2019  7:51 PM   Base dressing in place, unable to visualize 12/25/2019  7:51 PM       Wound 12/24/19 1915 Left lower quadrant (Active)   Dressing Appearance intact 12/26/2019  7:25 AM   Closure [place] 12/25/2019  7:51 PM   Base dressing in place, unable to visualize 12/25/2019  7:51 PM         OT Recommendation and Plan    Anticipated Equipment Needs At Discharge (OT Eval): (TBD)  Planned Therapy Interventions (OT Eval): activity tolerance training, BADL retraining, adaptive equipment training, occupation/activity based interventions, patient/caregiver education/training, ROM/therapeutic exercise, strengthening exercise, transfer/mobility retraining            OT IRF GOALS     Row Name 12/17/19 1429             Bathing Goal 1 (OT-IRF)    [place] Level (Bathing Goal 1, OT-IRF)  minimum assist (75% or more patient effort)  -CJ      Time Frame (Bathing Goal 1, OT-IRF)  long term goal (LTG);by discharge  -CJ         LB Dressing Goal 1 (OT-IRF)    [place] (LB Dressing Goal 1, OT-IRF)  minimum assist (75% or more patient effort)  -CJ      Time Frame (LB Dressing Goal 1, OT-IRF)  short term goal (STG)  -CJ         LB Dressing Goal 2 (OT-IRF)    [place] (LB Dressing Goal 2, OT-IRF)  contact guard assist  -CJ      Time Frame (LB Dressing Goal 2, OT-IRF)  long term goal (LTG);by discharge  -CJ         Toileting Goal 1 (OT-IRF)    [place] Level (Toileting Goal 1, OT-IRF)  minimum assist (75% or more patient effort)  -CJ      Time Frame (Toileting Goal 1, OT-IRF)  short term goal (STG)  -CJ         Toileting Goal 2 (OT-IRF)    [place] Level (Toileting Goal 2, OT-IRF)  contact guard assist  -CJ      Time Frame (Toileting Goal 2, OT-IRF)  long term goal (LTG);by discharge  -CJ        User Key  (r) = Recorded By, (t) = Taken By, (c) = Cosigned By    Initials Name Provider Type    Anika Solis OT Occupational Therapist                     Time Calculation:      Time Calculation- OT     Row Name 12/26/19 1421 12/26/19 0915          Time Calculation- OT    OT Start Time  1245  -  0915  -     OT Stop Time  1340  -  1000  -     OT Time Calculation (min)  55 min  -  45 min  -     Total Timed Code Minutes- OT  55 minute(s)  -  45 minute(s)  -     OT Non-Billable Time (min)  --  15 min  -       User Key  (r) = Recorded By, (t) = Taken By, (c) = Cosigned By    Initials Name Provider Type     Anika Gutierrez OT Occupational Therapist          Therapy Charges for Today     Code Description Service Date Service Provider Modifiers Qty    40745386439 HC OT SELF CARE/MGMT/TRAIN EA 15 MIN 12/26/2019 Anika Gutierrez OT GO 2    29250368349 HC OT THERAPEUTIC ACT EA 15 MIN 12/26/2019 Anika Gutierrez OT GO 3    50308268047 HC OT THER PROC EA 15 MIN 12/26/2019 Anika Gutierrez OT GO 2                   Anika Gutierrez OT  12/26/2019

## 2019-12-26 NOTE — PROGRESS NOTES
Occupational Therapy:    Physical Therapy: Individual: 90 minutes.    Speech Language Pathology:    Signed by: Alesia Gonzalez PTA

## 2019-12-26 NOTE — SIGNIFICANT NOTE
12/26/19 0845   Plan   Plan Contacted [facility] [phone] per Mariah who says to call back in am to verify bed availability for pt and she says can call her today before 4:00 pm to complete evaluation.  SS reviewed this information with pt and provided phone number for Mariah.  Discussed need for home health nursing care and explained  [facility] is the only home health agency in [facility] who accepts [employer]; pt is agreeable to SS sending referral to them.  Pt says SS can order bedside commode too.  Pt has DME provider preference.  Contacted [facility] [phone] per Nighat with referral, discharge on 12-27-19, plans for pt to go to [facility], and orders for nursing evaluation, wound care to midline coccyx and left gluteal: clean with NS, apply therahoney and mepilex BID, right coccyx: clean with NS, apply Venolex and mepilex BID, left arm skin tear: clean with NS, apply Vaseline gauze, wrap with gauze dressing every three days; will need to be changed on 12-29-19, right calf, right and left heel: apply Venolex BID then open to air, apply mepilex PRN to left abdomen for protection.  Faxed  referral with orders, face sheet, PT/OT notes, MD progress note, H&P, and labs to  [phone].   to be contacted at discharge.     Patient/Family in Agreement with Plan yes

## 2019-12-26 NOTE — DISCHARGE INSTR - OTHER ORDERS
[facility] [phone] for nursing.    [facility] [phone] for bedside commode.

## 2019-12-26 NOTE — PROGRESS NOTES
LOS: 10 days     Chief Complaint:     Debility    Subjective     Interval History:     Patient continues to consume quite a bit of candy and does not follow her water restriction and will recheck labs in a.m. including CBC and CMP.  She has no complaints this morning and is ready for therapy.    Patient participated in 100 minutes of occupational therapy on 12/20/19.  Patient did have complaints of abdominal pain and BLE pain but patient was agreeable to treatment. The RN as notified. Patient participated in 90 minutes of physical therapy. Patient did ambulate in the morning 320 feet and in the afternoon 320 feet.      held a team conference on 12/19/19.   spoke to patient about plans for discharge on 12-27-19 and how she is doing in therapy.  Educated patient about option of going to women's shelter temporarily until she can find a different home to live in instead of nursing home placement.  Patient is agreeable to consider this option.    Patient participated in 100 minutes on occupational therapy on 12/19/19. She did have complaints of back pain but was agreeable to OT session. Her bed-chair transfer independence is with contact guard at minimum assist (75% patient effort) with verbal cues.  She uses a wheelchair for assistive device.     On 12/18/19 she spent 90 minutes with OT and PT. Patient's chair-bed transfer independence level is at minimum assist (75% patient effort), using verbal cues with a wheelchair to assist.  PT noted that patient demonstrates decreased activity tolerance and complains of pain with increased activity. Continued skilled care required for improved functional mobility and ambulation quality. She did ambulate 300 feet x2 in the PM with an assistive device.    Patient was assessed by occupational and physical therapy and appropriate plan of care was put into place. Patient tolerated PT and OT sessions well on 12/17/19 but did have complaints of back and  "BLE pain.     Patient is unable to receive anticoagulation therapy due to her end-stage liver cirrhosis and high risk for bleeding but DVT prophylaxis was initiated with SCUDs and for now would try to avoid anticoagulation if at all possible as risk outweighs benefit as recommended by the referring facility.  Patient continues to refuse her insulin and her sugar is elevated and despite my discussion with her continues to consume high sugar candy at her bedside.      Review Of Systems:     Constitutional: no fever, chills and night sweats. No appetite change or unexpected weight change. ` Generalized fatigue.  Eyes: no eye drainage, itching or redness.  HEENT: no mouth sores, dysphagia or nose bleed.  Respiratory: no for shortness of breath, cough or production of sputum.  Cardiovascular: no chest pain, no palpitations, no orthopnea.  Gastrointestinal: no nausea, vomiting or diarrhea.  Mild chronic abdominal pain, no hematemesis or rectal bleeding.  Genitourinary: no dysuria or polyuria.  Hematologic/lymphatic: no lymph node abnormalities, no easy bruising or easy bleeding.  Musculoskeletal: no muscle or joint pain.  Skin: No rash and no itching.  Neurological: no loss of consciousness, no seizure, no headache.  Behavioral/Psych: no depression or suicidal ideation.  Endocrine: no hot flashes.  Immunologic: negative.    Objective     Vital Signs  /66 (BP Location: Left arm, Patient Position: Lying)   Pulse 92   Temp 98.1 °F (36.7 °C) (Oral)   Resp 20   Ht 160 cm (63\")   Wt 51.8 kg (114 lb 3.2 oz)   SpO2 96%   BMI 20.23 kg/m²   Intake & Output (last day)       12/25 0701 - 12/26 0700    P.O. 780    Total Intake(mL/kg) 780 (15.1)    Net +780         Urine Unmeasured Occurrence 7 x            Physical Exam:     General Appearance: alert, pleasant, appears stated age, interactive and  Cooperative.  Generalized weakness.     Head: normocephalic, without obvious abnormality and atraumatic     Eyes: lids and " lashes normal, conjunctivae and sclerae normal, no icterus, no  pallor, corneas clear and PERRLA     Ears: ears appear intact with no abnormalities noted     Nose: nares normal, septum midline, mucosa normal and no drainage                Throat: no oral lesions, no thrush, oral mucosa moist and oopharynx normal     Neck: no adenopathy, supple, trachea midline, no thyromegaly, no carotid bruit  and no JVD     Back: no kyphosis present, no scoliosis present, no skin lesions, erythema, or  scars, no tenderness to percussion or palpation and range of motion normal     Lungs: clear to auscultation, respirations regular, respirations even and  respirations unlabored. No accessory muscle use.      Heart:: regular rhythm & normal rate, normal S1, S2, no murmur, no gallop, no  rub and no click.  Chest wall with no abnormalities observed. PMI nondisplaced     Abdomen: normal bowel sounds in all quadrants, no masses, no hepatomegaly,  no splenomegaly, soft non-tender, no guarding and no rebound tenderness     Extremities: no edema, no cyanosis, no redness, no tenderness, no clubbing     Musculoskeletal: joints with full range of motion and joints, no effusion.  Pedal  pulses palpable and equal bilaterally     Skin: no bleeding, bruising or rash and no lesions noted.  Mid line coccyx, Right coccyx, Left upper gluteal and left lower gluteal unstageable pressure ulcers all present on admission.     Lymph Nodes: no palpable adenopathy     Neurologic: Mental Status orientated to person, place, time and situation.   Speech is intelligible, Nonlabored.  Alertness alert and awake and mood/affect  normal, Cranial Nerves 2 - 12 grossly intact as examined  Sensory intact in BLE and BUE.  Motor strength 4/5 in upper and lower extremities       Results Review:    Lab Results   Component Value Date    WBC 8.69 12/21/2019    HGB 9.3 (L) 12/21/2019    HCT 28.8 (L) 12/21/2019    MCV 82.3 12/21/2019     12/21/2019       Lab Results    Component Value Date    GLUCOSE 302 (H) 12/21/2019    BUN 21 (H) 12/21/2019    CREATININE 0.76 12/21/2019    EGFRIFNONA 78 12/21/2019    BCR 27.6 (H) 12/21/2019     (L) 12/21/2019    K 4.4 12/21/2019    CL 91 (L) 12/21/2019    CO2 27.0 12/21/2019    CALCIUM 8.2 (L) 12/21/2019    ALBUMIN 2.56 (L) 12/21/2019    LABIL2 1.2 (L) 06/08/2016    AST 78 (H) 12/21/2019    ALT 52 (H) 12/21/2019     No results found for: INR    Glucose   Date Value Ref Range Status   12/26/2019 350 (H) 70 - 130 mg/dL Final   12/25/2019 349 (H) 70 - 130 mg/dL Final   12/25/2019 354 (H) 70 - 130 mg/dL Final   12/25/2019 362 (H) 70 - 130 mg/dL Final   12/25/2019 165 (H) 70 - 130 mg/dL Final          Medication Review:     Current Facility-Administered Medications:   •  acetaminophen (TYLENOL) tablet 650 mg, 650 mg, Oral, Q6H PRN, Ravi Alcazar MD, 650 mg at 12/24/19 1109  •  amiodarone (PACERONE) tablet 200 mg, 200 mg, Oral, Q24H, Ravi Alcazar MD, 200 mg at 12/25/19 0839  •  aspirin chewable tablet 81 mg, 81 mg, Oral, Daily, Ravi Alcazar MD, 81 mg at 12/25/19 0839  •  busPIRone (BUSPAR) tablet 15 mg, 15 mg, Oral, Daily, Ravi Alcazar MD, 15 mg at 12/25/19 0839  •  castor oil-balsam peru (VENELEX) ointment, , Topical, Q12H, Ravi Alcazar MD  •  dextrose (D50W) 25 g/ 50mL Intravenous Solution 25 g, 25 g, Intravenous, Q15 Min PRN, Ravi Alcazar MD  •  dextrose (GLUTOSE) oral gel 15 g, 15 g, Oral, Q15 Min PRN, Ravi Alcazar MD  •  dicyclomine (BENTYL) capsule 10 mg, 10 mg, Oral, Daily, Ravi Alcazar MD, 10 mg at 12/25/19 0839  •  ferrous sulfate tablet 325 mg, 325 mg, Oral, TID With Meals, Ravi Alcazar MD, 325 mg at 12/25/19 1707  •  fluticasone (FLONASE) 50 MCG/ACT nasal spray 2 spray, 2 spray, Each Nare, Daily, Ravi Alcazar MD, 2 spray at 12/25/19 0840  •  furosemide (LASIX) tablet 40 mg, 40 mg, Oral, BID, Ravi Alcazar MD, 40 mg at 12/25/19 1707  •  gabapentin  (NEURONTIN) capsule 600 mg, 600 mg, Oral, TID, Ravi Alcazar MD, 600 mg at 12/25/19 2026  •  glucagon (human recombinant) (GLUCAGEN DIAGNOSTIC) injection 1 mg, 1 mg, Subcutaneous, Q15 Min PRN, Ravi Alcazar MD  •  insulin aspart (novoLOG) injection 0-7 Units, 0-7 Units, Subcutaneous, 4x Daily AC & at Bedtime, Ravi Alcazar MD, 5 Units at 12/25/19 2026  •  insulin detemir (LEVEMIR) injection 13 Units, 13 Units, Subcutaneous, Q12H, Ravi Alcazar MD, 13 Units at 12/25/19 2031  •  lactulose (CHRONULAC) 10 GM/15ML solution 10 g, 10 g, Oral, BID, Ravi Alcazar MD, 10 g at 12/25/19 2027  •  magnesium hydroxide (MILK OF MAGNESIA) suspension 2400 mg/10mL 5 mL, 5 mL, Oral, Daily PRN, Ravi Alcazar MD, 5 mL at 12/20/19 1010  •  ondansetron (ZOFRAN) tablet 4 mg, 4 mg, Oral, Q6H PRN, Ravi Alcazar MD, 4 mg at 12/24/19 1011  •  oxyCODONE (ROXICODONE) immediate release tablet 10 mg, 10 mg, Oral, Q8H PRN, Ravi Alcazar MD, 10 mg at 12/26/19 0157  •  pantoprazole (PROTONIX) EC tablet 40 mg, 40 mg, Oral, BID AC, Ravi Alcazar MD, 40 mg at 12/25/19 1707  •  spironolactone (ALDACTONE) tablet 100 mg, 100 mg, Oral, Daily, Ravi Alcazar MD, 100 mg at 12/25/19 0839      Assessment/Plan       Debility    Chronic obstructive pulmonary disease (CMS/HCC)    Diabetes mellitus (CMS/HCC)    Diabetic peripheral neuropathy (CMS/HCC)    Hypertension    Anxiety and depression    Liver cirrhosis (CMS/HCC)    ASSESSMENT:     1.  Atrial fibrillation  2.  Recurrent ascites  3.  Nonalcoholic liver cirrhosis, end-stage  4.  Hepatitis C  5.  Essential hypertension  6.  Diabetes mellitus insulin-dependent and nonadherent with insulin  7.  Neuropathy  8.  Anxiety  9.  Depression  10.  Chronic pain  11.  Hyponatremia  12.  COPD  13.  Acute respiratory failure with ARDS  14.  Aspiration pneumonia  15.  Chronic left-sided pleural effusion  16. Mid line coccyx, Right coccyx, Left upper gluteal  and left lower gluteal unstageable pressure ulcers all present on admission     PLAN:    Patient continues to consume large amount of candy and I increased her Levemir to 13 units q12h.    Patient participated in 100 minutes of occupational therapy on 12/20/19.  Patient did have complaints of abdominal pain and BLE pain but patient was agreeable to treatment. The RN as notified. Patient participated in 90 minutes of physical therapy. Patient did ambulate in the morning 320 feet and in the afternoon 320 feet.      held a team conference on 12/19/19.   spoke to patient about plans for discharge on 12-27-19 and how she is doing in therapy.  Educated patient about option of going to women's shelter temporarily until she can find a different home to live in instead of nursing home placement.  Patient is agreeable to consider this option.    Patient participated in 100 minutes on occupational therapy on 12/19/19. She did have complaints of back pain but was agreeable to OT session. Her bed-chair transfer independence is with contact guard at minimum assist (75% patient effort) with verbal cues.  She uses a wheelchair for assistive device.     On 12/18/19 she spent 90 minutes with OT and PT. Patient's chair-bed transfer independence level is at minimum assist (75% patient effort), using verbal cues with a wheelchair to assist.  PT noted that patient demonstrates decreased activity tolerance and complains of pain with increased activity. Continued skilled care required for improved functional mobility and ambulation quality. She did ambulate 300 feet x2 in the PM with an assistive device.    Patient was assessed by occupational and physical therapy and appropriate plan of care was put into place. Patient tolerated PT and OT sessions well on 12/17/19 but did have complaints of back and BLE pain.     Patient is unable to receive anticoagulation therapy due to her end-stage liver cirrhosis and  high risk for bleeding but DVT prophylaxis was initiated with SCUDs and for now would try to avoid anticoagulation if at all possible as risk outweighs benefit as recommended by the referring facility.  Patient continues to refuse her insulin and her sugar is elevated and despite my discussion with her continues to consume high sugar candy at her bedside.    Estimated length of stay 7 to 10 days     Prior level of function prior to her admission at [facility] she was independent and living at home.     Patient is going to require intensive inpatient physical therapy for therapeutic exercises, range of motion, endurance, gait training, safety, stairs training, strengthening for at least 1 to 2 hours a day for 5 to 6 days a week as well as occupational therapy for dressing, ADLs, feeding, home skills, safety and toileting techniques for 1 to 2 hours a day for 5 to 6 days a week.    Code Status and Medical Interventions:   Ordered at: 12/17/19 1254     Code Status:    No CPR     Medical Interventions (Level of Support Prior to Arrest):    Full       Ravi Alcazar MD  12/26/19  6:36 AM

## 2019-12-26 NOTE — PROGRESS NOTES
Case Management  Inpatient Rehabilitation Team Conference    Conference Date/Time: 12/24/2019 7:23:26 AM    Team Conference Attendees:  MD Shonna Valdes RN,   MONICA Cuello, PT  Michelle Araujo OT    Demographics            Age: 59Y            Gender: Female    Admission Date: 12/16/2019 8:00:28 PM  Rehabilitation Diagnosis:  debility  Comorbidities: E87.1 Hypo-osmolality and hyponatremia  K74.60 Unspecified cirrhosis of liver  B19.20 Unspecified viral hepatitis C without hepatic coma  Z79.4 Long term (current) use of insulin  E11.40 Type 2 diabetes mellitus with diabetic neuropathy, unspecified  F41.9 Anxiety disorder, unspecified  F32.9 Major depressive disorder, single episode, unspecified  J44.9 Chronic obstructive pulmonary disease, unspecified  L89.150 Pressure ulcer of sacral region, unstageable  L89.320 Pressure ulcer of left buttock, unstageable  L89.310 Pressure ulcer of right buttock, unstageable      Plan of Care  Anticipated Discharge Date/Estimated Length of Stay: 12-27-19  Anticipated Discharge Destination: Facility setting  Medical Necessity Expected Level Rationale: good  Intensity and Duration: an average of 3 hours/5 days per week  Medical Supervision and 24 Hour Rehab Nursing: x  Physical Therapy: x  PT Intensity/Duration: PT 1.5 hours per day/5 days per week  Occupational Therapy: x  OT Intensity/Duration: OT 1.5 hours per day/5 days per week  Social Work: x  Therapeutic Recreation: x  Updated (if changes indicated)  No changes to plan.      Discharge Plan of Care: Home Health Services Commodes: Bedside commode  Nursing home health services for skin tear left hand, right and left heels,  coccyx and Left buttock wound care.    Based on the patient's medical and functional status, their prognosis and  expected level of functional improvement is: good      Interdisciplinary Problem/Goals/Status  Body Systems    [RN]  Integumentary(Active)  Current Status(12/16/2019): FREE FROM FURTHER BREAKDOWN  Weekly Goal(01/31/2020): FREE FROM FURTHER BREAKDOWN  Discharge Goal: FREE FROM FURTHER BREAKDOWN        Mobility    [PT] Bed/Chair/Wheelchair(Active)  Current Status(12/17/2019): min A  Weekly Goal(12/24/2019): Sup  Discharge Goal: Sup    [PT] Walk(Active)  Current Status(12/17/2019): amb 300' RW CGA  Weekly Goal(12/24/2019): amb 300' RW Sup  Discharge Goal: amb 300' RW Sup        Safety    [RN] Potential for Injury(Active)  Current Status(12/16/2019): FREE FROM INJURY  Weekly Goal(01/31/2020): FREE FROM INJURY  Discharge Goal: FREE FROM INJURY        Self Care    [OT] Dressing (Lower)(Active)  Current Status(12/23/2019): Nydia  Weekly Goal(12/31/2019): CGA  Discharge Goal: CGA    [OT] Toileting(Active)  Current Status(12/23/2019): CGA  Weekly Goal(12/31/2019): met  Discharge Goal: CGA    Comments: Pt will discharge to [facility] with home health nursing if bed available.    Signed by: Shonna Finley, Supervisor    Physician CoSigned By: Ravi Alcazar 12/26/2019 04:26:11

## 2019-12-26 NOTE — SIGNIFICANT NOTE
12/26/19 1025   Plan   Plan Contacted [facility] [phone] per rotation per Fadumo with discharge on 12-27-19 and order for bedside commode.  Faxed [facility], face sheet, H&P, OT notes and MD progress note to [phone].  BSC to be delivered to rehab after 10:00 am on 12-27-19.

## 2019-12-26 NOTE — PROGRESS NOTES
Rehabilitation Nursing  Inpatient Rehabilitation Plan of Care Note    Plan of Care  Copy from POC  Safety    Potential for Injury (Active)  Current Status (12/16/2019 12:00:00 AM): FREE FROM INJURY  Weekly Goal: FREE FROM INJURY  Discharge Goal: FREE FROM INJURY    Body Systems    Integumentary (Active)  Current Status (12/16/2019 12:00:00 AM): FREE FROM FURTHER BREAKDOWN  Weekly Goal: FREE FROM FURTHER BREAKDOWN  Discharge Goal: FREE FROM FURTHER BREAKDOWN    Signed by: Meliza Guevara, Nurse

## 2019-12-26 NOTE — THERAPY TREATMENT NOTE
Inpatient Rehabilitation - Physical Therapy Treatment Note  [facility] [place]     Patient Name: Gretta Giles  : 1960  MRN: [id number]    Today's Date: 2019                 Admit Date: 2019      Visit Dx:    No diagnosis found.    Patient Active Problem List   Diagnosis   • Gastroesophageal reflux disease   • Atopic rhinitis   • Chronic low back pain   • Chronic obstructive pulmonary disease (CMS/HCC)   • Chronic obstructive pulmonary disease with acute exacerbation (CMS/HCC)   • Diabetes mellitus (CMS/HCC)   • Diabetic peripheral neuropathy (CMS/HCC)   • Dyslipidemia   • Nonalcoholic fatty liver disease   • Gastrointestinal ulcer due to Helicobacter pylori   • Hypertension   • Menopausal symptom   • Mixed anxiety depressive disorder   • Seasonal allergic rhinitis   • Tobacco dependence syndrome   • Vitamin D deficiency   • Hyperlipidemia   • Anxiety and depression   • Menopause syndrome   • Allergic rhinitis   • Type 2 diabetes mellitus, controlled (CMS/HCC)   • GERD without esophagitis   • Nausea   • Debility   • Liver cirrhosis (CMS/HCC)       Therapy Treatment    IRF Treatment Summary     Row Name 19 1144             Evaluation/Treatment Time and Intent    Subjective Information  no complaints  -LL      Existing Precautions/Restrictions  fall ABD draining to colostomy bag, Hep C+  -LL      Document Type  therapy note (daily note)  -LL      Mode of Treatment  individual therapy;physical therapy  -LL      Patient/Family Observations  Patient agreeable to PT session this date.  She demontrated improve endurance with activities this date.  -LL      Recorded by [LL] Alesia Gonzalez PTA      Row Name 19 1144             Cognition/Psychosocial- PT/OT    Affect/Mental Status (Cognitive)  WFL  -LL      Orientation Status (Cognition)  oriented x 3  -LL      Follows Commands (Cognition)  verbal cues/prompting required  -LL      Personal Safety Interventions  gait belt;nonskid shoes/slippers when  out of bed;supervised activity  -LL      Recorded by [LL] Alesia Gonzalez PTA      Row Name 12/26/19 1144             Bed Mobility Assessment/Treatment    Supine-Sit [place] (Bed Mobility)  verbal cues;nonverbal cues (demo/gesture);supervision  -LL      Assistive Device (Bed Mobility)  bed rails  -LL      Recorded by [LL] Alesia Gonzalez PTA      Row Name 12/26/19 1144             Transfer Assessment/Treatment    Transfer Assessment/Treatment  car transfer  -LL      Recorded by [LL] Alesia Gonzalez PTA      Row Name 12/26/19 1144             Bed-Chair Transfer    Bed-Chair [place] (Transfers)  nonverbal cues (demo/gesture);verbal cues;supervision;conditional independence  -LL      Assistive Device (Bed-Chair Transfers)  wheelchair  -LL      Recorded by [LL] Alesia Gonzalez PTA      Row Name 12/26/19 1144             Chair-Bed Transfer    Chair-Bed [place] (Transfers)  verbal cues;nonverbal cues (demo/gesture);supervision;conditional independence  -LL      Assistive Device (Chair-Bed Transfers)  wheelchair  -LL      Recorded by [LL] Alesia Gonzalez PTA      Row Name 12/26/19 1144             Sit-Stand Transfer    Sit-Stand [place] (Transfers)  verbal cues;nonverbal cues (demo/gesture);supervision;conditional independence  -LL      Assistive Device (Sit-Stand Transfers)  walker, front-wheeled  -LL      Recorded by [LL] Alesia Gonzalez PTA      Row Name 12/26/19 1144             Stand-Sit Transfer    Stand-Sit [place] (Transfers)  verbal cues;nonverbal cues (demo/gesture);supervision;conditional independence  -LL      Assistive Device (Stand-Sit Transfers)  walker, front-wheeled  -LL      Recorded by [LL] Alesia Gonzalez PTA      Row Name 12/26/19 1144             Toilet Transfer    Type (Toilet Transfer)  stand pivot/stand step  -LL      [place] Level (Toilet Transfer)  verbal cues;nonverbal cues (demo/gesture);supervision;conditional independence  -LL      Assistive Device (Toilet  Transfer)  grab bars/safety frame;raised toilet seat  -LL      Recorded by [LL] Alesia Gonzalez TANIYA      Row Name 12/26/19 1144             Gait/Stairs Assessment/Training    [place] Level (Gait)  verbal cues;nonverbal cues (demo/gesture);supervision  -LL      Assistive Device (Gait)  other (see comments) rollator  -LL      Distance in Feet (Gait)  300  -LL      Pattern (Gait)  step-through  -LL      Deviations/Abnormal Patterns (Gait)  jason decreased;gait speed decreased;stride length decreased  -LL      Recorded by [LL] Alesia Gonzalez TANIYA      Row Name 12/26/19 1144             Safety Issues, Functional Mobility    Impairments Affecting Function (Mobility)  balance;endurance/activity tolerance;pain;strength  -LL      Recorded by [LL] Alesia Gonzalez PTA      [facility] Name 12/26/19 1144             Pain Scale: Numbers Pre/Post-Treatment    Pain Location  back and generalized  -LL      Recorded by [LL] Alesia Gonzalez [facility] 12/26/19 1144             Pain Scale: FACES Pre/Post-Treatment    Pain: FACES Scale, Pretreatment  4-->hurts little more  -LL      Pain: FACES Scale, Post-Treatment  6-->hurts even more  -LL      Recorded by [LL] Alesia Gonzalez [facility]      Row Name 12/26/19 1144             Lower Extremity Seated Therapeutic Exercise    Performed, Seated Lower Extremity (Therapeutic Exercise)  hip flexion/extension;hip abduction/adduction;ankle dorsiflexion/plantarflexion;LAQ (long arc quad), knee extension Ball squeeze  -LL      Device, Seated Lower Extremity (Therapeutic Exercise)  elastic bands/tubing;small ball;free weights, cuff 2#  -LL      Sets/Reps Detail, Seated Lower Extremity (Therapeutic Exercise)  25 reps  -LL      Recorded by [LL] Alesia Gonzalez PTA      Row Name 12/26/19 1144             Lower Extremity Supine Therapeutic Exercise    Performed, Supine Lower Extremity (Therapeutic Exercise)  hip abduction/adduction;hip external/internal rotation;SAQ (short arc quad) over  bolster;quadriceps sets;gluteal sets;ankle pumps;heel slides Bridging  -LL      Device, Supine Lower Extremity (Therapeutic Exercise)  half foam roll;free weights, cuff 2#  -LL      Sets/Reps Detail, Supine Lower Extremity (Therapeutic Exercise)  25  -LL      Recorded by [LL] Alesia Gonzalez PTA      Row Name 12/26/19 1144             Positioning and Restraints    Pre-Treatment Position  bathroom  -LL      Post Treatment Position  bed  -LL      In Bed  sitting EOB;call light within reach;encouraged to call for assist;exit alarm on;side rails up x2;notified nsg  -LL      Recorded by [LL] Alesia Gonzalez PTA        User Key  (r) = Recorded By, (t) = Taken By, (c) = Cosigned By    Initials Name Effective Dates    LL Alesia Gonzalez PTA 05/02/16 -         Wound 12/16/19 midline coccyx Pressure Injury (Active)   Dressing Appearance dry;intact 12/26/2019  7:25 AM   Closure None 12/25/2019  7:51 PM   Base non-blanchable;dry;slough;yellow 12/26/2019  7:25 AM   Periwound redness 12/26/2019  7:25 AM   Drainage Amount none 12/25/2019  7:51 PM   Care, Wound cleansed with;sterile normal saline;honey applied 12/25/2019  7:51 PM   Dressing Care, Wound dressing changed 12/25/2019  7:51 PM       Wound 12/16/19 2000 Right coccyx Pressure Injury (Active)   Dressing Appearance dry;intact 12/26/2019  7:25 AM   Closure None 12/26/2019  7:25 AM   Base non-blanchable 12/26/2019  7:25 AM   Drainage Amount none 12/25/2019  7:51 PM   Care, Wound cleansed with;sterile normal saline;honey applied 12/25/2019  7:51 PM   Dressing Care, Wound dressing changed 12/25/2019  7:51 PM       Wound 12/16/19 2000 Left posterior heel (Active)   Dressing Appearance open to air 12/26/2019  7:25 AM   Closure None 12/26/2019  7:25 AM   Base non-blanchable;blanchable 12/26/2019  7:25 AM   Drainage Amount none 12/26/2019  7:25 AM   Dressing Care, Wound open to air 12/26/2019  7:25 AM       Wound Right posterior heel Pressure Injury (Active)   Dressing  Appearance open to air 12/26/2019  7:25 AM   Closure None 12/25/2019  7:51 PM   Base red 12/25/2019  7:51 PM   Drainage Amount none 12/25/2019  7:51 PM   Dressing Care, Wound open to air 12/26/2019  7:25 AM       Wound 12/16/19 2000 Right calf Abrasion (Active)   Dressing Appearance open to air 12/26/2019  7:25 AM   Closure None 12/26/2019  7:25 AM   Base non-blanchable 12/26/2019  7:25 AM   Periwound intact 12/26/2019  7:25 AM   Drainage Amount none 12/25/2019  7:51 PM       Wound 12/16/19 2000 Left upper gluteal Pressure Injury (Active)   Dressing Appearance dry;intact 12/26/2019  7:25 AM   Closure None 12/26/2019  7:25 AM   Base non-blanchable 12/26/2019  7:25 AM   Periwound redness 12/25/2019  7:51 PM   Periwound Temperature warm 12/25/2019  7:51 PM   Periwound Skin Turgor soft 12/25/2019  7:51 PM   Edges irregular 12/25/2019  7:51 PM   Drainage Amount none 12/25/2019  7:51 PM   Care, Wound cleansed with;sterile normal saline;honey applied 12/25/2019  7:51 PM   Dressing Care, Wound dressing changed 12/25/2019  7:51 PM       Wound 12/16/19 2000 Left lower gluteal Pressure Injury (Active)   Dressing Appearance dry;intact 12/26/2019  7:25 AM   Closure None 12/26/2019  7:25 AM   Base non-blanchable;red;white 12/26/2019  7:25 AM   Periwound redness 12/26/2019  7:25 AM   Periwound Temperature warm 12/26/2019  7:25 AM   Periwound Skin Turgor soft 12/26/2019  7:25 AM   Edges irregular 12/26/2019  7:25 AM   Drainage Amount none 12/26/2019  7:25 AM   Care, Wound cleansed with;sterile normal saline;honey applied 12/25/2019  7:51 PM   Dressing Care, Wound dressing changed 12/25/2019  7:51 PM       Wound 12/16/19 2000 Bilateral back Abrasion (Active)   Dressing Appearance open to air 12/26/2019  7:25 AM   Closure None 12/26/2019  7:25 AM   Base red 12/25/2019  7:51 PM   Drainage Amount none 12/25/2019  7:51 PM       Wound 12/23/19 1000 Left hand Skin Tear (Active)   Dressing Appearance intact 12/26/2019  7:25 AM   Closure  WILLIAN 12/25/2019  7:51 PM   Base dressing in place, unable to visualize 12/25/2019  7:51 PM       Wound 12/24/19 1915 Left lower quadrant (Active)   Dressing Appearance intact 12/26/2019  7:25 AM   Closure WILLIAN 12/25/2019  7:51 PM   Base dressing in place, unable to visualize 12/25/2019  7:51 PM           PT Recommendation and Plan                        Time Calculation:     PT Charges     Row Name 12/26/19 1149             Time Calculation    Start Time  1010  -LL      Stop Time  1140  -LL      Time Calculation (min)  90 min  -LL      PT Received On  12/26/19  -LL         Time Calculation- PT    Total Timed Code Minutes- PT  90 minute(s)  -LL        User Key  (r) = Recorded By, (t) = Taken By, (c) = Cosigned By    Initials Name Provider Type    LL Alesia Gonzalez PTA Physical Therapy Assistant          Therapy Charges for Today     Code Description Service Date Service Provider Modifiers Qty    53831342224 HC GAIT TRAINING EA 15 MIN 12/26/2019 Alesia Gonzalez PTA GP 2    12512001691 HC PT THERAPEUTIC ACT EA 15 MIN 12/26/2019 Alesia Gonzalez, TANIYA GP 1    88595822646 HC PT THER PROC EA 15 MIN 12/26/2019 Alesia Gonzalez PTA GP 3                   Alesia. TANIYA Gonzalez  12/26/2019

## 2019-12-27 NOTE — DISCHARGE SUMMARY
DISCHARGE SUMMARY        Patient Identification:  Name:  Gretta Giles  Age:  59 y.o.  Sex:  female  :  1960  MRN:  [id number]  Visit Number:  [id number]    Date of Admission: 2019  Date of Discharge:      PCP: Nighat Kincaid APRN    Discharging Provider: Ravi Alcazar MD      Discharge Diagnoses     1.  Atrial fibrillation  2.  Recurrent ascites  3.  Nonalcoholic liver cirrhosis, end-stage  4.  Hepatitis C  5.  Essential hypertension  6.  Diabetes mellitus insulin-dependent and nonadherent with insulin  7.  Neuropathy  8.  Anxiety  9.  Depression  10.  Chronic pain  11.  Hyponatremia  12.  COPD  13.  Acute respiratory failure with ARDS  14.  Aspiration pneumonia  15.  Chronic left-sided pleural effusion  16. Mid line coccyx, Right coccyx, Left upper gluteal and left lower gluteal unstageable pressure ulcers all present on admission      Consults     Consults     No orders found from 2019 to 2019.          History of Presenting Illness     Mrs. Giles is a 59-year-old lady with past history significant for liver nonalcoholic cirrhosis and history of congestive heart failure who presented to [facility] with shortness of breath and abdominal swelling.  Her swelling and shortness of breath have been going on for at least 2 weeks prior to her admission with progressive worsening over the past week and she had been taking her Lasix but has been nonadherent with her insulin and her blood sugar in the ER was 900.  Insulin drip was started and patient became hypotensive in the ER and was given 500 cc bolus and was initiated on Zosyn for intra-abdominal coverage and the sepsis protocol was initiated.  She was later on diagnosed with septic shock and acute respiratory failure and had to be intubated and underwent paracentesis by GI.  Blood cultures and urine cultures were obtained and patient was found to have very elevated lactic acid as well as leukocytosis and a  component of acute kidney injury.  Patient was started on IV albumin as well.  Her respiratory status was worsened and she had to be intubated and placed on mechanical ventilation with bilateral diffuse airspace disease consistent with ARDS from aspiration.  Her white count worsened to 37,000 and patient was initiated on broad-spectrum IV antibiotic therapy and Solu-Medrol.  DVT prophylaxis was not initiated but only with mechanical SCUDs due to her liver disease.  Her EF is at 65 to 70%.  Patient was able to pass swallowing value and she was placed on chopped meat and thin liquids with NG tube removed.  Patient was went into A. fib with RVR and was initiated on IV amiodarone then p.o. amiodarone but was unable to coagulate due to her liver disease.  Patient continued to progress medically and physical therapy and Occupational Therapy evaluation completed which recommended acute inpatient rehabitation program for functional mobility.  Acute inpatient rehab referral was ordered for continued medical monitoring and intervention with patient's comorbidities.  Patient management needs, anticoagulation skin monitoring and breakdown prevention.      Hospital Course     Patient continues to consume quite a bit of candy and does not follow her water restriction and will recheck labs in a.m. including CBC and CMP.  She has no complaints this morning and is ready for therapy.     Patient participated in 100 minutes of occupational therapy.  Patient did have complaints of abdominal pain and BLE pain but patient was agreeable to treatment. The RN as notified. Patient participated in 90 minutes of physical therapy. Patient did ambulate in the morning 320 feet and in the afternoon 320 feet.       held a team conference on 12/19/19.   spoke to patient about plans for discharge on 12-27-19 and how she is doing in therapy.  Educated patient about option of going to women's shelter temporarily until she  can find a different home to live in instead of nursing home placement.  Patient is agreeable to consider this option.     Patient participated in 100 minutes on occupational therapy. She did have complaints of back pain but was agreeable to OT session. Her bed-chair transfer independence is with contact guard at minimum assist (75% patient effort) with verbal cues.  She uses a wheelchair for assistive device.      She spent 90 minutes with OT and PT. Patient's chair-bed transfer independence level is at minimum assist (75% patient effort), using verbal cues with a wheelchair to assist.  PT noted that patient demonstrates decreased activity tolerance and complains of pain with increased activity. Continued skilled care required for improved functional mobility and ambulation quality. She did ambulate 300 feet x2 in the PM with an assistive device.     Patient is unable to receive anticoagulation therapy due to her end-stage liver cirrhosis and high risk for bleeding but DVT prophylaxis was initiated with SCUDs and for now would try to avoid anticoagulation if at all possible as risk outweighs benefit as recommended by the referring facility.  Patient continues to refuse her insulin and her sugar is elevated and despite my discussion with her continues to consume high sugar candy at her bedside.      Discharge Vitals/Physical Examination     Vital Signs:  Temp:  [98.3 °F (36.8 °C)] 98.3 °F (36.8 °C)  Heart Rate:  [85] 85  Resp:  [20] 20  BP: (103)/(67) 103/67  No data found.  SpO2 Percentage    12/25/19 1915 12/26/19 0725 12/26/19 1906   SpO2: 96% 98% 100%     SpO2:  [100 %] 100 %  on   ;   Device (Oxygen Therapy): room air    Body mass index is 20.23 kg/m².  Wt Readings from Last 3 Encounters:   12/16/19 51.8 kg (114 lb 3.2 oz)   06/08/16 53.1 kg (117 lb)   02/19/16 56.3 kg (124 lb 0.1 oz)         Physical Exam:    General Appearance: alert, pleasant, appears stated age, interactive and  Cooperative.  Generalized  weakness.     Head: normocephalic, without obvious abnormality and atraumatic     Eyes: lids and lashes normal, conjunctivae and sclerae normal, no icterus, no  pallor, corneas clear and PERRLA     Ears: ears appear intact with no abnormalities noted     Nose: nares normal, septum midline, mucosa normal and no drainage     Throat: no oral lesions, no thrush, oral mucosa moist and oopharynx normal     Neck: no adenopathy, supple, trachea midline, no thyromegaly, no carotid bruit  and no JVD     Back: no kyphosis present, no scoliosis present, no skin lesions, erythema, or  scars, no tenderness to percussion or palpation and range of motion normal     Lungs: clear to auscultation, respirations regular, respirations even and  respirations unlabored. No accessory muscle use.      Heart:: regular rhythm & normal rate, normal S1, S2, no murmur, no gallop, no  rub and no click.  Chest wall with no abnormalities observed. PMI nondisplaced     Abdomen: normal bowel sounds in all quadrants, no masses, no hepatomegaly,  no splenomegaly, soft non-tender, no guarding and no rebound tenderness     Extremities: no edema, no cyanosis, no redness, no tenderness, no clubbing     Musculoskeletal: joints with full range of motion and joints, no effusion.  Pedal  pulses palpable and equal bilaterally     Skin: no bleeding, bruising or rash and no lesions noted.  Mid line coccyx, Right coccyx, Left upper gluteal and left lower gluteal unstageable pressure ulcers all present on admission.     Lymph Nodes: no palpable adenopathy     Neurologic: Mental Status orientated to person, place, time and situation.   Speech is intelligible, Nonlabored.  Alertness alert and awake and mood/affect  normal, Cranial Nerves 2 - 12 grossly intact as examined  Sensory intact in BLE and BUE.  Motor strength 4/5 in upper and lower extremities          Pertinent Laboratory/Radiology Results     Pertinent Laboratory Results:              Results from last 7  days   Lab Units 12/27/19  0522 12/21/19  0532   WBC 10*3/mm3 12.43* 8.69   HEMOGLOBIN g/dL 9.7* 9.3*   HEMATOCRIT % 30.9* 28.8*   MCV fL 86.1 82.3   MCHC g/dL 31.4* 32.3   PLATELETS 10*3/mm3 153 178     Results from last 7 days   Lab Units 12/27/19  0522 12/21/19  0532   SODIUM mmol/L 127* 128*   POTASSIUM mmol/L 4.6 4.4   CHLORIDE mmol/L 93* 91*   CO2 mmol/L 22.6 27.0   BUN mg/dL 21* 21*   CREATININE mg/dL 0.82 0.76   EGFR IF NONAFRICN AM mL/min/1.73 72 78   CALCIUM mg/dL 8.0* 8.2*   GLUCOSE mg/dL 245* 302*   ALBUMIN g/dL 2.01* 2.56*   BILIRUBIN mg/dL 0.9 0.6   ALK PHOS U/L 250* 260*   AST (SGOT) U/L 89* 78*   ALT (SGPT) U/L 65* 52*   Estimated Creatinine Clearance: 61.9 mL/min (by C-G formula based on SCr of 0.82 mg/dL).  No results found for: AMMONIA    Glucose   Date/Time Value Ref Range Status   12/27/2019 0617 220 (H) 70 - 130 mg/dL Final   12/26/2019 2013 242 (H) 70 - 130 mg/dL Final   12/26/2019 1624 398 (H) 70 - 130 mg/dL Final   12/26/2019 1116 263 (H) 70 - 130 mg/dL Final   12/26/2019 0600 350 (H) 70 - 130 mg/dL Final   12/25/2019 2025 349 (H) 70 - 130 mg/dL Final   12/25/2019 1608 354 (H) 70 - 130 mg/dL Final   12/25/2019 1037 362 (H) 70 - 130 mg/dL Final     Lab Results   Component Value Date    HGBA1C 5.2 06/08/2016     Lab Results   Component Value Date    TSH 3.420 12/17/2019       No results found for: BLOODCX  No results found for: URINECX  No results found for: WOUNDCX  No results found for: STOOLCX  No results found for: RESPCX  Pain Management Panel     There is no flowsheet data to display.          Pertinent Radiology Results:    Imaging Results (All)     None          Test Results Pending at Discharge:        Discharge Disposition/Discharge Medications/Discharge Appointments     Discharge Disposition:     Home or Self Care    Code Status While Inpatient:    Code Status and Medical Interventions:   Ordered at: 12/17/19 1254     Code Status:    No CPR     Medical Interventions (Level of  Support Prior to Arrest):    Full       Discharge Medications:       Discharge Medications      New Medications      Instructions Start Date   accu-chek multiclix lancets   Use as directed      acetaminophen 325 MG tablet  Commonly known as:  TYLENOL   650 mg, Oral, Every 6 Hours PRN      amiodarone 200 MG tablet  Commonly known as:  PACERONE   200 mg, Oral, Every 24 Hours Scheduled      castor oil-balsam peru ointment   5 g, Topical, Every 12 Hours Scheduled      ferrous sulfate 325 (65 FE) MG tablet   325 mg, Oral, 3 Times Daily With Meals      glucose blood test strip   Use as instructed      glucose monitor monitoring kit   1 each, Does not apply, As Needed      guaifenesin 100 MG/5ML liquid  Commonly known as:  ROBITUSSIN   100 mg, Oral, 3 Times Daily PRN      insulin detemir 100 UNIT/ML injection  Commonly known as:  LEVEMIR   13 Units, Subcutaneous, Every 12 Hours Scheduled      lactulose 10 GM/15ML solution  Commonly known as:  CHRONULAC   10 g, Oral, 2 Times Daily      pantoprazole 40 MG EC tablet  Commonly known as:  PROTONIX   40 mg, Oral, 2 Times Daily Before Meals         Changes to Medications      Instructions Start Date   albuterol sulfate  (90 Base) MCG/ACT inhaler  Commonly known as:  PROVENTIL HFA;VENTOLIN HFA;PROAIR HFA  What changed:  Another medication with the same name was removed. Continue taking this medication, and follow the directions you see here.   2 puffs, Inhalation, Every 4 Hours      dicyclomine 10 MG capsule  Commonly known as:  BENTYL  What changed:  when to take this   10 mg, Oral, Daily      furosemide 40 MG tablet  Commonly known as:  LASIX  What changed:  when to take this   40 mg, Oral, 2 Times Daily         Continue These Medications      Instructions Start Date   ASPIRIN LOW DOSE 81 MG EC tablet  Generic drug:  aspirin   1 tablet, Oral, Daily, For health maintenance      busPIRone 15 MG tablet  Commonly known as:  BUSPAR   15 mg, Oral, 2 Times Daily      fluticasone  50 MCG/ACT nasal spray  Commonly known as:  FLONASE   2 sprays, Nasal, Daily, Use 1 to 2 sprays in each nostril once daily. For allergic rhinitis.      gabapentin 600 MG tablet  Commonly known as:  NEURONTIN   600 mg, Oral, 3 Times Daily      ondansetron 4 MG tablet  Commonly known as:  ZOFRAN   4 mg, Oral, Every 8 Hours PRN      oxyCODONE 10 MG tablet  Commonly known as:  ROXICODONE   10 mg, Oral, 3 Times Daily      spironolactone 100 MG tablet  Commonly known as:  ALDACTONE   100 mg, Oral, Daily         Stop These Medications    ADMELOG 100 UNIT/ML injection  Generic drug:  insulin lispro     buPROPion  MG 24 hr tablet  Commonly known as:  WELLBUTRIN XL     Insulin Glargine 100 UNIT/ML injection pen  Commonly known as:  BASAGLAR KWIKPEN     lisinopril 20 MG tablet  Commonly known as:  PRINIVIL,ZESTRIL     montelukast 10 MG tablet  Commonly known as:  SINGULAIR     omeprazole 20 MG capsule  Commonly known as:  priLOSEC     oxybutynin XL 5 MG 24 hr tablet  Commonly known as:  DITROPAN-XL     traZODone 100 MG tablet  Commonly known as:  DESYREL            Discharge Diet:    Diet Instructions     Regular consistent carb  Diet with 800 ml fluid restriction daily               Discharge Activity:    Activity Instructions     As per PT recommendations               Discharge Appointments:    Your Scheduled Appointments     MS. AMARO HAS AN APPOINTMENT TO SEE SANDRA PETTIT ON: 01- 02 @ 1:30  [phone]    MS. AMARO HAS AN APPOINTMENT TO SEE DR. TITUS ON: 12- 30 @ 1:45  [phone]   ( G.I. )    MS. AMARO HAS AN APPOINTMENT TO SEE DR. ROBERTSON ON: 01- 16 @ 2:00  [phone]  ( cardio )                 Additional Instructions for the Follow-ups that You Need to Schedule     Ambulatory Referral to Home Health   As directed      Face to Face Visit Date:  12/27/2019    Follow-up provider for Plan of Care?:  I treated the patient in an acute care facility and will not continue treatment after discharge.     Follow-up provider:  NIGHAT PETTIT [[id number]]    Reason/Clinical Findings:  cirrhosis, debility, wound care, anemia, anemia    Describe mobility limitations that make leaving home difficult:  due to her debility, patient will endure taxing effort to leaving home or shelter    Nursing/Therapeutic Services Requested:  Skilled Nursing    Skilled nursing orders:  Wound care dressing/changes Cardiopulmonary assessments COPD management    Frequency:  1 Week 1         CBC & Differential    Dec 30, 2019 (Approximate)      Call result to Nighat Pettit    Order Comments:  Call result to Nighat Pettit     Manual Differential:  No         Comprehensive Metabolic Panel    Dec 30, 2019 (Approximate)      Call result to MASSIEL Abad    Order Comments:  Call result to MASSIEL Abad             Contact information for follow-up providers     Nighat Pettit APRN .    Specialty:  Nurse Practitioner  Contact information:  [address] [place] [id number] [phone]                   Contact information for after-discharge care     Home Medical Care     [facility] .    Service:  Home Health Services  Contact information:  [address] [facility] [address] [phone]                             Additional Instructions for the Follow-ups that You Need to Schedule     Ambulatory Referral to Home Health   As directed      Face to Face Visit Date:  12/27/2019    Follow-up provider for Plan of Care?:  I treated the patient in an acute care facility and will not continue treatment after discharge.    Follow-up provider:  NIGHAT PETTIT [[id number]]    Reason/Clinical Findings:  cirrhosis, debility, wound care, anemia, anemia    Describe mobility limitations that make leaving home difficult:  due to her debility, patient will endure taxing effort to leaving home or shelter    Nursing/Therapeutic Services Requested:  Skilled Nursing    Skilled nursing orders:  Wound care  dressing/changes Cardiopulmonary assessments COPD management    Frequency:  1 Week 1         CBC & Differential    Dec 30, 2019 (Approximate)      Call result to Nighat Codi    Order Comments:  Call result to Nighat Codi     Manual Differential:  No         Comprehensive Metabolic Panel    Dec 30, 2019 (Approximate)      Call result to MASSIEL Abad    Order Comments:  Call result to MASSIEL Abad                Condition at :    Stable, much improved with no issues today    Discharge Plan Of Care:    Discussed need for home health nursing care and explained  [facility] is the only home health agency in [facility] who accepts [employer]; pt is agreeable to SS sending referral to them.  Pt says SS can order bedside commode too.  Pt has DME provider preference.  Contacted [facility] [phone] per Nighat with referral, discharge on 12-27-19, plans for pt to go to [facility], and orders for nursing evaluation, wound care to midline coccyx and left gluteal: clean with NS, apply therahoney and mepilex BID, right coccyx: clean with NS, apply Venolex and mepilex BID, left arm skin tear: clean with NS, apply Vaseline gauze, wrap with gauze dressing every three days; will need to be changed on 12-29-19, right calf, right and left heel: apply Venolex BID then open to air, apply mepilex PRN to left abdomen for protection.  Faxed HH referral with orders, face sheet, PT/OT notes.    Labs ordered for Monday with CBC and CMP to be faxed or called to her PCP.          Ravi Alcazar MD  12/27/19  8:44 AM          Please note that this discharge summary required more than 30 minutes to complete.

## 2019-12-27 NOTE — PROGRESS NOTES
Patient Assessment Instrument  Quality Indicators - Discharge    Section GG. Self-Care Performance     Eating: Patient completed the activities by him/herself with no assistance from  a helper.   Oral Hygiene: [place] sets up or cleans up; patient completes activity. [place]  assists only prior to or following the activity.   Toileting Hygiene: : [place] sets up or cleans up; patient completes activity.  [place] assists only prior to or following the activity.   Shower/Bathe Self: [place] provides verbal cues and/or touching/steadying and/or  contact guard assistance as patient completes activity.   Upper Body Dressing: [place] sets up or cleans up; patient completes activity.  [place] assists only prior to or following the activity.   Lower Body Dressing: [place] sets up or cleans up; patient completes activity.  [place] assists only prior to or following the activity.   Putting On/Taking Off Footwear: [place] sets up or cleans up; patient completes  activity. [place] assists only prior to or following the activity.    Section GG. Mobility Performance      Section J. Health Conditions Discharge      Section M. Skin Conditions Discharge      . Current Number of Unhealed Pressure Ulcers      Section N. Medication    Signed by: Anika Gutierrez, Occupational Therapist

## 2019-12-27 NOTE — SIGNIFICANT NOTE
12/27/19 9581   Plan   Plan Spoke to Reyna with [facility] [phone] who says Rehab RN will need to teach pt how to administer wound care and observe her completing wound care then provide documentation before they can provide services.  Informed Reyna about order for CMP, CBC and differential on 12-30-19 then call results to PCP.  Faxed lab orders to  [phone].  Spoke to pt's nurse who will provide teaching and inform SS of outcome.  Pt is willing to do wound care per RN.     Patient/Family in Agreement with Plan yes

## 2019-12-27 NOTE — SIGNIFICANT NOTE
12/27/19 0905   Plan   Plan Contacted [facility] [phone] per Belem about pt's discharge to [facility] and wound care.  Belem says if pt cannot administer her wound care and there is no caregiver to teach how to administer wound care then they may not be able to move forward with referral.  Informed her shelter staff cannot assist with wound care.  Belem will talk to her Manager and call back.  Faxed discharge summary with face to face to  [phone].
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
## 2019-12-27 NOTE — SIGNIFICANT NOTE
12/27/19 1021   Plan   Plan Spoke to pt who states her pain management appointment today has been cancelled and rescheduled to Monday 12-30-19.  Pt says daughter should be able to transport her to appointment.

## 2019-12-27 NOTE — SIGNIFICANT NOTE
12/27/19 1030   Plan   Plan Left message for daughter Lorna [phone].   Patient/Family in Agreement with Plan yes

## 2019-12-27 NOTE — SIGNIFICANT NOTE
12/27/19 1340   PT Discharge Summary   Reason for Discharge Discharge from facility   Outcomes Achieved Able to achieve all goals within established timeline   Discharge Destination other (comment)  (Under care of daughter for transport to homeless shelter)

## 2019-12-27 NOTE — THERAPY DISCHARGE NOTE
Inpatient Rehabilitation - Physical Therapy Treatment Note/Discharge  [facility] [place]     Patient Name: Gretta Giles  : 1960  MRN: [id number]  Today's Date: 2019             Admit Date: 2019    Visit Dx:    ICD-10-CM ICD-9-CM   1. Biliary cirrhosis (CMS/HCC) K74.5 571.6   2. Simple chronic bronchitis (CMS/HCC) J41.0 491.0     Patient Active Problem List   Diagnosis   • Gastroesophageal reflux disease   • Atopic rhinitis   • Chronic low back pain   • Chronic obstructive pulmonary disease (CMS/HCC)   • Chronic obstructive pulmonary disease with acute exacerbation (CMS/HCC)   • Diabetes mellitus (CMS/HCC)   • Diabetic peripheral neuropathy (CMS/HCC)   • Dyslipidemia   • Nonalcoholic fatty liver disease   • Gastrointestinal ulcer due to Helicobacter pylori   • Hypertension   • Menopausal symptom   • Mixed anxiety depressive disorder   • Seasonal allergic rhinitis   • Tobacco dependence syndrome   • Vitamin D deficiency   • Hyperlipidemia   • Anxiety and depression   • Menopause syndrome   • Allergic rhinitis   • Type 2 diabetes mellitus, controlled (CMS/HCC)   • GERD without esophagitis   • Nausea   • Debility   • Liver cirrhosis (CMS/HCC)         PT IRF GOALS     Row Name 19 1300             Bed Mobility Goal 1 (PT-IRF)    Progress/Outcomes (Bed Mobility Goal 1, PT-IRF)  goal met  -LL        User Key  (r) = Recorded By, (t) = Taken By, (c) = Cosigned By    Initials Name Provider Type    LL Alesia Gonzalez PTA Physical Therapy Assistant          Therapy Treatment    IRF Treatment Summary     Row Name 19 1335 19 1210          Evaluation/Treatment Time and Intent    Subjective Information  no complaints  -LL  no complaints agreeable to therapy  -CJ     Existing Precautions/Restrictions  fall ABD draining to colostomy bag, Hep C+  -LL  fall ABD draining to ostomy bag, Hep C, decreased skin integrity  -CJ     Document Type  discharge treatment  -LL  discharge treatment;discharge  evaluation/summary  -CJ     Mode of Treatment  individual therapy;physical therapy  -LL  occupational therapy  -CJ     Patient/Family Observations  Patient discharged this date to care of daughter for transport to homeless shelter.  Education completed regarding home safety, patient safety, HEP, proper use of gait belt, safety issues with use of rollator.  Written materials issued & no questions/concerns voiced.  -  Education completed re:  ADL status, safety, AE, DME, home program, precautions, 24 hr supervision and D/C concerns.  Verbalized understanding.  -CJ     Recorded by [LL] Alesia Gonzalez PTA [CJ] Anika Gutierrez, MIN[facility]     Row Name 12/27/19 1335 12/27/19 1210          Cognition/Psychosocial- PT/OT    Affect/Mental Status (Cognitive)  WFL  -LL  WFL  -CJ     Orientation Status (Cognition)  oriented x 3  -LL  --     Follows Commands (Cognition)  verbal cues/prompting required  -LL  verbal cues/prompting required  -CJ     Personal Safety Interventions  gait belt;nonskid shoes/slippers when out of bed;supervised activity  -  gait belt;nonskid shoes/slippers when out of bed;supervised activity  -CJ     Recorded by [LL] Alesia Gonzalez PTA [CJ] Anika Gutierrez, OT     Row Name 12/27/19 1335             Bed Mobility Assessment/Treatment    Supine-Sit [place] (Bed Mobility)  verbal cues;nonverbal cues (demo/gesture);conditional independence  -LL      Assistive Device (Bed Mobility)  bed rails  -LL      Recorded by [LL] Alesia Gonzalez PTA      Row Name 12/27/19 1335             Transfer Assessment/Treatment    Transfer Assessment/Treatment  car transfer  -LL      Recorded by [LL] Alesia Gonzalez PTA      Row Name 12/27/19 1335             Bed-Chair Transfer    Bed-Chair [place] (Transfers)  nonverbal cues (demo/gesture);verbal cues;supervision;conditional independence  -LL      Assistive Device (Bed-Chair Transfers)  wheelchair  -LL      Recorded by [LL] Alesia Gonzalez PTA      Row Name 12/27/19  1335 12/27/19 1210          Chair-Bed Transfer    Chair-Bed [place] (Transfers)  verbal cues;nonverbal cues (demo/gesture);supervision;conditional independence  -LL  stand by assist;verbal cues  -CJ     Assistive Device (Chair-Bed Transfers)  wheelchair  -LL  wheelchair  -CJ     Recorded by [LL] Alesia Gonzalez PTA [CJ] Anika Gutierrez, OT     Row Name 12/27/19 1335             Sit-Stand Transfer    Sit-Stand [place] (Transfers)  verbal cues;nonverbal cues (demo/gesture);supervision;conditional independence  -LL      Assistive Device (Sit-Stand Transfers)  walker, front-wheeled  -LL      Recorded by [LL] Alesia Gonzalez PTA      Row Name 12/27/19 1335             Stand-Sit Transfer    Stand-Sit [place] (Transfers)  verbal cues;nonverbal cues (demo/gesture);supervision;conditional independence  -LL      Assistive Device (Stand-Sit Transfers)  walker, front-wheeled  -LL      Recorded by [LL] Alesia Gonzalez PTA      Row Name 12/27/19 1335 12/27/19 1210          Toilet Transfer    Type (Toilet Transfer)  stand pivot/stand step  -LL  --     [place] Level (Toilet Transfer)  verbal cues;nonverbal cues (demo/gesture);conditional independence  -LL  stand by assist;verbal cues  -CJ     Assistive Device (Toilet Transfer)  grab bars/safety frame;raised toilet seat  -LL  grab bars/safety frame  -CJ     Recorded by [LL] Alesia Gonzalez PTA [CJ] Anika Gutierrez, OT     Row Name 12/27/19 1335             Car Transfer    Type (Car Transfer)  stand pivot/stand step  -LL      [place] Level (Car Transfer)  conditional independence  -LL      Assistive Device (Car Transfer)  walker, front-wheeled  -LL      Recorded by [LL] Alesia Gonzalez PTA      Row Name 12/27/19 1335             Gait/Stairs Assessment/Training    [place] Level (Gait)  verbal cues;nonverbal cues (demo/gesture);supervision  -LL      Assistive Device (Gait)  other (see comments) rollator  -LL      Distance in Feet (Gait)  500  -LL       Pattern (Gait)  step-through  -LL      Deviations/Abnormal Patterns (Gait)  jason decreased;gait speed decreased;stride length decreased  -LL      Recorded by [LL] Alesia Gonzalez, TANIYA      Row Name 12/27/19 1335             Safety Issues, Functional Mobility    Impairments Affecting Function (Mobility)  balance;endurance/activity tolerance;pain;strength  -LL      Recorded by [LL] Alesia Gonzalez, PTA      Row Name 12/27/19 1210             Bathing Assessment/Treatment    Comment (Bathing)  SBA  -CJ      Recorded by [CJ] Anika Gutierrez, OT      Row Name 12/27/19 1210             Upper Body Dressing Assessment/Treatment    Comment (Upper Body Dressing)  Set up  -CJ      Recorded by [CJ] Anika Gutierrez, OT      Row Name 12/27/19 1210             Lower Body Dressing Assessment/Treatment    Comment (Lower Body Dressing)  SBA  -CJ      Recorded by [CJ] Anika Gutierrez, OT      Row Name 12/27/19 1210             Grooming Assessment/Treatment    Comment (Grooming)  Set up  -CJ      Recorded by [CJ] Anika Gutierrez, OT      Row Name 12/27/19 1210             Toileting Assessment/Treatment    Assistive Device Use (Toileting)  grab bar/safety frame  -CJ      Comment (Toileting)  SBA  -CJ      Recorded by [CJ] Anika Gutierrez, OT      Row Name 12/27/19 1210             Self-Feeding Assessment/Treatment    [place] Level (Self-Feeding)  conditional independence  -CJ      Recorded by [CJ] Anika Gutierrez, OT      Row Name 12/27/19 1210             General ROM    GENERAL ROM COMMENTS  BUE AROM- WFL  -CJ      Recorded by [CJ] Anika Gutierrez, OT      Row Name 12/27/19 1210             MMT (Manual Muscle Testing)    General MMT Comments  BUE - 4 to 4-/5  -CJ      Recorded by [CJ] Anika Gutierrez, OT      Row Name 12/27/19 1210             Vision Assessment/Intervention    Visual Impairment/Limitations  corrective lenses for reading  -CJ      Recorded by [CJ] Anika Gutierrez, OT      Row Name 12/27/19 1210              Upper Extremity Seated Therapeutic Exercise    Device, Seated Upper Extremity (Therapeutic Exercise)  -- dowel ex- wrist rolls X 2, 0 lbs  -      Exercise Type, Seated Upper Extremity (Therapeutic Exercise)  AROM (active range of motion) BUE ther ex/act, bilat coord ex, GMC/FMC  -      Expected Outcomes, Seated Upper Extremity (Therapeutic Exercise)  improve functional tolerance, self-care activity;improve performance, BADLs;improve performance, transfer skills  -      Recorded by [CJ] Anika Gutierrez OT      Row Name 12/27/19 3392             Lower Extremity Seated Therapeutic Exercise    Performed, Seated Lower Extremity (Therapeutic Exercise)  hip flexion/extension;hip abduction/adduction;ankle dorsiflexion/plantarflexion;LAQ (long arc quad), knee extension Ball squeeze, GS  -      Device, Seated Lower Extremity (Therapeutic Exercise)  elastic bands/tubing;small ball;free weights, cuff 2#  -LL      Sets/Reps Detail, Seated Lower Extremity (Therapeutic Exercise)  30 reps  -LL      Recorded by [LL] Alesia Gonzalez PTA      Row Name 12/27/19 9453             Lower Extremity Supine Therapeutic Exercise    Performed, Supine Lower Extremity (Therapeutic Exercise)  hip abduction/adduction;hip external/internal rotation;SAQ (short arc quad) over bolster;SLR (straight leg raise);quadriceps sets;gluteal sets;ankle pumps;heel slides Bridging  -      Device, Supine Lower Extremity (Therapeutic Exercise)  half foam roll;free weights, cuff 2#  -LL      Sets/Reps Detail, Supine Lower Extremity (Therapeutic Exercise)  30  -LL      Recorded by [LL] Alesia Gonzalez PTA      Row Name 12/27/19 2275 12/27/19 1210          Positioning and Restraints    Pre-Treatment Position  in bed  -  --     Post Treatment Position  wheelchair  -  bed  -CJ     In Bed  --  supine;call light within reach;encouraged to call for assist;exit alarm on;heels elevated  -     In Wheelchair  sitting;with OT;legs elevated  -  --      Recorded by [LL] Alesia Gonzalez PTA [CJ] Anika Gutierrez OT       User Key  (r) = Recorded By, (t) = Taken By, (c) = Cosigned By    Initials Name Effective Dates     Anika Gutierrez OT 04/03/18 -     LL Alesia Gonzalez PTA 05/02/16 -           PT Recommendation and Plan  Planned Therapy Interventions (PT Eval): balance training, bed mobility training, gait training, patient/family education, strengthening, transfer training            Time Calculation:   PT Charges     Row Name 12/27/19 1341             Time Calculation    Start Time  0735  -LL      Stop Time  0915  -LL      Time Calculation (min)  100 min  -LL      PT Received On  12/27/19  -LL         Time Calculation- PT    Total Timed Code Minutes- PT  100 minute(s)  -LL        User Key  (r) = Recorded By, (t) = Taken By, (c) = Cosigned By    Initials Name Provider Type    LL Alesia Gonzalez PTA Physical Therapy Assistant          Therapy Charges for Today     Code Description Service Date Service Provider Modifiers Qty    38184327008 HC GAIT TRAINING EA 15 MIN 12/26/2019 Alesia Gonzalez, PTA GP 2    54192824009 HC PT THERAPEUTIC ACT EA 15 MIN 12/26/2019 Alesia Gonzalez, PTA GP 1    88219613985 HC PT THER PROC EA 15 MIN 12/26/2019 Alesia Gonzalez, PTA GP 3    97957260811 HC GAIT TRAINING EA 15 MIN 12/27/2019 Alesia Gonzalez, TANIYA GP 2    55500655281 HC PT THERAPEUTIC ACT EA 15 MIN 12/27/2019 Alesia Gonzalez, PTA GP 1    38964305425 HC PT THER PROC EA 15 MIN 12/27/2019 Alesia Gonzalez, TANIYA GP 4               PT Discharge Summary  Reason for Discharge: Discharge from facility  Outcomes Achieved: Able to achieve all goals within established timeline  Discharge Destination: other (comment)(Under care of daughter for transport to homeless shelter)    Maria Alejandra Gonzalez PTA  12/27/2019

## 2019-12-27 NOTE — SIGNIFICANT NOTE
12/27/19 0854   Plan   Moreno GHOTRA signed statement excusing pt from chores at the shelter.  Spoke to Lucy at [facility] [phone] who says to fax statement to her.  Faxed statement to [phone].

## 2019-12-27 NOTE — SIGNIFICANT NOTE
12/27/19 3532   Plan   Plan Pt's nurse says pt was able to perform all wound care, left arm is scabbed and just open air, therefore, does not need any dressing changes.  Contacted [facility] [phone] per Reyna with this information and she says HH will start care on 12-28-19.   Contacted [facility] [phone] per Lucy about pt's pain management appointment being cancelled today and rescheduled for Monday 12-30-19, therefore, daughter will bring pt to shelter when they leave rehab.

## 2019-12-27 NOTE — PROGRESS NOTES
Patient Assessment Instrument  Quality Indicators - Discharge    Section GG. Self-Care Performance      Section GG. Mobility Performance     Roll Left and Right: Patient completed the activities by him/herself with no  assistance from a helper.   Sit to Lying: Patient completed the activities by him/herself with no  assistance from a helper.   Lying to Sitting on Side of Bed: Patient completed the activities by  him/herself with no assistance from a helper.   Sit to Stand: Dinosaur provides verbal cues and/or touching/steadying and/or  contact guard assistance as patient completes activity. Assistance may be  provided throughout the activity or intermittently.   Chair/Bed to Chair Transfer: Dinosaur provides verbal cues and/or  touching/steadying and/or contact guard assistance as patient completes  activity. Assistance may be provided throughout the activity or intermittently.   Toilet Transfer Patient completed the activities by him/herself with no  assistance from a helper.   Car Transfer: Patient completed the activities by him/herself with no  assistance from a helper.   Walk 10 Feet:   Dinosaur provides verbal cues and/or touching/steadying and/or  contact guard assistance as patient completes activity. Assistance may be  provided throughout the activity or intermittently.  Walk 50 Feet with 2 Turns:   Dinosaur provides verbal cues and/or  touching/steadying and/or contact guard assistance as patient completes  activity. Assistance may be provided throughout the activity or intermittently.  Walk 150 Feet:   Dinosaur provides verbal cues and/or touching/steadying and/or  contact guard assistance as patient completes activity. Assistance may be  provided throughout the activity or intermittently.  Walking 10 Feet on Uneven Surfaces:   Not attempted due to medical or safety  concerns.  1 Step Over Curb or Up/Down Stair:   Not attempted due to medical or safety  concerns.  Picking up an Object:   Not attempted due to  medical or safety concerns. Uses  Wheelchair and/or Scooter: No    Section J. Health Conditions Discharge      Section M. Skin Conditions Discharge      . Current Number of Unhealed Pressure Ulcers      Section N. Medication    Signed by: Alesia Gonzalez PTA

## 2019-12-27 NOTE — PROGRESS NOTES
Occupational Therapy: Individual: 85 minutes.    Physical Therapy:    Speech Language Pathology:    Signed by: Anika Gutierrez, Occupational Therapist

## 2019-12-27 NOTE — THERAPY DISCHARGE NOTE
Inpatient Rehabilitation - Occupational Therapy Treatment Note/Discharge  DIONISIO Mahin     Patient Name: Gretta Giles  : 1960  MRN: [id number]  Today's Date: 2019               Admit Date: 2019    Visit Dx:     ICD-10-CM ICD-9-CM   1. Biliary cirrhosis (CMS/HCC) K74.5 571.6   2. Simple chronic bronchitis (CMS/HCC) J41.0 491.0     Patient Active Problem List   Diagnosis   • Gastroesophageal reflux disease   • Atopic rhinitis   • Chronic low back pain   • Chronic obstructive pulmonary disease (CMS/HCC)   • Chronic obstructive pulmonary disease with acute exacerbation (CMS/HCC)   • Diabetes mellitus (CMS/HCC)   • Diabetic peripheral neuropathy (CMS/HCC)   • Dyslipidemia   • Nonalcoholic fatty liver disease   • Gastrointestinal ulcer due to Helicobacter pylori   • Hypertension   • Menopausal symptom   • Mixed anxiety depressive disorder   • Seasonal allergic rhinitis   • Tobacco dependence syndrome   • Vitamin D deficiency   • Hyperlipidemia   • Anxiety and depression   • Menopause syndrome   • Allergic rhinitis   • Type 2 diabetes mellitus, controlled (CMS/HCC)   • GERD without esophagitis   • Nausea   • Debility   • Liver cirrhosis (CMS/HCC)       Therapy Treatment  IRF Treatment Summary     Row Name 19 1210             Evaluation/Treatment Time and Intent    Subjective Information  no complaints agreeable to therapy  -CJ      Existing Precautions/Restrictions  fall ABD draining to ostomy bag, Hep C, decreased skin integrity  -CJ      Document Type  discharge treatment;discharge evaluation/summary  -CJ      Mode of Treatment  occupational therapy  -CJ      Patient/Family Observations  Education completed re:  ADL status, safety, AE, DME, home program, precautions, supervision and D/C concerns.  Verbalized understanding.  -CJ      Recorded by [CJ] Anika Gutierrez OT      Row Name 19 1210             Cognition/Psychosocial- PT/OT    Affect/Mental Status (Cognitive)  WFL  -CJ      Follows  Commands (Cognition)  verbal cues/prompting required  -CJ      Personal Safety Interventions  gait belt;nonskid shoes/slippers when out of bed;supervised activity  -CJ      Recorded by [CJ] Anika Gutierrez, OT      Row Name 12/27/19 1210             Chair-Bed Transfer    Chair-Bed [place] (Transfers)  stand by assist;verbal cues  -CJ      Assistive Device (Chair-Bed Transfers)  wheelchair  -CJ      Recorded by [CJ] Anika Gutierrez, OT      Row Name 12/27/19 1210             Toilet Transfer    [place] Level (Toilet Transfer)  stand by assist;verbal cues  -CJ      Assistive Device (Toilet Transfer)  grab bars/safety frame  -CJ      Recorded by [CJ] Anika Gutierrez, OT      Row Name 12/27/19 1210             Bathing Assessment/Treatment    Comment (Bathing)  SBA  -CJ      Recorded by [CJ] Anika Gutierrez, OT      Row Name 12/27/19 1210             Upper Body Dressing Assessment/Treatment    Comment (Upper Body Dressing)  Set up  -CJ      Recorded by [CJ] Anika Gutierrez, OT      Row Name 12/27/19 1210             Lower Body Dressing Assessment/Treatment    Comment (Lower Body Dressing)  SBA  -CJ      Recorded by [CJ] Anika Gutierrez, OT      Row Name 12/27/19 1210             Grooming Assessment/Treatment    Comment (Grooming)  Set up  -CJ      Recorded by [CJ] Anika Gutierrez, OT      Row Name 12/27/19 1210             Toileting Assessment/Treatment    Assistive Device Use (Toileting)  grab bar/safety frame  -CJ      Comment (Toileting)  SBA  -CJ      Recorded by [CJ] Anika Gutierrez, OT      Row Name 12/27/19 1210             Self-Feeding Assessment/Treatment    [place] Level (Self-Feeding)  conditional independence  -CJ      Recorded by [CJ] Anika Gutierrez, OT      Row Name 12/27/19 1210             General ROM    GENERAL ROM COMMENTS  BUE AROM- WFL  -CJ      Recorded by [CJ] Anika Gutierrez, OT      Row Name 12/27/19 1210             MMT (Manual Muscle Testing)    General MMT Comments  BUE  - 4 to 4-/5  -CJ      Recorded by [CJ] Anika Gutierrez OT      Row Name 12/27/19 1210             Vision Assessment/Intervention    Visual Impairment/Limitations  corrective lenses for reading  -CJ      Recorded by [CJ] Anika Gutierrez OT      Row Name 12/27/19 1210             Upper Extremity Seated Therapeutic Exercise    Device, Seated Upper Extremity (Therapeutic Exercise)  -- dowel ex- wrist rolls X 2, 0 lbs  -CJ      Exercise Type, Seated Upper Extremity (Therapeutic Exercise)  AROM (active range of motion) BUE ther ex/act, bilat coord ex, GMC/FMC  -CJ      Expected Outcomes, Seated Upper Extremity (Therapeutic Exercise)  improve functional tolerance, self-care activity;improve performance, BADLs;improve performance, transfer skills  -CJ      Recorded by [CJ] Anika Gutierrez OT      Row Name 12/27/19 1210             Positioning and Restraints    Post Treatment Position  bed  -CJ      In Bed  supine;call light within reach;encouraged to call for assist;exit alarm on;heels elevated  -CJ      Recorded by [CJ] Anika Gutierrez OT        User Key  (r) = Recorded By, (t) = Taken By, (c) = Cosigned By    Initials Name Effective Dates    CJ Anika Gutierrez OT 04/03/18 -           Wound 12/16/19 midline coccyx Pressure Injury (Active)   Dressing Appearance dry;intact 12/26/2019  8:11 PM   Closure None 12/26/2019  8:11 PM   Base moist;slough;pink 12/26/2019  8:11 PM   Drainage Amount none 12/26/2019  8:11 PM   Care, Wound sterile normal saline;honey applied 12/26/2019  8:11 PM   Dressing Care, Wound dressing changed 12/26/2019  8:11 PM       Wound 12/16/19 2000 Right coccyx Pressure Injury (Active)   Dressing Appearance dry;intact 12/26/2019  8:11 PM   Closure None 12/26/2019  8:11 PM   Base moist;pink 12/26/2019  8:11 PM   Drainage Amount none 12/26/2019  8:11 PM   Care, Wound cleansed with;sterile normal saline;honey applied 12/26/2019  8:11 PM   Dressing Care, Wound dressing changed 12/26/2019  8:11 PM        Wound 12/16/19 2000 Left posterior heel (Active)   Dressing Appearance open to air 12/26/2019  8:11 PM   Closure None 12/26/2019  8:11 PM   Base non-blanchable;blanchable 12/26/2019  8:11 PM   Drainage Amount none 12/26/2019  8:11 PM       Wound Right posterior heel Pressure Injury (Active)   Dressing Appearance open to air 12/26/2019  8:11 PM   Closure None 12/26/2019  8:11 PM   Base red 12/26/2019  8:11 PM   Drainage Amount none 12/26/2019  8:11 PM       Wound 12/16/19 2000 Right calf Abrasion (Active)   Dressing Appearance open to air 12/26/2019  8:11 PM   Closure None 12/26/2019  8:11 PM   Base non-blanchable 12/26/2019  8:11 PM   Drainage Amount none 12/26/2019  8:11 PM       Wound 12/16/19 2000 Left upper gluteal Pressure Injury (Active)   Dressing Appearance dry;intact 12/26/2019  8:11 PM   Closure None 12/26/2019  8:11 PM   Base non-blanchable 12/26/2019  8:11 PM   Periwound redness 12/26/2019  8:11 PM   Periwound Temperature warm 12/26/2019  8:11 PM   Periwound Skin Turgor soft 12/26/2019  8:11 PM   Edges irregular 12/26/2019  8:11 PM   Drainage Amount none 12/26/2019  8:11 PM   Care, Wound cleansed with;sterile normal saline;honey applied 12/26/2019  8:11 PM   Dressing Care, Wound dressing changed 12/26/2019  8:11 PM       Wound 12/16/19 2000 Left lower gluteal Pressure Injury (Active)   Dressing Appearance dry;intact 12/26/2019  8:11 PM   Closure None 12/26/2019  8:11 PM   Base non-blanchable;red;white 12/26/2019  8:11 PM   Periwound redness 12/26/2019  8:11 PM   Periwound Temperature warm 12/26/2019  8:11 PM   Periwound Skin Turgor soft 12/26/2019  8:11 PM   Edges irregular 12/26/2019  8:11 PM   Drainage Amount none 12/26/2019  8:11 PM   Care, Wound cleansed with;sterile normal saline;honey applied 12/26/2019  8:11 PM   Dressing Care, Wound dressing changed 12/26/2019  8:11 PM       Wound 12/16/19 2000 Bilateral back Abrasion (Active)   Dressing Appearance open to air 12/26/2019  8:11 PM   Closure None  12/26/2019  8:11 PM   Base red 12/26/2019  8:11 PM   Drainage Amount none 12/26/2019  8:11 PM       Wound 12/23/19 1000 Left hand Skin Tear (Active)   Dressing Appearance intact 12/26/2019  8:11 PM   Closure WILLIAN 12/26/2019  8:11 PM   Base dressing in place, unable to visualize 12/26/2019  8:11 PM       Wound 12/24/19 1915 Left lower quadrant (Active)   Dressing Appearance intact 12/26/2019  8:11 PM   Closure None 12/26/2019  8:11 PM   Base pink;dry 12/26/2019  8:11 PM   Drainage Amount none 12/26/2019  8:11 PM   Care, Wound sterile normal saline 12/26/2019  8:11 PM   Dressing Care, Wound dressing changed 12/26/2019  8:11 PM         OT IRF GOALS     Row Name 12/27/19 1222 12/17/19 1429          Bathing Goal 1 (OT-IRF)    [place] Level (Bathing Goal 1, OT-IRF)  --  minimum assist (75% or more patient effort)  -CJ     Time Frame (Bathing Goal 1, OT-IRF)  --  long term goal (LTG);by discharge  -CJ     Progress/Outcomes (Bathing Goal 1, OT-IRF)  goal met  -  --        LB Dressing Goal 1 (OT-IRF)    [place] (LB Dressing Goal 1, OT-IRF)  --  minimum assist (75% or more patient effort)  -CJ     Time Frame (LB Dressing Goal 1, OT-IRF)  --  short term goal (STG)  -CJ     Progress/Outcomes (LB Dressing Goal 1, OT-IRF)  goal met  -  --        LB Dressing Goal 2 (OT-IRF)    [place] (LB Dressing Goal 2, OT-IRF)  --  contact guard assist  -CJ     Time Frame (LB Dressing Goal 2, OT-IRF)  --  long term goal (LTG);by discharge  -CJ     Progress/Outcomes (LB Dressing Goal 2, OT-IRF)  goal met  -  --        Toileting Goal 1 (OT-IRF)    [place] Level (Toileting Goal 1, OT-IRF)  --  minimum assist (75% or more patient effort)  -CJ     Time Frame (Toileting Goal 1, OT-IRF)  --  short term goal (STG)  -CJ     Progress/Outcomes (Toileting Goal 1, OT-IRF)  goal met  -  --        Toileting Goal 2 (OT-IRF)    [place] Level (Toileting Goal 2, OT-IRF)  --  contact guard assist  -CJ     Time Frame (Toileting Goal  2, OT-IRF)  --  long term goal (LTG);by discharge  -     Progress/Outcomes (Toileting Goal 2, OT-IRF)  goal met  -  --       User Key  (r) = Recorded By, (t) = Taken By, (c) = Cosigned By    Initials Name Provider Type    Anika Solis OT Occupational Therapist                OT Recommendation and Plan  Anticipated Equipment Needs At Discharge (OT Eval): (TBD)  Planned Therapy Interventions (OT Eval): activity tolerance training, BADL retraining, adaptive equipment training, occupation/activity based interventions, patient/caregiver education/training, ROM/therapeutic exercise, strengthening exercise, transfer/mobility retraining               Time Calculation:    Time Calculation- OT     Row Name 12/27/19 1222             Time Calculation-     OT Start Time  0915  -      OT Stop Time  1040  -      OT Time Calculation (min)  85 min  -      Total Timed Code Minutes- OT  85 minute(s)  -      OT Non-Billable Time (min)  15 min  -        User Key  (r) = Recorded By, (t) = Taken By, (c) = Cosigned By    Initials Name Provider Type    Anika Solis OT Occupational Therapist          Therapy Charges for Today     Code Description Service Date Service Provider Modifiers Qty    92438740204 HC OT SELF CARE/MGMT/TRAIN EA 15 MIN 12/26/2019 Anika Gutierrez OT GO 2    65930731224 HC OT THERAPEUTIC ACT EA 15 MIN 12/26/2019 Anika Gutierrez OT GO 3    32375915775 HC OT THER PROC EA 15 MIN 12/26/2019 Anika Gutierrez OT GO 2    64624982493 HC OT SELF CARE/MGMT/TRAIN EA 15 MIN 12/27/2019 Anika Gutierrez OT GO 3    96385350320 HC OT THER PROC EA 15 MIN 12/27/2019 Anika Gutierrez OT GO 1    13738788082 HC OT THERAPEUTIC ACT EA 15 MIN 12/27/2019 Anika Gutierrez OT GO 2               OT Discharge Summary  Reason for Discharge: Discharge from facility  Outcomes Achieved: Able to achieve all goals within established timeline    Anika Gutierrez OT  12/27/2019

## 2019-12-27 NOTE — PROGRESS NOTES
Patient Assessment Instrument  Quality Indicators - Discharge    Section GG. Self-Care Performance      Section GG. Mobility Performance      Section J. Health Conditions Discharge  Fall(s) Since Admission:  No    Section M. Skin Conditions Discharge  Unhealed Pressure Ulcer(s)/Injurie(s) at Stage 1 or Higher:  Yes.    . Current Number of Unhealed Pressure Ulcers    Number of Unhealed Stage 1 Pressure Injuries: 0  Number of Unhealed Stage 2: 0  Number of Unhealed Stage 3: 0  Number of Unhealed Stage 4: 0  Number of Unhealed Unstageable Due to Non-removable Dressing/Device: 4  Number of Unhealed Unstageable Pressure Ulcers/Injuries Due to Non-removable  Dressing/Device at Admission: 0  Number of Unhealed Unstageable Due to Slough/Eschar: 4  Number of Unhealed Unstageable Due to Slough/Eschar at Admission: 4  Number of Unhealed Unstageable Injuries Presenting as Deep Tissue Injury: 0    Section N. Medication    Signed by: Nurse Rut

## 2019-12-27 NOTE — SIGNIFICANT NOTE
12/26/19 0845   Plan   Plan Contacted [facility] [phone] per Mariah who says to call back in am to verify bed availability for pt and she says can call her today before 4:00 pm to complete evaluation.  SS reviewed this information with pt and provided phone number for Mariah.  Discussed need for home health nursing care and explained  [facility] is the only home health agency in [facility] who accepts [employer]; pt is agreeable to SS sending referral to them.  Pt says SS can order bedside commode too.  Pt has no DME provider preference.  Contacted [facility] [phone] per Nighat with referral, discharge on 12-27-19, plans for pt to go to [facility], and orders for nursing evaluation, wound care to midline coccyx and left gluteal: clean with NS, apply therahoney and mepilex BID, right coccyx: clean with NS, apply Venolex and mepilex BID, left arm skin tear: clean with NS, apply Vaseline gauze, wrap with gauze dressing every three days; will need to be changed on 12-29-19, right calf, right and left heel: apply Venolex BID then open to air, apply mepilex PRN to left abdomen for protection.  Faxed  referral with orders, face sheet, PT/OT notes, MD progress note, H&P, and labs to  [phone].  HH to be contacted at discharge.     Patient/Family in Agreement with Plan yes

## 2019-12-27 NOTE — SIGNIFICANT NOTE
12/27/19 1423   Plan   Plan Faxed nursing note to [facility] [phone] regarding wound care teaching.

## 2019-12-27 NOTE — NURSING NOTE
Wound care teaching done. Pt verbalized understanding and also demonstrated by performing all wound care her self. Pt states she feels comfortable doing wound care. Dressing removed on left arm revealing a scab. Band aid applied just for protection.

## 2019-12-27 NOTE — DISCHARGE INSTRUCTIONS
Honey and mepilex to coccyx areas twice a day. Venelex to both heels twice a day and lower leg. Outer buttock area honey and mepilex twice a day. Change dressing to right arm skin tear every 3 days and as needed. Clean with normal saline, apply adaptic and gauze.

## 2019-12-27 NOTE — SIGNIFICANT NOTE
12/27/19 0815   Plan   Plan Contacted [facility] [phone] per Lucy who states pt can come to their shelter today and sleep on an inflatable mattress until bed is available; she expects one soon.  Informed her pt has a pain management appointment at 4:00 pm today in [place]; Lucy says pt can come to shelter after appointment and requests pt call them when she is on her way.  Informed Lucy referral was made to [facility] for nursing wound care.  Staff are not allowed to assist with any wound care or dressing changes.  Lucy says pt can bring bedside commode to shelter.  SS informed pt she can go to shelter today after pain management appointment and needs to call shelter when she is on her way.  Pt says her daughter Lorna will come to rehab this afternoon and provide transportation to pain management appointment then to shelter.  Daughter can also pick-up rx's from pharmacy in [place] before she comes to rehab.     Patient/Family in Agreement with Plan yes   Final Discharge Disposition Code 06 - home with home health care

## 2019-12-30 NOTE — PROGRESS NOTES
[facility] Coordinator  Inpatient Rehabilitation Discharge    Mode of Locomotion: Walking.    Discharge Against Medical Advice:  No.  Discharge Information  Patient Discharged Alive:  Yes  Discharge Destination/Living Setting: Home with Home Health Services  Diagnosis for Interruption/Death:    Impairment Group: 16 Debility (non-cardiac, non-pulmonary)    Comorbidities: Rank Code      Description      1    E87.1     Hypo-osmolality and hyponatremia  2    K74.60    Unspecified cirrhosis of liver  3    B19.20    Unspecified viral hepatitis C without hepatic                 coma  4    Z79.4     Long term (current) use of insulin  5    E11.40    Type 2 diabetes mellitus with diabetic                 neuropathy, unspecified  6    F41.9     Anxiety disorder, unspecified  7    F32.9     Major depressive disorder, single episode,                 unspecified  8    J44.9     Chronic obstructive pulmonary disease,                 unspecified  9    L89.150   Pressure ulcer of sacral region, unstageable  10   L89.320   Pressure ulcer of left buttock, unstageable  11   L89.310   Pressure ulcer of right buttock, unstageable    Complications:      DENA Bladder Accidents: 0  - Accidents.  Patient used medications/device this  shift.  12/16/2019 9:51:00 PM  Bladder Score = 6. Patient has not had an accident, but uses a  device/medication.  DENA Bowel Accident: 0  - Accidents.  Bowel Score = 6. Patient has no accidents, but uses a device/medications.    Signed by: Codi Castaneda, Supervisor

## 2019-12-30 NOTE — PROGRESS NOTES
Patient Assessment Instrument  Quality Indicators - Discharge    Section GG. Self-Care Performance      Section GG. Mobility Performance      Section J. Health Conditions Discharge      Section M. Skin Conditions Discharge      . Current Number of Unhealed Pressure Ulcers      Section N. Medication    Medication Intervention: N/A - There were no potential clinically significant  medication issues identified since admission or patient is not taking any  medications.    Signed by: Shonna Finley, Supervisor

## 2020-01-01 ENCOUNTER — NURSING HOME (OUTPATIENT)
Dept: INTERNAL MEDICINE | Facility: CLINIC | Age: 60
End: 2020-01-01

## 2020-01-01 ENCOUNTER — TELEPHONE (OUTPATIENT)
Dept: GASTROENTEROLOGY | Facility: CLINIC | Age: 60
End: 2020-01-01

## 2020-01-01 ENCOUNTER — TRANSCRIBE ORDERS (OUTPATIENT)
Dept: ADMINISTRATIVE | Facility: HOSPITAL | Age: 60
End: 2020-01-01

## 2020-01-01 ENCOUNTER — ANESTHESIA EVENT (OUTPATIENT)
Dept: GASTROENTEROLOGY | Facility: HOSPITAL | Age: 60
End: 2020-01-01

## 2020-01-01 ENCOUNTER — LAB REQUISITION (OUTPATIENT)
Dept: LAB | Facility: HOSPITAL | Age: 60
End: 2020-01-01

## 2020-01-01 ENCOUNTER — HOSPITAL ENCOUNTER (OUTPATIENT)
Dept: ULTRASOUND IMAGING | Facility: HOSPITAL | Age: 60
Discharge: HOME | End: 2020-02-06
Admitting: INTERNAL MEDICINE

## 2020-01-01 ENCOUNTER — ANESTHESIA (OUTPATIENT)
Dept: INTERVENTIONAL RADIOLOGY/VASCULAR | Facility: HOSPITAL | Age: 60
End: 2020-01-01

## 2020-01-01 ENCOUNTER — TELEMEDICINE (OUTPATIENT)
Dept: GASTROENTEROLOGY | Facility: CLINIC | Age: 60
End: 2020-01-01

## 2020-01-01 ENCOUNTER — APPOINTMENT (OUTPATIENT)
Dept: GENERAL RADIOLOGY | Facility: HOSPITAL | Age: 60
End: 2020-01-01

## 2020-01-01 ENCOUNTER — APPOINTMENT (OUTPATIENT)
Dept: LAB | Facility: HOSPITAL | Age: 60
End: 2020-01-01

## 2020-01-01 ENCOUNTER — HOSPITAL ENCOUNTER (OUTPATIENT)
Dept: ULTRASOUND IMAGING | Facility: HOSPITAL | Age: 60
Discharge: HOME | End: 2020-02-21

## 2020-01-01 ENCOUNTER — HOSPITAL ENCOUNTER (OUTPATIENT)
Dept: ULTRASOUND IMAGING | Facility: HOSPITAL | Age: 60
Discharge: HOME | End: 2020-02-21
Admitting: RADIOLOGY

## 2020-01-01 ENCOUNTER — OFFICE VISIT (OUTPATIENT)
Dept: CARDIOLOGY | Facility: CLINIC | Age: 60
End: 2020-01-01

## 2020-01-01 ENCOUNTER — HOSPITAL ENCOUNTER (INPATIENT)
Facility: HOSPITAL | Age: 60
LOS: 5 days | Discharge: NURSING, REHAB OR LONG-TERM CARE | End: 2020-06-11
Attending: INTERNAL MEDICINE | Admitting: INTERNAL MEDICINE

## 2020-01-01 ENCOUNTER — APPOINTMENT (OUTPATIENT)
Dept: CT IMAGING | Facility: HOSPITAL | Age: 60
End: 2020-01-01

## 2020-01-01 ENCOUNTER — HOSPITAL ENCOUNTER (OUTPATIENT)
Dept: ULTRASOUND IMAGING | Facility: HOSPITAL | Age: 60
Discharge: HOME | End: 2020-04-10
Admitting: INTERNAL MEDICINE

## 2020-01-01 ENCOUNTER — APPOINTMENT (OUTPATIENT)
Dept: CARDIOLOGY | Facility: HOSPITAL | Age: 60
End: 2020-01-01

## 2020-01-01 ENCOUNTER — APPOINTMENT (OUTPATIENT)
Dept: ULTRASOUND IMAGING | Facility: HOSPITAL | Age: 60
End: 2020-01-01

## 2020-01-01 ENCOUNTER — HOSPITAL ENCOUNTER (OUTPATIENT)
Dept: ULTRASOUND IMAGING | Facility: HOSPITAL | Age: 60
End: 2020-01-01

## 2020-01-01 ENCOUNTER — ANESTHESIA EVENT (OUTPATIENT)
Dept: INTERVENTIONAL RADIOLOGY/VASCULAR | Facility: HOSPITAL | Age: 60
End: 2020-01-01

## 2020-01-01 ENCOUNTER — OFFICE VISIT (OUTPATIENT)
Dept: GASTROENTEROLOGY | Facility: CLINIC | Age: 60
End: 2020-01-01

## 2020-01-01 ENCOUNTER — HOSPITAL ENCOUNTER (INPATIENT)
Facility: HOSPITAL | Age: 60
LOS: 6 days | Discharge: NURSING, REHAB OR LONG-TERM CARE | End: 2020-06-23
Attending: EMERGENCY MEDICINE | Admitting: INTERNAL MEDICINE

## 2020-01-01 ENCOUNTER — DOCUMENTATION (OUTPATIENT)
Dept: INTERNAL MEDICINE | Facility: HOSPITAL | Age: 60
End: 2020-01-01

## 2020-01-01 ENCOUNTER — TELEPHONE (OUTPATIENT)
Dept: INTERNAL MEDICINE | Facility: CLINIC | Age: 60
End: 2020-01-01

## 2020-01-01 ENCOUNTER — HOSPITAL ENCOUNTER (EMERGENCY)
Facility: HOSPITAL | Age: 60
Discharge: HOME | End: 2020-04-23
Attending: EMERGENCY MEDICINE | Admitting: EMERGENCY MEDICINE

## 2020-01-01 ENCOUNTER — LAB (OUTPATIENT)
Dept: LAB | Facility: HOSPITAL | Age: 60
End: 2020-01-01

## 2020-01-01 ENCOUNTER — HOSPITAL ENCOUNTER (OUTPATIENT)
Dept: ULTRASOUND IMAGING | Facility: HOSPITAL | Age: 60
Discharge: HOME | End: 2020-07-22
Admitting: INTERNAL MEDICINE

## 2020-01-01 ENCOUNTER — APPOINTMENT (OUTPATIENT)
Dept: INTERVENTIONAL RADIOLOGY/VASCULAR | Facility: HOSPITAL | Age: 60
End: 2020-01-01

## 2020-01-01 ENCOUNTER — DOCUMENTATION (OUTPATIENT)
Dept: PHARMACY | Facility: HOSPITAL | Age: 60
End: 2020-01-01

## 2020-01-01 ENCOUNTER — ANESTHESIA (OUTPATIENT)
Dept: GASTROENTEROLOGY | Facility: HOSPITAL | Age: 60
End: 2020-01-01

## 2020-01-01 ENCOUNTER — HOSPITAL ENCOUNTER (INPATIENT)
Facility: HOSPITAL | Age: 60
LOS: 1 days | Discharge: ANOTHER HOSPITAL | End: 2020-06-05
Attending: EMERGENCY MEDICINE | Admitting: INTERNAL MEDICINE

## 2020-01-01 ENCOUNTER — HOSPITAL ENCOUNTER (OUTPATIENT)
Dept: ULTRASOUND IMAGING | Facility: HOSPITAL | Age: 60
Discharge: HOME | End: 2020-01-20
Admitting: INTERNAL MEDICINE

## 2020-01-01 VITALS
OXYGEN SATURATION: 95 % | WEIGHT: 136.3 LBS | TEMPERATURE: 96.9 F | RESPIRATION RATE: 18 BRPM | HEART RATE: 75 BPM | DIASTOLIC BLOOD PRESSURE: 69 MMHG | SYSTOLIC BLOOD PRESSURE: 123 MMHG | BODY MASS INDEX: 23.27 KG/M2 | HEIGHT: 64 IN

## 2020-01-01 VITALS
SYSTOLIC BLOOD PRESSURE: 108 MMHG | RESPIRATION RATE: 18 BRPM | HEART RATE: 72 BPM | DIASTOLIC BLOOD PRESSURE: 76 MMHG | TEMPERATURE: 97.8 F | OXYGEN SATURATION: 95 %

## 2020-01-01 VITALS
WEIGHT: 123.2 LBS | HEIGHT: 63 IN | RESPIRATION RATE: 18 BRPM | TEMPERATURE: 97.8 F | HEART RATE: 82 BPM | BODY MASS INDEX: 21.83 KG/M2 | OXYGEN SATURATION: 98 % | SYSTOLIC BLOOD PRESSURE: 102 MMHG | DIASTOLIC BLOOD PRESSURE: 70 MMHG

## 2020-01-01 VITALS
HEART RATE: 78 BPM | SYSTOLIC BLOOD PRESSURE: 136 MMHG | RESPIRATION RATE: 18 BRPM | BODY MASS INDEX: 20.48 KG/M2 | OXYGEN SATURATION: 98 % | DIASTOLIC BLOOD PRESSURE: 84 MMHG | WEIGHT: 115.6 LBS | TEMPERATURE: 97.8 F

## 2020-01-01 VITALS
WEIGHT: 159.9 LBS | BODY MASS INDEX: 27.3 KG/M2 | DIASTOLIC BLOOD PRESSURE: 72 MMHG | SYSTOLIC BLOOD PRESSURE: 120 MMHG | TEMPERATURE: 98.6 F | HEART RATE: 77 BPM | OXYGEN SATURATION: 94 % | HEIGHT: 64 IN | RESPIRATION RATE: 20 BRPM

## 2020-01-01 VITALS
TEMPERATURE: 98 F | RESPIRATION RATE: 18 BRPM | OXYGEN SATURATION: 98 % | HEIGHT: 64 IN | BODY MASS INDEX: 23.22 KG/M2 | SYSTOLIC BLOOD PRESSURE: 110 MMHG | DIASTOLIC BLOOD PRESSURE: 62 MMHG | WEIGHT: 136 LBS | HEART RATE: 57 BPM

## 2020-01-01 VITALS
DIASTOLIC BLOOD PRESSURE: 56 MMHG | SYSTOLIC BLOOD PRESSURE: 95 MMHG | RESPIRATION RATE: 16 BRPM | TEMPERATURE: 97.7 F | HEART RATE: 78 BPM | OXYGEN SATURATION: 100 %

## 2020-01-01 VITALS
SYSTOLIC BLOOD PRESSURE: 117 MMHG | WEIGHT: 119.3 LBS | HEART RATE: 80 BPM | RESPIRATION RATE: 16 BRPM | HEIGHT: 63 IN | BODY MASS INDEX: 21.14 KG/M2 | TEMPERATURE: 98.4 F | DIASTOLIC BLOOD PRESSURE: 73 MMHG | OXYGEN SATURATION: 93 %

## 2020-01-01 VITALS
WEIGHT: 117.2 LBS | TEMPERATURE: 97.4 F | DIASTOLIC BLOOD PRESSURE: 74 MMHG | BODY MASS INDEX: 20.76 KG/M2 | HEART RATE: 83 BPM | SYSTOLIC BLOOD PRESSURE: 102 MMHG | RESPIRATION RATE: 20 BRPM | OXYGEN SATURATION: 98 %

## 2020-01-01 VITALS
BODY MASS INDEX: 18.73 KG/M2 | OXYGEN SATURATION: 96 % | RESPIRATION RATE: 18 BRPM | WEIGHT: 109.1 LBS | TEMPERATURE: 98.5 F | DIASTOLIC BLOOD PRESSURE: 67 MMHG | SYSTOLIC BLOOD PRESSURE: 95 MMHG | HEART RATE: 66 BPM

## 2020-01-01 VITALS
OXYGEN SATURATION: 93 % | HEART RATE: 74 BPM | SYSTOLIC BLOOD PRESSURE: 114 MMHG | DIASTOLIC BLOOD PRESSURE: 80 MMHG | RESPIRATION RATE: 20 BRPM | TEMPERATURE: 98.1 F | BODY MASS INDEX: 20.53 KG/M2 | WEIGHT: 115.9 LBS

## 2020-01-01 VITALS
RESPIRATION RATE: 18 BRPM | HEART RATE: 87 BPM | DIASTOLIC BLOOD PRESSURE: 61 MMHG | SYSTOLIC BLOOD PRESSURE: 91 MMHG | OXYGEN SATURATION: 96 % | TEMPERATURE: 97.5 F

## 2020-01-01 VITALS
WEIGHT: 110.8 LBS | OXYGEN SATURATION: 98 % | TEMPERATURE: 97.9 F | RESPIRATION RATE: 20 BRPM | SYSTOLIC BLOOD PRESSURE: 110 MMHG | BODY MASS INDEX: 19.63 KG/M2 | DIASTOLIC BLOOD PRESSURE: 74 MMHG | HEART RATE: 80 BPM

## 2020-01-01 VITALS
OXYGEN SATURATION: 97 % | RESPIRATION RATE: 18 BRPM | DIASTOLIC BLOOD PRESSURE: 60 MMHG | SYSTOLIC BLOOD PRESSURE: 134 MMHG | HEART RATE: 61 BPM | TEMPERATURE: 98.3 F

## 2020-01-01 VITALS
OXYGEN SATURATION: 97 % | BODY MASS INDEX: 20.49 KG/M2 | WEIGHT: 120 LBS | TEMPERATURE: 98.6 F | SYSTOLIC BLOOD PRESSURE: 93 MMHG | DIASTOLIC BLOOD PRESSURE: 62 MMHG | RESPIRATION RATE: 18 BRPM | HEIGHT: 64 IN | HEART RATE: 57 BPM

## 2020-01-01 VITALS
SYSTOLIC BLOOD PRESSURE: 118 MMHG | OXYGEN SATURATION: 98 % | TEMPERATURE: 98.1 F | HEART RATE: 77 BPM | RESPIRATION RATE: 18 BRPM | DIASTOLIC BLOOD PRESSURE: 70 MMHG

## 2020-01-01 VITALS
SYSTOLIC BLOOD PRESSURE: 132 MMHG | DIASTOLIC BLOOD PRESSURE: 84 MMHG | RESPIRATION RATE: 20 BRPM | HEART RATE: 98 BPM | TEMPERATURE: 98.1 F | OXYGEN SATURATION: 97 %

## 2020-01-01 VITALS
HEIGHT: 64 IN | WEIGHT: 130 LBS | DIASTOLIC BLOOD PRESSURE: 64 MMHG | BODY MASS INDEX: 22.2 KG/M2 | HEART RATE: 67 BPM | SYSTOLIC BLOOD PRESSURE: 110 MMHG

## 2020-01-01 DIAGNOSIS — K70.31 ALCOHOLIC CIRRHOSIS OF LIVER WITH ASCITES (HCC): Primary | ICD-10-CM

## 2020-01-01 DIAGNOSIS — D64.9 NORMOCYTIC ANEMIA: ICD-10-CM

## 2020-01-01 DIAGNOSIS — B19.20 HEPATITIS C VIRUS INFECTION WITHOUT HEPATIC COMA, UNSPECIFIED CHRONICITY: ICD-10-CM

## 2020-01-01 DIAGNOSIS — Z79.4 TYPE 2 DIABETES MELLITUS WITH HYPERGLYCEMIA, WITH LONG-TERM CURRENT USE OF INSULIN (HCC): ICD-10-CM

## 2020-01-01 DIAGNOSIS — I45.9 HEART BLOCK: ICD-10-CM

## 2020-01-01 DIAGNOSIS — M54.40 CHRONIC LOW BACK PAIN WITH SCIATICA, SCIATICA LATERALITY UNSPECIFIED, UNSPECIFIED BACK PAIN LATERALITY: ICD-10-CM

## 2020-01-01 DIAGNOSIS — E11.42 TYPE 2 DIABETES MELLITUS WITH DIABETIC POLYNEUROPATHY, WITH LONG-TERM CURRENT USE OF INSULIN (HCC): ICD-10-CM

## 2020-01-01 DIAGNOSIS — R79.89 ELEVATED LFTS: ICD-10-CM

## 2020-01-01 DIAGNOSIS — R40.0 SOMNOLENCE: ICD-10-CM

## 2020-01-01 DIAGNOSIS — K74.69 OTHER CIRRHOSIS OF LIVER (HCC): Primary | ICD-10-CM

## 2020-01-01 DIAGNOSIS — B18.2 CHRONIC HEPATITIS C VIRUS INFECTION WITH CIRRHOSIS (HCC): Primary | ICD-10-CM

## 2020-01-01 DIAGNOSIS — F32.A ANXIETY AND DEPRESSION: ICD-10-CM

## 2020-01-01 DIAGNOSIS — R25.1 TREMOR: ICD-10-CM

## 2020-01-01 DIAGNOSIS — I85.10 SECONDARY ESOPHAGEAL VARICES WITHOUT BLEEDING (HCC): ICD-10-CM

## 2020-01-01 DIAGNOSIS — M54.50 CHRONIC LOW BACK PAIN, UNSPECIFIED BACK PAIN LATERALITY, UNSPECIFIED WHETHER SCIATICA PRESENT: ICD-10-CM

## 2020-01-01 DIAGNOSIS — B37.31 VAGINAL CANDIDA: Primary | ICD-10-CM

## 2020-01-01 DIAGNOSIS — D64.9 ANEMIA, UNSPECIFIED TYPE: ICD-10-CM

## 2020-01-01 DIAGNOSIS — R18.8 OTHER ASCITES: ICD-10-CM

## 2020-01-01 DIAGNOSIS — K74.60 CHRONIC HEPATITIS C VIRUS INFECTION WITH CIRRHOSIS (HCC): Primary | ICD-10-CM

## 2020-01-01 DIAGNOSIS — J41.0 SIMPLE CHRONIC BRONCHITIS (HCC): ICD-10-CM

## 2020-01-01 DIAGNOSIS — N28.9 RENAL INSUFFICIENCY: ICD-10-CM

## 2020-01-01 DIAGNOSIS — Z00.00 ROUTINE GENERAL MEDICAL EXAMINATION AT A HEALTH CARE FACILITY: ICD-10-CM

## 2020-01-01 DIAGNOSIS — Z79.4 TYPE 2 DIABETES MELLITUS WITH DIABETIC POLYNEUROPATHY, WITH LONG-TERM CURRENT USE OF INSULIN (HCC): ICD-10-CM

## 2020-01-01 DIAGNOSIS — B18.2 CHRONIC HEPATITIS C VIRUS INFECTION WITH CIRRHOSIS (HCC): ICD-10-CM

## 2020-01-01 DIAGNOSIS — K76.9 LIVER DISEASE: Primary | ICD-10-CM

## 2020-01-01 DIAGNOSIS — R53.1 GENERALIZED WEAKNESS: Primary | ICD-10-CM

## 2020-01-01 DIAGNOSIS — K76.9 LIVER DISEASE: ICD-10-CM

## 2020-01-01 DIAGNOSIS — K74.60 CHRONIC HEPATITIS C VIRUS INFECTION WITH CIRRHOSIS (HCC): ICD-10-CM

## 2020-01-01 DIAGNOSIS — R00.1 BRADYCARDIA: ICD-10-CM

## 2020-01-01 DIAGNOSIS — K74.60 CIRRHOSIS OF LIVER WITH ASCITES, UNSPECIFIED HEPATIC CIRRHOSIS TYPE (HCC): Primary | ICD-10-CM

## 2020-01-01 DIAGNOSIS — Z51.5 HOSPICE CARE PATIENT: ICD-10-CM

## 2020-01-01 DIAGNOSIS — G89.29 CHRONIC LOW BACK PAIN, UNSPECIFIED BACK PAIN LATERALITY, UNSPECIFIED WHETHER SCIATICA PRESENT: ICD-10-CM

## 2020-01-01 DIAGNOSIS — G89.29 CHRONIC LOW BACK PAIN WITH SCIATICA, SCIATICA LATERALITY UNSPECIFIED, UNSPECIFIED BACK PAIN LATERALITY: ICD-10-CM

## 2020-01-01 DIAGNOSIS — E87.1 HYPONATREMIA: ICD-10-CM

## 2020-01-01 DIAGNOSIS — E44.0 MODERATE PROTEIN-CALORIE MALNUTRITION (HCC): ICD-10-CM

## 2020-01-01 DIAGNOSIS — R18.8 OTHER ASCITES: Primary | ICD-10-CM

## 2020-01-01 DIAGNOSIS — I10 HYPERTENSION, UNSPECIFIED TYPE: ICD-10-CM

## 2020-01-01 DIAGNOSIS — Z79.4 TYPE 2 DIABETES MELLITUS WITH DIABETIC POLYNEUROPATHY, WITH LONG-TERM CURRENT USE OF INSULIN (HCC): Primary | ICD-10-CM

## 2020-01-01 DIAGNOSIS — N39.0 URINARY TRACT INFECTION WITHOUT HEMATURIA, SITE UNSPECIFIED: ICD-10-CM

## 2020-01-01 DIAGNOSIS — R73.9 HYPERGLYCEMIA: ICD-10-CM

## 2020-01-01 DIAGNOSIS — Z12.11 ENCOUNTER FOR SCREENING FOR MALIGNANT NEOPLASM OF COLON: ICD-10-CM

## 2020-01-01 DIAGNOSIS — Z11.59 ENCOUNTER FOR SCREENING FOR OTHER VIRAL DISEASES: ICD-10-CM

## 2020-01-01 DIAGNOSIS — G47.00 INSOMNIA, UNSPECIFIED TYPE: ICD-10-CM

## 2020-01-01 DIAGNOSIS — E11.65 TYPE 2 DIABETES MELLITUS WITH HYPERGLYCEMIA, WITH LONG-TERM CURRENT USE OF INSULIN (HCC): ICD-10-CM

## 2020-01-01 DIAGNOSIS — N39.0 URINARY TRACT INFECTION WITHOUT HEMATURIA, SITE UNSPECIFIED: Primary | ICD-10-CM

## 2020-01-01 DIAGNOSIS — D64.9 ANEMIA, UNSPECIFIED TYPE: Primary | ICD-10-CM

## 2020-01-01 DIAGNOSIS — Z74.09 IMPAIRED FUNCTIONAL MOBILITY, BALANCE, GAIT, AND ENDURANCE: ICD-10-CM

## 2020-01-01 DIAGNOSIS — K92.2 UGI BLEED: ICD-10-CM

## 2020-01-01 DIAGNOSIS — G89.29 CHRONIC BACK PAIN, UNSPECIFIED BACK LOCATION, UNSPECIFIED BACK PAIN LATERALITY: ICD-10-CM

## 2020-01-01 DIAGNOSIS — K31.89 PORTAL HYPERTENSIVE GASTROPATHY (HCC): ICD-10-CM

## 2020-01-01 DIAGNOSIS — K76.6 PORTAL HYPERTENSIVE GASTROPATHY (HCC): ICD-10-CM

## 2020-01-01 DIAGNOSIS — R77.8 ELEVATED TROPONIN: ICD-10-CM

## 2020-01-01 DIAGNOSIS — E87.20 LACTIC ACIDOSIS: ICD-10-CM

## 2020-01-01 DIAGNOSIS — K92.0 HEMATEMESIS WITH NAUSEA: ICD-10-CM

## 2020-01-01 DIAGNOSIS — R18.8 CIRRHOSIS OF LIVER WITH ASCITES, UNSPECIFIED HEPATIC CIRRHOSIS TYPE (HCC): Primary | ICD-10-CM

## 2020-01-01 DIAGNOSIS — N30.00 ACUTE CYSTITIS WITHOUT HEMATURIA: ICD-10-CM

## 2020-01-01 DIAGNOSIS — K59.00 CONSTIPATION, UNSPECIFIED CONSTIPATION TYPE: ICD-10-CM

## 2020-01-01 DIAGNOSIS — J18.9 PNEUMONIA DUE TO INFECTIOUS ORGANISM, UNSPECIFIED LATERALITY, UNSPECIFIED PART OF LUNG: ICD-10-CM

## 2020-01-01 DIAGNOSIS — M54.9 CHRONIC BACK PAIN, UNSPECIFIED BACK LOCATION, UNSPECIFIED BACK PAIN LATERALITY: ICD-10-CM

## 2020-01-01 DIAGNOSIS — B19.20 HEPATITIS C VIRUS INFECTION WITHOUT HEPATIC COMA, UNSPECIFIED CHRONICITY: Primary | ICD-10-CM

## 2020-01-01 DIAGNOSIS — K76.6 PORTAL HYPERTENSION (HCC): ICD-10-CM

## 2020-01-01 DIAGNOSIS — K21.9 GASTROESOPHAGEAL REFLUX DISEASE WITHOUT ESOPHAGITIS: ICD-10-CM

## 2020-01-01 DIAGNOSIS — J41.1 MUCOPURULENT CHRONIC BRONCHITIS (HCC): ICD-10-CM

## 2020-01-01 DIAGNOSIS — Z87.19 HISTORY OF CIRRHOSIS: ICD-10-CM

## 2020-01-01 DIAGNOSIS — E87.1 CHRONIC HYPONATREMIA: ICD-10-CM

## 2020-01-01 DIAGNOSIS — I48.91 ATRIAL FIBRILLATION, UNSPECIFIED TYPE (HCC): Primary | ICD-10-CM

## 2020-01-01 DIAGNOSIS — I44.2 COMPLETE HEART BLOCK (HCC): Primary | ICD-10-CM

## 2020-01-01 DIAGNOSIS — R18.8 ASCITES: ICD-10-CM

## 2020-01-01 DIAGNOSIS — F41.9 ANXIETY AND DEPRESSION: ICD-10-CM

## 2020-01-01 DIAGNOSIS — F41.8 MIXED ANXIETY DEPRESSIVE DISORDER: ICD-10-CM

## 2020-01-01 DIAGNOSIS — F17.200 TOBACCO DEPENDENCE SYNDROME: ICD-10-CM

## 2020-01-01 DIAGNOSIS — F41.8 MIXED ANXIETY DEPRESSIVE DISORDER: Primary | ICD-10-CM

## 2020-01-01 DIAGNOSIS — K92.2 UGI BLEED: Primary | ICD-10-CM

## 2020-01-01 DIAGNOSIS — D62 ACUTE BLOOD LOSS ANEMIA: ICD-10-CM

## 2020-01-01 DIAGNOSIS — B18.2 CHRONIC HEPATITIS C WITHOUT HEPATIC COMA (HCC): ICD-10-CM

## 2020-01-01 DIAGNOSIS — Z76.89 ENCOUNTER FOR EVALUATION OF ASCITES: Primary | ICD-10-CM

## 2020-01-01 DIAGNOSIS — E11.42 TYPE 2 DIABETES MELLITUS WITH DIABETIC POLYNEUROPATHY, WITH LONG-TERM CURRENT USE OF INSULIN (HCC): Primary | ICD-10-CM

## 2020-01-01 DIAGNOSIS — R79.89 ELEVATED SERUM CREATININE: ICD-10-CM

## 2020-01-01 LAB
A-A DO2: 184.9 MMHG
ABO GROUP BLD: NORMAL
ABO GROUP BLD: NORMAL
ALBUMIN FLD-MCNC: 0.5 G/DL
ALBUMIN SERPL-MCNC: 2.2 G/DL (ref 3.5–5.2)
ALBUMIN SERPL-MCNC: 2.3 G/DL (ref 3.5–5.2)
ALBUMIN SERPL-MCNC: 2.4 G/DL (ref 3.5–5.2)
ALBUMIN SERPL-MCNC: 2.4 G/DL (ref 3.5–5.2)
ALBUMIN SERPL-MCNC: 2.5 G/DL (ref 3.5–5.2)
ALBUMIN SERPL-MCNC: 2.6 G/DL (ref 3.5–5.2)
ALBUMIN SERPL-MCNC: 2.8 G/DL (ref 3.5–5.2)
ALBUMIN/GLOB SERPL: 0.4 G/DL
ALBUMIN/GLOB SERPL: 0.6 G/DL
ALBUMIN/GLOB SERPL: 0.7 G/DL
ALBUMIN/GLOB SERPL: 0.8 G/DL
ALBUMIN/GLOB SERPL: 0.9 G/DL
ALP SERPL-CCNC: 104 U/L (ref 39–117)
ALP SERPL-CCNC: 108 U/L (ref 39–117)
ALP SERPL-CCNC: 131 U/L (ref 39–117)
ALP SERPL-CCNC: 134 U/L (ref 39–117)
ALP SERPL-CCNC: 143 U/L (ref 39–117)
ALP SERPL-CCNC: 144 U/L (ref 39–117)
ALP SERPL-CCNC: 155 U/L (ref 39–117)
ALP SERPL-CCNC: 169 U/L (ref 39–117)
ALP SERPL-CCNC: 209 U/L (ref 39–117)
ALP SERPL-CCNC: 381 U/L (ref 39–117)
ALT SERPL W P-5'-P-CCNC: 102 U/L (ref 1–33)
ALT SERPL W P-5'-P-CCNC: 11 U/L (ref 1–33)
ALT SERPL W P-5'-P-CCNC: 12 U/L (ref 1–33)
ALT SERPL W P-5'-P-CCNC: 13 U/L (ref 1–33)
ALT SERPL W P-5'-P-CCNC: 13 U/L (ref 1–33)
ALT SERPL W P-5'-P-CCNC: 53 U/L (ref 1–33)
ALT SERPL W P-5'-P-CCNC: 6 U/L (ref 1–33)
ALT SERPL W P-5'-P-CCNC: 79 U/L (ref 1–33)
ALT SERPL W P-5'-P-CCNC: 8 U/L (ref 1–33)
ALT SERPL W P-5'-P-CCNC: 9 U/L (ref 1–33)
AMMONIA BLD-SCNC: 32 UMOL/L (ref 11–51)
AMMONIA BLD-SCNC: 45 UMOL/L (ref 11–51)
ANION GAP SERPL CALCULATED.3IONS-SCNC: 10 MMOL/L (ref 5–15)
ANION GAP SERPL CALCULATED.3IONS-SCNC: 10.3 MMOL/L (ref 5–15)
ANION GAP SERPL CALCULATED.3IONS-SCNC: 11 MMOL/L (ref 5–15)
ANION GAP SERPL CALCULATED.3IONS-SCNC: 11 MMOL/L (ref 5–15)
ANION GAP SERPL CALCULATED.3IONS-SCNC: 13 MMOL/L (ref 5–15)
ANION GAP SERPL CALCULATED.3IONS-SCNC: 13 MMOL/L (ref 5–15)
ANION GAP SERPL CALCULATED.3IONS-SCNC: 15 MMOL/L (ref 5–15)
ANION GAP SERPL CALCULATED.3IONS-SCNC: 16.3 MMOL/L (ref 5–15)
ANION GAP SERPL CALCULATED.3IONS-SCNC: 4 MMOL/L (ref 5–15)
ANION GAP SERPL CALCULATED.3IONS-SCNC: 7 MMOL/L (ref 5–15)
ANION GAP SERPL CALCULATED.3IONS-SCNC: 7.7 MMOL/L (ref 5–15)
ANION GAP SERPL CALCULATED.3IONS-SCNC: 8 MMOL/L (ref 5–15)
APPEARANCE FLD: CLEAR
APTT PPP: 32.1 SECONDS (ref 24.5–37.2)
APTT PPP: 34.5 SECONDS (ref 24.5–37.2)
ARTERIAL PATENCY WRIST A: ABNORMAL
AST SERPL-CCNC: 117 U/L (ref 1–32)
AST SERPL-CCNC: 14 U/L (ref 1–32)
AST SERPL-CCNC: 18 U/L (ref 1–32)
AST SERPL-CCNC: 197 U/L (ref 1–32)
AST SERPL-CCNC: 20 U/L (ref 1–32)
AST SERPL-CCNC: 23 U/L (ref 1–32)
AST SERPL-CCNC: 25 U/L (ref 1–32)
AST SERPL-CCNC: 26 U/L (ref 1–32)
AST SERPL-CCNC: 27 U/L (ref 1–32)
AST SERPL-CCNC: 81 U/L (ref 1–32)
ATMOSPHERIC PRESS: 733 MMHG
BACTERIA BLD CULT: ABNORMAL
BACTERIA FLD CULT: NORMAL
BACTERIA SPEC AEROBE CULT: ABNORMAL
BACTERIA SPEC AEROBE CULT: NORMAL
BACTERIA SPEC RESP CULT: NORMAL
BACTERIA UR QL AUTO: ABNORMAL /HPF
BACTERIA UR QL AUTO: ABNORMAL /HPF
BASE EXCESS BLDA CALC-SCNC: -12 MMOL/L (ref 0–2)
BASOPHILS # BLD AUTO: 0.11 10*3/MM3 (ref 0–0.2)
BASOPHILS # BLD AUTO: 0.13 10*3/MM3 (ref 0–0.2)
BASOPHILS # BLD AUTO: 0.17 10*3/MM3 (ref 0–0.2)
BASOPHILS # BLD AUTO: 0.18 10*3/MM3 (ref 0–0.2)
BASOPHILS # BLD AUTO: 0.32 10*3/MM3 (ref 0–0.2)
BASOPHILS NFR BLD AUTO: 0.6 % (ref 0–1.5)
BASOPHILS NFR BLD AUTO: 0.7 % (ref 0–1.5)
BASOPHILS NFR BLD AUTO: 0.8 % (ref 0–1.5)
BASOPHILS NFR BLD AUTO: 1.3 % (ref 0–1.5)
BASOPHILS NFR BLD AUTO: 1.4 % (ref 0–1.5)
BASOPHILS NFR BLD AUTO: 1.4 % (ref 0–1.5)
BASOPHILS NFR BLD AUTO: 1.9 % (ref 0–1.5)
BDY SITE: ABNORMAL
BH BB BLOOD EXPIRATION DATE: NORMAL
BH BB BLOOD TYPE BARCODE: 6200
BH BB DISPENSE STATUS: NORMAL
BH BB PRODUCT CODE: NORMAL
BH BB UNIT NUMBER: NORMAL
BH CV ECHO MEAS - AO MAX PG (FULL): 1.8 MMHG
BH CV ECHO MEAS - AO MAX PG: 7 MMHG
BH CV ECHO MEAS - AO MEAN PG (FULL): 2 MMHG
BH CV ECHO MEAS - AO MEAN PG: 4 MMHG
BH CV ECHO MEAS - AO ROOT AREA (BSA CORRECTED): 1.6
BH CV ECHO MEAS - AO ROOT AREA: 5.3 CM^2
BH CV ECHO MEAS - AO ROOT DIAM: 2.6 CM
BH CV ECHO MEAS - AO V2 MAX: 135 CM/SEC
BH CV ECHO MEAS - AO V2 MEAN: 86.4 CM/SEC
BH CV ECHO MEAS - AO V2 VTI: 32.4 CM
BH CV ECHO MEAS - ASC AORTA: 2.2 CM
BH CV ECHO MEAS - AVA(I,A): 1.9 CM^2
BH CV ECHO MEAS - AVA(I,D): 1.9 CM^2
BH CV ECHO MEAS - AVA(V,A): 1.9 CM^2
BH CV ECHO MEAS - AVA(V,D): 1.9 CM^2
BH CV ECHO MEAS - BSA(HAYCOCK): 1.7 M^2
BH CV ECHO MEAS - BSA: 1.6 M^2
BH CV ECHO MEAS - BZI_BMI: 23 KILOGRAMS/M^2
BH CV ECHO MEAS - BZI_METRIC_HEIGHT: 162.6 CM
BH CV ECHO MEAS - BZI_METRIC_WEIGHT: 60.8 KG
BH CV ECHO MEAS - EDV(CUBED): 35.9 ML
BH CV ECHO MEAS - EDV(MOD-SP2): 45.5 ML
BH CV ECHO MEAS - EDV(MOD-SP4): 61.6 ML
BH CV ECHO MEAS - EDV(TEICH): 44.1 ML
BH CV ECHO MEAS - EF(CUBED): 83.8 %
BH CV ECHO MEAS - EF(MOD-SP2): 63.3 %
BH CV ECHO MEAS - EF(MOD-SP4): 66.9 %
BH CV ECHO MEAS - EF(TEICH): 78 %
BH CV ECHO MEAS - EF{MOD-BP}: 65 %
BH CV ECHO MEAS - ESV(CUBED): 5.8 ML
BH CV ECHO MEAS - ESV(MOD-SP2): 16.7 ML
BH CV ECHO MEAS - ESV(MOD-SP4): 20.4 ML
BH CV ECHO MEAS - ESV(TEICH): 9.7 ML
BH CV ECHO MEAS - FS: 45.5 %
BH CV ECHO MEAS - IVS/LVPW: 1.3
BH CV ECHO MEAS - IVSD: 0.9 CM
BH CV ECHO MEAS - LA DIMENSION: 4.1 CM
BH CV ECHO MEAS - LA/AO: 1.6
BH CV ECHO MEAS - LAD MAJOR: 5.1 CM
BH CV ECHO MEAS - LAT PEAK E' VEL: 7.5 CM/SEC
BH CV ECHO MEAS - LATERAL E/E' RATIO: 13.7
BH CV ECHO MEAS - LV DIASTOLIC VOL/BSA (35-75): 37.3 ML/M^2
BH CV ECHO MEAS - LV MASS(C)D: 68.6 GRAMS
BH CV ECHO MEAS - LV MASS(C)DI: 41.6 GRAMS/M^2
BH CV ECHO MEAS - LV MAX PG: 5.2 MMHG
BH CV ECHO MEAS - LV MEAN PG: 2 MMHG
BH CV ECHO MEAS - LV SYSTOLIC VOL/BSA (12-30): 12.4 ML/M^2
BH CV ECHO MEAS - LV V1 MAX: 114 CM/SEC
BH CV ECHO MEAS - LV V1 MEAN: 65.6 CM/SEC
BH CV ECHO MEAS - LV V1 VTI: 27.2 CM
BH CV ECHO MEAS - LVIDD: 3.3 CM
BH CV ECHO MEAS - LVIDS: 1.8 CM
BH CV ECHO MEAS - LVLD AP2: 6 CM
BH CV ECHO MEAS - LVLD AP4: 6.3 CM
BH CV ECHO MEAS - LVLS AP2: 5.3 CM
BH CV ECHO MEAS - LVLS AP4: 4.9 CM
BH CV ECHO MEAS - LVOT AREA (M): 2.3 CM^2
BH CV ECHO MEAS - LVOT AREA: 2.3 CM^2
BH CV ECHO MEAS - LVOT DIAM: 1.7 CM
BH CV ECHO MEAS - LVPWD: 0.7 CM
BH CV ECHO MEAS - MED PEAK E' VEL: 8.9 CM/SEC
BH CV ECHO MEAS - MEDIAL E/E' RATIO: 11.5
BH CV ECHO MEAS - MV A MAX VEL: 46.3 CM/SEC
BH CV ECHO MEAS - MV DEC SLOPE: 570.5 CM/SEC^2
BH CV ECHO MEAS - MV DEC TIME: 0.18 SEC
BH CV ECHO MEAS - MV E MAX VEL: 102.5 CM/SEC
BH CV ECHO MEAS - MV E/A: 2.2
BH CV ECHO MEAS - PA ACC TIME: 0.21 SEC
BH CV ECHO MEAS - PA MAX PG: 3.8 MMHG
BH CV ECHO MEAS - PA PR(ACCEL): -13.7 MMHG
BH CV ECHO MEAS - PA V2 MAX: 96.6 CM/SEC
BH CV ECHO MEAS - RAP SYSTOLE: 8 MMHG
BH CV ECHO MEAS - RVSP: 34 MMHG
BH CV ECHO MEAS - SI(AO): 104.3 ML/M^2
BH CV ECHO MEAS - SI(CUBED): 18.2 ML/M^2
BH CV ECHO MEAS - SI(LVOT): 37.4 ML/M^2
BH CV ECHO MEAS - SI(MOD-SP2): 17.5 ML/M^2
BH CV ECHO MEAS - SI(MOD-SP4): 25 ML/M^2
BH CV ECHO MEAS - SI(TEICH): 20.9 ML/M^2
BH CV ECHO MEAS - SV(AO): 172 ML
BH CV ECHO MEAS - SV(CUBED): 30.1 ML
BH CV ECHO MEAS - SV(LVOT): 61.7 ML
BH CV ECHO MEAS - SV(MOD-SP2): 28.8 ML
BH CV ECHO MEAS - SV(MOD-SP4): 41.2 ML
BH CV ECHO MEAS - SV(TEICH): 34.4 ML
BH CV ECHO MEAS - TAPSE (>1.6): 2.4 CM2
BH CV ECHO MEAS - TR MAX PG: 26 MMHG
BH CV ECHO MEAS - TR MAX VEL: 256.2 CM/SEC
BH CV ECHO MEAS - TV E MAX VEL: 58.3 CM/SEC
BH CV ECHO MEASUREMENTS AVERAGE E/E' RATIO: 12.5
BH CV VAS BP RIGHT ARM: NORMAL MMHG
BH CV XLRA - RV BASE: 3 CM
BH CV XLRA - RV LENGTH: 5.9 CM
BH CV XLRA - RV MID: 3.1 CM
BH CV XLRA - TDI S': 12.4 CM/SEC
BILIRUB SERPL-MCNC: 0.4 MG/DL (ref 0.2–1.2)
BILIRUB SERPL-MCNC: 0.5 MG/DL (ref 0.2–1.2)
BILIRUB SERPL-MCNC: 0.6 MG/DL (ref 0.2–1.2)
BILIRUB SERPL-MCNC: 0.7 MG/DL (ref 0.2–1.2)
BILIRUB SERPL-MCNC: 0.8 MG/DL (ref 0.2–1.2)
BILIRUB SERPL-MCNC: 0.8 MG/DL (ref 0.2–1.2)
BILIRUB SERPL-MCNC: 0.9 MG/DL (ref 0.2–1.2)
BILIRUB SERPL-MCNC: 0.9 MG/DL (ref 0.2–1.2)
BILIRUB SERPL-MCNC: 1.1 MG/DL (ref 0.2–1.2)
BILIRUB SERPL-MCNC: 1.6 MG/DL (ref 0.2–1.2)
BILIRUB UR QL STRIP: NEGATIVE
BILIRUB UR QL STRIP: NEGATIVE
BLD GP AB SCN SERPL QL: NEGATIVE
BUN BLD-MCNC: 14 MG/DL (ref 6–20)
BUN BLD-MCNC: 14 MG/DL (ref 6–20)
BUN BLD-MCNC: 15 MG/DL (ref 6–20)
BUN BLD-MCNC: 16 MG/DL (ref 6–20)
BUN BLD-MCNC: 16 MG/DL (ref 6–20)
BUN BLD-MCNC: 17 MG/DL (ref 6–20)
BUN BLD-MCNC: 18 MG/DL (ref 6–20)
BUN BLD-MCNC: 19 MG/DL (ref 6–20)
BUN BLD-MCNC: 21 MG/DL (ref 6–20)
BUN BLD-MCNC: 21 MG/DL (ref 6–20)
BUN BLD-MCNC: 24 MG/DL (ref 6–20)
BUN BLD-MCNC: 25 MG/DL (ref 6–20)
BUN BLD-MCNC: 25 MG/DL (ref 6–20)
BUN BLD-MCNC: 28 MG/DL (ref 6–20)
BUN BLD-MCNC: 29 MG/DL (ref 6–20)
BUN BLD-MCNC: 32 MG/DL (ref 6–20)
BUN/CREAT SERPL: 10.3 (ref 7–25)
BUN/CREAT SERPL: 12.7 (ref 7–25)
BUN/CREAT SERPL: 12.9 (ref 7–25)
BUN/CREAT SERPL: 12.9 (ref 7–25)
BUN/CREAT SERPL: 13.6 (ref 7–25)
BUN/CREAT SERPL: 14.5 (ref 7–25)
BUN/CREAT SERPL: 14.7 (ref 7–25)
BUN/CREAT SERPL: 15.1 (ref 7–25)
BUN/CREAT SERPL: 15.3 (ref 7–25)
BUN/CREAT SERPL: 16 (ref 7–25)
BUN/CREAT SERPL: 16 (ref 7–25)
BUN/CREAT SERPL: 16.1 (ref 7–25)
BUN/CREAT SERPL: 17.4 (ref 7–25)
BUN/CREAT SERPL: 19.3 (ref 7–25)
BUN/CREAT SERPL: 22 (ref 7–25)
BUN/CREAT SERPL: 22.1 (ref 7–25)
BUN/CREAT SERPL: 22.4 (ref 7–25)
BUN/CREAT SERPL: 23.7 (ref 7–25)
CALCIUM SPEC-SCNC: 7.3 MG/DL (ref 8.6–10.5)
CALCIUM SPEC-SCNC: 7.3 MG/DL (ref 8.6–10.5)
CALCIUM SPEC-SCNC: 7.4 MG/DL (ref 8.6–10.5)
CALCIUM SPEC-SCNC: 7.5 MG/DL (ref 8.6–10.5)
CALCIUM SPEC-SCNC: 7.5 MG/DL (ref 8.6–10.5)
CALCIUM SPEC-SCNC: 7.6 MG/DL (ref 8.6–10.5)
CALCIUM SPEC-SCNC: 7.7 MG/DL (ref 8.6–10.5)
CALCIUM SPEC-SCNC: 7.7 MG/DL (ref 8.6–10.5)
CALCIUM SPEC-SCNC: 7.8 MG/DL (ref 8.6–10.5)
CALCIUM SPEC-SCNC: 7.8 MG/DL (ref 8.6–10.5)
CALCIUM SPEC-SCNC: 7.9 MG/DL (ref 8.6–10.5)
CALCIUM SPEC-SCNC: 8 MG/DL (ref 8.6–10.5)
CALCIUM SPEC-SCNC: 8.1 MG/DL (ref 8.6–10.5)
CALCIUM SPEC-SCNC: 8.2 MG/DL (ref 8.6–10.5)
CALCIUM SPEC-SCNC: 8.3 MG/DL (ref 8.6–10.5)
CALCIUM SPEC-SCNC: 8.5 MG/DL (ref 8.6–10.5)
CHLORIDE SERPL-SCNC: 100 MMOL/L (ref 98–107)
CHLORIDE SERPL-SCNC: 101 MMOL/L (ref 98–107)
CHLORIDE SERPL-SCNC: 101 MMOL/L (ref 98–107)
CHLORIDE SERPL-SCNC: 103 MMOL/L (ref 98–107)
CHLORIDE SERPL-SCNC: 103 MMOL/L (ref 98–107)
CHLORIDE SERPL-SCNC: 104 MMOL/L (ref 98–107)
CHLORIDE SERPL-SCNC: 105 MMOL/L (ref 98–107)
CHLORIDE SERPL-SCNC: 105 MMOL/L (ref 98–107)
CHLORIDE SERPL-SCNC: 106 MMOL/L (ref 98–107)
CHLORIDE SERPL-SCNC: 107 MMOL/L (ref 98–107)
CHLORIDE SERPL-SCNC: 108 MMOL/L (ref 98–107)
CHLORIDE SERPL-SCNC: 108 MMOL/L (ref 98–107)
CHLORIDE SERPL-SCNC: 109 MMOL/L (ref 98–107)
CHLORIDE SERPL-SCNC: 93 MMOL/L (ref 98–107)
CHLORIDE SERPL-SCNC: 98 MMOL/L (ref 98–107)
CLARITY UR: ABNORMAL
CLARITY UR: CLEAR
CO2 SERPL-SCNC: 13.7 MMOL/L (ref 22–29)
CO2 SERPL-SCNC: 17 MMOL/L (ref 22–29)
CO2 SERPL-SCNC: 17 MMOL/L (ref 22–29)
CO2 SERPL-SCNC: 18 MMOL/L (ref 22–29)
CO2 SERPL-SCNC: 19 MMOL/L (ref 22–29)
CO2 SERPL-SCNC: 19.7 MMOL/L (ref 22–29)
CO2 SERPL-SCNC: 21 MMOL/L (ref 22–29)
CO2 SERPL-SCNC: 21 MMOL/L (ref 22–29)
CO2 SERPL-SCNC: 22 MMOL/L (ref 22–29)
CO2 SERPL-SCNC: 23 MMOL/L (ref 22–29)
CO2 SERPL-SCNC: 26.3 MMOL/L (ref 22–29)
COHGB MFR BLD: 1.2 % (ref 0–2)
COLOR FLD: YELLOW
COLOR UR: YELLOW
COLOR UR: YELLOW
CREAT BLD-MCNC: 0.93 MG/DL (ref 0.57–1)
CREAT BLD-MCNC: 1 MG/DL (ref 0.57–1)
CREAT BLD-MCNC: 1.09 MG/DL (ref 0.57–1)
CREAT BLD-MCNC: 1.13 MG/DL (ref 0.57–1)
CREAT BLD-MCNC: 1.16 MG/DL (ref 0.57–1)
CREAT BLD-MCNC: 1.16 MG/DL (ref 0.57–1)
CREAT BLD-MCNC: 1.24 MG/DL (ref 0.57–1)
CREAT BLD-MCNC: 1.24 MG/DL (ref 0.57–1)
CREAT BLD-MCNC: 1.25 MG/DL (ref 0.57–1)
CREAT BLD-MCNC: 1.26 MG/DL (ref 0.57–1)
CREAT BLD-MCNC: 1.31 MG/DL (ref 0.57–1)
CREAT BLD-MCNC: 1.32 MG/DL (ref 0.57–1)
CREAT BLD-MCNC: 1.35 MG/DL (ref 0.57–1)
CREAT BLD-MCNC: 1.36 MG/DL (ref 0.57–1)
CREAT BLD-MCNC: 1.38 MG/DL (ref 0.57–1)
CREAT BLD-MCNC: 1.4 MG/DL (ref 0.57–1)
CREAT BLD-MCNC: 1.47 MG/DL (ref 0.57–1)
CREAT BLD-MCNC: 1.55 MG/DL (ref 0.57–1)
CROSSMATCH INTERPRETATION: NORMAL
D-LACTATE SERPL-SCNC: 1.1 MMOL/L (ref 0.5–2)
D-LACTATE SERPL-SCNC: 1.5 MMOL/L (ref 0.5–2)
D-LACTATE SERPL-SCNC: 1.5 MMOL/L (ref 0.5–2)
D-LACTATE SERPL-SCNC: 1.9 MMOL/L (ref 0.5–2)
D-LACTATE SERPL-SCNC: 2.2 MMOL/L (ref 0.5–2)
D-LACTATE SERPL-SCNC: 2.3 MMOL/L (ref 0.5–2)
D-LACTATE SERPL-SCNC: 2.4 MMOL/L (ref 0.5–2)
D-LACTATE SERPL-SCNC: 5.6 MMOL/L (ref 0.5–2)
DEPRECATED RDW RBC AUTO: 52.2 FL (ref 37–54)
DEPRECATED RDW RBC AUTO: 55 FL (ref 37–54)
DEPRECATED RDW RBC AUTO: 55.6 FL (ref 37–54)
DEPRECATED RDW RBC AUTO: 56 FL (ref 37–54)
DEPRECATED RDW RBC AUTO: 57.1 FL (ref 37–54)
DEPRECATED RDW RBC AUTO: 57.1 FL (ref 37–54)
DEPRECATED RDW RBC AUTO: 57.2 FL (ref 37–54)
DEPRECATED RDW RBC AUTO: 57.6 FL (ref 37–54)
DEPRECATED RDW RBC AUTO: 57.8 FL (ref 37–54)
DEPRECATED RDW RBC AUTO: 58.4 FL (ref 37–54)
DEPRECATED RDW RBC AUTO: 58.6 FL (ref 37–54)
DEPRECATED RDW RBC AUTO: 58.6 FL (ref 37–54)
DEPRECATED RDW RBC AUTO: 58.8 FL (ref 37–54)
DEPRECATED RDW RBC AUTO: 58.8 FL (ref 37–54)
DEPRECATED RDW RBC AUTO: 59.4 FL (ref 37–54)
DEPRECATED RDW RBC AUTO: 59.6 FL (ref 37–54)
DEPRECATED RDW RBC AUTO: 59.8 FL (ref 37–54)
DEPRECATED RDW RBC AUTO: 60.9 FL (ref 37–54)
DEPRECATED RDW RBC AUTO: 61.1 FL (ref 37–54)
DEPRECATED RDW RBC AUTO: 61.8 FL (ref 37–54)
DEPRECATED RDW RBC AUTO: 62.1 FL (ref 37–54)
DEPRECATED RDW RBC AUTO: 62.2 FL (ref 37–54)
DEPRECATED RDW RBC AUTO: 62.2 FL (ref 37–54)
DEPRECATED RDW RBC AUTO: 63.4 FL (ref 37–54)
DEPRECATED RDW RBC AUTO: 63.6 FL (ref 37–54)
DEPRECATED RDW RBC AUTO: 66.2 FL (ref 37–54)
EOSINOPHIL # BLD AUTO: 0.07 10*3/MM3 (ref 0–0.4)
EOSINOPHIL # BLD AUTO: 0.18 10*3/MM3 (ref 0–0.4)
EOSINOPHIL # BLD AUTO: 0.2 10*3/MM3 (ref 0–0.4)
EOSINOPHIL # BLD AUTO: 0.29 10*3/MM3 (ref 0–0.4)
EOSINOPHIL # BLD AUTO: 0.33 10*3/MM3 (ref 0–0.4)
EOSINOPHIL # BLD AUTO: 0.38 10*3/MM3 (ref 0–0.4)
EOSINOPHIL # BLD AUTO: 0.49 10*3/MM3 (ref 0–0.4)
EOSINOPHIL NFR BLD AUTO: 0.4 % (ref 0.3–6.2)
EOSINOPHIL NFR BLD AUTO: 0.9 % (ref 0.3–6.2)
EOSINOPHIL NFR BLD AUTO: 1.4 % (ref 0.3–6.2)
EOSINOPHIL NFR BLD AUTO: 2.3 % (ref 0.3–6.2)
EOSINOPHIL NFR BLD AUTO: 2.6 % (ref 0.3–6.2)
EOSINOPHIL NFR BLD AUTO: 3 % (ref 0.3–6.2)
EOSINOPHIL NFR BLD AUTO: 3.7 % (ref 0.3–6.2)
EOSINOPHIL SPEC QL MICRO: 0 % EOS/100 CELLS (ref 0–0)
ERYTHROCYTE [DISTWIDTH] IN BLOOD BY AUTOMATED COUNT: 15.9 % (ref 12.3–15.4)
ERYTHROCYTE [DISTWIDTH] IN BLOOD BY AUTOMATED COUNT: 16 % (ref 12.3–15.4)
ERYTHROCYTE [DISTWIDTH] IN BLOOD BY AUTOMATED COUNT: 16 % (ref 12.3–15.4)
ERYTHROCYTE [DISTWIDTH] IN BLOOD BY AUTOMATED COUNT: 16.1 % (ref 12.3–15.4)
ERYTHROCYTE [DISTWIDTH] IN BLOOD BY AUTOMATED COUNT: 16.3 % (ref 12.3–15.4)
ERYTHROCYTE [DISTWIDTH] IN BLOOD BY AUTOMATED COUNT: 16.4 % (ref 12.3–15.4)
ERYTHROCYTE [DISTWIDTH] IN BLOOD BY AUTOMATED COUNT: 16.4 % (ref 12.3–15.4)
ERYTHROCYTE [DISTWIDTH] IN BLOOD BY AUTOMATED COUNT: 16.7 % (ref 12.3–15.4)
ERYTHROCYTE [DISTWIDTH] IN BLOOD BY AUTOMATED COUNT: 17 % (ref 12.3–15.4)
ERYTHROCYTE [DISTWIDTH] IN BLOOD BY AUTOMATED COUNT: 17.1 % (ref 12.3–15.4)
ERYTHROCYTE [DISTWIDTH] IN BLOOD BY AUTOMATED COUNT: 17.2 % (ref 12.3–15.4)
ERYTHROCYTE [DISTWIDTH] IN BLOOD BY AUTOMATED COUNT: 17.3 % (ref 12.3–15.4)
ERYTHROCYTE [DISTWIDTH] IN BLOOD BY AUTOMATED COUNT: 17.4 % (ref 12.3–15.4)
ERYTHROCYTE [DISTWIDTH] IN BLOOD BY AUTOMATED COUNT: 17.5 % (ref 12.3–15.4)
ERYTHROCYTE [DISTWIDTH] IN BLOOD BY AUTOMATED COUNT: 17.6 % (ref 12.3–15.4)
ERYTHROCYTE [DISTWIDTH] IN BLOOD BY AUTOMATED COUNT: 17.7 % (ref 12.3–15.4)
ERYTHROCYTE [DISTWIDTH] IN BLOOD BY AUTOMATED COUNT: 17.8 % (ref 12.3–15.4)
ERYTHROCYTE [DISTWIDTH] IN BLOOD BY AUTOMATED COUNT: 18 % (ref 12.3–15.4)
ERYTHROCYTE [DISTWIDTH] IN BLOOD BY AUTOMATED COUNT: 18.2 % (ref 12.3–15.4)
ERYTHROCYTE [DISTWIDTH] IN BLOOD BY AUTOMATED COUNT: 18.2 % (ref 12.3–15.4)
ERYTHROCYTE [DISTWIDTH] IN BLOOD BY AUTOMATED COUNT: 18.3 % (ref 12.3–15.4)
ERYTHROCYTE [DISTWIDTH] IN BLOOD BY AUTOMATED COUNT: 18.9 % (ref 12.3–15.4)
FLUAV AG NPH QL: NEGATIVE
FLUBV AG NPH QL IA: NEGATIVE
GAS FLOW AIRWAY: 2 LPM
GFR SERPL CREATININE-BSD FRML MDRD: 34 ML/MIN/1.73
GFR SERPL CREATININE-BSD FRML MDRD: 37 ML/MIN/1.73
GFR SERPL CREATININE-BSD FRML MDRD: 39 ML/MIN/1.73
GFR SERPL CREATININE-BSD FRML MDRD: 39 ML/MIN/1.73
GFR SERPL CREATININE-BSD FRML MDRD: 40 ML/MIN/1.73
GFR SERPL CREATININE-BSD FRML MDRD: 40 ML/MIN/1.73
GFR SERPL CREATININE-BSD FRML MDRD: 41 ML/MIN/1.73
GFR SERPL CREATININE-BSD FRML MDRD: 42 ML/MIN/1.73
GFR SERPL CREATININE-BSD FRML MDRD: 44 ML/MIN/1.73
GFR SERPL CREATININE-BSD FRML MDRD: 44 ML/MIN/1.73
GFR SERPL CREATININE-BSD FRML MDRD: 45 ML/MIN/1.73
GFR SERPL CREATININE-BSD FRML MDRD: 45 ML/MIN/1.73
GFR SERPL CREATININE-BSD FRML MDRD: 48 ML/MIN/1.73
GFR SERPL CREATININE-BSD FRML MDRD: 48 ML/MIN/1.73
GFR SERPL CREATININE-BSD FRML MDRD: 50 ML/MIN/1.73
GFR SERPL CREATININE-BSD FRML MDRD: 52 ML/MIN/1.73
GFR SERPL CREATININE-BSD FRML MDRD: 57 ML/MIN/1.73
GFR SERPL CREATININE-BSD FRML MDRD: 62 ML/MIN/1.73
GLOBULIN UR ELPH-MCNC: 2.4 GM/DL
GLOBULIN UR ELPH-MCNC: 2.7 GM/DL
GLOBULIN UR ELPH-MCNC: 2.9 GM/DL
GLOBULIN UR ELPH-MCNC: 2.9 GM/DL
GLOBULIN UR ELPH-MCNC: 3.4 GM/DL
GLOBULIN UR ELPH-MCNC: 3.9 GM/DL
GLOBULIN UR ELPH-MCNC: 4.3 GM/DL
GLOBULIN UR ELPH-MCNC: 4.3 GM/DL
GLOBULIN UR ELPH-MCNC: 4.8 GM/DL
GLOBULIN UR ELPH-MCNC: 5.2 GM/DL
GLUCOSE BLD-MCNC: 101 MG/DL (ref 65–99)
GLUCOSE BLD-MCNC: 125 MG/DL (ref 65–99)
GLUCOSE BLD-MCNC: 128 MG/DL (ref 65–99)
GLUCOSE BLD-MCNC: 137 MG/DL (ref 65–99)
GLUCOSE BLD-MCNC: 156 MG/DL (ref 65–99)
GLUCOSE BLD-MCNC: 160 MG/DL (ref 65–99)
GLUCOSE BLD-MCNC: 168 MG/DL (ref 65–99)
GLUCOSE BLD-MCNC: 172 MG/DL (ref 65–99)
GLUCOSE BLD-MCNC: 177 MG/DL (ref 65–99)
GLUCOSE BLD-MCNC: 190 MG/DL (ref 65–99)
GLUCOSE BLD-MCNC: 200 MG/DL (ref 65–99)
GLUCOSE BLD-MCNC: 266 MG/DL (ref 65–99)
GLUCOSE BLD-MCNC: 268 MG/DL (ref 65–99)
GLUCOSE BLD-MCNC: 269 MG/DL (ref 65–99)
GLUCOSE BLD-MCNC: 52 MG/DL (ref 65–99)
GLUCOSE BLD-MCNC: 60 MG/DL (ref 65–99)
GLUCOSE BLD-MCNC: 80 MG/DL (ref 65–99)
GLUCOSE BLD-MCNC: 95 MG/DL (ref 65–99)
GLUCOSE BLDC GLUCOMTR-MCNC: 103 MG/DL (ref 70–130)
GLUCOSE BLDC GLUCOMTR-MCNC: 113 MG/DL (ref 70–130)
GLUCOSE BLDC GLUCOMTR-MCNC: 114 MG/DL (ref 70–130)
GLUCOSE BLDC GLUCOMTR-MCNC: 117 MG/DL (ref 70–130)
GLUCOSE BLDC GLUCOMTR-MCNC: 122 MG/DL (ref 70–130)
GLUCOSE BLDC GLUCOMTR-MCNC: 130 MG/DL (ref 70–130)
GLUCOSE BLDC GLUCOMTR-MCNC: 131 MG/DL (ref 70–130)
GLUCOSE BLDC GLUCOMTR-MCNC: 136 MG/DL (ref 70–130)
GLUCOSE BLDC GLUCOMTR-MCNC: 139 MG/DL (ref 70–130)
GLUCOSE BLDC GLUCOMTR-MCNC: 146 MG/DL (ref 70–130)
GLUCOSE BLDC GLUCOMTR-MCNC: 148 MG/DL (ref 70–130)
GLUCOSE BLDC GLUCOMTR-MCNC: 151 MG/DL (ref 70–130)
GLUCOSE BLDC GLUCOMTR-MCNC: 155 MG/DL (ref 70–130)
GLUCOSE BLDC GLUCOMTR-MCNC: 156 MG/DL (ref 70–130)
GLUCOSE BLDC GLUCOMTR-MCNC: 156 MG/DL (ref 70–130)
GLUCOSE BLDC GLUCOMTR-MCNC: 162 MG/DL (ref 70–130)
GLUCOSE BLDC GLUCOMTR-MCNC: 166 MG/DL (ref 70–130)
GLUCOSE BLDC GLUCOMTR-MCNC: 170 MG/DL (ref 70–130)
GLUCOSE BLDC GLUCOMTR-MCNC: 174 MG/DL (ref 70–130)
GLUCOSE BLDC GLUCOMTR-MCNC: 176 MG/DL (ref 70–130)
GLUCOSE BLDC GLUCOMTR-MCNC: 180 MG/DL (ref 70–130)
GLUCOSE BLDC GLUCOMTR-MCNC: 181 MG/DL (ref 70–130)
GLUCOSE BLDC GLUCOMTR-MCNC: 181 MG/DL (ref 70–130)
GLUCOSE BLDC GLUCOMTR-MCNC: 182 MG/DL (ref 70–130)
GLUCOSE BLDC GLUCOMTR-MCNC: 186 MG/DL (ref 70–130)
GLUCOSE BLDC GLUCOMTR-MCNC: 189 MG/DL (ref 70–130)
GLUCOSE BLDC GLUCOMTR-MCNC: 192 MG/DL (ref 70–130)
GLUCOSE BLDC GLUCOMTR-MCNC: 193 MG/DL (ref 70–130)
GLUCOSE BLDC GLUCOMTR-MCNC: 197 MG/DL (ref 70–130)
GLUCOSE BLDC GLUCOMTR-MCNC: 205 MG/DL (ref 70–130)
GLUCOSE BLDC GLUCOMTR-MCNC: 206 MG/DL (ref 70–130)
GLUCOSE BLDC GLUCOMTR-MCNC: 213 MG/DL (ref 70–130)
GLUCOSE BLDC GLUCOMTR-MCNC: 219 MG/DL (ref 70–130)
GLUCOSE BLDC GLUCOMTR-MCNC: 224 MG/DL (ref 70–130)
GLUCOSE BLDC GLUCOMTR-MCNC: 227 MG/DL (ref 70–130)
GLUCOSE BLDC GLUCOMTR-MCNC: 236 MG/DL (ref 70–130)
GLUCOSE BLDC GLUCOMTR-MCNC: 242 MG/DL (ref 70–130)
GLUCOSE BLDC GLUCOMTR-MCNC: 249 MG/DL (ref 70–130)
GLUCOSE BLDC GLUCOMTR-MCNC: 252 MG/DL (ref 70–130)
GLUCOSE BLDC GLUCOMTR-MCNC: 252 MG/DL (ref 70–130)
GLUCOSE BLDC GLUCOMTR-MCNC: 277 MG/DL (ref 70–130)
GLUCOSE BLDC GLUCOMTR-MCNC: 410 MG/DL (ref 70–130)
GLUCOSE BLDC GLUCOMTR-MCNC: 54 MG/DL (ref 70–130)
GLUCOSE BLDC GLUCOMTR-MCNC: 65 MG/DL (ref 70–130)
GLUCOSE BLDC GLUCOMTR-MCNC: 67 MG/DL (ref 70–130)
GLUCOSE BLDC GLUCOMTR-MCNC: 76 MG/DL (ref 70–130)
GLUCOSE BLDC GLUCOMTR-MCNC: 84 MG/DL (ref 70–130)
GLUCOSE BLDC GLUCOMTR-MCNC: 86 MG/DL (ref 70–130)
GLUCOSE BLDC GLUCOMTR-MCNC: 94 MG/DL (ref 70–130)
GLUCOSE FLD-MCNC: 105 MG/DL
GLUCOSE UR STRIP-MCNC: ABNORMAL MG/DL
GLUCOSE UR STRIP-MCNC: NEGATIVE MG/DL
GRAM STN SPEC: ABNORMAL
GRAM STN SPEC: NORMAL
HAV AB SER QL IA: POSITIVE
HBA1C MFR BLD: 6.8 % (ref 4.8–5.6)
HBV CORE AB SER DONR QL IA: NEGATIVE
HBV SURFACE AB SER RIA-ACNC: NORMAL
HBV SURFACE AG SERPL QL IA: NORMAL
HCO3 BLDA-SCNC: 10.6 MMOL/L (ref 22–28)
HCT VFR BLD AUTO: 22.2 % (ref 34–46.6)
HCT VFR BLD AUTO: 22.2 % (ref 34–46.6)
HCT VFR BLD AUTO: 24.5 % (ref 34–46.6)
HCT VFR BLD AUTO: 27.2 % (ref 34–46.6)
HCT VFR BLD AUTO: 27.7 % (ref 34–46.6)
HCT VFR BLD AUTO: 27.8 % (ref 34–46.6)
HCT VFR BLD AUTO: 27.8 % (ref 34–46.6)
HCT VFR BLD AUTO: 27.9 % (ref 34–46.6)
HCT VFR BLD AUTO: 28.1 % (ref 34–46.6)
HCT VFR BLD AUTO: 28.1 % (ref 34–46.6)
HCT VFR BLD AUTO: 28.5 % (ref 34–46.6)
HCT VFR BLD AUTO: 28.8 % (ref 34–46.6)
HCT VFR BLD AUTO: 29.2 % (ref 34–46.6)
HCT VFR BLD AUTO: 30.1 % (ref 34–46.6)
HCT VFR BLD AUTO: 30.2 % (ref 34–46.6)
HCT VFR BLD AUTO: 30.4 % (ref 34–46.6)
HCT VFR BLD AUTO: 30.4 % (ref 34–46.6)
HCT VFR BLD AUTO: 30.5 % (ref 34–46.6)
HCT VFR BLD AUTO: 30.8 % (ref 34–46.6)
HCT VFR BLD AUTO: 31.2 % (ref 34–46.6)
HCT VFR BLD AUTO: 31.4 % (ref 34–46.6)
HCT VFR BLD AUTO: 31.7 % (ref 34–46.6)
HCT VFR BLD AUTO: 31.9 % (ref 34–46.6)
HCT VFR BLD AUTO: 32.1 % (ref 34–46.6)
HCT VFR BLD AUTO: 32.3 % (ref 34–46.6)
HCT VFR BLD AUTO: 33.2 % (ref 34–46.6)
HCT VFR BLD AUTO: 34.1 % (ref 34–46.6)
HCT VFR BLD AUTO: 39.9 % (ref 34–46.6)
HCT VFR BLD CALC: 39 %
HCV GENTYP SERPL NAA+PROBE: 3
HCV RNA SERPL NAA+PROBE-ACNC: NORMAL IU/ML
HCV RNA SERPL NAA+PROBE-LOG IU: 5.79 LOG10 IU/ML
HGB BLD-MCNC: 10 G/DL (ref 12–15.9)
HGB BLD-MCNC: 10 G/DL (ref 12–15.9)
HGB BLD-MCNC: 10.1 G/DL (ref 12–15.9)
HGB BLD-MCNC: 10.2 G/DL (ref 12–15.9)
HGB BLD-MCNC: 10.2 G/DL (ref 12–15.9)
HGB BLD-MCNC: 10.4 G/DL (ref 12–15.9)
HGB BLD-MCNC: 10.5 G/DL (ref 12–15.9)
HGB BLD-MCNC: 10.6 G/DL (ref 12–15.9)
HGB BLD-MCNC: 10.8 G/DL (ref 12–15.9)
HGB BLD-MCNC: 10.9 G/DL (ref 12–15.9)
HGB BLD-MCNC: 11.1 G/DL (ref 12–15.9)
HGB BLD-MCNC: 13 G/DL (ref 12–15.9)
HGB BLD-MCNC: 7.3 G/DL (ref 12–15.9)
HGB BLD-MCNC: 7.3 G/DL (ref 12–15.9)
HGB BLD-MCNC: 7.9 G/DL (ref 12–15.9)
HGB BLD-MCNC: 8.6 G/DL (ref 12–15.9)
HGB BLD-MCNC: 8.8 G/DL (ref 12–15.9)
HGB BLD-MCNC: 9 G/DL (ref 12–15.9)
HGB BLD-MCNC: 9.1 G/DL (ref 12–15.9)
HGB BLD-MCNC: 9.2 G/DL (ref 12–15.9)
HGB BLD-MCNC: 9.3 G/DL (ref 12–15.9)
HGB BLD-MCNC: 9.5 G/DL (ref 12–15.9)
HGB BLD-MCNC: 9.6 G/DL (ref 12–15.9)
HGB BLD-MCNC: 9.7 G/DL (ref 12–15.9)
HGB BLD-MCNC: 9.7 G/DL (ref 12–15.9)
HGB UR QL STRIP.AUTO: ABNORMAL
HGB UR QL STRIP.AUTO: ABNORMAL
HIV1 P24 AG SER QL: NORMAL
HIV1+2 AB SER QL: NORMAL
HOLD SPECIMEN: NORMAL
HOROWITZ INDEX BLD+IHG-RTO: 58 %
HYALINE CASTS UR QL AUTO: ABNORMAL /LPF
HYALINE CASTS UR QL AUTO: ABNORMAL /LPF
IMM GRANULOCYTES # BLD AUTO: 0.06 10*3/MM3 (ref 0–0.05)
IMM GRANULOCYTES # BLD AUTO: 0.1 10*3/MM3 (ref 0–0.05)
IMM GRANULOCYTES # BLD AUTO: 0.13 10*3/MM3 (ref 0–0.05)
IMM GRANULOCYTES # BLD AUTO: 0.14 10*3/MM3 (ref 0–0.05)
IMM GRANULOCYTES # BLD AUTO: 0.18 10*3/MM3 (ref 0–0.05)
IMM GRANULOCYTES # BLD AUTO: 0.23 10*3/MM3 (ref 0–0.05)
IMM GRANULOCYTES # BLD AUTO: 0.4 10*3/MM3 (ref 0–0.05)
IMM GRANULOCYTES NFR BLD AUTO: 0.6 % (ref 0–0.5)
IMM GRANULOCYTES NFR BLD AUTO: 0.8 % (ref 0–0.5)
IMM GRANULOCYTES NFR BLD AUTO: 0.8 % (ref 0–0.5)
IMM GRANULOCYTES NFR BLD AUTO: 1.1 % (ref 0–0.5)
IMM GRANULOCYTES NFR BLD AUTO: 1.2 % (ref 0–0.5)
IMM GRANULOCYTES NFR BLD AUTO: 1.3 % (ref 0–0.5)
IMM GRANULOCYTES NFR BLD AUTO: 2.4 % (ref 0–0.5)
INR PPP: 1.25 (ref 0.9–1.1)
INR PPP: 1.34 (ref 0.9–1.1)
INR PPP: 1.39 (ref 0.85–1.16)
INR PPP: 1.4 (ref 0.85–1.16)
INR PPP: 1.43 (ref 0.85–1.16)
INR PPP: 1.63 (ref 0.85–1.16)
INR PPP: 1.7 (ref 0.85–1.16)
ISOLATED FROM: ABNORMAL
KETONES UR QL STRIP: NEGATIVE
KETONES UR QL STRIP: NEGATIVE
LAB AP CASE REPORT: NORMAL
LACTATE HOLD SPECIMEN: NORMAL
LARGE PLATELETS: NORMAL
LDH FLD-CCNC: 59 U/L
LEUKOCYTE ESTERASE UR QL STRIP.AUTO: ABNORMAL
LEUKOCYTE ESTERASE UR QL STRIP.AUTO: NEGATIVE
LIPASE SERPL-CCNC: 19 U/L (ref 13–60)
LV EF 2D ECHO EST: 55 %
LYMPHOCYTES # BLD AUTO: 1.41 10*3/MM3 (ref 0.7–3.1)
LYMPHOCYTES # BLD AUTO: 1.65 10*3/MM3 (ref 0.7–3.1)
LYMPHOCYTES # BLD AUTO: 1.72 10*3/MM3 (ref 0.7–3.1)
LYMPHOCYTES # BLD AUTO: 1.87 10*3/MM3 (ref 0.7–3.1)
LYMPHOCYTES # BLD AUTO: 2.11 10*3/MM3 (ref 0.7–3.1)
LYMPHOCYTES # BLD AUTO: 2.14 10*3/MM3 (ref 0.7–3.1)
LYMPHOCYTES # BLD AUTO: 6.86 10*3/MM3 (ref 0.7–3.1)
LYMPHOCYTES NFR BLD AUTO: 10.1 % (ref 19.6–45.3)
LYMPHOCYTES NFR BLD AUTO: 11.3 % (ref 19.6–45.3)
LYMPHOCYTES NFR BLD AUTO: 11.3 % (ref 19.6–45.3)
LYMPHOCYTES NFR BLD AUTO: 13.2 % (ref 19.6–45.3)
LYMPHOCYTES NFR BLD AUTO: 13.4 % (ref 19.6–45.3)
LYMPHOCYTES NFR BLD AUTO: 20.5 % (ref 19.6–45.3)
LYMPHOCYTES NFR BLD AUTO: 41.3 % (ref 19.6–45.3)
LYMPHOCYTES NFR FLD MANUAL: 64 %
Lab: NORMAL
MACROPHAGE FLUID: 17 %
MAGNESIUM SERPL-MCNC: 1.8 MG/DL (ref 1.6–2.6)
MAGNESIUM SERPL-MCNC: 2.1 MG/DL (ref 1.6–2.6)
MAGNESIUM SERPL-MCNC: 2.4 MG/DL (ref 1.6–2.6)
MAGNESIUM SERPL-MCNC: 2.5 MG/DL (ref 1.6–2.6)
MAGNESIUM SERPL-MCNC: 2.5 MG/DL (ref 1.6–2.6)
MAGNESIUM SERPL-MCNC: 2.6 MG/DL (ref 1.6–2.6)
MAGNESIUM SERPL-MCNC: 2.8 MG/DL (ref 1.6–2.6)
MCH RBC QN AUTO: 29.7 PG (ref 26.6–33)
MCH RBC QN AUTO: 30.1 PG (ref 26.6–33)
MCH RBC QN AUTO: 30.2 PG (ref 26.6–33)
MCH RBC QN AUTO: 30.3 PG (ref 26.6–33)
MCH RBC QN AUTO: 30.4 PG (ref 26.6–33)
MCH RBC QN AUTO: 30.5 PG (ref 26.6–33)
MCH RBC QN AUTO: 30.6 PG (ref 26.6–33)
MCH RBC QN AUTO: 30.7 PG (ref 26.6–33)
MCH RBC QN AUTO: 30.8 PG (ref 26.6–33)
MCH RBC QN AUTO: 30.9 PG (ref 26.6–33)
MCH RBC QN AUTO: 31 PG (ref 26.6–33)
MCH RBC QN AUTO: 31.2 PG (ref 26.6–33)
MCH RBC QN AUTO: 31.2 PG (ref 26.6–33)
MCH RBC QN AUTO: 31.3 PG (ref 26.6–33)
MCHC RBC AUTO-ENTMCNC: 31.3 G/DL (ref 31.5–35.7)
MCHC RBC AUTO-ENTMCNC: 31.5 G/DL (ref 31.5–35.7)
MCHC RBC AUTO-ENTMCNC: 31.6 G/DL (ref 31.5–35.7)
MCHC RBC AUTO-ENTMCNC: 31.7 G/DL (ref 31.5–35.7)
MCHC RBC AUTO-ENTMCNC: 31.7 G/DL (ref 31.5–35.7)
MCHC RBC AUTO-ENTMCNC: 31.9 G/DL (ref 31.5–35.7)
MCHC RBC AUTO-ENTMCNC: 31.9 G/DL (ref 31.5–35.7)
MCHC RBC AUTO-ENTMCNC: 32 G/DL (ref 31.5–35.7)
MCHC RBC AUTO-ENTMCNC: 32 G/DL (ref 31.5–35.7)
MCHC RBC AUTO-ENTMCNC: 32.2 G/DL (ref 31.5–35.7)
MCHC RBC AUTO-ENTMCNC: 32.2 G/DL (ref 31.5–35.7)
MCHC RBC AUTO-ENTMCNC: 32.3 G/DL (ref 31.5–35.7)
MCHC RBC AUTO-ENTMCNC: 32.5 G/DL (ref 31.5–35.7)
MCHC RBC AUTO-ENTMCNC: 32.6 G/DL (ref 31.5–35.7)
MCHC RBC AUTO-ENTMCNC: 32.6 G/DL (ref 31.5–35.7)
MCHC RBC AUTO-ENTMCNC: 32.7 G/DL (ref 31.5–35.7)
MCHC RBC AUTO-ENTMCNC: 32.7 G/DL (ref 31.5–35.7)
MCHC RBC AUTO-ENTMCNC: 32.8 G/DL (ref 31.5–35.7)
MCHC RBC AUTO-ENTMCNC: 33.1 G/DL (ref 31.5–35.7)
MCHC RBC AUTO-ENTMCNC: 33.2 G/DL (ref 31.5–35.7)
MCHC RBC AUTO-ENTMCNC: 33.2 G/DL (ref 31.5–35.7)
MCHC RBC AUTO-ENTMCNC: 33.5 G/DL (ref 31.5–35.7)
MCHC RBC AUTO-ENTMCNC: 33.8 G/DL (ref 31.5–35.7)
MCHC RBC AUTO-ENTMCNC: 34.4 G/DL (ref 31.5–35.7)
MCV RBC AUTO: 88.1 FL (ref 79–97)
MCV RBC AUTO: 90.8 FL (ref 79–97)
MCV RBC AUTO: 91.3 FL (ref 79–97)
MCV RBC AUTO: 92.1 FL (ref 79–97)
MCV RBC AUTO: 92.4 FL (ref 79–97)
MCV RBC AUTO: 92.7 FL (ref 79–97)
MCV RBC AUTO: 92.9 FL (ref 79–97)
MCV RBC AUTO: 93.4 FL (ref 79–97)
MCV RBC AUTO: 93.5 FL (ref 79–97)
MCV RBC AUTO: 94 FL (ref 79–97)
MCV RBC AUTO: 94.6 FL (ref 79–97)
MCV RBC AUTO: 94.8 FL (ref 79–97)
MCV RBC AUTO: 94.9 FL (ref 79–97)
MCV RBC AUTO: 95.2 FL (ref 79–97)
MCV RBC AUTO: 95.2 FL (ref 79–97)
MCV RBC AUTO: 95.3 FL (ref 79–97)
MCV RBC AUTO: 95.9 FL (ref 79–97)
MCV RBC AUTO: 96.1 FL (ref 79–97)
MCV RBC AUTO: 96.2 FL (ref 79–97)
MCV RBC AUTO: 96.3 FL (ref 79–97)
MCV RBC AUTO: 96.3 FL (ref 79–97)
MCV RBC AUTO: 96.5 FL (ref 79–97)
MCV RBC AUTO: 96.5 FL (ref 79–97)
MCV RBC AUTO: 96.6 FL (ref 79–97)
MCV RBC AUTO: 96.8 FL (ref 79–97)
MCV RBC AUTO: 97.1 FL (ref 79–97)
MESOTHL CELL NFR FLD MANUAL: 1 %
METHGB BLD QL: 0.8 % (ref 0–1.5)
MODALITY: ABNORMAL
MONOCYTES # BLD AUTO: 1 10*3/MM3 (ref 0.1–0.9)
MONOCYTES # BLD AUTO: 1 10*3/MM3 (ref 0.1–0.9)
MONOCYTES # BLD AUTO: 1.27 10*3/MM3 (ref 0.1–0.9)
MONOCYTES # BLD AUTO: 1.39 10*3/MM3 (ref 0.1–0.9)
MONOCYTES # BLD AUTO: 1.45 10*3/MM3 (ref 0.1–0.9)
MONOCYTES # BLD AUTO: 1.67 10*3/MM3 (ref 0.1–0.9)
MONOCYTES # BLD AUTO: 1.72 10*3/MM3 (ref 0.1–0.9)
MONOCYTES NFR BLD AUTO: 10.4 % (ref 5–12)
MONOCYTES NFR BLD AUTO: 11.6 % (ref 5–12)
MONOCYTES NFR BLD AUTO: 6.1 % (ref 5–12)
MONOCYTES NFR BLD AUTO: 8.8 % (ref 5–12)
MONOCYTES NFR BLD AUTO: 9.7 % (ref 5–12)
MONOCYTES NFR BLD AUTO: 9.8 % (ref 5–12)
MONOCYTES NFR BLD AUTO: 9.9 % (ref 5–12)
MONOCYTES NFR FLD: 2 %
NEUTROPHILS # BLD AUTO: 10.42 10*3/MM3 (ref 1.7–7)
NEUTROPHILS # BLD AUTO: 13.37 10*3/MM3 (ref 1.7–7)
NEUTROPHILS # BLD AUTO: 14.64 10*3/MM3 (ref 1.7–7)
NEUTROPHILS # BLD AUTO: 6.6 10*3/MM3 (ref 1.7–7)
NEUTROPHILS # BLD AUTO: 6.81 10*3/MM3 (ref 1.7–7)
NEUTROPHILS # BLD AUTO: 9.04 10*3/MM3 (ref 1.7–7)
NEUTROPHILS # BLD AUTO: 9.26 10*3/MM3 (ref 1.7–7)
NEUTROPHILS NFR BLD AUTO: 41 % (ref 42.7–76)
NEUTROPHILS NFR BLD AUTO: 64.2 % (ref 42.7–76)
NEUTROPHILS NFR BLD AUTO: 71.9 % (ref 42.7–76)
NEUTROPHILS NFR BLD AUTO: 72.3 % (ref 42.7–76)
NEUTROPHILS NFR BLD AUTO: 73.5 % (ref 42.7–76)
NEUTROPHILS NFR BLD AUTO: 77.2 % (ref 42.7–76)
NEUTROPHILS NFR BLD AUTO: 81.9 % (ref 42.7–76)
NEUTROPHILS NFR FLD MANUAL: 16 %
NITRITE UR QL STRIP: NEGATIVE
NITRITE UR QL STRIP: NEGATIVE
NOTE: ABNORMAL
NRBC BLD AUTO-RTO: 0 /100 WBC (ref 0–0.2)
NT-PROBNP SERPL-MCNC: 5858 PG/ML (ref 5–900)
NT-PROBNP SERPL-MCNC: 814.8 PG/ML (ref 5–900)
OXYHGB MFR BLDV: 97.8 % (ref 94–99)
PATH REPORT.FINAL DX SPEC: NORMAL
PCO2 BLDA: 17.8 MM HG (ref 35–45)
PCO2 TEMP ADJ BLD: ABNORMAL MM[HG]
PH BLDA: 7.38 PH UNITS (ref 7.3–7.5)
PH UR STRIP.AUTO: 6 [PH] (ref 5–8)
PH UR STRIP.AUTO: 6 [PH] (ref 5–8)
PH, TEMP CORRECTED: ABNORMAL
PHOSPHATE SERPL-MCNC: 3.1 MG/DL (ref 2.5–4.5)
PHOSPHATE SERPL-MCNC: 3.8 MG/DL (ref 2.5–4.5)
PHOSPHATE SERPL-MCNC: 3.9 MG/DL (ref 2.5–4.5)
PHOSPHATE SERPL-MCNC: 4 MG/DL (ref 2.5–4.5)
PHOSPHATE SERPL-MCNC: 5 MG/DL (ref 2.5–4.5)
PLATELET # BLD AUTO: 102 10*3/MM3 (ref 140–450)
PLATELET # BLD AUTO: 106 10*3/MM3 (ref 140–450)
PLATELET # BLD AUTO: 111 10*3/MM3 (ref 140–450)
PLATELET # BLD AUTO: 113 10*3/MM3 (ref 140–450)
PLATELET # BLD AUTO: 120 10*3/MM3 (ref 140–450)
PLATELET # BLD AUTO: 121 10*3/MM3 (ref 140–450)
PLATELET # BLD AUTO: 122 10*3/MM3 (ref 140–450)
PLATELET # BLD AUTO: 123 10*3/MM3 (ref 140–450)
PLATELET # BLD AUTO: 124 10*3/MM3 (ref 140–450)
PLATELET # BLD AUTO: 126 10*3/MM3 (ref 140–450)
PLATELET # BLD AUTO: 130 10*3/MM3 (ref 140–450)
PLATELET # BLD AUTO: 130 10*3/MM3 (ref 140–450)
PLATELET # BLD AUTO: 131 10*3/MM3 (ref 140–450)
PLATELET # BLD AUTO: 136 10*3/MM3 (ref 140–450)
PLATELET # BLD AUTO: 137 10*3/MM3 (ref 140–450)
PLATELET # BLD AUTO: 145 10*3/MM3 (ref 140–450)
PLATELET # BLD AUTO: 147 10*3/MM3 (ref 140–450)
PLATELET # BLD AUTO: 148 10*3/MM3 (ref 140–450)
PLATELET # BLD AUTO: 151 10*3/MM3 (ref 140–450)
PLATELET # BLD AUTO: 151 10*3/MM3 (ref 140–450)
PLATELET # BLD AUTO: 155 10*3/MM3 (ref 140–450)
PLATELET # BLD AUTO: 155 10*3/MM3 (ref 140–450)
PLATELET # BLD AUTO: 160 10*3/MM3 (ref 140–450)
PLATELET # BLD AUTO: 165 10*3/MM3 (ref 140–450)
PLATELET # BLD AUTO: 82 10*3/MM3 (ref 140–450)
PLATELET # BLD AUTO: 89 10*3/MM3 (ref 140–450)
PLATELET # BLD AUTO: 89 10*3/MM3 (ref 140–450)
PLATELET # BLD AUTO: 90 10*3/MM3 (ref 140–450)
PMV BLD AUTO: 10.1 FL (ref 6–12)
PMV BLD AUTO: 10.2 FL (ref 6–12)
PMV BLD AUTO: 10.3 FL (ref 6–12)
PMV BLD AUTO: 10.4 FL (ref 6–12)
PMV BLD AUTO: 10.5 FL (ref 6–12)
PMV BLD AUTO: 10.6 FL (ref 6–12)
PMV BLD AUTO: 10.9 FL (ref 6–12)
PMV BLD AUTO: 11.4 FL (ref 6–12)
PMV BLD AUTO: 9.2 FL (ref 6–12)
PMV BLD AUTO: 9.3 FL (ref 6–12)
PMV BLD AUTO: 9.4 FL (ref 6–12)
PMV BLD AUTO: 9.6 FL (ref 6–12)
PMV BLD AUTO: 9.6 FL (ref 6–12)
PMV BLD AUTO: 9.7 FL (ref 6–12)
PMV BLD AUTO: 9.7 FL (ref 6–12)
PMV BLD AUTO: 9.8 FL (ref 6–12)
PMV BLD AUTO: 9.9 FL (ref 6–12)
PO2 BLDA: 194 MM HG (ref 75–100)
PO2 TEMP ADJ BLD: ABNORMAL MM[HG]
POTASSIUM BLD-SCNC: 3.7 MMOL/L (ref 3.5–5.2)
POTASSIUM BLD-SCNC: 3.8 MMOL/L (ref 3.5–5.2)
POTASSIUM BLD-SCNC: 3.9 MMOL/L (ref 3.5–5.2)
POTASSIUM BLD-SCNC: 4.2 MMOL/L (ref 3.5–5.2)
POTASSIUM BLD-SCNC: 4.2 MMOL/L (ref 3.5–5.2)
POTASSIUM BLD-SCNC: 4.4 MMOL/L (ref 3.5–5.2)
POTASSIUM BLD-SCNC: 4.6 MMOL/L (ref 3.5–5.2)
POTASSIUM BLD-SCNC: 4.8 MMOL/L (ref 3.5–5.2)
POTASSIUM BLD-SCNC: 5.1 MMOL/L (ref 3.5–5.2)
POTASSIUM BLD-SCNC: 5.2 MMOL/L (ref 3.5–5.2)
POTASSIUM BLD-SCNC: 5.2 MMOL/L (ref 3.5–5.2)
POTASSIUM BLD-SCNC: 5.4 MMOL/L (ref 3.5–5.2)
POTASSIUM BLD-SCNC: 5.4 MMOL/L (ref 3.5–5.2)
POTASSIUM BLD-SCNC: 5.6 MMOL/L (ref 3.5–5.2)
POTASSIUM BLD-SCNC: 5.7 MMOL/L (ref 3.5–5.2)
POTASSIUM BLD-SCNC: 5.9 MMOL/L (ref 3.5–5.2)
PROCALCITONIN SERPL-MCNC: 0.1 NG/ML (ref 0.1–0.25)
PROCALCITONIN SERPL-MCNC: 0.29 NG/ML (ref 0.1–0.25)
PROT FLD-MCNC: 1.8 G/DL
PROT SERPL-MCNC: 4.6 G/DL (ref 6–8.5)
PROT SERPL-MCNC: 4.9 G/DL (ref 6–8.5)
PROT SERPL-MCNC: 5.1 G/DL (ref 6–8.5)
PROT SERPL-MCNC: 5.3 G/DL (ref 6–8.5)
PROT SERPL-MCNC: 5.7 G/DL (ref 6–8.5)
PROT SERPL-MCNC: 6.4 G/DL (ref 6–8.5)
PROT SERPL-MCNC: 6.7 G/DL (ref 6–8.5)
PROT SERPL-MCNC: 6.9 G/DL (ref 6–8.5)
PROT SERPL-MCNC: 7.4 G/DL (ref 6–8.5)
PROT SERPL-MCNC: 7.6 G/DL (ref 6–8.5)
PROT UR QL STRIP: ABNORMAL
PROT UR QL STRIP: NEGATIVE
PROTHROMBIN TIME: 16.3 SECONDS (ref 12–15.1)
PROTHROMBIN TIME: 16.8 SECONDS (ref 11.5–14)
PROTHROMBIN TIME: 16.9 SECONDS (ref 11.5–14)
PROTHROMBIN TIME: 16.9 SECONDS (ref 12–15.1)
PROTHROMBIN TIME: 17.1 SECONDS (ref 11.5–14)
PROTHROMBIN TIME: 19 SECONDS (ref 11.5–14)
PROTHROMBIN TIME: 19.6 SECONDS (ref 11.5–14)
RBC # BLD AUTO: 2.4 10*6/MM3 (ref 3.77–5.28)
RBC # BLD AUTO: 2.53 10*6/MM3 (ref 3.77–5.28)
RBC # BLD AUTO: 2.8 10*6/MM3 (ref 3.77–5.28)
RBC # BLD AUTO: 2.88 10*6/MM3 (ref 3.77–5.28)
RBC # BLD AUTO: 2.91 10*6/MM3 (ref 3.77–5.28)
RBC # BLD AUTO: 2.91 10*6/MM3 (ref 3.77–5.28)
RBC # BLD AUTO: 2.95 10*6/MM3 (ref 3.77–5.28)
RBC # BLD AUTO: 2.97 10*6/MM3 (ref 3.77–5.28)
RBC # BLD AUTO: 2.99 10*6/MM3 (ref 3.77–5.28)
RBC # BLD AUTO: 3.03 10*6/MM3 (ref 3.77–5.28)
RBC # BLD AUTO: 3.06 10*6/MM3 (ref 3.77–5.28)
RBC # BLD AUTO: 3.15 10*6/MM3 (ref 3.77–5.28)
RBC # BLD AUTO: 3.17 10*6/MM3 (ref 3.77–5.28)
RBC # BLD AUTO: 3.17 10*6/MM3 (ref 3.77–5.28)
RBC # BLD AUTO: 3.22 10*6/MM3 (ref 3.77–5.28)
RBC # BLD AUTO: 3.25 10*6/MM3 (ref 3.77–5.28)
RBC # BLD AUTO: 3.25 10*6/MM3 (ref 3.77–5.28)
RBC # BLD AUTO: 3.29 10*6/MM3 (ref 3.77–5.28)
RBC # BLD AUTO: 3.3 10*6/MM3 (ref 3.77–5.28)
RBC # BLD AUTO: 3.32 10*6/MM3 (ref 3.77–5.28)
RBC # BLD AUTO: 3.36 10*6/MM3 (ref 3.77–5.28)
RBC # BLD AUTO: 3.37 10*6/MM3 (ref 3.77–5.28)
RBC # BLD AUTO: 3.46 10*6/MM3 (ref 3.77–5.28)
RBC # BLD AUTO: 3.54 10*6/MM3 (ref 3.77–5.28)
RBC # BLD AUTO: 3.6 10*6/MM3 (ref 3.77–5.28)
RBC # BLD AUTO: 4.15 10*6/MM3 (ref 3.77–5.28)
RBC # FLD AUTO: <2000 /MM3
RBC # UR: ABNORMAL /HPF
RBC # UR: ABNORMAL /HPF
RBC MORPH BLD: NORMAL
REF LAB TEST METHOD: ABNORMAL
REF LAB TEST METHOD: ABNORMAL
REF LAB TEST METHOD: NORMAL
RH BLD: POSITIVE
RH BLD: POSITIVE
SAO2 % BLDCOA: 99.8 % (ref 94–100)
SARS-COV-2 RNA PNL SPEC NAA+PROBE: NOT DETECTED
SARS-COV-2 RNA PNL SPEC NAA+PROBE: NOT DETECTED
SARS-COV-2 RNA RESP QL NAA+PROBE: NOT DETECTED
SODIUM BLD-SCNC: 127 MMOL/L (ref 136–145)
SODIUM BLD-SCNC: 128 MMOL/L (ref 136–145)
SODIUM BLD-SCNC: 131 MMOL/L (ref 136–145)
SODIUM BLD-SCNC: 132 MMOL/L (ref 136–145)
SODIUM BLD-SCNC: 133 MMOL/L (ref 136–145)
SODIUM BLD-SCNC: 134 MMOL/L (ref 136–145)
SODIUM BLD-SCNC: 135 MMOL/L (ref 136–145)
SODIUM BLD-SCNC: 136 MMOL/L (ref 136–145)
SODIUM BLD-SCNC: 137 MMOL/L (ref 136–145)
SODIUM BLD-SCNC: 138 MMOL/L (ref 136–145)
SP GR UR STRIP: 1.01 (ref 1–1.03)
SP GR UR STRIP: 1.03 (ref 1–1.03)
SQUAMOUS #/AREA URNS HPF: ABNORMAL /HPF
SQUAMOUS #/AREA URNS HPF: ABNORMAL /HPF
T&S EXPIRATION DATE: NORMAL
TEST INFORMATION: NORMAL
TROPONIN T SERPL-MCNC: 0.09 NG/ML (ref 0–0.03)
TROPONIN T SERPL-MCNC: 0.09 NG/ML (ref 0–0.03)
TROPONIN T SERPL-MCNC: 0.1 NG/ML (ref 0–0.03)
TROPONIN T SERPL-MCNC: 0.12 NG/ML (ref 0–0.03)
TROPONIN T SERPL-MCNC: 0.14 NG/ML (ref 0–0.03)
TSH SERPL DL<=0.05 MIU/L-ACNC: 2.91 UIU/ML (ref 0.27–4.2)
UNIT  ABO: NORMAL
UNIT  RH: NORMAL
UROBILINOGEN UR QL STRIP: ABNORMAL
UROBILINOGEN UR QL STRIP: ABNORMAL
VENTILATOR MODE: ABNORMAL
WBC # FLD AUTO: 298 /MM3
WBC MORPH BLD: NORMAL
WBC NRBC COR # BLD: 10.19 10*3/MM3 (ref 3.4–10.8)
WBC NRBC COR # BLD: 10.28 10*3/MM3 (ref 3.4–10.8)
WBC NRBC COR # BLD: 10.94 10*3/MM3 (ref 3.4–10.8)
WBC NRBC COR # BLD: 11.07 10*3/MM3 (ref 3.4–10.8)
WBC NRBC COR # BLD: 11.55 10*3/MM3 (ref 3.4–10.8)
WBC NRBC COR # BLD: 12.12 10*3/MM3 (ref 3.4–10.8)
WBC NRBC COR # BLD: 12.5 10*3/MM3 (ref 3.4–10.8)
WBC NRBC COR # BLD: 12.54 10*3/MM3 (ref 3.4–10.8)
WBC NRBC COR # BLD: 12.75 10*3/MM3 (ref 3.4–10.8)
WBC NRBC COR # BLD: 12.86 10*3/MM3 (ref 3.4–10.8)
WBC NRBC COR # BLD: 12.96 10*3/MM3 (ref 3.4–10.8)
WBC NRBC COR # BLD: 14.17 10*3/MM3 (ref 3.4–10.8)
WBC NRBC COR # BLD: 15.38 10*3/MM3 (ref 3.4–10.8)
WBC NRBC COR # BLD: 16.33 10*3/MM3 (ref 3.4–10.8)
WBC NRBC COR # BLD: 16.45 10*3/MM3 (ref 3.4–10.8)
WBC NRBC COR # BLD: 16.6 10*3/MM3 (ref 3.4–10.8)
WBC NRBC COR # BLD: 16.81 10*3/MM3 (ref 3.4–10.8)
WBC NRBC COR # BLD: 17.35 10*3/MM3 (ref 3.4–10.8)
WBC NRBC COR # BLD: 17.81 10*3/MM3 (ref 3.4–10.8)
WBC NRBC COR # BLD: 18.67 10*3/MM3 (ref 3.4–10.8)
WBC NRBC COR # BLD: 18.97 10*3/MM3 (ref 3.4–10.8)
WBC NRBC COR # BLD: 20.35 10*3/MM3 (ref 3.4–10.8)
WBC NRBC COR # BLD: 22.01 10*3/MM3 (ref 3.4–10.8)
WBC NRBC COR # BLD: 9.06 10*3/MM3 (ref 3.4–10.8)
WBC NRBC COR # BLD: 9.29 10*3/MM3 (ref 3.4–10.8)
WBC NRBC COR # BLD: 9.56 10*3/MM3 (ref 3.4–10.8)
WBC UR QL AUTO: ABNORMAL /HPF
WBC UR QL AUTO: ABNORMAL /HPF
YEAST URNS QL MICRO: ABNORMAL /HPF

## 2020-01-01 PROCEDURE — 25010000002 ONDANSETRON PER 1 MG: Performed by: INTERNAL MEDICINE

## 2020-01-01 PROCEDURE — 80053 COMPREHEN METABOLIC PANEL: CPT | Performed by: EMERGENCY MEDICINE

## 2020-01-01 PROCEDURE — 25010000002 CEFTRIAXONE PER 250 MG: Performed by: INTERNAL MEDICINE

## 2020-01-01 PROCEDURE — 83690 ASSAY OF LIPASE: CPT | Performed by: EMERGENCY MEDICINE

## 2020-01-01 PROCEDURE — 99291 CRITICAL CARE FIRST HOUR: CPT | Performed by: INTERNAL MEDICINE

## 2020-01-01 PROCEDURE — 85018 HEMOGLOBIN: CPT | Performed by: INTERNAL MEDICINE

## 2020-01-01 PROCEDURE — 99213 OFFICE O/P EST LOW 20 MIN: CPT | Performed by: INTERNAL MEDICINE

## 2020-01-01 PROCEDURE — 94799 UNLISTED PULMONARY SVC/PX: CPT

## 2020-01-01 PROCEDURE — 06183J4 BYPASS PORTAL VEIN TO HEPATIC VEIN WITH SYNTHETIC SUBSTITUTE, PERCUTANEOUS APPROACH: ICD-10-PCS | Performed by: RADIOLOGY

## 2020-01-01 PROCEDURE — U0002 COVID-19 LAB TEST NON-CDC: HCPCS | Performed by: INTERNAL MEDICINE

## 2020-01-01 PROCEDURE — 83735 ASSAY OF MAGNESIUM: CPT | Performed by: EMERGENCY MEDICINE

## 2020-01-01 PROCEDURE — 82945 GLUCOSE OTHER FLUID: CPT | Performed by: INTERNAL MEDICINE

## 2020-01-01 PROCEDURE — 25010000002 CALCIUM GLUCONATE PER 10 ML: Performed by: NURSE PRACTITIONER

## 2020-01-01 PROCEDURE — U0004 COV-19 TEST NON-CDC HGH THRU: HCPCS | Performed by: INTERNAL MEDICINE

## 2020-01-01 PROCEDURE — P9016 RBC LEUKOCYTES REDUCED: HCPCS

## 2020-01-01 PROCEDURE — 85025 COMPLETE CBC W/AUTO DIFF WBC: CPT | Performed by: INTERNAL MEDICINE

## 2020-01-01 PROCEDURE — 80053 COMPREHEN METABOLIC PANEL: CPT | Performed by: NURSE PRACTITIONER

## 2020-01-01 PROCEDURE — 80048 BASIC METABOLIC PNL TOTAL CA: CPT | Performed by: INTERNAL MEDICINE

## 2020-01-01 PROCEDURE — 43244 EGD VARICES LIGATION: CPT | Performed by: INTERNAL MEDICINE

## 2020-01-01 PROCEDURE — 96365 THER/PROPH/DIAG IV INF INIT: CPT

## 2020-01-01 PROCEDURE — 5A1223Z PERFORMANCE OF CARDIAC PACING, CONTINUOUS: ICD-10-PCS | Performed by: INTERNAL MEDICINE

## 2020-01-01 PROCEDURE — 99309 SBSQ NF CARE MODERATE MDM 30: CPT | Performed by: PHYSICIAN ASSISTANT

## 2020-01-01 PROCEDURE — 84484 ASSAY OF TROPONIN QUANT: CPT | Performed by: INTERNAL MEDICINE

## 2020-01-01 PROCEDURE — 86901 BLOOD TYPING SEROLOGIC RH(D): CPT

## 2020-01-01 PROCEDURE — 94640 AIRWAY INHALATION TREATMENT: CPT

## 2020-01-01 PROCEDURE — 82042 OTHER SOURCE ALBUMIN QUAN EA: CPT | Performed by: INTERNAL MEDICINE

## 2020-01-01 PROCEDURE — 86923 COMPATIBILITY TEST ELECTRIC: CPT

## 2020-01-01 PROCEDURE — 87070 CULTURE OTHR SPECIMN AEROBIC: CPT | Performed by: NURSE PRACTITIONER

## 2020-01-01 PROCEDURE — 99254 IP/OBS CNSLTJ NEW/EST MOD 60: CPT | Performed by: FAMILY MEDICINE

## 2020-01-01 PROCEDURE — 83735 ASSAY OF MAGNESIUM: CPT | Performed by: INTERNAL MEDICINE

## 2020-01-01 PROCEDURE — 84484 ASSAY OF TROPONIN QUANT: CPT | Performed by: NURSE PRACTITIONER

## 2020-01-01 PROCEDURE — 25010000002 PHENYLEPHRINE PER 1 ML: Performed by: NURSE ANESTHETIST, CERTIFIED REGISTERED

## 2020-01-01 PROCEDURE — 99310 SBSQ NF CARE HIGH MDM 45: CPT | Performed by: INTERNAL MEDICINE

## 2020-01-01 PROCEDURE — 94002 VENT MGMT INPAT INIT DAY: CPT

## 2020-01-01 PROCEDURE — 84100 ASSAY OF PHOSPHORUS: CPT | Performed by: INTERNAL MEDICINE

## 2020-01-01 PROCEDURE — 63710000001 INSULIN REGULAR HUMAN PER 5 UNITS: Performed by: INTERNAL MEDICINE

## 2020-01-01 PROCEDURE — 76942 ECHO GUIDE FOR BIOPSY: CPT

## 2020-01-01 PROCEDURE — P9041 ALBUMIN (HUMAN),5%, 50ML: HCPCS | Performed by: NURSE PRACTITIONER

## 2020-01-01 PROCEDURE — 99253 IP/OBS CNSLTJ NEW/EST LOW 45: CPT | Performed by: INTERNAL MEDICINE

## 2020-01-01 PROCEDURE — 33210 INSERT ELECTRD/PM CATH SNGL: CPT | Performed by: INTERNAL MEDICINE

## 2020-01-01 PROCEDURE — 75989 ABSCESS DRAINAGE UNDER X-RAY: CPT

## 2020-01-01 PROCEDURE — 85610 PROTHROMBIN TIME: CPT | Performed by: INTERNAL MEDICINE

## 2020-01-01 PROCEDURE — 85610 PROTHROMBIN TIME: CPT | Performed by: EMERGENCY MEDICINE

## 2020-01-01 PROCEDURE — 85027 COMPLETE CBC AUTOMATED: CPT | Performed by: INTERNAL MEDICINE

## 2020-01-01 PROCEDURE — 63710000001 INSULIN DETEMIR PER 5 UNITS: Performed by: INTERNAL MEDICINE

## 2020-01-01 PROCEDURE — 0W9G3ZZ DRAINAGE OF PERITONEAL CAVITY, PERCUTANEOUS APPROACH: ICD-10-PCS | Performed by: RADIOLOGY

## 2020-01-01 PROCEDURE — 63710000001 INSULIN LISPRO (HUMAN) PER 5 UNITS: Performed by: INTERNAL MEDICINE

## 2020-01-01 PROCEDURE — 85049 AUTOMATED PLATELET COUNT: CPT | Performed by: RADIOLOGY

## 2020-01-01 PROCEDURE — 99239 HOSP IP/OBS DSCHRG MGMT >30: CPT | Performed by: INTERNAL MEDICINE

## 2020-01-01 PROCEDURE — 87804 INFLUENZA ASSAY W/OPTIC: CPT | Performed by: EMERGENCY MEDICINE

## 2020-01-01 PROCEDURE — 99204 OFFICE O/P NEW MOD 45 MIN: CPT | Performed by: INTERNAL MEDICINE

## 2020-01-01 PROCEDURE — 25010000002 IOPAMIDOL 61 % SOLUTION

## 2020-01-01 PROCEDURE — 99232 SBSQ HOSP IP/OBS MODERATE 35: CPT | Performed by: INTERNAL MEDICINE

## 2020-01-01 PROCEDURE — 86900 BLOOD TYPING SEROLOGIC ABO: CPT | Performed by: EMERGENCY MEDICINE

## 2020-01-01 PROCEDURE — 82962 GLUCOSE BLOOD TEST: CPT

## 2020-01-01 PROCEDURE — 93005 ELECTROCARDIOGRAM TRACING: CPT | Performed by: NURSE PRACTITIONER

## 2020-01-01 PROCEDURE — 99232 SBSQ HOSP IP/OBS MODERATE 35: CPT | Performed by: PHYSICIAN ASSISTANT

## 2020-01-01 PROCEDURE — 85730 THROMBOPLASTIN TIME PARTIAL: CPT | Performed by: RADIOLOGY

## 2020-01-01 PROCEDURE — 93005 ELECTROCARDIOGRAM TRACING: CPT

## 2020-01-01 PROCEDURE — 36430 TRANSFUSION BLD/BLD COMPNT: CPT

## 2020-01-01 PROCEDURE — 92610 EVALUATE SWALLOWING FUNCTION: CPT

## 2020-01-01 PROCEDURE — 71045 X-RAY EXAM CHEST 1 VIEW: CPT

## 2020-01-01 PROCEDURE — 89051 BODY FLUID CELL COUNT: CPT | Performed by: INTERNAL MEDICINE

## 2020-01-01 PROCEDURE — 87635 SARS-COV-2 COVID-19 AMP PRB: CPT | Performed by: INTERNAL MEDICINE

## 2020-01-01 PROCEDURE — 85610 PROTHROMBIN TIME: CPT | Performed by: RADIOLOGY

## 2020-01-01 PROCEDURE — P9047 ALBUMIN (HUMAN), 25%, 50ML: HCPCS | Performed by: EMERGENCY MEDICINE

## 2020-01-01 PROCEDURE — 85025 COMPLETE CBC W/AUTO DIFF WBC: CPT

## 2020-01-01 PROCEDURE — 84484 ASSAY OF TROPONIN QUANT: CPT | Performed by: EMERGENCY MEDICINE

## 2020-01-01 PROCEDURE — 25010000002 HEPARIN (PORCINE) PER 1000 UNITS: Performed by: NURSE PRACTITIONER

## 2020-01-01 PROCEDURE — 93005 ELECTROCARDIOGRAM TRACING: CPT | Performed by: INTERNAL MEDICINE

## 2020-01-01 PROCEDURE — 83605 ASSAY OF LACTIC ACID: CPT | Performed by: NURSE PRACTITIONER

## 2020-01-01 PROCEDURE — 99285 EMERGENCY DEPT VISIT HI MDM: CPT

## 2020-01-01 PROCEDURE — 99239 HOSP IP/OBS DSCHRG MGMT >30: CPT | Performed by: NURSE PRACTITIONER

## 2020-01-01 PROCEDURE — 84100 ASSAY OF PHOSPHORUS: CPT | Performed by: NURSE PRACTITIONER

## 2020-01-01 PROCEDURE — 25010000003 CEFAZOLIN 1-4 GM/50ML-% SOLUTION: Performed by: ANESTHESIOLOGY

## 2020-01-01 PROCEDURE — 80053 COMPREHEN METABOLIC PANEL: CPT | Performed by: INTERNAL MEDICINE

## 2020-01-01 PROCEDURE — 99315 NF DSCHRG MGMT 30 MIN/LESS: CPT | Performed by: PHYSICIAN ASSISTANT

## 2020-01-01 PROCEDURE — 93010 ELECTROCARDIOGRAM REPORT: CPT | Performed by: INTERNAL MEDICINE

## 2020-01-01 PROCEDURE — 83615 LACTATE (LD) (LDH) ENZYME: CPT | Performed by: INTERNAL MEDICINE

## 2020-01-01 PROCEDURE — 25010000002 OCTREOTIDE PER 25 MCG: Performed by: EMERGENCY MEDICINE

## 2020-01-01 PROCEDURE — 83605 ASSAY OF LACTIC ACID: CPT | Performed by: INTERNAL MEDICINE

## 2020-01-01 PROCEDURE — 99233 SBSQ HOSP IP/OBS HIGH 50: CPT | Performed by: INTERNAL MEDICINE

## 2020-01-01 PROCEDURE — 25010000002 IOPAMIDOL 61 % SOLUTION: Performed by: INTERNAL MEDICINE

## 2020-01-01 PROCEDURE — 84157 ASSAY OF PROTEIN OTHER: CPT | Performed by: INTERNAL MEDICINE

## 2020-01-01 PROCEDURE — 81001 URINALYSIS AUTO W/SCOPE: CPT | Performed by: INTERNAL MEDICINE

## 2020-01-01 PROCEDURE — 87205 SMEAR GRAM STAIN: CPT | Performed by: INTERNAL MEDICINE

## 2020-01-01 PROCEDURE — 99305 1ST NF CARE MODERATE MDM 35: CPT | Performed by: INTERNAL MEDICINE

## 2020-01-01 PROCEDURE — 82140 ASSAY OF AMMONIA: CPT | Performed by: NURSE PRACTITIONER

## 2020-01-01 PROCEDURE — 76705 ECHO EXAM OF ABDOMEN: CPT

## 2020-01-01 PROCEDURE — 82140 ASSAY OF AMMONIA: CPT | Performed by: FAMILY MEDICINE

## 2020-01-01 PROCEDURE — 25010000002 PROPOFOL 10 MG/ML EMULSION: Performed by: NURSE ANESTHETIST, CERTIFIED REGISTERED

## 2020-01-01 PROCEDURE — 63710000001 INSULIN LISPRO (HUMAN) PER 5 UNITS: Performed by: NURSE PRACTITIONER

## 2020-01-01 PROCEDURE — 86900 BLOOD TYPING SEROLOGIC ABO: CPT

## 2020-01-01 PROCEDURE — 87522 HEPATITIS C REVRS TRNSCRPJ: CPT

## 2020-01-01 PROCEDURE — 25010000002 PIPERACILLIN SOD-TAZOBACTAM PER 1 G: Performed by: NURSE PRACTITIONER

## 2020-01-01 PROCEDURE — 85014 HEMATOCRIT: CPT | Performed by: INTERNAL MEDICINE

## 2020-01-01 PROCEDURE — 93005 ELECTROCARDIOGRAM TRACING: CPT | Performed by: EMERGENCY MEDICINE

## 2020-01-01 PROCEDURE — 25010000002 CALCIUM GLUCONATE PER 10 ML

## 2020-01-01 PROCEDURE — 97530 THERAPEUTIC ACTIVITIES: CPT

## 2020-01-01 PROCEDURE — 87086 URINE CULTURE/COLONY COUNT: CPT | Performed by: INTERNAL MEDICINE

## 2020-01-01 PROCEDURE — 87899 AGENT NOS ASSAY W/OPTIC: CPT

## 2020-01-01 PROCEDURE — 80053 COMPREHEN METABOLIC PANEL: CPT

## 2020-01-01 PROCEDURE — 25010000002 ALBUMIN HUMAN 5% PER 50 ML: Performed by: NURSE PRACTITIONER

## 2020-01-01 PROCEDURE — 83036 HEMOGLOBIN GLYCOSYLATED A1C: CPT | Performed by: NURSE PRACTITIONER

## 2020-01-01 PROCEDURE — 86704 HEP B CORE ANTIBODY TOTAL: CPT

## 2020-01-01 PROCEDURE — 87040 BLOOD CULTURE FOR BACTERIA: CPT | Performed by: FAMILY MEDICINE

## 2020-01-01 PROCEDURE — 99214 OFFICE O/P EST MOD 30 MIN: CPT | Performed by: INTERNAL MEDICINE

## 2020-01-01 PROCEDURE — 99236 HOSP IP/OBS SAME DATE HI 85: CPT | Performed by: INTERNAL MEDICINE

## 2020-01-01 PROCEDURE — 99356 PR PROLONGED SVC I/P OR OBS SETTING 1ST HOUR: CPT | Performed by: INTERNAL MEDICINE

## 2020-01-01 PROCEDURE — 25010000003 MAGNESIUM SULFATE 4 GM/100ML SOLUTION: Performed by: INTERNAL MEDICINE

## 2020-01-01 PROCEDURE — 99255 IP/OBS CONSLTJ NEW/EST HI 80: CPT | Performed by: NURSE PRACTITIONER

## 2020-01-01 PROCEDURE — 63710000001 INSULIN DETEMIR PER 5 UNITS: Performed by: NURSE PRACTITIONER

## 2020-01-01 PROCEDURE — 87040 BLOOD CULTURE FOR BACTERIA: CPT | Performed by: INTERNAL MEDICINE

## 2020-01-01 PROCEDURE — 82375 ASSAY CARBOXYHB QUANT: CPT

## 2020-01-01 PROCEDURE — 99213 OFFICE O/P EST LOW 20 MIN: CPT | Performed by: PHYSICIAN ASSISTANT

## 2020-01-01 PROCEDURE — 88112 CYTOPATH CELL ENHANCE TECH: CPT | Performed by: INTERNAL MEDICINE

## 2020-01-01 PROCEDURE — 99308 SBSQ NF CARE LOW MDM 20: CPT | Performed by: INTERNAL MEDICINE

## 2020-01-01 PROCEDURE — 63710000001 INSULIN REGULAR HUMAN PER 5 UNITS: Performed by: EMERGENCY MEDICINE

## 2020-01-01 PROCEDURE — 82805 BLOOD GASES W/O2 SATURATION: CPT

## 2020-01-01 PROCEDURE — 83880 ASSAY OF NATRIURETIC PEPTIDE: CPT | Performed by: NURSE PRACTITIONER

## 2020-01-01 PROCEDURE — C1894 INTRO/SHEATH, NON-LASER: HCPCS | Performed by: INTERNAL MEDICINE

## 2020-01-01 PROCEDURE — 36600 WITHDRAWAL OF ARTERIAL BLOOD: CPT

## 2020-01-01 PROCEDURE — 87902 NFCT AGT GNTYP ALYS HEP C: CPT

## 2020-01-01 PROCEDURE — 25010000002 ONDANSETRON PER 1 MG: Performed by: ANESTHESIOLOGY

## 2020-01-01 PROCEDURE — 83050 HGB METHEMOGLOBIN QUAN: CPT

## 2020-01-01 PROCEDURE — 25010000002 DEXAMETHASONE PER 1 MG: Performed by: ANESTHESIOLOGY

## 2020-01-01 PROCEDURE — 93306 TTE W/DOPPLER COMPLETE: CPT | Performed by: INTERNAL MEDICINE

## 2020-01-01 PROCEDURE — 25010000002 ALBUMIN HUMAN 25% PER 50 ML: Performed by: EMERGENCY MEDICINE

## 2020-01-01 PROCEDURE — 25010000002 ATROPINE PER 0.01 MG

## 2020-01-01 PROCEDURE — 85025 COMPLETE CBC W/AUTO DIFF WBC: CPT | Performed by: EMERGENCY MEDICINE

## 2020-01-01 PROCEDURE — 97166 OT EVAL MOD COMPLEX 45 MIN: CPT

## 2020-01-01 PROCEDURE — 85025 COMPLETE CBC W/AUTO DIFF WBC: CPT | Performed by: NURSE PRACTITIONER

## 2020-01-01 PROCEDURE — 63710000001 INSULIN LISPRO (HUMAN) PER 5 UNITS

## 2020-01-01 PROCEDURE — 36415 COLL VENOUS BLD VENIPUNCTURE: CPT

## 2020-01-01 PROCEDURE — 06L38CZ OCCLUSION OF ESOPHAGEAL VEIN WITH EXTRALUMINAL DEVICE, VIA NATURAL OR ARTIFICIAL OPENING ENDOSCOPIC: ICD-10-PCS | Performed by: INTERNAL MEDICINE

## 2020-01-01 PROCEDURE — 87147 CULTURE TYPE IMMUNOLOGIC: CPT | Performed by: INTERNAL MEDICINE

## 2020-01-01 PROCEDURE — C1725 CATH, TRANSLUMIN NON-LASER: HCPCS

## 2020-01-01 PROCEDURE — 84443 ASSAY THYROID STIM HORMONE: CPT | Performed by: NURSE PRACTITIONER

## 2020-01-01 PROCEDURE — 25010000003 LIDOCAINE 1 % SOLUTION

## 2020-01-01 PROCEDURE — 63710000001 INSULIN REGULAR HUMAN PER 5 UNITS: Performed by: NURSE PRACTITIONER

## 2020-01-01 PROCEDURE — 83605 ASSAY OF LACTIC ACID: CPT | Performed by: EMERGENCY MEDICINE

## 2020-01-01 PROCEDURE — 37182 INSERT HEPATIC SHUNT (TIPS): CPT

## 2020-01-01 PROCEDURE — C1887 CATHETER, GUIDING: HCPCS

## 2020-01-01 PROCEDURE — 87635 SARS-COV-2 COVID-19 AMP PRB: CPT | Performed by: EMERGENCY MEDICINE

## 2020-01-01 PROCEDURE — 86706 HEP B SURFACE ANTIBODY: CPT

## 2020-01-01 PROCEDURE — 25010000002 OCTREOTIDE PER 25 MCG: Performed by: INTERNAL MEDICINE

## 2020-01-01 PROCEDURE — 85007 BL SMEAR W/DIFF WBC COUNT: CPT | Performed by: INTERNAL MEDICINE

## 2020-01-01 PROCEDURE — C1751 CATH, INF, PER/CENT/MIDLINE: HCPCS

## 2020-01-01 PROCEDURE — C1894 INTRO/SHEATH, NON-LASER: HCPCS

## 2020-01-01 PROCEDURE — 87205 SMEAR GRAM STAIN: CPT | Performed by: NURSE PRACTITIONER

## 2020-01-01 PROCEDURE — 84145 PROCALCITONIN (PCT): CPT | Performed by: EMERGENCY MEDICINE

## 2020-01-01 PROCEDURE — 86901 BLOOD TYPING SEROLOGIC RH(D): CPT | Performed by: EMERGENCY MEDICINE

## 2020-01-01 PROCEDURE — 25010000002 DOPAMINE PER 40 MG: Performed by: EMERGENCY MEDICINE

## 2020-01-01 PROCEDURE — 83880 ASSAY OF NATRIURETIC PEPTIDE: CPT | Performed by: EMERGENCY MEDICINE

## 2020-01-01 PROCEDURE — 99310 SBSQ NF CARE HIGH MDM 45: CPT | Performed by: PHYSICIAN ASSISTANT

## 2020-01-01 PROCEDURE — C1874 STENT, COATED/COV W/DEL SYS: HCPCS

## 2020-01-01 PROCEDURE — 02HV33Z INSERTION OF INFUSION DEVICE INTO SUPERIOR VENA CAVA, PERCUTANEOUS APPROACH: ICD-10-PCS | Performed by: INTERNAL MEDICINE

## 2020-01-01 PROCEDURE — 25010000002 PIPERACILLIN SOD-TAZOBACTAM PER 1 G: Performed by: FAMILY MEDICINE

## 2020-01-01 PROCEDURE — 25010000002 MORPHINE SULFATE (PF) 2 MG/ML SOLUTION: Performed by: FAMILY MEDICINE

## 2020-01-01 PROCEDURE — 86708 HEPATITIS A ANTIBODY: CPT

## 2020-01-01 PROCEDURE — 25010000002 ONDANSETRON PER 1 MG: Performed by: NURSE ANESTHETIST, CERTIFIED REGISTERED

## 2020-01-01 PROCEDURE — 87150 DNA/RNA AMPLIFIED PROBE: CPT | Performed by: INTERNAL MEDICINE

## 2020-01-01 PROCEDURE — C1769 GUIDE WIRE: HCPCS

## 2020-01-01 PROCEDURE — 76775 US EXAM ABDO BACK WALL LIM: CPT

## 2020-01-01 PROCEDURE — 99284 EMERGENCY DEPT VISIT MOD MDM: CPT

## 2020-01-01 PROCEDURE — 74170 CT ABD WO CNTRST FLWD CNTRST: CPT

## 2020-01-01 PROCEDURE — G0432 EIA HIV-1/HIV-2 SCREEN: HCPCS

## 2020-01-01 PROCEDURE — 81001 URINALYSIS AUTO W/SCOPE: CPT | Performed by: EMERGENCY MEDICINE

## 2020-01-01 PROCEDURE — 76700 US EXAM ABDOM COMPLETE: CPT

## 2020-01-01 PROCEDURE — 94799 UNLISTED PULMONARY SVC/PX: CPT | Performed by: NURSE PRACTITIONER

## 2020-01-01 PROCEDURE — 25010000002 MORPHINE PER 10 MG: Performed by: INTERNAL MEDICINE

## 2020-01-01 PROCEDURE — 87340 HEPATITIS B SURFACE AG IA: CPT

## 2020-01-01 PROCEDURE — 84132 ASSAY OF SERUM POTASSIUM: CPT | Performed by: NURSE PRACTITIONER

## 2020-01-01 PROCEDURE — 86850 RBC ANTIBODY SCREEN: CPT | Performed by: EMERGENCY MEDICINE

## 2020-01-01 PROCEDURE — 97535 SELF CARE MNGMENT TRAINING: CPT

## 2020-01-01 PROCEDURE — 25010000002 FENTANYL CITRATE (PF) 100 MCG/2ML SOLUTION: Performed by: NURSE PRACTITIONER

## 2020-01-01 PROCEDURE — 97163 PT EVAL HIGH COMPLEX 45 MIN: CPT

## 2020-01-01 PROCEDURE — 87070 CULTURE OTHR SPECIMN AEROBIC: CPT | Performed by: INTERNAL MEDICINE

## 2020-01-01 PROCEDURE — 84145 PROCALCITONIN (PCT): CPT | Performed by: INTERNAL MEDICINE

## 2020-01-01 PROCEDURE — 83735 ASSAY OF MAGNESIUM: CPT | Performed by: NURSE PRACTITIONER

## 2020-01-01 PROCEDURE — 25010000002 CEFTRIAXONE PER 250 MG

## 2020-01-01 PROCEDURE — 25010000003 LIDOCAINE 1 % SOLUTION: Performed by: INTERNAL MEDICINE

## 2020-01-01 PROCEDURE — 93306 TTE W/DOPPLER COMPLETE: CPT

## 2020-01-01 RX ORDER — ROCURONIUM BROMIDE 10 MG/ML
INJECTION, SOLUTION INTRAVENOUS AS NEEDED
Status: DISCONTINUED | OUTPATIENT
Start: 2020-01-01 | End: 2020-01-01 | Stop reason: SURG

## 2020-01-01 RX ORDER — FAMOTIDINE 40 MG/1
40 TABLET, FILM COATED ORAL 2 TIMES DAILY
COMMUNITY
Start: 2020-01-01 | End: 2020-01-01 | Stop reason: HOSPADM

## 2020-01-01 RX ORDER — GABAPENTIN 100 MG/1
200 CAPSULE ORAL 3 TIMES DAILY PRN
Qty: 18 CAPSULE | Refills: 0 | Status: SHIPPED | OUTPATIENT
Start: 2020-01-01 | End: 2020-01-01 | Stop reason: SDUPTHER

## 2020-01-01 RX ORDER — PROPOFOL 10 MG/ML
VIAL (ML) INTRAVENOUS AS NEEDED
Status: DISCONTINUED | OUTPATIENT
Start: 2020-01-01 | End: 2020-01-01 | Stop reason: SURG

## 2020-01-01 RX ORDER — FAMOTIDINE 10 MG/ML
20 INJECTION, SOLUTION INTRAVENOUS DAILY
Status: DISCONTINUED | OUTPATIENT
Start: 2020-01-01 | End: 2020-01-01 | Stop reason: HOSPADM

## 2020-01-01 RX ORDER — CEFTRIAXONE 1 G/1
1 INJECTION, POWDER, FOR SOLUTION INTRAMUSCULAR; INTRAVENOUS DAILY
COMMUNITY
End: 2020-01-01 | Stop reason: HOSPADM

## 2020-01-01 RX ORDER — PROPRANOLOL HYDROCHLORIDE 10 MG/1
10 TABLET ORAL 2 TIMES DAILY
COMMUNITY
End: 2020-01-01 | Stop reason: HOSPADM

## 2020-01-01 RX ORDER — GABAPENTIN 600 MG/1
600 TABLET ORAL 3 TIMES DAILY
Qty: 30 TABLET | Refills: 0 | Status: SHIPPED | OUTPATIENT
Start: 2020-01-01 | End: 2020-01-01 | Stop reason: SDUPTHER

## 2020-01-01 RX ORDER — OXYCODONE HYDROCHLORIDE 10 MG/1
10 TABLET ORAL 3 TIMES DAILY
Qty: 90 TABLET | Refills: 0 | Status: SHIPPED | OUTPATIENT
Start: 2020-01-01 | End: 2020-01-01

## 2020-01-01 RX ORDER — GABAPENTIN 600 MG/1
600 TABLET ORAL 3 TIMES DAILY
Qty: 30 TABLET | Refills: 0 | Status: SHIPPED | OUTPATIENT
Start: 2020-01-01 | End: 2020-01-01 | Stop reason: HOSPADM

## 2020-01-01 RX ORDER — FAMOTIDINE 10 MG/ML
20 INJECTION, SOLUTION INTRAVENOUS EVERY 12 HOURS SCHEDULED
Status: DISCONTINUED | OUTPATIENT
Start: 2020-01-01 | End: 2020-01-01

## 2020-01-01 RX ORDER — SODIUM CHLORIDE, SODIUM LACTATE, POTASSIUM CHLORIDE, CALCIUM CHLORIDE 600; 310; 30; 20 MG/100ML; MG/100ML; MG/100ML; MG/100ML
9 INJECTION, SOLUTION INTRAVENOUS CONTINUOUS
Status: CANCELLED | OUTPATIENT
Start: 2020-01-01

## 2020-01-01 RX ORDER — FENTANYL CITRATE 50 UG/ML
50 INJECTION, SOLUTION INTRAMUSCULAR; INTRAVENOUS
Status: DISCONTINUED | OUTPATIENT
Start: 2020-01-01 | End: 2020-01-01

## 2020-01-01 RX ORDER — VELPATASVIR AND SOFOSBUVIR 100; 400 MG/1; MG/1
1 TABLET, FILM COATED ORAL DAILY
Qty: 168 TABLET | Refills: 0 | Status: SHIPPED | OUTPATIENT
Start: 2020-01-01 | End: 2020-01-01

## 2020-01-01 RX ORDER — AZITHROMYCIN 250 MG/1
TABLET, FILM COATED ORAL
COMMUNITY
End: 2020-01-01 | Stop reason: HOSPADM

## 2020-01-01 RX ORDER — LACTULOSE 10 G/15ML
22.5 SOLUTION ORAL 2 TIMES DAILY
Start: 2020-01-01 | End: 2020-01-01 | Stop reason: HOSPADM

## 2020-01-01 RX ORDER — OXYCODONE HYDROCHLORIDE 5 MG/1
10 TABLET ORAL 4 TIMES DAILY
Status: DISCONTINUED | OUTPATIENT
Start: 2020-01-01 | End: 2020-01-01

## 2020-01-01 RX ORDER — NICOTINE POLACRILEX 4 MG
15 LOZENGE BUCCAL
Status: DISCONTINUED | OUTPATIENT
Start: 2020-01-01 | End: 2020-01-01 | Stop reason: HOSPADM

## 2020-01-01 RX ORDER — SPIRONOLACTONE 100 MG/1
100 TABLET, FILM COATED ORAL DAILY
Status: ON HOLD | COMMUNITY
End: 2020-01-01 | Stop reason: SDUPTHER

## 2020-01-01 RX ORDER — LIDOCAINE HYDROCHLORIDE 10 MG/ML
INJECTION, SOLUTION EPIDURAL; INFILTRATION; INTRACAUDAL; PERINEURAL AS NEEDED
Status: DISCONTINUED | OUTPATIENT
Start: 2020-01-01 | End: 2020-01-01 | Stop reason: SURG

## 2020-01-01 RX ORDER — OXYCODONE HYDROCHLORIDE 10 MG/1
10 TABLET ORAL 4 TIMES DAILY
Qty: 120 TABLET | Refills: 0 | Status: SHIPPED | OUTPATIENT
Start: 2020-01-01 | End: 2020-01-01 | Stop reason: SDUPTHER

## 2020-01-01 RX ORDER — CEFDINIR 300 MG/1
300 CAPSULE ORAL 2 TIMES DAILY
Qty: 12 CAPSULE | Refills: 0 | Status: SHIPPED | OUTPATIENT
Start: 2020-01-01 | End: 2020-01-01 | Stop reason: HOSPADM

## 2020-01-01 RX ORDER — SODIUM CHLORIDE, SODIUM LACTATE, POTASSIUM CHLORIDE, CALCIUM CHLORIDE 600; 310; 30; 20 MG/100ML; MG/100ML; MG/100ML; MG/100ML
30 INJECTION, SOLUTION INTRAVENOUS CONTINUOUS PRN
Status: DISCONTINUED | OUTPATIENT
Start: 2020-01-01 | End: 2020-01-01

## 2020-01-01 RX ORDER — SODIUM CHLORIDE 9 MG/ML
50 INJECTION, SOLUTION INTRAVENOUS CONTINUOUS
Status: DISCONTINUED | OUTPATIENT
Start: 2020-01-01 | End: 2020-01-01

## 2020-01-01 RX ORDER — SODIUM CHLORIDE 9 MG/ML
INJECTION, SOLUTION INTRAVENOUS CONTINUOUS PRN
Status: DISCONTINUED | OUTPATIENT
Start: 2020-01-01 | End: 2020-01-01 | Stop reason: SURG

## 2020-01-01 RX ORDER — ONDANSETRON 2 MG/ML
4 INJECTION INTRAMUSCULAR; INTRAVENOUS EVERY 6 HOURS PRN
Start: 2020-01-01 | End: 2020-01-01

## 2020-01-01 RX ORDER — GABAPENTIN 600 MG/1
600 TABLET ORAL 3 TIMES DAILY
Qty: 30 TABLET | Refills: 0 | Status: SHIPPED | OUTPATIENT
Start: 2020-01-01 | End: 2020-01-01

## 2020-01-01 RX ORDER — CIPROFLOXACIN 500 MG/1
500 TABLET, FILM COATED ORAL 2 TIMES DAILY
Qty: 3 TABLET | Refills: 0
Start: 2020-01-01 | End: 2020-01-01

## 2020-01-01 RX ORDER — VELPATASVIR AND SOFOSBUVIR 100; 400 MG/1; MG/1
1 TABLET, FILM COATED ORAL DAILY
Qty: 168 TABLET | Refills: 0 | Status: SHIPPED | OUTPATIENT
Start: 2020-01-01 | End: 2020-11-03

## 2020-01-01 RX ORDER — OXYCODONE HYDROCHLORIDE 10 MG/1
10 TABLET ORAL 4 TIMES DAILY
Status: ON HOLD | COMMUNITY
End: 2020-01-01 | Stop reason: SDUPTHER

## 2020-01-01 RX ORDER — FAMOTIDINE 10 MG/ML
20 INJECTION, SOLUTION INTRAVENOUS ONCE
Status: CANCELLED | OUTPATIENT
Start: 2020-01-01 | End: 2020-01-01

## 2020-01-01 RX ORDER — LIDOCAINE HYDROCHLORIDE 10 MG/ML
INJECTION, SOLUTION INFILTRATION; PERINEURAL AS NEEDED
Status: DISCONTINUED | OUTPATIENT
Start: 2020-01-01 | End: 2020-01-01 | Stop reason: HOSPADM

## 2020-01-01 RX ORDER — ATROPINE SULFATE 0.4 MG/ML
AMPUL (ML) INJECTION
Status: COMPLETED
Start: 2020-01-01 | End: 2020-01-01

## 2020-01-01 RX ORDER — LACTULOSE 10 G/15ML
30 SOLUTION ORAL 2 TIMES DAILY
Status: DISCONTINUED | OUTPATIENT
Start: 2020-01-01 | End: 2020-01-01 | Stop reason: HOSPADM

## 2020-01-01 RX ORDER — ALBUMIN (HUMAN) 12.5 G/50ML
50 SOLUTION INTRAVENOUS ONCE
Status: CANCELLED | OUTPATIENT
Start: 2020-01-01

## 2020-01-01 RX ORDER — OXYCODONE HYDROCHLORIDE 5 MG/1
10 TABLET ORAL EVERY 6 HOURS PRN
Status: DISCONTINUED | OUTPATIENT
Start: 2020-01-01 | End: 2020-01-01 | Stop reason: HOSPADM

## 2020-01-01 RX ORDER — ONDANSETRON 2 MG/ML
4 INJECTION INTRAMUSCULAR; INTRAVENOUS ONCE AS NEEDED
Status: DISCONTINUED | OUTPATIENT
Start: 2020-01-01 | End: 2020-01-01

## 2020-01-01 RX ORDER — LIDOCAINE HYDROCHLORIDE 10 MG/ML
10 INJECTION, SOLUTION EPIDURAL; INFILTRATION; INTRACAUDAL; PERINEURAL ONCE
Status: DISCONTINUED | OUTPATIENT
Start: 2020-01-01 | End: 2020-01-01 | Stop reason: HOSPADM

## 2020-01-01 RX ORDER — SODIUM CHLORIDE 0.9 % (FLUSH) 0.9 %
10 SYRINGE (ML) INJECTION AS NEEDED
Status: CANCELLED | OUTPATIENT
Start: 2020-01-01

## 2020-01-01 RX ORDER — LACTULOSE 10 G/15ML
22.5 SOLUTION ORAL 3 TIMES DAILY
Status: DISCONTINUED | OUTPATIENT
Start: 2020-01-01 | End: 2020-01-01

## 2020-01-01 RX ORDER — OXYCODONE HYDROCHLORIDE 10 MG/1
TABLET ORAL
Qty: 180 TABLET | Refills: 0 | Status: SHIPPED | OUTPATIENT
Start: 2020-01-01

## 2020-01-01 RX ORDER — OXYCODONE HYDROCHLORIDE 10 MG/1
10 TABLET ORAL EVERY 6 HOURS PRN
Qty: 12 TABLET | Refills: 0 | Status: SHIPPED | OUTPATIENT
Start: 2020-01-01 | End: 2020-01-01 | Stop reason: SDUPTHER

## 2020-01-01 RX ORDER — LACTULOSE 10 G/15ML
22.5 SOLUTION ORAL 3 TIMES DAILY
Status: ON HOLD | COMMUNITY
End: 2020-01-01 | Stop reason: SDUPTHER

## 2020-01-01 RX ORDER — LACTULOSE 10 G/15ML
20 SOLUTION ORAL 3 TIMES DAILY PRN
Status: DISCONTINUED | OUTPATIENT
Start: 2020-01-01 | End: 2020-01-01 | Stop reason: HOSPADM

## 2020-01-01 RX ORDER — PANTOPRAZOLE SODIUM 40 MG/1
40 TABLET, DELAYED RELEASE ORAL DAILY
Start: 2020-01-01 | End: 2020-01-01

## 2020-01-01 RX ORDER — SODIUM CHLORIDE 0.9 % (FLUSH) 0.9 %
10 SYRINGE (ML) INJECTION EVERY 12 HOURS SCHEDULED
Status: DISCONTINUED | OUTPATIENT
Start: 2020-01-01 | End: 2020-01-01 | Stop reason: HOSPADM

## 2020-01-01 RX ORDER — FENTANYL CITRATE 50 UG/ML
50 INJECTION, SOLUTION INTRAMUSCULAR; INTRAVENOUS ONCE
Status: COMPLETED | OUTPATIENT
Start: 2020-01-01 | End: 2020-01-01

## 2020-01-01 RX ORDER — FAMOTIDINE 10 MG/ML
20 INJECTION, SOLUTION INTRAVENOUS DAILY
Status: ON HOLD
Start: 2020-01-01 | End: 2020-01-01

## 2020-01-01 RX ORDER — SODIUM CHLORIDE 1000 MG
1 TABLET, SOLUBLE MISCELLANEOUS DAILY
Status: ON HOLD | COMMUNITY
End: 2020-01-01

## 2020-01-01 RX ORDER — CITALOPRAM 10 MG/1
10 TABLET ORAL NIGHTLY
Status: DISCONTINUED | OUTPATIENT
Start: 2020-01-01 | End: 2020-01-01

## 2020-01-01 RX ORDER — BUDESONIDE AND FORMOTEROL FUMARATE DIHYDRATE 160; 4.5 UG/1; UG/1
2 AEROSOL RESPIRATORY (INHALATION)
Refills: 12
Start: 2020-01-01

## 2020-01-01 RX ORDER — DOPAMINE HYDROCHLORIDE 160 MG/100ML
2-20 INJECTION, SOLUTION INTRAVENOUS
Status: DISCONTINUED | OUTPATIENT
Start: 2020-01-01 | End: 2020-01-01

## 2020-01-01 RX ORDER — LIDOCAINE HYDROCHLORIDE 10 MG/ML
INJECTION, SOLUTION EPIDURAL; INFILTRATION; INTRACAUDAL; PERINEURAL
Status: COMPLETED
Start: 2020-01-01 | End: 2020-01-01

## 2020-01-01 RX ORDER — GABAPENTIN 600 MG/1
600 TABLET ORAL 3 TIMES DAILY
Qty: 90 TABLET | Refills: 5 | Status: SHIPPED | OUTPATIENT
Start: 2020-01-01 | End: 2020-01-01 | Stop reason: HOSPADM

## 2020-01-01 RX ORDER — INSULIN GLARGINE 100 [IU]/ML
16 INJECTION, SOLUTION SUBCUTANEOUS 2 TIMES DAILY
Status: ON HOLD | COMMUNITY
End: 2020-01-01 | Stop reason: SDUPTHER

## 2020-01-01 RX ORDER — SODIUM CHLORIDE 0.9 % (FLUSH) 0.9 %
10 SYRINGE (ML) INJECTION AS NEEDED
Status: DISCONTINUED | OUTPATIENT
Start: 2020-01-01 | End: 2020-01-01 | Stop reason: HOSPADM

## 2020-01-01 RX ORDER — PANTOPRAZOLE SODIUM 40 MG/10ML
40 INJECTION, POWDER, LYOPHILIZED, FOR SOLUTION INTRAVENOUS
Status: DISCONTINUED | OUTPATIENT
Start: 2020-01-01 | End: 2020-01-01

## 2020-01-01 RX ORDER — FAMOTIDINE 20 MG/1
20 TABLET, FILM COATED ORAL ONCE
Status: CANCELLED | OUTPATIENT
Start: 2020-01-01 | End: 2020-01-01

## 2020-01-01 RX ORDER — ONDANSETRON 2 MG/ML
INJECTION INTRAMUSCULAR; INTRAVENOUS AS NEEDED
Status: DISCONTINUED | OUTPATIENT
Start: 2020-01-01 | End: 2020-01-01 | Stop reason: SURG

## 2020-01-01 RX ORDER — VELPATASVIR AND SOFOSBUVIR 100; 400 MG/1; MG/1
1 TABLET, FILM COATED ORAL NIGHTLY
COMMUNITY
Start: 2020-01-01 | End: 2020-01-01

## 2020-01-01 RX ORDER — OXYCODONE HYDROCHLORIDE 10 MG/1
TABLET ORAL
Qty: 180 TABLET | Refills: 0 | Status: SHIPPED | OUTPATIENT
Start: 2020-01-01 | End: 2020-01-01 | Stop reason: SDUPTHER

## 2020-01-01 RX ORDER — ONDANSETRON 2 MG/ML
4 INJECTION INTRAMUSCULAR; INTRAVENOUS EVERY 6 HOURS PRN
Status: DISCONTINUED | OUTPATIENT
Start: 2020-01-01 | End: 2020-01-01 | Stop reason: HOSPADM

## 2020-01-01 RX ORDER — FERROUS SULFATE TAB EC 324 MG (65 MG FE EQUIVALENT) 324 (65 FE) MG
324 TABLET DELAYED RESPONSE ORAL
COMMUNITY

## 2020-01-01 RX ORDER — DEXTROSE MONOHYDRATE 25 G/50ML
50 INJECTION, SOLUTION INTRAVENOUS ONCE
Status: COMPLETED | OUTPATIENT
Start: 2020-01-01 | End: 2020-01-01

## 2020-01-01 RX ORDER — LEVETIRACETAM 10 MG/ML
1000 INJECTION INTRAVASCULAR ONCE
Status: DISCONTINUED | OUTPATIENT
Start: 2020-01-01 | End: 2020-01-01

## 2020-01-01 RX ORDER — MAGNESIUM SULFATE HEPTAHYDRATE 40 MG/ML
4 INJECTION, SOLUTION INTRAVENOUS AS NEEDED
Status: DISCONTINUED | OUTPATIENT
Start: 2020-01-01 | End: 2020-01-01 | Stop reason: HOSPADM

## 2020-01-01 RX ORDER — INSULIN GLARGINE 100 [IU]/ML
16 INJECTION, SOLUTION SUBCUTANEOUS 2 TIMES DAILY
COMMUNITY
End: 2020-01-01 | Stop reason: HOSPADM

## 2020-01-01 RX ORDER — CITALOPRAM 10 MG/1
10 TABLET ORAL DAILY
COMMUNITY
End: 2020-01-01 | Stop reason: HOSPADM

## 2020-01-01 RX ORDER — CITALOPRAM 20 MG/1
10 TABLET ORAL DAILY
Status: DISCONTINUED | OUTPATIENT
Start: 2020-01-01 | End: 2020-01-01

## 2020-01-01 RX ORDER — NICOTINE POLACRILEX 4 MG
1 LOZENGE BUCCAL
Status: DISCONTINUED | OUTPATIENT
Start: 2020-01-01 | End: 2020-01-01 | Stop reason: HOSPADM

## 2020-01-01 RX ORDER — OXYCODONE HYDROCHLORIDE 5 MG/1
10 TABLET ORAL EVERY 8 HOURS PRN
Status: DISCONTINUED | OUTPATIENT
Start: 2020-01-01 | End: 2020-01-01

## 2020-01-01 RX ORDER — VELPATASVIR AND SOFOSBUVIR 100; 400 MG/1; MG/1
1 TABLET, FILM COATED ORAL DAILY
Qty: 168 TABLET | Refills: 0 | Status: SHIPPED | OUTPATIENT
Start: 2020-01-01 | End: 2020-01-01 | Stop reason: SDUPTHER

## 2020-01-01 RX ORDER — DEXTROSE MONOHYDRATE 25 G/50ML
25 INJECTION, SOLUTION INTRAVENOUS
Status: DISCONTINUED | OUTPATIENT
Start: 2020-01-01 | End: 2020-01-01 | Stop reason: HOSPADM

## 2020-01-01 RX ORDER — BUDESONIDE AND FORMOTEROL FUMARATE DIHYDRATE 160; 4.5 UG/1; UG/1
2 AEROSOL RESPIRATORY (INHALATION)
Status: DISCONTINUED | OUTPATIENT
Start: 2020-01-01 | End: 2020-01-01 | Stop reason: HOSPADM

## 2020-01-01 RX ORDER — GABAPENTIN 600 MG/1
600 TABLET ORAL 3 TIMES DAILY
Qty: 90 TABLET | Refills: 3 | Status: SHIPPED | OUTPATIENT
Start: 2020-01-01 | End: 2020-01-01 | Stop reason: SDUPTHER

## 2020-01-01 RX ORDER — SPIRONOLACTONE 100 MG/1
50 TABLET, FILM COATED ORAL DAILY
Start: 2020-01-01

## 2020-01-01 RX ORDER — FUROSEMIDE 40 MG/1
40 TABLET ORAL 2 TIMES DAILY
Status: ON HOLD | COMMUNITY
End: 2020-01-01 | Stop reason: SDUPTHER

## 2020-01-01 RX ORDER — LACTULOSE 10 G/15ML
10 SOLUTION ORAL 2 TIMES DAILY
Status: DISCONTINUED | OUTPATIENT
Start: 2020-01-01 | End: 2020-01-01

## 2020-01-01 RX ORDER — CHOLECALCIFEROL (VITAMIN D3) 125 MCG
5 CAPSULE ORAL NIGHTLY PRN
Status: DISCONTINUED | OUTPATIENT
Start: 2020-01-01 | End: 2020-01-01 | Stop reason: HOSPADM

## 2020-01-01 RX ORDER — IPRATROPIUM BROMIDE AND ALBUTEROL SULFATE 2.5; .5 MG/3ML; MG/3ML
3 SOLUTION RESPIRATORY (INHALATION) EVERY 4 HOURS PRN
Status: DISCONTINUED | OUTPATIENT
Start: 2020-01-01 | End: 2020-01-01 | Stop reason: HOSPADM

## 2020-01-01 RX ORDER — SODIUM CHLORIDE 0.9 % (FLUSH) 0.9 %
10 SYRINGE (ML) INJECTION AS NEEDED
Status: DISCONTINUED | OUTPATIENT
Start: 2020-01-01 | End: 2020-01-01

## 2020-01-01 RX ORDER — ACETAMINOPHEN 325 MG/1
650 TABLET ORAL EVERY 6 HOURS PRN
Status: DISCONTINUED | OUTPATIENT
Start: 2020-01-01 | End: 2020-01-01 | Stop reason: HOSPADM

## 2020-01-01 RX ORDER — GABAPENTIN 600 MG/1
600 TABLET ORAL 3 TIMES DAILY
Status: ON HOLD | COMMUNITY
End: 2020-01-01 | Stop reason: SDUPTHER

## 2020-01-01 RX ORDER — LACTULOSE 20 G/30ML
10 SOLUTION ORAL 2 TIMES DAILY
Status: DISCONTINUED | OUTPATIENT
Start: 2020-01-01 | End: 2020-01-01 | Stop reason: HOSPADM

## 2020-01-01 RX ORDER — LACTULOSE 10 G/15ML
20 SOLUTION ORAL 3 TIMES DAILY
Status: DISCONTINUED | OUTPATIENT
Start: 2020-01-01 | End: 2020-01-01 | Stop reason: HOSPADM

## 2020-01-01 RX ORDER — CEFAZOLIN SODIUM 1 G/50ML
INJECTION, SOLUTION INTRAVENOUS AS NEEDED
Status: DISCONTINUED | OUTPATIENT
Start: 2020-01-01 | End: 2020-01-01 | Stop reason: SURG

## 2020-01-01 RX ORDER — MAGNESIUM SULFATE HEPTAHYDRATE 40 MG/ML
2 INJECTION, SOLUTION INTRAVENOUS AS NEEDED
Status: DISCONTINUED | OUTPATIENT
Start: 2020-01-01 | End: 2020-01-01 | Stop reason: HOSPADM

## 2020-01-01 RX ORDER — ASPIRIN 81 MG/1
81 TABLET ORAL DAILY
Status: DISCONTINUED | OUTPATIENT
Start: 2020-01-01 | End: 2020-01-01

## 2020-01-01 RX ORDER — LIDOCAINE HYDROCHLORIDE 10 MG/ML
0.5 INJECTION, SOLUTION EPIDURAL; INFILTRATION; INTRACAUDAL; PERINEURAL ONCE AS NEEDED
Status: CANCELLED | OUTPATIENT
Start: 2020-01-01

## 2020-01-01 RX ORDER — ALBUMIN (HUMAN) 12.5 G/50ML
25 SOLUTION INTRAVENOUS ONCE
Status: COMPLETED | OUTPATIENT
Start: 2020-01-01 | End: 2020-01-01

## 2020-01-01 RX ORDER — LORAZEPAM 0.5 MG/1
TABLET ORAL
Qty: 56 TABLET | Refills: 0 | Status: SHIPPED | OUTPATIENT
Start: 2020-01-01

## 2020-01-01 RX ORDER — SODIUM CHLORIDE 0.9 % (FLUSH) 0.9 %
10 SYRINGE (ML) INJECTION EVERY 12 HOURS SCHEDULED
Status: CANCELLED | OUTPATIENT
Start: 2020-01-01

## 2020-01-01 RX ORDER — OXYCODONE HYDROCHLORIDE 10 MG/1
10 TABLET ORAL EVERY 6 HOURS PRN
Qty: 120 TABLET | Refills: 0 | Status: SHIPPED | OUTPATIENT
Start: 2020-01-01 | End: 2020-01-01 | Stop reason: SDUPTHER

## 2020-01-01 RX ORDER — GABAPENTIN 300 MG/1
600 CAPSULE ORAL EVERY 8 HOURS SCHEDULED
Status: DISCONTINUED | OUTPATIENT
Start: 2020-01-01 | End: 2020-01-01 | Stop reason: HOSPADM

## 2020-01-01 RX ORDER — ALBUMIN, HUMAN INJ 5% 5 %
500 SOLUTION INTRAVENOUS ONCE
Status: COMPLETED | OUTPATIENT
Start: 2020-01-01 | End: 2020-01-01

## 2020-01-01 RX ORDER — CHOLECALCIFEROL (VITAMIN D3) 125 MCG
5 CAPSULE ORAL NIGHTLY PRN
COMMUNITY

## 2020-01-01 RX ORDER — ALBUTEROL SULFATE 2.5 MG/3ML
2.5 SOLUTION RESPIRATORY (INHALATION) EVERY 6 HOURS PRN
Status: DISCONTINUED | OUTPATIENT
Start: 2020-01-01 | End: 2020-01-01 | Stop reason: HOSPADM

## 2020-01-01 RX ORDER — ALBUTEROL SULFATE 90 UG/1
1 AEROSOL, METERED RESPIRATORY (INHALATION) EVERY 4 HOURS PRN
COMMUNITY
End: 2020-01-01 | Stop reason: HOSPADM

## 2020-01-01 RX ORDER — PANTOPRAZOLE SODIUM 40 MG/10ML
40 INJECTION, POWDER, LYOPHILIZED, FOR SOLUTION INTRAVENOUS ONCE
Status: COMPLETED | OUTPATIENT
Start: 2020-01-01 | End: 2020-01-01

## 2020-01-01 RX ORDER — BISACODYL 5 MG/1
10 TABLET, DELAYED RELEASE ORAL 2 TIMES WEEKLY
Qty: 30 TABLET | Refills: 2 | Status: SHIPPED | OUTPATIENT
Start: 2020-01-01 | End: 2020-01-01 | Stop reason: HOSPADM

## 2020-01-01 RX ORDER — OCTREOTIDE ACETATE 50 UG/ML
50 INJECTION, SOLUTION INTRAVENOUS; SUBCUTANEOUS ONCE
Status: COMPLETED | OUTPATIENT
Start: 2020-01-01 | End: 2020-01-01

## 2020-01-01 RX ORDER — DEXAMETHASONE SODIUM PHOSPHATE 4 MG/ML
INJECTION, SOLUTION INTRA-ARTICULAR; INTRALESIONAL; INTRAMUSCULAR; INTRAVENOUS; SOFT TISSUE AS NEEDED
Status: DISCONTINUED | OUTPATIENT
Start: 2020-01-01 | End: 2020-01-01 | Stop reason: SURG

## 2020-01-01 RX ORDER — LACTULOSE 10 G/15ML
20 SOLUTION ORAL ONCE
Status: COMPLETED | OUTPATIENT
Start: 2020-01-01 | End: 2020-01-01

## 2020-01-01 RX ORDER — CHLORHEXIDINE GLUCONATE 0.12 MG/ML
30 RINSE ORAL 2 TIMES DAILY
COMMUNITY

## 2020-01-01 RX ORDER — LACTULOSE 10 G/15ML
20 SOLUTION ORAL 3 TIMES DAILY
Start: 2020-01-01

## 2020-01-01 RX ORDER — GABAPENTIN 100 MG/1
200 CAPSULE ORAL 3 TIMES DAILY PRN
Status: DISCONTINUED | OUTPATIENT
Start: 2020-01-01 | End: 2020-01-01 | Stop reason: HOSPADM

## 2020-01-01 RX ORDER — FAMOTIDINE 20 MG/1
20 TABLET, FILM COATED ORAL
Status: DISCONTINUED | OUTPATIENT
Start: 2020-01-01 | End: 2020-01-01 | Stop reason: HOSPADM

## 2020-01-01 RX ORDER — PANTOPRAZOLE SODIUM 40 MG/10ML
40 INJECTION, POWDER, LYOPHILIZED, FOR SOLUTION INTRAVENOUS ONCE
Status: DISCONTINUED | OUTPATIENT
Start: 2020-01-01 | End: 2020-01-01

## 2020-01-01 RX ORDER — ALBUTEROL SULFATE 2.5 MG/3ML
2.5 SOLUTION RESPIRATORY (INHALATION) EVERY 6 HOURS PRN
Refills: 12 | Status: ON HOLD
Start: 2020-01-01 | End: 2020-01-01

## 2020-01-01 RX ORDER — GABAPENTIN 100 MG/1
200 CAPSULE ORAL 3 TIMES DAILY PRN
Qty: 180 CAPSULE | Refills: 1 | Status: SHIPPED | OUTPATIENT
Start: 2020-01-01

## 2020-01-01 RX ORDER — HEPARIN SODIUM 5000 [USP'U]/ML
5000 INJECTION, SOLUTION INTRAVENOUS; SUBCUTANEOUS EVERY 12 HOURS SCHEDULED
Status: DISCONTINUED | OUTPATIENT
Start: 2020-01-01 | End: 2020-01-01 | Stop reason: HOSPADM

## 2020-01-01 RX ORDER — LIDOCAINE 50 MG/G
1 PATCH TOPICAL EVERY 24 HOURS
COMMUNITY

## 2020-01-01 RX ORDER — PANTOPRAZOLE SODIUM 40 MG/1
40 TABLET, DELAYED RELEASE ORAL 2 TIMES DAILY
COMMUNITY
End: 2020-01-01 | Stop reason: ALTCHOICE

## 2020-01-01 RX ORDER — FLUTICASONE PROPIONATE 44 UG/1
2 AEROSOL, METERED RESPIRATORY (INHALATION)
COMMUNITY
End: 2020-01-01 | Stop reason: HOSPADM

## 2020-01-01 RX ORDER — CALCIUM GLUCONATE 94 MG/ML
INJECTION, SOLUTION INTRAVENOUS
Status: COMPLETED
Start: 2020-01-01 | End: 2020-01-01

## 2020-01-01 RX ORDER — OXYCODONE HYDROCHLORIDE 10 MG/1
10 TABLET ORAL 4 TIMES DAILY
Qty: 12 TABLET | Refills: 0 | Status: ON HOLD | OUTPATIENT
Start: 2020-01-01 | End: 2020-01-01 | Stop reason: SDUPTHER

## 2020-01-01 RX ORDER — ALUMINA, MAGNESIA, AND SIMETHICONE 2400; 2400; 240 MG/30ML; MG/30ML; MG/30ML
15 SUSPENSION ORAL ONCE
Status: COMPLETED | OUTPATIENT
Start: 2020-01-01 | End: 2020-01-01

## 2020-01-01 RX ORDER — DICYCLOMINE HYDROCHLORIDE 10 MG/1
10 CAPSULE ORAL DAILY PRN
COMMUNITY

## 2020-01-01 RX ORDER — BUDESONIDE AND FORMOTEROL FUMARATE DIHYDRATE 160; 4.5 UG/1; UG/1
2 AEROSOL RESPIRATORY (INHALATION)
Refills: 12 | Status: ON HOLD
Start: 2020-01-01 | End: 2020-01-01

## 2020-01-01 RX ORDER — MULTIPLE VITAMINS W/ MINERALS TAB 9MG-400MCG
1 TAB ORAL DAILY
Status: DISCONTINUED | OUTPATIENT
Start: 2020-01-01 | End: 2020-01-01

## 2020-01-01 RX ORDER — POTASSIUM CHLORIDE 7.45 MG/ML
10 INJECTION INTRAVENOUS
Status: DISCONTINUED | OUTPATIENT
Start: 2020-01-01 | End: 2020-01-01 | Stop reason: HOSPADM

## 2020-01-01 RX ORDER — OXYCODONE HYDROCHLORIDE 10 MG/1
10 TABLET ORAL 4 TIMES DAILY
Qty: 120 TABLET | Refills: 0 | Status: SHIPPED | OUTPATIENT
Start: 2020-01-01 | End: 2020-01-01 | Stop reason: HOSPADM

## 2020-01-01 RX ORDER — MORPHINE SULFATE 2 MG/ML
2 INJECTION, SOLUTION INTRAMUSCULAR; INTRAVENOUS
Status: DISCONTINUED | OUTPATIENT
Start: 2020-01-01 | End: 2020-01-01 | Stop reason: HOSPADM

## 2020-01-01 RX ORDER — CLOTRIMAZOLE AND BETAMETHASONE DIPROPIONATE 10; .64 MG/G; MG/G
1 CREAM TOPICAL 2 TIMES DAILY
COMMUNITY
End: 2020-01-01

## 2020-01-01 RX ORDER — INSULIN GLARGINE 100 [IU]/ML
12 INJECTION, SOLUTION SUBCUTANEOUS NIGHTLY
Refills: 12
Start: 2020-01-01

## 2020-01-01 RX ORDER — FUROSEMIDE 40 MG/1
40 TABLET ORAL 2 TIMES DAILY
COMMUNITY
End: 2020-01-01 | Stop reason: HOSPADM

## 2020-01-01 RX ORDER — OXYCODONE HYDROCHLORIDE 5 MG/1
10 TABLET ORAL 3 TIMES DAILY PRN
Status: DISCONTINUED | OUTPATIENT
Start: 2020-01-01 | End: 2020-01-01

## 2020-01-01 RX ORDER — FAMOTIDINE 10 MG/ML
20 INJECTION, SOLUTION INTRAVENOUS DAILY
Status: DISCONTINUED | OUTPATIENT
Start: 2020-01-01 | End: 2020-01-01

## 2020-01-01 RX ORDER — FLUTICASONE PROPIONATE 44 UG/1
2 AEROSOL, METERED RESPIRATORY (INHALATION)
COMMUNITY

## 2020-01-01 RX ORDER — CEFAZOLIN SODIUM 1 G/50ML
1 INJECTION, SOLUTION INTRAVENOUS
Status: DISCONTINUED | OUTPATIENT
Start: 2020-01-01 | End: 2020-01-01 | Stop reason: SDUPTHER

## 2020-01-01 RX ORDER — CHOLECALCIFEROL (VITAMIN D3) 125 MCG
5 CAPSULE ORAL NIGHTLY
COMMUNITY
End: 2020-01-01 | Stop reason: HOSPADM

## 2020-01-01 RX ORDER — CITALOPRAM 10 MG/1
10 TABLET ORAL NIGHTLY
COMMUNITY
End: 2020-01-01 | Stop reason: HOSPADM

## 2020-01-01 RX ORDER — NICOTINE POLACRILEX 4 MG
1 LOZENGE BUCCAL
Start: 2020-01-01 | End: 2020-01-01

## 2020-01-01 RX ORDER — MORPHINE SULFATE 2 MG/ML
1 INJECTION, SOLUTION INTRAMUSCULAR; INTRAVENOUS EVERY 4 HOURS PRN
Status: DISCONTINUED | OUTPATIENT
Start: 2020-01-01 | End: 2020-01-01 | Stop reason: HOSPADM

## 2020-01-01 RX ORDER — CHLORHEXIDINE GLUCONATE 0.12 MG/ML
30 RINSE ORAL 2 TIMES DAILY
COMMUNITY
End: 2020-01-01 | Stop reason: HOSPADM

## 2020-01-01 RX ORDER — SODIUM CHLORIDE 0.9 % (FLUSH) 0.9 %
10 SYRINGE (ML) INJECTION EVERY 12 HOURS SCHEDULED
Status: DISCONTINUED | OUTPATIENT
Start: 2020-01-01 | End: 2020-01-01

## 2020-01-01 RX ORDER — FUROSEMIDE 40 MG/1
40 TABLET ORAL DAILY
Start: 2020-01-01 | End: 2020-01-01 | Stop reason: HOSPADM

## 2020-01-01 RX ORDER — OXYCODONE HYDROCHLORIDE 10 MG/1
10 TABLET ORAL 4 TIMES DAILY
Qty: 12 TABLET | Refills: 0 | Status: SHIPPED | OUTPATIENT
Start: 2020-01-01 | End: 2020-01-01 | Stop reason: SDUPTHER

## 2020-01-01 RX ORDER — LIDOCAINE 50 MG/G
1 PATCH TOPICAL DAILY
COMMUNITY
End: 2020-01-01 | Stop reason: HOSPADM

## 2020-01-01 RX ORDER — SODIUM CHLORIDE, SODIUM LACTATE, POTASSIUM CHLORIDE, CALCIUM CHLORIDE 600; 310; 30; 20 MG/100ML; MG/100ML; MG/100ML; MG/100ML
150 INJECTION, SOLUTION INTRAVENOUS CONTINUOUS
Status: DISCONTINUED | OUTPATIENT
Start: 2020-01-01 | End: 2020-01-01

## 2020-01-01 RX ORDER — SODIUM CHLORIDE 9 MG/ML
100 INJECTION, SOLUTION INTRAVENOUS CONTINUOUS
Status: ACTIVE | OUTPATIENT
Start: 2020-01-01 | End: 2020-01-01

## 2020-01-01 RX ORDER — FAMOTIDINE 20 MG/1
20 TABLET, FILM COATED ORAL 2 TIMES DAILY
Qty: 60 TABLET | Refills: 2 | Status: ON HOLD | OUTPATIENT
Start: 2020-01-01 | End: 2020-01-01

## 2020-01-01 RX ORDER — PANTOPRAZOLE SODIUM 40 MG/1
40 TABLET, DELAYED RELEASE ORAL
Status: DISCONTINUED | OUTPATIENT
Start: 2020-01-01 | End: 2020-01-01 | Stop reason: HOSPADM

## 2020-01-01 RX ORDER — OXYCODONE HYDROCHLORIDE 10 MG/1
10 TABLET ORAL 4 TIMES DAILY
Qty: 40 TABLET | Refills: 0 | Status: SHIPPED | OUTPATIENT
Start: 2020-01-01 | End: 2020-01-01

## 2020-01-01 RX ADMIN — BUDESONIDE AND FORMOTEROL FUMARATE DIHYDRATE 2 PUFF: 160; 4.5 AEROSOL RESPIRATORY (INHALATION) at 19:38

## 2020-01-01 RX ADMIN — SODIUM CHLORIDE 50 ML/HR: 9 INJECTION, SOLUTION INTRAVENOUS at 19:14

## 2020-01-01 RX ADMIN — INSULIN LISPRO 2 UNITS: 100 INJECTION, SOLUTION INTRAVENOUS; SUBCUTANEOUS at 09:19

## 2020-01-01 RX ADMIN — PANTOPRAZOLE SODIUM 40 MG: 40 TABLET, DELAYED RELEASE ORAL at 05:56

## 2020-01-01 RX ADMIN — FAMOTIDINE 20 MG: 20 TABLET, FILM COATED ORAL at 09:18

## 2020-01-01 RX ADMIN — LACTULOSE: 10 SOLUTION ORAL; RECTAL at 19:04

## 2020-01-01 RX ADMIN — ALBUMIN HUMAN 500 ML: 0.05 INJECTION, SOLUTION INTRAVENOUS at 00:16

## 2020-01-01 RX ADMIN — OCTREOTIDE ACETATE 50 MCG/HR: 500 INJECTION, SOLUTION INTRAVENOUS; SUBCUTANEOUS at 22:53

## 2020-01-01 RX ADMIN — LACTULOSE 30 ML: 20 SOLUTION ORAL at 21:49

## 2020-01-01 RX ADMIN — GABAPENTIN 600 MG: 300 CAPSULE ORAL at 14:15

## 2020-01-01 RX ADMIN — ROCURONIUM BROMIDE 40 MG: 10 SOLUTION INTRAVENOUS at 17:10

## 2020-01-01 RX ADMIN — BUDESONIDE AND FORMOTEROL FUMARATE DIHYDRATE 2 PUFF: 160; 4.5 AEROSOL RESPIRATORY (INHALATION) at 09:11

## 2020-01-01 RX ADMIN — HEPARIN SODIUM 5000 UNITS: 5000 INJECTION INTRAVENOUS; SUBCUTANEOUS at 09:19

## 2020-01-01 RX ADMIN — IOPAMIDOL 95 ML: 612 INJECTION, SOLUTION INTRAVENOUS at 16:23

## 2020-01-01 RX ADMIN — CEFTRIAXONE 1 G: 1 INJECTION, POWDER, FOR SOLUTION INTRAMUSCULAR; INTRAVENOUS at 20:09

## 2020-01-01 RX ADMIN — SODIUM CHLORIDE, PRESERVATIVE FREE 10 ML: 5 INJECTION INTRAVENOUS at 21:50

## 2020-01-01 RX ADMIN — LIDOCAINE HYDROCHLORIDE 3 ML: 10 INJECTION, SOLUTION EPIDURAL; INFILTRATION; INTRACAUDAL; PERINEURAL at 17:22

## 2020-01-01 RX ADMIN — CITALOPRAM HYDROBROMIDE 10 MG: 10 TABLET ORAL at 21:05

## 2020-01-01 RX ADMIN — TAZOBACTAM SODIUM AND PIPERACILLIN SODIUM 3.38 G: 375; 3 INJECTION, SOLUTION INTRAVENOUS at 16:29

## 2020-01-01 RX ADMIN — ACETAMINOPHEN 650 MG: 325 TABLET, FILM COATED ORAL at 12:06

## 2020-01-01 RX ADMIN — PROPOFOL 30 MCG/KG/MIN: 10 INJECTION, EMULSION INTRAVENOUS at 12:40

## 2020-01-01 RX ADMIN — GABAPENTIN 600 MG: 300 CAPSULE ORAL at 20:08

## 2020-01-01 RX ADMIN — OCTREOTIDE ACETATE 50 MCG/HR: 500 INJECTION, SOLUTION INTRAVENOUS; SUBCUTANEOUS at 03:24

## 2020-01-01 RX ADMIN — INSULIN HUMAN 4 UNITS: 100 INJECTION, SOLUTION PARENTERAL at 12:05

## 2020-01-01 RX ADMIN — OXYCODONE 10 MG: 5 TABLET ORAL at 11:24

## 2020-01-01 RX ADMIN — ATROPINE SULFATE 0.4 MG: 0.4 INJECTION, SOLUTION INTRAMUSCULAR; INTRAVENOUS; SUBCUTANEOUS at 12:09

## 2020-01-01 RX ADMIN — SODIUM CHLORIDE, PRESERVATIVE FREE 10 ML: 5 INJECTION INTRAVENOUS at 21:55

## 2020-01-01 RX ADMIN — FAMOTIDINE 20 MG: 20 TABLET, FILM COATED ORAL at 08:39

## 2020-01-01 RX ADMIN — OXYCODONE 10 MG: 5 TABLET ORAL at 06:33

## 2020-01-01 RX ADMIN — PANTOPRAZOLE SODIUM 40 MG: 40 INJECTION, POWDER, FOR SOLUTION INTRAVENOUS at 06:44

## 2020-01-01 RX ADMIN — GABAPENTIN 200 MG: 100 CAPSULE ORAL at 23:29

## 2020-01-01 RX ADMIN — SODIUM CHLORIDE, POTASSIUM CHLORIDE, SODIUM LACTATE AND CALCIUM CHLORIDE 150 ML/HR: 600; 310; 30; 20 INJECTION, SOLUTION INTRAVENOUS at 03:57

## 2020-01-01 RX ADMIN — SODIUM CHLORIDE: 9 INJECTION, SOLUTION INTRAVENOUS at 11:52

## 2020-01-01 RX ADMIN — ONDANSETRON 4 MG: 2 INJECTION INTRAMUSCULAR; INTRAVENOUS at 18:30

## 2020-01-01 RX ADMIN — BUDESONIDE AND FORMOTEROL FUMARATE DIHYDRATE 2 PUFF: 160; 4.5 AEROSOL RESPIRATORY (INHALATION) at 08:06

## 2020-01-01 RX ADMIN — DEXTROSE MONOHYDRATE 50 ML: 25 INJECTION, SOLUTION INTRAVENOUS at 21:35

## 2020-01-01 RX ADMIN — IOPAMIDOL 37 ML: 612 INJECTION, SOLUTION INTRAVENOUS at 18:54

## 2020-01-01 RX ADMIN — OXYCODONE 10 MG: 5 TABLET ORAL at 01:33

## 2020-01-01 RX ADMIN — BUDESONIDE AND FORMOTEROL FUMARATE DIHYDRATE 2 PUFF: 160; 4.5 AEROSOL RESPIRATORY (INHALATION) at 20:58

## 2020-01-01 RX ADMIN — ONDANSETRON 4 MG: 2 INJECTION INTRAMUSCULAR; INTRAVENOUS at 12:34

## 2020-01-01 RX ADMIN — DEXTROSE MONOHYDRATE 50 ML: 25 INJECTION, SOLUTION INTRAVENOUS at 05:26

## 2020-01-01 RX ADMIN — BUDESONIDE AND FORMOTEROL FUMARATE DIHYDRATE 2 PUFF: 160; 4.5 AEROSOL RESPIRATORY (INHALATION) at 20:53

## 2020-01-01 RX ADMIN — BUDESONIDE AND FORMOTEROL FUMARATE DIHYDRATE 2 PUFF: 160; 4.5 AEROSOL RESPIRATORY (INHALATION) at 07:34

## 2020-01-01 RX ADMIN — LACTULOSE 10 G: 20 SOLUTION ORAL at 08:37

## 2020-01-01 RX ADMIN — INSULIN HUMAN 2 UNITS: 100 INJECTION, SOLUTION PARENTERAL at 18:24

## 2020-01-01 RX ADMIN — INSULIN HUMAN 2 UNITS: 100 INJECTION, SOLUTION PARENTERAL at 02:20

## 2020-01-01 RX ADMIN — LACTULOSE 10 G: 20 SOLUTION ORAL at 21:47

## 2020-01-01 RX ADMIN — INSULIN DETEMIR 10 UNITS: 100 INJECTION, SOLUTION SUBCUTANEOUS at 02:20

## 2020-01-01 RX ADMIN — LACTULOSE 30 ML: 20 SOLUTION ORAL at 20:09

## 2020-01-01 RX ADMIN — PROPOFOL 100 MG: 10 INJECTION, EMULSION INTRAVENOUS at 17:10

## 2020-01-01 RX ADMIN — BUDESONIDE AND FORMOTEROL FUMARATE DIHYDRATE 2 PUFF: 160; 4.5 AEROSOL RESPIRATORY (INHALATION) at 19:51

## 2020-01-01 RX ADMIN — OXYCODONE 10 MG: 5 TABLET ORAL at 21:54

## 2020-01-01 RX ADMIN — OCTREOTIDE ACETATE 50 MCG/HR: 500 INJECTION, SOLUTION INTRAVENOUS; SUBCUTANEOUS at 01:13

## 2020-01-01 RX ADMIN — OXYCODONE 10 MG: 5 TABLET ORAL at 14:07

## 2020-01-01 RX ADMIN — BUDESONIDE AND FORMOTEROL FUMARATE DIHYDRATE 2 PUFF: 160; 4.5 AEROSOL RESPIRATORY (INHALATION) at 07:40

## 2020-01-01 RX ADMIN — BUDESONIDE AND FORMOTEROL FUMARATE DIHYDRATE 2 PUFF: 160; 4.5 AEROSOL RESPIRATORY (INHALATION) at 08:31

## 2020-01-01 RX ADMIN — SODIUM CHLORIDE, PRESERVATIVE FREE 10 ML: 5 INJECTION INTRAVENOUS at 20:10

## 2020-01-01 RX ADMIN — SODIUM CHLORIDE 2000 ML: 9 INJECTION, SOLUTION INTRAVENOUS at 12:23

## 2020-01-01 RX ADMIN — SODIUM CHLORIDE, PRESERVATIVE FREE 10 ML: 5 INJECTION INTRAVENOUS at 08:53

## 2020-01-01 RX ADMIN — BUDESONIDE AND FORMOTEROL FUMARATE DIHYDRATE 2 PUFF: 160; 4.5 AEROSOL RESPIRATORY (INHALATION) at 10:05

## 2020-01-01 RX ADMIN — INSULIN DETEMIR 10 UNITS: 100 INJECTION, SOLUTION SUBCUTANEOUS at 08:14

## 2020-01-01 RX ADMIN — OXYCODONE 10 MG: 5 TABLET ORAL at 05:08

## 2020-01-01 RX ADMIN — TAZOBACTAM SODIUM AND PIPERACILLIN SODIUM 3.38 G: 375; 3 INJECTION, SOLUTION INTRAVENOUS at 02:24

## 2020-01-01 RX ADMIN — CEFTRIAXONE 1 G: 1 INJECTION, POWDER, FOR SOLUTION INTRAMUSCULAR; INTRAVENOUS at 10:55

## 2020-01-01 RX ADMIN — LACTULOSE 30 ML: 20 SOLUTION ORAL at 09:19

## 2020-01-01 RX ADMIN — HEPARIN SODIUM 5000 UNITS: 5000 INJECTION INTRAVENOUS; SUBCUTANEOUS at 08:09

## 2020-01-01 RX ADMIN — PANTOPRAZOLE SODIUM 40 MG: 40 INJECTION, POWDER, FOR SOLUTION INTRAVENOUS at 17:39

## 2020-01-01 RX ADMIN — PHENYLEPHRINE HYDROCHLORIDE 160 MCG: 10 INJECTION INTRAVENOUS at 12:01

## 2020-01-01 RX ADMIN — INSULIN LISPRO 2 UNITS: 100 INJECTION, SOLUTION INTRAVENOUS; SUBCUTANEOUS at 12:03

## 2020-01-01 RX ADMIN — OXYCODONE 10 MG: 5 TABLET ORAL at 06:05

## 2020-01-01 RX ADMIN — PHENYLEPHRINE HYDROCHLORIDE 160 MCG: 10 INJECTION INTRAVENOUS at 12:08

## 2020-01-01 RX ADMIN — SODIUM CHLORIDE, PRESERVATIVE FREE 10 ML: 5 INJECTION INTRAVENOUS at 08:14

## 2020-01-01 RX ADMIN — SODIUM CHLORIDE 8 MG/HR: 900 INJECTION INTRAVENOUS at 08:31

## 2020-01-01 RX ADMIN — BUDESONIDE AND FORMOTEROL FUMARATE DIHYDRATE 2 PUFF: 160; 4.5 AEROSOL RESPIRATORY (INHALATION) at 08:34

## 2020-01-01 RX ADMIN — GABAPENTIN 200 MG: 100 CAPSULE ORAL at 18:23

## 2020-01-01 RX ADMIN — FAMOTIDINE 20 MG: 20 TABLET, FILM COATED ORAL at 18:30

## 2020-01-01 RX ADMIN — HEPARIN SODIUM 5000 UNITS: 5000 INJECTION INTRAVENOUS; SUBCUTANEOUS at 08:39

## 2020-01-01 RX ADMIN — INSULIN DETEMIR 5 UNITS: 100 INJECTION, SOLUTION SUBCUTANEOUS at 20:11

## 2020-01-01 RX ADMIN — CEFTRIAXONE 1 G: 1 INJECTION, POWDER, FOR SOLUTION INTRAMUSCULAR; INTRAVENOUS at 02:15

## 2020-01-01 RX ADMIN — MAGNESIUM SULFATE HEPTAHYDRATE 4 G: 40 INJECTION, SOLUTION INTRAVENOUS at 22:15

## 2020-01-01 RX ADMIN — GABAPENTIN 200 MG: 100 CAPSULE ORAL at 09:14

## 2020-01-01 RX ADMIN — CEFTRIAXONE 1 G: 1 INJECTION, POWDER, FOR SOLUTION INTRAMUSCULAR; INTRAVENOUS at 11:54

## 2020-01-01 RX ADMIN — SUGAMMADEX 200 MG: 100 INJECTION, SOLUTION INTRAVENOUS at 12:33

## 2020-01-01 RX ADMIN — LACTULOSE 20 G: 20 SOLUTION ORAL at 17:11

## 2020-01-01 RX ADMIN — PANTOPRAZOLE SODIUM 40 MG: 40 INJECTION, POWDER, FOR SOLUTION INTRAVENOUS at 06:49

## 2020-01-01 RX ADMIN — SODIUM CHLORIDE, PRESERVATIVE FREE 10 ML: 5 INJECTION INTRAVENOUS at 21:52

## 2020-01-01 RX ADMIN — ALBUMIN HUMAN 25 G: 0.25 SOLUTION INTRAVENOUS at 13:37

## 2020-01-01 RX ADMIN — DEXTROSE MONOHYDRATE 25 G: 25 INJECTION, SOLUTION INTRAVENOUS at 23:36

## 2020-01-01 RX ADMIN — INSULIN LISPRO 3 UNITS: 100 INJECTION, SOLUTION INTRAVENOUS; SUBCUTANEOUS at 11:54

## 2020-01-01 RX ADMIN — INSULIN DETEMIR 10 UNITS: 100 INJECTION, SOLUTION SUBCUTANEOUS at 08:38

## 2020-01-01 RX ADMIN — INSULIN LISPRO 4 UNITS: 100 INJECTION, SOLUTION INTRAVENOUS; SUBCUTANEOUS at 12:07

## 2020-01-01 RX ADMIN — BUDESONIDE AND FORMOTEROL FUMARATE DIHYDRATE 2 PUFF: 160; 4.5 AEROSOL RESPIRATORY (INHALATION) at 19:27

## 2020-01-01 RX ADMIN — BUDESONIDE AND FORMOTEROL FUMARATE DIHYDRATE 2 PUFF: 160; 4.5 AEROSOL RESPIRATORY (INHALATION) at 20:29

## 2020-01-01 RX ADMIN — PANTOPRAZOLE SODIUM 40 MG: 40 INJECTION, POWDER, FOR SOLUTION INTRAVENOUS at 06:14

## 2020-01-01 RX ADMIN — GABAPENTIN 600 MG: 300 CAPSULE ORAL at 15:00

## 2020-01-01 RX ADMIN — MELATONIN TAB 5 MG 5 MG: 5 TAB at 21:55

## 2020-01-01 RX ADMIN — INSULIN LISPRO 3 UNITS: 100 INJECTION, SOLUTION INTRAVENOUS; SUBCUTANEOUS at 12:21

## 2020-01-01 RX ADMIN — CEFTRIAXONE 1 G: 1 INJECTION, POWDER, FOR SOLUTION INTRAMUSCULAR; INTRAVENOUS at 20:55

## 2020-01-01 RX ADMIN — HEPARIN SODIUM 5000 UNITS: 5000 INJECTION INTRAVENOUS; SUBCUTANEOUS at 20:09

## 2020-01-01 RX ADMIN — PROPOFOL 150 MG: 10 INJECTION, EMULSION INTRAVENOUS at 11:58

## 2020-01-01 RX ADMIN — INSULIN LISPRO 2 UNITS: 100 INJECTION, SOLUTION INTRAVENOUS; SUBCUTANEOUS at 17:09

## 2020-01-01 RX ADMIN — ACETAMINOPHEN 650 MG: 325 TABLET, FILM COATED ORAL at 23:58

## 2020-01-01 RX ADMIN — OXYCODONE 10 MG: 5 TABLET ORAL at 20:22

## 2020-01-01 RX ADMIN — ONDANSETRON 4 MG: 2 INJECTION INTRAMUSCULAR; INTRAVENOUS at 23:08

## 2020-01-01 RX ADMIN — ONDANSETRON 4 MG: 2 INJECTION INTRAMUSCULAR; INTRAVENOUS at 04:12

## 2020-01-01 RX ADMIN — OXYCODONE 10 MG: 5 TABLET ORAL at 20:08

## 2020-01-01 RX ADMIN — SODIUM CHLORIDE 50 ML/HR: 9 INJECTION, SOLUTION INTRAVENOUS at 05:37

## 2020-01-01 RX ADMIN — INSULIN HUMAN 10 UNITS: 100 INJECTION, SOLUTION PARENTERAL at 05:23

## 2020-01-01 RX ADMIN — FAMOTIDINE 20 MG: 10 INJECTION INTRAVENOUS at 16:32

## 2020-01-01 RX ADMIN — ALUMINUM HYDROXIDE, MAGNESIUM HYDROXIDE, AND DIMETHICONE 15 ML: 400; 400; 40 SUSPENSION ORAL at 04:23

## 2020-01-01 RX ADMIN — INSULIN LISPRO 2 UNITS: 100 INJECTION, SOLUTION INTRAVENOUS; SUBCUTANEOUS at 12:06

## 2020-01-01 RX ADMIN — SODIUM CHLORIDE, PRESERVATIVE FREE 10 ML: 5 INJECTION INTRAVENOUS at 08:08

## 2020-01-01 RX ADMIN — LACTULOSE 30 ML: 20 SOLUTION ORAL at 08:23

## 2020-01-01 RX ADMIN — HEPARIN SODIUM 5000 UNITS: 5000 INJECTION INTRAVENOUS; SUBCUTANEOUS at 21:55

## 2020-01-01 RX ADMIN — SODIUM CHLORIDE, PRESERVATIVE FREE 10 ML: 5 INJECTION INTRAVENOUS at 08:38

## 2020-01-01 RX ADMIN — INSULIN HUMAN 2 UNITS: 100 INJECTION, SOLUTION PARENTERAL at 23:29

## 2020-01-01 RX ADMIN — ALBUMIN HUMAN 500 ML: 0.05 INJECTION, SOLUTION INTRAVENOUS at 05:38

## 2020-01-01 RX ADMIN — INSULIN LISPRO 4 UNITS: 100 INJECTION, SOLUTION INTRAVENOUS; SUBCUTANEOUS at 13:06

## 2020-01-01 RX ADMIN — BUDESONIDE AND FORMOTEROL FUMARATE DIHYDRATE 2 PUFF: 160; 4.5 AEROSOL RESPIRATORY (INHALATION) at 08:59

## 2020-01-01 RX ADMIN — OCTREOTIDE ACETATE 50 MCG/HR: 500 INJECTION, SOLUTION INTRAVENOUS; SUBCUTANEOUS at 15:53

## 2020-01-01 RX ADMIN — BUDESONIDE AND FORMOTEROL FUMARATE DIHYDRATE 2 PUFF: 160; 4.5 AEROSOL RESPIRATORY (INHALATION) at 21:14

## 2020-01-01 RX ADMIN — DOPAMINE HYDROCHLORIDE IN DEXTROSE 2 MCG/KG/MIN: 1.6 INJECTION, SOLUTION INTRAVENOUS at 12:34

## 2020-01-01 RX ADMIN — ONDANSETRON 4 MG: 2 INJECTION INTRAMUSCULAR; INTRAVENOUS at 14:16

## 2020-01-01 RX ADMIN — OXYCODONE 10 MG: 5 TABLET ORAL at 14:02

## 2020-01-01 RX ADMIN — OXYCODONE 10 MG: 5 TABLET ORAL at 15:00

## 2020-01-01 RX ADMIN — OXYCODONE 10 MG: 5 TABLET ORAL at 14:22

## 2020-01-01 RX ADMIN — GABAPENTIN 600 MG: 300 CAPSULE ORAL at 05:08

## 2020-01-01 RX ADMIN — GABAPENTIN 600 MG: 300 CAPSULE ORAL at 13:02

## 2020-01-01 RX ADMIN — FAMOTIDINE 20 MG: 10 INJECTION, SOLUTION INTRAVENOUS at 08:23

## 2020-01-01 RX ADMIN — CEFTRIAXONE 1 G: 1 INJECTION, POWDER, FOR SOLUTION INTRAMUSCULAR; INTRAVENOUS at 12:03

## 2020-01-01 RX ADMIN — GABAPENTIN 600 MG: 300 CAPSULE ORAL at 21:20

## 2020-01-01 RX ADMIN — FAMOTIDINE 20 MG: 20 TABLET, FILM COATED ORAL at 06:06

## 2020-01-01 RX ADMIN — ONDANSETRON 4 MG: 2 INJECTION INTRAMUSCULAR; INTRAVENOUS at 20:27

## 2020-01-01 RX ADMIN — DEXAMETHASONE SODIUM PHOSPHATE 4 MG: 4 INJECTION, SOLUTION INTRAMUSCULAR; INTRAVENOUS at 18:30

## 2020-01-01 RX ADMIN — PHENYLEPHRINE HYDROCHLORIDE 160 MCG: 10 INJECTION INTRAVENOUS at 12:16

## 2020-01-01 RX ADMIN — INSULIN LISPRO 2 UNITS: 100 INJECTION, SOLUTION INTRAVENOUS; SUBCUTANEOUS at 18:11

## 2020-01-01 RX ADMIN — SODIUM CHLORIDE, PRESERVATIVE FREE 10 ML: 5 INJECTION INTRAVENOUS at 08:30

## 2020-01-01 RX ADMIN — GABAPENTIN 200 MG: 100 CAPSULE ORAL at 03:11

## 2020-01-01 RX ADMIN — HEPARIN SODIUM 5000 UNITS: 5000 INJECTION INTRAVENOUS; SUBCUTANEOUS at 09:18

## 2020-01-01 RX ADMIN — OXYCODONE 10 MG: 5 TABLET ORAL at 21:55

## 2020-01-01 RX ADMIN — INSULIN LISPRO 3 UNITS: 100 INJECTION, SOLUTION INTRAVENOUS; SUBCUTANEOUS at 12:05

## 2020-01-01 RX ADMIN — SODIUM CHLORIDE 500 ML: 9 INJECTION, SOLUTION INTRAVENOUS at 14:06

## 2020-01-01 RX ADMIN — INSULIN LISPRO 2 UNITS: 100 INJECTION, SOLUTION INTRAVENOUS; SUBCUTANEOUS at 17:49

## 2020-01-01 RX ADMIN — TAZOBACTAM SODIUM AND PIPERACILLIN SODIUM 3.38 G: 375; 3 INJECTION, SOLUTION INTRAVENOUS at 07:57

## 2020-01-01 RX ADMIN — INSULIN DETEMIR 10 UNITS: 100 INJECTION, SOLUTION SUBCUTANEOUS at 20:35

## 2020-01-01 RX ADMIN — GABAPENTIN 600 MG: 300 CAPSULE ORAL at 06:24

## 2020-01-01 RX ADMIN — MELATONIN TAB 5 MG 5 MG: 5 TAB at 20:08

## 2020-01-01 RX ADMIN — LACTULOSE 30 ML: 20 SOLUTION ORAL at 08:00

## 2020-01-01 RX ADMIN — SODIUM CHLORIDE, PRESERVATIVE FREE 10 ML: 5 INJECTION INTRAVENOUS at 09:00

## 2020-01-01 RX ADMIN — INSULIN LISPRO 2 UNITS: 100 INJECTION, SOLUTION INTRAVENOUS; SUBCUTANEOUS at 08:35

## 2020-01-01 RX ADMIN — OXYCODONE 10 MG: 5 TABLET ORAL at 11:43

## 2020-01-01 RX ADMIN — OCTREOTIDE ACETATE 50 MCG/HR: 500 INJECTION, SOLUTION INTRAVENOUS; SUBCUTANEOUS at 02:21

## 2020-01-01 RX ADMIN — GABAPENTIN 600 MG: 300 CAPSULE ORAL at 15:11

## 2020-01-01 RX ADMIN — INSULIN HUMAN 7 UNITS: 100 INJECTION, SOLUTION PARENTERAL at 21:35

## 2020-01-01 RX ADMIN — OCTREOTIDE ACETATE 50 MCG/HR: 500 INJECTION, SOLUTION INTRAVENOUS; SUBCUTANEOUS at 19:14

## 2020-01-01 RX ADMIN — LACTULOSE 30 ML: 20 SOLUTION ORAL at 20:00

## 2020-01-01 RX ADMIN — FAMOTIDINE 20 MG: 20 TABLET, FILM COATED ORAL at 18:09

## 2020-01-01 RX ADMIN — INSULIN LISPRO 2 UNITS: 100 INJECTION, SOLUTION INTRAVENOUS; SUBCUTANEOUS at 08:39

## 2020-01-01 RX ADMIN — SODIUM CHLORIDE, PRESERVATIVE FREE 10 ML: 5 INJECTION INTRAVENOUS at 08:00

## 2020-01-01 RX ADMIN — GABAPENTIN 600 MG: 300 CAPSULE ORAL at 21:58

## 2020-01-01 RX ADMIN — TAZOBACTAM SODIUM AND PIPERACILLIN SODIUM 3.38 G: 375; 3 INJECTION, SOLUTION INTRAVENOUS at 08:12

## 2020-01-01 RX ADMIN — LACTULOSE 30 ML: 20 SOLUTION ORAL at 08:53

## 2020-01-01 RX ADMIN — OXYCODONE 10 MG: 5 TABLET ORAL at 13:01

## 2020-01-01 RX ADMIN — GABAPENTIN 200 MG: 100 CAPSULE ORAL at 22:04

## 2020-01-01 RX ADMIN — INSULIN DETEMIR 10 UNITS: 100 INJECTION, SOLUTION SUBCUTANEOUS at 16:13

## 2020-01-01 RX ADMIN — SODIUM CHLORIDE, PRESERVATIVE FREE 10 ML: 5 INJECTION INTRAVENOUS at 09:20

## 2020-01-01 RX ADMIN — GABAPENTIN 600 MG: 300 CAPSULE ORAL at 14:07

## 2020-01-01 RX ADMIN — OCTREOTIDE ACETATE 50 MCG/HR: 500 INJECTION, SOLUTION INTRAVENOUS; SUBCUTANEOUS at 09:47

## 2020-01-01 RX ADMIN — INSULIN HUMAN 4 UNITS: 100 INJECTION, SOLUTION PARENTERAL at 00:10

## 2020-01-01 RX ADMIN — INSULIN HUMAN 2 UNITS: 100 INJECTION, SOLUTION PARENTERAL at 06:49

## 2020-01-01 RX ADMIN — OXYCODONE 10 MG: 5 TABLET ORAL at 20:35

## 2020-01-01 RX ADMIN — CEFAZOLIN SODIUM 1 G: 1 INJECTION, SOLUTION INTRAVENOUS at 17:22

## 2020-01-01 RX ADMIN — SODIUM CHLORIDE 8 MG/HR: 900 INJECTION INTRAVENOUS at 17:39

## 2020-01-01 RX ADMIN — INSULIN DETEMIR 5 UNITS: 100 INJECTION, SOLUTION SUBCUTANEOUS at 21:50

## 2020-01-01 RX ADMIN — INSULIN LISPRO 2 UNITS: 100 INJECTION, SOLUTION INTRAVENOUS; SUBCUTANEOUS at 08:37

## 2020-01-01 RX ADMIN — BUDESONIDE AND FORMOTEROL FUMARATE DIHYDRATE 2 PUFF: 160; 4.5 AEROSOL RESPIRATORY (INHALATION) at 08:04

## 2020-01-01 RX ADMIN — TAZOBACTAM SODIUM AND PIPERACILLIN SODIUM 3.38 G: 375; 3 INJECTION, SOLUTION INTRAVENOUS at 21:44

## 2020-01-01 RX ADMIN — OXYCODONE 10 MG: 5 TABLET ORAL at 21:24

## 2020-01-01 RX ADMIN — INSULIN LISPRO 2 UNITS: 100 INJECTION, SOLUTION INTRAVENOUS; SUBCUTANEOUS at 17:59

## 2020-01-01 RX ADMIN — BUDESONIDE AND FORMOTEROL FUMARATE DIHYDRATE 2 PUFF: 160; 4.5 AEROSOL RESPIRATORY (INHALATION) at 08:39

## 2020-01-01 RX ADMIN — GABAPENTIN 600 MG: 300 CAPSULE ORAL at 06:33

## 2020-01-01 RX ADMIN — OCTREOTIDE ACETATE 50 MCG/HR: 500 INJECTION, SOLUTION INTRAVENOUS; SUBCUTANEOUS at 06:06

## 2020-01-01 RX ADMIN — LIDOCAINE HYDROCHLORIDE 50 MG: 10 INJECTION, SOLUTION EPIDURAL; INFILTRATION; INTRACAUDAL; PERINEURAL at 11:58

## 2020-01-01 RX ADMIN — HEPARIN SODIUM 5000 UNITS: 5000 INJECTION INTRAVENOUS; SUBCUTANEOUS at 21:49

## 2020-01-01 RX ADMIN — LIDOCAINE HYDROCHLORIDE 40 MG: 10 INJECTION, SOLUTION EPIDURAL; INFILTRATION; INTRACAUDAL; PERINEURAL at 17:10

## 2020-01-01 RX ADMIN — FAMOTIDINE 20 MG: 10 INJECTION INTRAVENOUS at 08:00

## 2020-01-01 RX ADMIN — SODIUM CHLORIDE 50 ML/HR: 9 INJECTION, SOLUTION INTRAVENOUS at 11:58

## 2020-01-01 RX ADMIN — BUDESONIDE AND FORMOTEROL FUMARATE DIHYDRATE 2 PUFF: 160; 4.5 AEROSOL RESPIRATORY (INHALATION) at 20:24

## 2020-01-01 RX ADMIN — SODIUM CHLORIDE, PRESERVATIVE FREE 10 ML: 5 INJECTION INTRAVENOUS at 08:03

## 2020-01-01 RX ADMIN — LACTULOSE 20 G: 20 SOLUTION ORAL at 13:06

## 2020-01-01 RX ADMIN — MORPHINE SULFATE 1 MG: 2 INJECTION, SOLUTION INTRAMUSCULAR; INTRAVENOUS at 23:48

## 2020-01-01 RX ADMIN — ONDANSETRON 4 MG: 2 INJECTION INTRAMUSCULAR; INTRAVENOUS at 10:07

## 2020-01-01 RX ADMIN — GABAPENTIN 600 MG: 300 CAPSULE ORAL at 21:54

## 2020-01-01 RX ADMIN — FAMOTIDINE 20 MG: 10 INJECTION, SOLUTION INTRAVENOUS at 08:09

## 2020-01-01 RX ADMIN — OCTREOTIDE ACETATE 50 MCG/HR: 500 INJECTION, SOLUTION INTRAVENOUS; SUBCUTANEOUS at 18:14

## 2020-01-01 RX ADMIN — GABAPENTIN 600 MG: 300 CAPSULE ORAL at 06:05

## 2020-01-01 RX ADMIN — CEFTRIAXONE 1 G: 1 INJECTION, POWDER, FOR SOLUTION INTRAMUSCULAR; INTRAVENOUS at 20:29

## 2020-01-01 RX ADMIN — BUDESONIDE AND FORMOTEROL FUMARATE DIHYDRATE 2 PUFF: 160; 4.5 AEROSOL RESPIRATORY (INHALATION) at 19:12

## 2020-01-01 RX ADMIN — HEPARIN SODIUM 5000 UNITS: 5000 INJECTION INTRAVENOUS; SUBCUTANEOUS at 20:34

## 2020-01-01 RX ADMIN — INSULIN LISPRO 3 UNITS: 100 INJECTION, SOLUTION INTRAVENOUS; SUBCUTANEOUS at 13:18

## 2020-01-01 RX ADMIN — GABAPENTIN 200 MG: 100 CAPSULE ORAL at 15:53

## 2020-01-01 RX ADMIN — OXYCODONE 10 MG: 5 TABLET ORAL at 06:44

## 2020-01-01 RX ADMIN — GABAPENTIN 200 MG: 100 CAPSULE ORAL at 21:23

## 2020-01-01 RX ADMIN — OXYCODONE 10 MG: 5 TABLET ORAL at 21:20

## 2020-01-01 RX ADMIN — ONDANSETRON 4 MG: 2 INJECTION INTRAMUSCULAR; INTRAVENOUS at 01:44

## 2020-01-01 RX ADMIN — OCTREOTIDE ACETATE 50 MCG: 50 INJECTION, SOLUTION INTRAVENOUS; SUBCUTANEOUS at 18:05

## 2020-01-01 RX ADMIN — TAZOBACTAM SODIUM AND PIPERACILLIN SODIUM 3.38 G: 375; 3 INJECTION, SOLUTION INTRAVENOUS at 00:40

## 2020-01-01 RX ADMIN — OCTREOTIDE ACETATE 50 MCG/HR: 500 INJECTION, SOLUTION INTRAVENOUS; SUBCUTANEOUS at 00:00

## 2020-01-01 RX ADMIN — BUDESONIDE AND FORMOTEROL FUMARATE DIHYDRATE 2 PUFF: 160; 4.5 AEROSOL RESPIRATORY (INHALATION) at 08:51

## 2020-01-01 RX ADMIN — INSULIN DETEMIR 10 UNITS: 100 INJECTION, SOLUTION SUBCUTANEOUS at 08:35

## 2020-01-01 RX ADMIN — OXYCODONE 10 MG: 5 TABLET ORAL at 04:12

## 2020-01-01 RX ADMIN — BUDESONIDE AND FORMOTEROL FUMARATE DIHYDRATE 2 PUFF: 160; 4.5 AEROSOL RESPIRATORY (INHALATION) at 07:55

## 2020-01-01 RX ADMIN — SODIUM CHLORIDE, PRESERVATIVE FREE 10 ML: 5 INJECTION INTRAVENOUS at 08:01

## 2020-01-01 RX ADMIN — CALCIUM GLUCONATE 1 G: 98 INJECTION, SOLUTION INTRAVENOUS at 05:17

## 2020-01-01 RX ADMIN — SODIUM CHLORIDE, PRESERVATIVE FREE 10 ML: 5 INJECTION INTRAVENOUS at 21:06

## 2020-01-01 RX ADMIN — INSULIN LISPRO 3 UNITS: 100 INJECTION, SOLUTION INTRAVENOUS; SUBCUTANEOUS at 18:11

## 2020-01-01 RX ADMIN — BUDESONIDE AND FORMOTEROL FUMARATE DIHYDRATE 2 PUFF: 160; 4.5 AEROSOL RESPIRATORY (INHALATION) at 19:43

## 2020-01-01 RX ADMIN — OXYCODONE 10 MG: 5 TABLET ORAL at 12:06

## 2020-01-01 RX ADMIN — OXYCODONE 10 MG: 5 TABLET ORAL at 15:53

## 2020-01-01 RX ADMIN — INSULIN HUMAN 2 UNITS: 100 INJECTION, SOLUTION PARENTERAL at 12:23

## 2020-01-01 RX ADMIN — SODIUM CHLORIDE 8 MG/HR: 900 INJECTION INTRAVENOUS at 03:24

## 2020-01-01 RX ADMIN — CITALOPRAM HYDROBROMIDE 10 MG: 10 TABLET ORAL at 20:56

## 2020-01-01 RX ADMIN — INSULIN LISPRO 2 UNITS: 100 INJECTION, SOLUTION INTRAVENOUS; SUBCUTANEOUS at 11:54

## 2020-01-01 RX ADMIN — CEFTRIAXONE 1 G: 1 INJECTION, POWDER, FOR SOLUTION INTRAMUSCULAR; INTRAVENOUS at 21:47

## 2020-01-01 RX ADMIN — OXYCODONE 10 MG: 5 TABLET ORAL at 18:23

## 2020-01-01 RX ADMIN — CITALOPRAM HYDROBROMIDE 10 MG: 10 TABLET ORAL at 20:09

## 2020-01-01 RX ADMIN — OXYCODONE 10 MG: 5 TABLET ORAL at 06:24

## 2020-01-01 RX ADMIN — GABAPENTIN 600 MG: 300 CAPSULE ORAL at 14:02

## 2020-01-01 RX ADMIN — TAZOBACTAM SODIUM AND PIPERACILLIN SODIUM 3.38 G: 375; 3 INJECTION, SOLUTION INTRAVENOUS at 15:11

## 2020-01-01 RX ADMIN — INSULIN HUMAN 4 UNITS: 100 INJECTION, SOLUTION PARENTERAL at 20:35

## 2020-01-01 RX ADMIN — FAMOTIDINE 20 MG: 10 INJECTION, SOLUTION INTRAVENOUS at 20:01

## 2020-01-01 RX ADMIN — HEPARIN SODIUM 5000 UNITS: 5000 INJECTION INTRAVENOUS; SUBCUTANEOUS at 20:01

## 2020-01-01 RX ADMIN — SODIUM CHLORIDE, PRESERVATIVE FREE 10 ML: 5 INJECTION INTRAVENOUS at 08:40

## 2020-01-01 RX ADMIN — TAZOBACTAM SODIUM AND PIPERACILLIN SODIUM 3.38 G: 375; 3 INJECTION, SOLUTION INTRAVENOUS at 15:13

## 2020-01-01 RX ADMIN — INSULIN LISPRO 2 UNITS: 100 INJECTION, SOLUTION INTRAVENOUS; SUBCUTANEOUS at 18:10

## 2020-01-01 RX ADMIN — HEPARIN SODIUM 5000 UNITS: 5000 INJECTION INTRAVENOUS; SUBCUTANEOUS at 08:53

## 2020-01-01 RX ADMIN — OXYCODONE 10 MG: 5 TABLET ORAL at 06:32

## 2020-01-01 RX ADMIN — LACTULOSE 30 ML: 20 SOLUTION ORAL at 08:39

## 2020-01-01 RX ADMIN — SODIUM CHLORIDE 8 MG/HR: 900 INJECTION INTRAVENOUS at 23:19

## 2020-01-01 RX ADMIN — GABAPENTIN 600 MG: 300 CAPSULE ORAL at 21:49

## 2020-01-01 RX ADMIN — INSULIN LISPRO 2 UNITS: 100 INJECTION, SOLUTION INTRAVENOUS; SUBCUTANEOUS at 18:30

## 2020-01-01 RX ADMIN — OXYCODONE 10 MG: 5 TABLET ORAL at 21:50

## 2020-01-01 RX ADMIN — SODIUM CHLORIDE, POTASSIUM CHLORIDE, SODIUM LACTATE AND CALCIUM CHLORIDE 1000 ML: 600; 310; 30; 20 INJECTION, SOLUTION INTRAVENOUS at 17:37

## 2020-01-01 RX ADMIN — TAZOBACTAM SODIUM AND PIPERACILLIN SODIUM 3.38 G: 375; 3 INJECTION, SOLUTION INTRAVENOUS at 08:23

## 2020-01-01 RX ADMIN — CEFTRIAXONE 1 G: 1 INJECTION, POWDER, FOR SOLUTION INTRAMUSCULAR; INTRAVENOUS at 21:05

## 2020-01-01 RX ADMIN — INSULIN LISPRO 2 UNITS: 100 INJECTION, SOLUTION INTRAVENOUS; SUBCUTANEOUS at 11:33

## 2020-01-01 RX ADMIN — PANTOPRAZOLE SODIUM 40 MG: 40 INJECTION, POWDER, FOR SOLUTION INTRAVENOUS at 05:37

## 2020-01-01 RX ADMIN — BUDESONIDE AND FORMOTEROL FUMARATE DIHYDRATE 2 PUFF: 160; 4.5 AEROSOL RESPIRATORY (INHALATION) at 21:01

## 2020-01-01 RX ADMIN — SODIUM CHLORIDE 100 ML/HR: 9 INJECTION, SOLUTION INTRAVENOUS at 12:05

## 2020-01-01 RX ADMIN — SODIUM CHLORIDE, PRESERVATIVE FREE 10 ML: 5 INJECTION INTRAVENOUS at 15:13

## 2020-01-01 RX ADMIN — MORPHINE SULFATE 2 MG: 2 INJECTION, SOLUTION INTRAMUSCULAR; INTRAVENOUS at 14:38

## 2020-01-01 RX ADMIN — FENTANYL CITRATE 50 MCG: 50 INJECTION INTRAMUSCULAR; INTRAVENOUS at 11:23

## 2020-01-01 RX ADMIN — ROCURONIUM BROMIDE 70 MG: 10 INJECTION INTRAVENOUS at 11:58

## 2020-01-01 RX ADMIN — INSULIN LISPRO 2 UNITS: 100 INJECTION, SOLUTION INTRAVENOUS; SUBCUTANEOUS at 08:14

## 2020-01-01 RX ADMIN — GABAPENTIN 600 MG: 300 CAPSULE ORAL at 06:32

## 2020-01-01 RX ADMIN — OCTREOTIDE ACETATE 50 MCG/HR: 500 INJECTION, SOLUTION INTRAVENOUS; SUBCUTANEOUS at 12:40

## 2020-01-01 RX ADMIN — GABAPENTIN 200 MG: 100 CAPSULE ORAL at 05:37

## 2020-01-01 RX ADMIN — LACTULOSE 30 ML: 20 SOLUTION ORAL at 20:34

## 2020-01-01 RX ADMIN — SODIUM CHLORIDE, PRESERVATIVE FREE 10 ML: 5 INJECTION INTRAVENOUS at 20:35

## 2020-01-01 RX ADMIN — CITALOPRAM HYDROBROMIDE 10 MG: 10 TABLET ORAL at 22:04

## 2020-01-01 RX ADMIN — SODIUM CHLORIDE, PRESERVATIVE FREE 10 ML: 5 INJECTION INTRAVENOUS at 20:24

## 2020-01-01 RX ADMIN — SODIUM CHLORIDE, PRESERVATIVE FREE 10 ML: 5 INJECTION INTRAVENOUS at 20:09

## 2020-01-01 RX ADMIN — CALCIUM GLUCONATE 1 G: 98 INJECTION, SOLUTION INTRAVENOUS at 12:15

## 2020-01-01 RX ADMIN — OXYCODONE 10 MG: 5 TABLET ORAL at 13:50

## 2020-01-01 RX ADMIN — OXYCODONE 10 MG: 5 TABLET ORAL at 13:09

## 2020-01-01 RX ADMIN — OXYCODONE 10 MG: 5 TABLET ORAL at 09:26

## 2020-01-01 RX ADMIN — OXYCODONE 10 MG: 5 TABLET ORAL at 16:13

## 2020-01-01 RX ADMIN — SODIUM CHLORIDE, PRESERVATIVE FREE 10 ML: 5 INJECTION INTRAVENOUS at 20:56

## 2020-01-01 RX ADMIN — MELATONIN TAB 5 MG 5 MG: 5 TAB at 21:20

## 2020-01-01 RX ADMIN — SODIUM CHLORIDE, POTASSIUM CHLORIDE, SODIUM LACTATE AND CALCIUM CHLORIDE 150 ML/HR: 600; 310; 30; 20 INJECTION, SOLUTION INTRAVENOUS at 21:33

## 2020-01-20 NOTE — PROGRESS NOTES
Nursing Home Progress Note        Brian Rivera DO []  MASSIEL Lala []  [address] [place] [address]  Phone: [phone]  Fax: [phone] Montana Haas MD []  Vinh Matos DO []  Regina Nam PA-C [x]   [address] [place] [address]  Phone: [phone]  Fax: [phone]     PATIENT NAME: Gretta Giles                                                                          YOB: 1960           DATE OF SERVICE: 01/20/2020  FACILITY:  [x] [place]   [] [place]   [] [facility]   [] [facility]   [] Other ______________________________________________________________________     CHIEF COMPLAINT:  Abdominal distention with discomfort.       HISTORY OF PRESENT ILLNESS:   Ms. Giles ia 78 y/o female who presents to [facility] for rehabilitation services following hospital admission for pneumonia and UTI.  She has alcoholic cirrhosis with ascites, which required paracentesis during admission.  Per staff, she has had worsening ascites over the weekend.  She has also had bilateral LE edema and sacral edema.  She denies any significant shortness of breath, but does report increasing discomfort.  She describes the abdomen as tight feeling.  Blood pressure was noted to be on the lower end, but this has been her normal since admission.  She has follow up scheduled with her GI doctor later this month.  Staff also note poor compliance with lactulose.  Upon admission, patient was noted to have several wounds including coccyx, heel, fore arm, and buttock.  Wound care orders are in place.  Staff report ok healing, no signs of active infection.     PAST MEDICAL & SURGICAL HISTORY:   Past Medical History:   Diagnosis Date   • Acute renal failure (ARF) (CMS/HCC)    • Atrial fibrillation (CMS/HCC)    • CHF (congestive heart failure) (CMS/HCC)    • Chronic hyponatremia    • COPD (chronic obstructive pulmonary disease) (CMS/HCC)    • Diabetes mellitus (CMS/HCC)    • Esophageal  varices (CMS/HCC)    • Hepatitis C    • History of abdominal paracentesis    • History of transfusion    • Hyperlipidemia    • Hypertension    • Liver cirrhosis (CMS/HCC)     medical record   • Seizure disorder (CMS/HCC)     grand mal   • Sepsis (CMS/HCC)       Past Surgical History:   Procedure Laterality Date   • ANKLE SURGERY     • CHOLECYSTECTOMY      about 20 years ago per patient   • GALLBLADDER SURGERY     • HYSTERECTOMY     • HYSTERECTOMY      about 20 years ago   • KNEE SURGERY     • REPLACEMENT TOTAL KNEE           MEDICATIONS:  I have reviewed and reconciled the patients medication list in the patients chart at the skilled nursing facility today.      ALLERGIES:  Allergies   Allergen Reactions   • Codeine Itching   • Morphine Itching         SOCIAL HISTORY:  Social History     Socioeconomic History   • Marital status:      Spouse name: Not on file   • Number of children: Not on file   • Years of education: Not on file   • Highest education level: Not on file   Tobacco Use   • Smoking status: Current Every Day Smoker       FAMILY HISTORY:  Family History   Problem Relation Age of Onset   • Diabetes Father    • Cancer Father         malignant neoplasm of brain   • Cancer Sister         bone   • Heart disease Sister        REVIEW OF SYSTEMS:  Review of Systems   Constitutional: Positive for fatigue (chronic.). Negative for activity change, appetite change, chills and fever.   Respiratory: Negative for cough and shortness of breath.    Cardiovascular: Negative for chest pain and leg swelling.   Gastrointestinal: Positive for abdominal distention and abdominal pain (pressure, discomfort. ). Negative for constipation, diarrhea, nausea and vomiting.   Genitourinary: Negative for dysuria and hematuria.   Musculoskeletal: Positive for arthralgias.   Skin: Positive for wound.   Neurological: Positive for weakness (generalized. ). Negative for dizziness and light-headedness.   Psychiatric/Behavioral:  Negative for agitation, behavioral problems, confusion and sleep disturbance.          PHYSICAL EXAMINATION:     VITAL SIGNS:  BP 91/61   Pulse 87   Temp 97.5 °F (36.4 °C)   Resp 18   SpO2 96%     Physical Exam   Constitutional:  Non-toxic appearance. She appears ill (chronic.). No distress.   HENT:   Head: Normocephalic and atraumatic.   Eyes: Pupils are equal, round, and reactive to light. Conjunctivae and EOM are normal.   Neck: Normal range of motion. Neck supple. No JVD present.   Cardiovascular: Normal rate, regular rhythm and normal heart sounds.   Pulmonary/Chest: Effort normal and breath sounds normal. No respiratory distress. She has no wheezes. She has no rales.   Abdominal: Soft. Bowel sounds are normal. She exhibits distension and ascites. There is tenderness in the right upper quadrant.   Musculoskeletal: Normal range of motion. She exhibits edema.   Bilateral LE edema   Neurological: She is alert.   Skin: Skin is warm and dry. Capillary refill takes less than 2 seconds. She is not diaphoretic.   Multiple wounds, dressings are intact.     Psychiatric: She has a normal mood and affect. Her behavior is normal.   Nursing note and vitals reviewed.      RECORDS REVIEW:   Discharge summary 1/16 from [facility].    ASSESSMENT     Diagnoses and all orders for this visit:    Biliary cirrhosis (CMS/HCC)    Hepatitis C virus infection without hepatic coma, unspecified chronicity    Hypertension, unspecified type      PLAN  Continue close follow up with GI.  Will try for outpatient paracentesis today.  If she develops acute shortness of breath of worsening abdominal pain, will need prompt evaluation in the ED.  She denies any acute shortness of breath upon assessment today.  Continues with abdominal discomfort and tightness.  She does have increased edema from her baseline.  Abdomen is quite distended.  Her blood pressure is on the low end, staff report his is her baseline.  She denies any chest pain  or dizziness.  She is on diuretics including lasix 40 MG BID and Spironolactone 100 MG daily.  Can attempt dose reduction on the spironolactone.  Monitor BP daily.           [x]  Discussed Patient in detail with nursing/staff, addressed all needs today.     [x]  Plan of Care Reviewed   []  PT/OT Reviewed   []  Order Changes  []  Discharge Plans Reviewed  [x]  Advance Directive on file with Nursing Home.   [x]  POA on file with Nursing Home.    [x]  Code Status listed:  []  Full Code   []  DNR         Regina Nam PA-C.  1/20/2020

## 2020-02-19 PROBLEM — B18.2 CHRONIC HEPATITIS C WITHOUT HEPATIC COMA (HCC): Status: ACTIVE | Noted: 2020-01-01

## 2020-02-19 PROBLEM — K74.69 OTHER CIRRHOSIS OF LIVER (HCC): Status: ACTIVE | Noted: 2019-01-01

## 2020-02-19 PROBLEM — K74.60 CHRONIC HEPATITIS C VIRUS INFECTION WITH CIRRHOSIS (HCC): Status: ACTIVE | Noted: 2020-01-01

## 2020-02-19 PROBLEM — I85.10 SECONDARY ESOPHAGEAL VARICES WITHOUT BLEEDING (HCC): Status: ACTIVE | Noted: 2020-01-01

## 2020-02-19 NOTE — PROGRESS NOTES
New Patient Consult      Date: 2020   Patient Name: Gretta Giles  MRN: [id number]  : 1960     Referring Physician: Montana Haas MD    Chief Complaint   Patient presents with   • Cirrhosis   • Hepatitis       History of Present Illness: Gretta Giles is a 59 y.o. female who is here today to establish care with Gastroenterology for Cirrhosis of the liver and suspected hepatitis C.   The patient has prior history of liver disease with cirrhosis and also hepatitis C.  No prior h/o illicit drug use, no IVDA. She had tattoo in the past. Deny jaundice, change in urine color, or stool color. The patient denies history of pruritus.  The patient has excessive tiredness or fatigability. He has associated abdominal distension and occasional abdominal pain since almost a year now and had multiple paracentesis. No prior history of heavy alcohol, was taking ETOH socially once in few weeks. She was followed and managed at [place] by Dr Antonio. Her father had cirrhosis and liver cancer. ? Esophageal varices.  Last paracentesis 2 weeks ago. Hep c was not treated. Known to have diastolic heart failure. Also has A-fib.     Recent blood work  and imaging reveal possible cirrhosis. Deny  any change in mental status. No change in bowel habit, rarely constipated.  There is no history of  hematochezia or melena.   Pt denies nausea vomiting or odynophagia or dysphagia. There is a history of occasional acid reflux. There is no history of anemia. Prior history of EGD in 2019 and was told normal.  Last colonoscopy in  was normal as per pt, both done Dr Antonio. No family history of colon cancer.       Subjective      Past Medical History:   Past Medical History:   Diagnosis Date   • Acute renal failure (ARF) (CMS/HCC)    • Atrial fibrillation (CMS/HCC)    • CHF (congestive heart failure) (CMS/HCC)    • Chronic hyponatremia    • COPD (chronic obstructive pulmonary disease) (CMS/HCC)    • Diabetes mellitus  (CMS/HCC)    • Esophageal varices (CMS/HCC)    • Hepatitis C    • History of abdominal paracentesis    • History of transfusion    • Hyperlipidemia    • Hypertension    • Liver cirrhosis (CMS/HCC)     medical record   • Seizure disorder (CMS/HCC)     grand mal   • Sepsis (CMS/HCC)        Past Surgical History:   Past Surgical History:   Procedure Laterality Date   • ANKLE SURGERY     • CHOLECYSTECTOMY      about 20 years ago per patient   • GALLBLADDER SURGERY     • HYSTERECTOMY     • HYSTERECTOMY      about 20 years ago   • KNEE SURGERY     • REPLACEMENT TOTAL KNEE         Family History:   Family History   Problem Relation Age of Onset   • Diabetes Father    • Cancer Father         malignant neoplasm of brain   • Cancer Sister         bone   • Heart disease Sister        Social History:   Social History     Socioeconomic History   • Marital status:      Spouse name: Not on file   • Number of children: Not on file   • Years of education: Not on file   • Highest education level: Not on file   Tobacco Use   • Smoking status: Current Every Day Smoker   • Smokeless tobacco: Never Used   Substance and Sexual Activity   • Alcohol use: Never     Frequency: Never   • Drug use: Never   • Sexual activity: Defer         Current Outpatient Medications:   •  amiodarone (PACERONE) 200 MG tablet, Take 1 tablet by mouth Daily., Disp: 30 tablet, Rfl: 0  •  aspirin (ASPIRIN LOW DOSE) 81 MG EC tablet, Take 1 tablet by mouth daily. For health maintenance, Disp: , Rfl:   •  busPIRone (BUSPAR) 15 MG tablet, Take 15 mg by mouth 2 (Two) Times a Day., Disp: , Rfl:   •  castor oil-balsam peru (VENELEX) ointment, Apply 5 g topically to the appropriate area as directed Every 12 (Twelve) Hours., Disp: 60 g, Rfl: 0  •  citalopram (CeleXA) 10 MG tablet, Take 10 mg by mouth Daily., Disp: , Rfl:   •  dicyclomine (BENTYL) 10 MG capsule, Take 1 capsule by mouth Daily., Disp: 30 capsule, Rfl: 0  •  ferrous sulfate 325 (65 FE) MG tablet, Take  "1 tablet by mouth 3 (Three) Times a Day With Meals., Disp: 30 tablet, Rfl: 0  •  fluticasone (FLOVENT DISKUS) 50 MCG/BLIST diskus inhaler, Inhale 1 puff 2 (Two) Times a Day., Disp: , Rfl:   •  furosemide (LASIX) 40 MG tablet, Take 40 mg by mouth 2 (Two) Times a Day., Disp: , Rfl:   •  gabapentin (NEURONTIN) 600 MG tablet, Take 600 mg by mouth 3 (Three) Times a Day., Disp: , Rfl:   •  glucose blood test strip, Use as instructed, Disp: 100 each, Rfl: 0  •  glucose monitor monitoring kit, 1 each As Needed (Diabetes mellitus)., Disp: 1 each, Rfl: 0  •  insulin aspart (novoLOG) 100 UNIT/ML injection, Inject  under the skin into the appropriate area as directed 3 (Three) Times a Day Before Meals., Disp: , Rfl:   •  insulin detemir (LEVEMIR) 100 UNIT/ML injection, Inject 13 Units under the skin into the appropriate area as directed Every 12 (Twelve) Hours., Disp: 10 mL, Rfl: 0  •  Insulin Glargine (BASAGLAR KWIKPEN) 100 UNIT/ML injection pen, Inject  under the skin into the appropriate area as directed., Disp: , Rfl:   •  Insulin Pen Needle (PEN NEEDLES 5/16\") 31G X 8 MM misc, 1 each 2 (Two) Times a Day., Disp: 100 each, Rfl: 0  •  lactulose (CHRONULAC) 10 GM/15ML solution, Take 15 mL by mouth 2 (Two) Times a Day., Disp: 236 mL, Rfl: 0  •  Lancets (ACCU-CHEK MULTICLIX) lancets, Use as directed, Disp: 102 each, Rfl: 0  •  Multiple Vitamin (MULTI-VITAMIN DAILY PO), Take  by mouth., Disp: , Rfl:   •  ondansetron (ZOFRAN) 4 MG tablet, Take 4 mg by mouth Every 8 (Eight) Hours As Needed for Nausea or Vomiting., Disp: , Rfl:   •  oxyCODONE (ROXICODONE) 10 MG tablet, Take 1 tablet by mouth 4 (Four) Times a Day., Disp: 120 tablet, Rfl: 0  •  pantoprazole (PROTONIX) 40 MG EC tablet, Take 40 mg by mouth Daily., Disp: , Rfl:   •  sodium chloride 1 g tablet, Take 1 g by mouth Daily., Disp: , Rfl:   •  spironolactone (ALDACTONE) 100 MG tablet, Take 100 mg by mouth Daily., Disp: , Rfl:     Allergies   Allergen Reactions   • Codeine " "Itching   • Morphine Itching       Review of Systems:   Review of Systems   Constitutional: Positive for fatigue and unexpected weight loss. Negative for chills, fever and unexpected weight gain.   HENT: Negative for congestion, ear pain, postnasal drip, sinus pressure, sore throat and trouble swallowing.    Eyes: Negative for blurred vision and visual disturbance.   Respiratory: Negative for cough, chest tightness and shortness of breath.    Cardiovascular: Positive for leg swelling. Negative for chest pain and palpitations.   Gastrointestinal: Positive for abdominal distention, abdominal pain, GERD and indigestion. Negative for blood in stool, constipation, diarrhea, nausea and vomiting.   Endocrine: Negative for polyphagia.   Genitourinary: Negative for dysuria and hematuria.   Musculoskeletal: Positive for arthralgias and back pain. Negative for joint swelling and neck pain.   Skin: Negative for rash, skin lesions and bruise.   Neurological: Negative for dizziness, seizures, speech difficulty, weakness (Generalized weakness), numbness and confusion.   Hematological: Negative for adenopathy. Bruises/bleeds easily.   Psychiatric/Behavioral: Negative for hallucinations, suicidal ideas and depressed mood.       The following portions of the patient's history were reviewed and updated as appropriate: allergies, current medications, past family history, past medical history, past social history, past surgical history and problem list.    Objective     Physical Exam:  Vital Signs:   Vitals:    02/19/20 1308   BP: 102/70   Pulse: 82   Resp: 18   Temp: 97.8 °F (36.6 °C)   TempSrc: Temporal   SpO2: 98%   Weight: 55.9 kg (123 lb 3.2 oz)   Height: 160 cm (63\")       Physical Exam   Constitutional: She is oriented to person, place, and time.   Poorly nourished and poorly built   HENT:   Head: Atraumatic.   Right Ear: External ear normal.   Left Ear: External ear normal.   Mouth/Throat: Oropharynx is clear and moist.   "   Frontal wasting noted   Eyes: EOM are normal.   Pallor present   Neck: Normal range of motion. No tracheal deviation present. No thyromegaly present.   Cardiovascular: Normal rate and regular rhythm.   No murmur heard.  Pulmonary/Chest: Effort normal and breath sounds normal. No respiratory distress.   Abdominal: Soft. Bowel sounds are normal. She exhibits distension. She exhibits no mass. No hernia.   Moderate ascites noted  Tended veins noted in the abdominal wall   Musculoskeletal: Normal range of motion. She exhibits edema.   Neurological: She is alert and oriented to person, place, and time. No cranial nerve deficit or sensory deficit.   Skin: Skin is warm and dry.   Psychiatric: She has a normal mood and affect. Her behavior is normal. Judgment and thought content normal.   Nursing note and vitals reviewed.      Results Review:   I have reviewed the patient's new clinical and imaging results and agree with the interpretation.     Assessment / Plan      Assessment & Plan:  1. Chronic hepatitis C virus infection with cirrhosis (CMS/HCC)  Patient was followed at [facility] by Dr. Antonio.  As per patient she was drinking alcohol socially once in few weeks.  She denies any alcohol abuse no history of any illicit drug use or IV drug abuse.   She was been told she has hepatitis C. Could not gather any more information either from the patient or from the record.  No prior treatment for hep C.  She has a strong family history of her cirrhosis including her dad and sister.   She has all the signs of cirrhosis with ascites.  I suspect this is secondary to chronic hep C infection.  Her MELD score is 9.   Will get further work-up including ultrasound scan with a view to treat her if possible for hep C infection  - US Abdomen Complete; Future  - Hepatitis C Genotype; Future  - Hepatitis C RNA, Quantitative, PCR (graph); Future  - HIV-1 & HIV-2 Antibodies; Future  - Hepatitis B Surface Antigen; Future  - Hepatitis B Surface  Antibody; Future  - Hepatitis B Core Antibody, Total; Future  - Hepatitis A Antibody, Total; Future  - Comprehensive Metabolic Panel; Future    2.  Ascites secondary to cirrhosis  Patient seem to have chronic ascites.  She states that she was getting frequent paracentesis last year at [facility].  She had at least twice a paracentesis done in the last 4 to 6 weeks time.   As per record there was no SBP noted with recent paracentesis. She does have a history of diastolic heart failure with EF of 65 to 75%.  Since him to have any systolic heart failure.  No history suggestive of any cardiac cirrhosis.  I am not sure whether she had any episodes of hepatic encephalopathy or not but she is on the lactulose 15 mL p.o. twice daily for now.  Patient currently on Aldactone 100 mg p.o. daily with the Lasix 80 mg p.o. Daily, will continue the same for now.  She may need frequent paracentesis until her diuretic dose is titrated up with the aim to completely stop therapeutic paracentesis  Recommend getting the paracentesis by radiology once in between if there is any progression of the ascites before the next visit  Strict 2 g low-salt or no salt diet at the rehab and at home  Continue lactulose 15 mL p.o. twice daily to titrate for the bowel movement 2 to 3/day  Will repeat CMP in 3 to 4 weeks time and increase the dose of diuretics if her electrolytes and renal function is normal    2. Anemia, unspecified type  Normocytic anemia.  No current signs of any GI bleed.  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  We will continue iron pills for now  - US Abdomen Complete; Future  - Hepatitis C Genotype; Future  - Hepatitis C RNA, Quantitative, PCR (graph); Future  - HIV-1 & HIV-2 Antibodies; Future  - Hepatitis B Surface Antigen; Future  - Hepatitis B Surface Antibody; Future  - Hepatitis B Core Antibody, Total; Future  - Hepatitis A Antibody, Total; Future    3. Secondary esophageal varices without bleeding  (CMS/HCC)  Per records she does have a history of esophageal varices.  As per patient she had an EGD done last year and she was been told it was normal  We will get the report of an EGD from [place]    4. Chronic hepatitis C without hepatic coma (CMS/HCC)  As per patient she had a hep C infection, unrecorded states hep C infection but no serology over viral load noted on record  Will confirm the hep C infection    5. Elevated LFTs  Hepatocellular pattern of elevated liver enzymes with normal total bilirubin borderline low platelets and INR of 1.3  These findings all appear to be secondary to her hep C, will get other studies if required depending on her bowel serology    6. Gastroesophageal reflux disease without esophagitis  Well-controlled on the PPI to continue the same    7. Encounter for screening for malignant neoplasm of colon  Average risk.  Last colonoscopy was in 2019 [place] as per patient which was normal      Follow Up:   Return in about 4 weeks (around 3/18/2020).    Amna Woo MD  [facility] [place]  2/19/2020  1:44 PM    Please note that portions of this note may have been completed with a voice recognition program. Efforts were made to edit the dictations, but occasionally words are mistranscribed.

## 2020-02-20 NOTE — PROGRESS NOTES
Nursing Home Progress Note        Brian Rivera DO []  MASSIEL Lala []  [address] [place] [address]  Phone: [phone]  Fax: [phone] Montana Haas MD []  Vinh Matos DO []  Regina Nam PA-C [x]   [address] [place] [address]  Phone: [phone]  Fax: [phone]     PATIENT NAME: Gretta Giles                                                                          YOB: 1960           DATE OF SERVICE: 2/14/2020  FACILITY:  [x] [facility]   [] [facility]   [] [facility] [place]   [] [facility]   [] Other ______________________________________________________________________     CHIEF COMPLAINT:  Anxiety and depression.      HISTORY OF PRESENT ILLNESS:   Ms. Giles ia 76 y/o female who presents today with complaint of increased anxiety and depression.  She reports that this is a chronic problem.   She feels that it is worsening.  At times, she has difficulty sleeping due to the symptoms.  She is currently taking buspar daily.  She recently met with psychiatry services who made recommendations to increase buspar dosing and add celexa.  In addition she is also taking gabapentin  600 MG TID and Oxycodone 10 MG QID.  She also has liver cirrhosis, requiring paracentesis.  She has GI appointment soon.  She continues to require intermittent paracentesis due to increased abdominal distention, discomfort, and shortness of breath.  Upon assessment today, she denies any acute symptoms.  She has been adjusting well to long term care at the facility, participating in activities regularly.  Unfortunately, she continues to smoke.       PAST MEDICAL & SURGICAL HISTORY:   Past Medical History:   Diagnosis Date   • Acute renal failure (ARF) (CMS/HCC)    • Atrial fibrillation (CMS/HCC)    • CHF (congestive heart failure) (CMS/HCC)    • Chronic hyponatremia    • COPD (chronic obstructive pulmonary disease) (CMS/HCC)    • Diabetes mellitus (CMS/HCC)    • Esophageal varices (CMS/HCC)     • Hepatitis C    • History of abdominal paracentesis    • History of transfusion    • Hyperlipidemia    • Hypertension    • Liver cirrhosis (CMS/HCC)     medical record   • Seizure disorder (CMS/HCC)     grand mal   • Sepsis (CMS/HCC)       Past Surgical History:   Procedure Laterality Date   • ANKLE SURGERY     • CHOLECYSTECTOMY      about 20 years ago per patient   • GALLBLADDER SURGERY     • HYSTERECTOMY     • HYSTERECTOMY      about 20 years ago   • KNEE SURGERY     • REPLACEMENT TOTAL KNEE           MEDICATIONS:  I have reviewed and reconciled the patients medication list in the patients chart at the skilled nursing facility today.      ALLERGIES:  Allergies   Allergen Reactions   • Codeine Itching   • Morphine Itching         SOCIAL HISTORY:  Social History     Socioeconomic History   • Marital status:      Spouse name: Not on file   • Number of children: Not on file   • Years of education: Not on file   • Highest education level: Not on file   Tobacco Use   • Smoking status: Current Every Day Smoker   • Smokeless tobacco: Never Used   Substance and Sexual Activity   • Alcohol use: Never     Frequency: Never   • Drug use: Never   • Sexual activity: Defer       FAMILY HISTORY:  Family History   Problem Relation Age of Onset   • Diabetes Father    • Cancer Father         malignant neoplasm of brain   • Cancer Sister         bone   • Heart disease Sister        REVIEW OF SYSTEMS:  Review of Systems   Constitutional: Positive for fatigue (chronic.). Negative for activity change, appetite change, chills and fever.   Respiratory: Negative for cough and shortness of breath.    Cardiovascular: Negative for chest pain and leg swelling.   Gastrointestinal: Positive for abdominal distention (chronic.). Negative for abdominal pain, constipation, diarrhea, nausea and vomiting.   Genitourinary: Negative for dysuria and hematuria.   Musculoskeletal: Positive for arthralgias.   Skin: Positive for wound.    Neurological: Positive for weakness (generalized. ). Negative for dizziness and light-headedness.   Psychiatric/Behavioral: Positive for dysphoric mood and sleep disturbance (infrequent.). Negative for agitation, behavioral problems and confusion. The patient is nervous/anxious.           PHYSICAL EXAMINATION:     VITAL SIGNS:  /84   Pulse 78   Temp 97.8 °F (36.6 °C)   Resp 18   Wt 52.4 kg (115 lb 9.6 oz)   SpO2 98%   BMI 20.48 kg/m²     Physical Exam   Constitutional:  Non-toxic appearance. She appears ill (chronic.). No distress.   HENT:   Head: Normocephalic and atraumatic.   Eyes: Pupils are equal, round, and reactive to light. Conjunctivae and EOM are normal.   Neck: Normal range of motion. Neck supple. No JVD present.   Cardiovascular: Normal rate, regular rhythm and normal heart sounds.   Pulmonary/Chest: Effort normal and breath sounds normal. No respiratory distress. She has no wheezes. She has no rales.   Abdominal: Soft. Bowel sounds are normal. She exhibits distension and ascites. There is no tenderness.   Musculoskeletal: Normal range of motion. She exhibits edema.   Bilateral LE edema   Neurological: She is alert.   Skin: Skin is warm and dry. Capillary refill takes less than 2 seconds. She is not diaphoretic.       Psychiatric: She has a normal mood and affect. Her behavior is normal.   Nursing note and vitals reviewed.      RECORDS REVIEW:   N/A.    ASSESSMENT     Diagnoses and all orders for this visit:    Mixed anxiety depressive disorder    Hypertension, unspecified type    Chronic hepatitis C virus infection with cirrhosis (CMS/HCC)    Tobacco dependence syndrome      PLAN  Anxiety/depression:  Appreciate recommendations from psychiatry services.  Will add dose of Buspar in the afternoon, 15 MG.  Start Celexa 10 MG in the evening.  Monitor closely for any worsening s/s.   HTN:  Improved from last visit.  Continue current medications, continue to monitor.  Cirrhosis:  Patient  believes she was diagnosed with Hep C in the past.  She has follow up with GI soon.  Continue PRN paracentesis.  Continue current medications for now.    Tobacco:  Continue to encourage cessation.           [x]  Discussed Patient in detail with nursing/staff, addressed all needs today.     [x]  Plan of Care Reviewed   []  PT/OT Reviewed   []  Order Changes  []  Discharge Plans Reviewed  [x]  Advance Directive on file with Nursing Home.   [x]  POA on file with Nursing Home.    [x]  Code Status listed at facility.          Regina Nam PA-C.  2/19/2020

## 2020-03-03 NOTE — PROGRESS NOTES
Nursing Home Discharge Summary      Brian Rivera DO  []  MASSIEL Lala  []  [address] [place] [address]  Phone: [phone]  Fax: [phone] Montana Haas MD  []  Vinh Matos DO  []  Regina Nam PA-C  [x]  [address] [place] [address]  Phone: [phone]  Fax: [phone]     PATIENT NAME: Gretta Giles                                                                          YOB: 1960     Age:  59 y.o.  Sex:  female  DATE OF SERVICE: 03/03/2020  Primary Care Physician:  Nighat Kincaid APRN   Date of Discharge:  03/03/2020   Admission Date:  01/16/2020  FACILITY:   [x] [facility]    [] [facility]    [] [facility]     [] [facility]    [] Other      Discharge Diagnosis:    Encounter Diagnoses   Name    • Generalized weakness Improving.  Continue to work with therapy services in the home setting.     • Pneumonia due to infectious organism, unspecified laterality, unspecified part of lung Resolved.  No reports of recent shortness of breath or cough.      • Acute cystitis without hematuria Resolved.  Completed antibiotics inpatient at .     • Chronic hepatitis C virus infection with cirrhosis (CMS/HCC) Stable.  Followed by GI.  She has required scheduled paracentesis while at [facility].  Continue to monitor for acute symptoms such as shortness of breath.     • Hypertension, unspecified type Controlled.  Continue current medications.      • Simple chronic bronchitis (CMS/HCC) Stable.  She continues to smoke.  Encourage smoking cessation.      ·  Type 2 Diabetes Poor control.  Continue with current medications.  1/21:  A1c 8.6.      • Mixed anxiety depressive disorder Stable.  Increase in Buspar dose. Started on Celexa.         Presenting Problem: generalized weakness requiring therapy services following admission for pneumonia.    History of Presenting Illness:    Ms. Giles is a 58 y/o female with history of chronic hepatitis c infection with cirrhosis,  hypertension, diabetes, CHF, and COPD.  She presented to [facility] following hospital admission for pneumonia, cirrhosis, and cystitis.  She was treated with broad spectrum antibiotics with resolution of symptoms.  She also underwent paracentesis during admission, with notable improvement to her abdominal distention.       She has reamained in stable condition throughout her admission at [facility].  She worked along therapy services, with notable improvement to her weakness.  She continues to use wheelchair for mobility.  Psychiatry services consulted, recommendations appreciated.  She had reports of increased anxiety, Buspar increased to 15 MG BID and celexa started.  In addition, she was referred to GI.  She follows with Dr. Woo for chronic hepatitis C with cirrhosis.  She has follow up with him later this month.  She has also undergone routine paracentesis while at [facility].  This has improved her symptoms of shortness of breath and associated abdominal distention.  She has paracentesis scheduled for Thursday.  Will confirm with GI if she should continue with scheduled procedure.     Upon assessment today she reports that she feels she is ready to go home.  She has no concerns with her discharge.  She will go home with home health, skilled nursing, and therapy services.  She reports that she requires no additional DME.  I have recommended follow up with primary care provider within 2 weeks of discharge.  GI follow up scheduled 3/18.    Vitals:  /74   Pulse 80   Temp 97.9 °F (36.6 °C)   Resp 20   Wt 50.3 kg (110 lb 12.8 oz)   SpO2 98%   BMI 19.63 kg/m²     Review of Systems   Gastrointestinal: Positive for abdominal distention (chronic.).   Neurological: Positive for weakness (generalized, chronic. ).   All other systems reviewed and are negative.        Physical Exam   Constitutional: She is oriented to person, place, and time. No distress.   Chronically ill, NAD.  Pleasant  in conversation.    HENT:   Head: Normocephalic and atraumatic.   Eyes: Pupils are equal, round, and reactive to light. Conjunctivae and EOM are normal.   Neck: Normal range of motion. Neck supple.   Cardiovascular: Normal rate, regular rhythm, normal heart sounds and intact distal pulses. Exam reveals no gallop and no friction rub.   No murmur heard.  Pulmonary/Chest: Effort normal and breath sounds normal. No respiratory distress. She has no wheezes. She has no rales.   Abdominal: Soft. Bowel sounds are normal. She exhibits distension. There is no tenderness.   Musculoskeletal: Normal range of motion. She exhibits no edema or tenderness.   Neurological: She is alert and oriented to person, place, and time.   Skin: Skin is warm and dry. Capillary refill takes less than 2 seconds. She is not diaphoretic.   Psychiatric: She has a normal mood and affect. Her behavior is normal.   Nursing note and vitals reviewed.       Condition on Discharge:    Stable to home    Discharge Medication:    Current Outpatient Medications:   •  albuterol sulfate  (90 Base) MCG/ACT inhaler, Inhale 1 puff Every 4 (Four) Hours As Needed for Wheezing., Disp: , Rfl:   •  amiodarone (PACERONE) 200 MG tablet, Take 1 tablet by mouth Daily., Disp: 30 tablet, Rfl: 0  •  aspirin (ASPIRIN LOW DOSE) 81 MG EC tablet, Take 1 tablet by mouth daily. For health maintenance, Disp: , Rfl:   •  busPIRone (BUSPAR) 15 MG tablet, Take 15 mg by mouth 2 (Two) Times a Day., Disp: , Rfl:   •  citalopram (CeleXA) 10 MG tablet, Take 10 mg by mouth Daily., Disp: , Rfl:   •  dicyclomine (BENTYL) 10 MG capsule, Take 1 capsule by mouth Daily., Disp: 30 capsule, Rfl: 0  •  ferrous sulfate 325 (65 FE) MG tablet, Take 1 tablet by mouth 3 (Three) Times a Day With Meals., Disp: 30 tablet, Rfl: 0  •  fluticasone (FLOVENT DISKUS) 50 MCG/BLIST diskus inhaler, Inhale 1 puff 2 (Two) Times a Day., Disp: , Rfl:   •  furosemide (LASIX) 40 MG tablet, Take 40 mg by mouth 2 (Two)  "Times a Day., Disp: , Rfl:   •  glucose blood test strip, Use as instructed, Disp: 100 each, Rfl: 0  •  glucose monitor monitoring kit, 1 each As Needed (Diabetes mellitus)., Disp: 1 each, Rfl: 0  •  insulin aspart (novoLOG) 100 UNIT/ML injection, Inject  under the skin into the appropriate area as directed 3 (Three) Times a Day Before Meals., Disp: , Rfl:   •  Insulin Glargine (BASAGLAR KWIKPEN) 100 UNIT/ML injection pen, Inject 18 Units under the skin into the appropriate area as directed 2 (Two) Times a Day., Disp: , Rfl:   •  Insulin Pen Needle (PEN NEEDLES 5/16\") 31G X 8 MM misc, 1 each 2 (Two) Times a Day., Disp: 100 each, Rfl: 0  •  lactulose (CHRONULAC) 10 GM/15ML solution, Take 15 mL by mouth 2 (Two) Times a Day., Disp: 236 mL, Rfl: 0  •  Menthol-Zinc Oxide (CALMOSEPTINE) 0.44-20.6 % ointment, Apply  topically to the appropriate area as directed As Needed., Disp: , Rfl:   •  Multiple Vitamin (MULTI-VITAMIN DAILY PO), Take  by mouth., Disp: , Rfl:   •  ondansetron (ZOFRAN) 4 MG tablet, Take 4 mg by mouth Every 8 (Eight) Hours As Needed for Nausea or Vomiting., Disp: , Rfl:   •  pantoprazole (PROTONIX) 40 MG EC tablet, Take 40 mg by mouth 2 (Two) Times a Day., Disp: , Rfl:   •  sodium chloride 1 g tablet, Take 1 g by mouth Daily., Disp: , Rfl:   •  spironolactone (ALDACTONE) 100 MG tablet, Take 100 mg by mouth Daily., Disp: , Rfl:   •  gabapentin (NEURONTIN) 600 MG tablet, Take 1 tablet by mouth 3 (Three) Times a Day., Disp: 30 tablet, Rfl: 0  •  oxyCODONE (ROXICODONE) 10 MG tablet, Take 1 tablet by mouth 4 (Four) Times a Day., Disp: 40 tablet, Rfl: 0     Time: Discharge 25 min    Regina Nam PA-C   03/03/2020     "

## 2020-03-06 NOTE — TELEPHONE ENCOUNTER
PHARMACY CALLED STATING FROM PATIENTS DISCHARGE THERE WERE MEDICATIONS SENT OUT TO [facility] MEDICATIONS HAVE ALREADY BEEN BILLED TO INSURANCE 03/04 AND PROVIDER WHO SIGNED 0FF NEEDS TO REACH OUT AND CANCEL THAT MEDICATIONS WERE SENT TO [facility] AS WELL AND PATIENT WANTS MEDICATIONS TO BE PICKED UP THERE. PATIENT HAS WAITED 3 DAYS. PHARMACY STATES TO DISCONTINUE AND CALL TO GO AHEAD AND PROCESS MEDICATIONS..    gabapentin (NEURONTIN) 600 MG tablet  oxyCODONE (ROXICODONE) 10 MG tablet    PLEASE ADVISE     [facility] CONFIRMED    THEY HAVE PRESCRIPTIONS DO NOT NEED ANOTHER

## 2020-03-06 NOTE — TELEPHONE ENCOUNTER
This was routed wrong by the hub. This patient belongs to Dr. Haas. Thank you.   [FreeTextEntry1] : All blood tests were reviewed with patient.\par

## 2020-03-09 NOTE — TELEPHONE ENCOUNTER
Spoke with [facility], medications were dispensed to facility so they are not able to cancel the insurance billing and meds can't be returned to pharmacy. Spoke with [facility], they will hold current script until due to be filled.

## 2020-04-10 NOTE — PROGRESS NOTES
Nursing Home History and Physical        Brianleslie Rivera DO []  MASSIEL Lala []  [address] [place] [address]  Phone: [phone]  Fax: [phone] Montana Haas MD[x]  Vinh Matos DO []   VIRGILIO Santiago []    [address] [place] [address]  Phone: [phone]  Fax: [phone]     PATIENT NAME: Gretta Giles                                                                          YOB: 1960           DATE OF SERVICE: 01/29/2020  FACILITY:   [x] [facility] [] [facility]  [] [facility]    [] [facility]      ______________________________________________________________________    CHIEF COMPLAINT:  Initial visit, liver cirrhosis      HISTORY OF PRESENT ILLNESS:   Mrs. Giles is a 69 years old female with history of hypertension, chronic anemia, type 2 diabetes mellitus, chronic obstructive pulmonary disease and advanced liver cirrhosis with ascites and esophageal varices.  Based on record review, patient was hospitalized at [facility] with a diagnosis of suspected intra-abdominal infection and sepsis as well as uncontrolled blood glucose with initial level of 900.  She frequently came hypotensive with diagnosis of septic shock.  She was intubated and started on mechanical ventilation due to acute respiratory failure along with acute kidney injury and subsequent ARDS felt to be secondary to aspiration.  Echocardiogram revealed ejection fraction 65 to 70%.  Abdominal paracentesis was done as well.  She was found to have atrial fibrillation, anticoagulation was deferred due to end-stage liver cirrhosis.  After successful intubation and initiation of PT and OT, she was by her choice discharged home December 27, 2019.  Meanwhile, patient continued to decline and due to her frail condition and increased burden of care, she elected to be admitted to this facility for PT, OT and skilled nursing care.  During my visit, patient was pleasant but appears  uncomfortable; complained of abdominal pain which is at baseline.  Stated that oxycodone is effective and requested increasing frequency to 4 times daily instead of 3 times daily    PAST MEDICAL & SURGICAL HISTORY:   Past Medical History:   Diagnosis Date   • Acute renal failure (ARF) (CMS/HCC)    • Atrial fibrillation (CMS/HCC)    • CHF (congestive heart failure) (CMS/HCC)    • Chronic hyponatremia    • COPD (chronic obstructive pulmonary disease) (CMS/HCC)    • Diabetes mellitus (CMS/HCC)    • Esophageal varices (CMS/HCC)    • Hepatitis C    • History of abdominal paracentesis    • History of transfusion    • Hyperlipidemia    • Hypertension    • Liver cirrhosis (CMS/HCC)     medical record   • Seizure disorder (CMS/HCC)     grand mal   • Sepsis (CMS/HCC)       Past Surgical History:   Procedure Laterality Date   • ANKLE SURGERY     • CHOLECYSTECTOMY      about 20 years ago per patient   • GALLBLADDER SURGERY     • HYSTERECTOMY     • HYSTERECTOMY      about 20 years ago   • KNEE SURGERY     • REPLACEMENT TOTAL KNEE           MEDICATIONS:  I have reviewed and reconciled the patients medication list in the patients chart at the [facility] nursing [facility] today.      ALLERGIES:  Allergies   Allergen Reactions   • Codeine Itching   • Morphine Itching         SOCIAL HISTORY:  Social History     Socioeconomic History   • Marital status:      Spouse name: Not on file   • Number of children: Not on file   • Years of education: Not on file   • Highest education level: Not on file   Tobacco Use   • Smoking status: Current Every Day Smoker   • Smokeless tobacco: Never Used   Substance and Sexual Activity   • Alcohol use: Never     Frequency: Never   • Drug use: Never   • Sexual activity: Defer       FAMILY HISTORY:  Family History   Problem Relation Age of Onset   • Diabetes Father    • Cancer Father         malignant neoplasm of brain   • Cancer Sister         bone   • Heart disease Sister        REVIEW OF  SYSTEMS:  Review of Systems   Constitutional: Positive for fatigue.   HENT: Negative.    Respiratory: Negative.    Cardiovascular: Negative.    Gastrointestinal: Positive for abdominal distention.   Musculoskeletal: Positive for arthralgias and myalgias.   Skin: Negative.    Neurological: Negative.        PHYSICAL EXAMINATION:   /80   Pulse 74   Temp 98.1 °F (36.7 °C)   Resp 20   Wt 52.6 kg (115 lb 14.4 oz)   SpO2 93%   BMI 20.53 kg/m²     Physical Exam   Constitutional: She is oriented to person, place, and time.   Patient is thin, pale and slightly anxious due to discomfort..   HENT:   Head: Normocephalic and atraumatic.   Eyes: Pupils are equal, round, and reactive to light. EOM are normal.   Neck: Normal range of motion. Neck supple.   Cardiovascular: Normal rate, regular rhythm and normal heart sounds.   Pulmonary/Chest: Effort normal and breath sounds normal.   Abdominal: Soft. She exhibits distension (Ascites  Management from but no rigidity or rebound rigidity noted.,  Vitals are positive.).   Musculoskeletal: Normal range of motion.   Neurological: She is alert and oriented to person, place, and time.   Skin: Skin is warm and dry.   Psychiatric: She has a normal mood and affect.       RECORDS REVIEW:   I have reviewed in detail available records including discharge summary and workup    ASSESSMENT:  · Advanced nonalcoholic fatty liver  · Recurrent ascites  · Esophageal varices  · Status post septic shock  · Status post intubation mechanical ventilation  · Multiple wounds involving right Achilles and left gluteal area, stage II pressure injury  · Chronic obstructive pulmonary disease  · Chronic essential hypertension  · Atrial fibrillation, not on anticoagulation due to liver failure  · Type 2 diabetes mellitus  · Hepatitis C infection  ·     PLAN:  · Preadmission medications reviewed, reconciled and reviewed  · PT and OT as clinically indicated  · Fall and aspiration precautions   · Routine  baseline labs  · Insulin correction dose coverage, hypoglycemic precautions  · Nutritional support recommended and monitored with interdisciplinary support  · Abdominal paracentesis as clinically indicated  · Strict offloading and pressure relief  · Nutritional support and hydration  · Bowel hygiene  · Patient is being monitored closely, further plans were modified accordingly    [x]  Discussed Patient in detail with nursing/staff, addressed all needs today.     [x]  Plan of Care Reviewed   [x]  PT/OT Reviewed     []  Wound assessment and management documentation reviewed    []  Antipsychotic medications and benzodiazepine addressed  []  Order Changes  []  Opioid medications addressed  []  Order Changes  []  Discharge Plans Reviewed   []  Advance Directive on file with Nursing Home.   []  POA on file with Nursing Home.   [x]  Code Status listed on patients chart at the nursing home facility.          Montana Haas MD.  1/29/2020

## 2020-04-13 PROBLEM — R18.8 OTHER ASCITES: Status: ACTIVE | Noted: 2020-01-01

## 2020-04-14 NOTE — PROGRESS NOTES
Follow Up Note     Date: 2020   Patient Name: Gretta Giles  MRN: [id number]  : 1960     Referring Physician: Nighat Kincaid APRN    Chief Complaint: Cirrhosis, ascites    Interval History:   2020  Gretta Giles is a 59 y.o. female who is here today for follow up for her recent lab work done to work for hep C infection and also for follow-up for her progressive ascites and cirrhosis.  She states that she is compliant with a low-salt diet.  She was been started on salt tablets recently for hyponatremia.  She has been taking lactulose with a good bowel movements daily.  Denies any altered mental status..   States that she does not have a good appetite and she is losing weight.  She had a recent paracentesis and 6 L of fluid removed.    2020   Gretta Giles is a 59 y.o. female who is here today to establish care with Gastroenterology for Cirrhosis of the liver and suspected hepatitis C.   The patient has prior history of liver disease with cirrhosis and also hepatitis C.  No prior h/o illicit drug use, no IVDA. She had tattoo in the past. Deny jaundice, change in urine color, or stool color. The patient denies history of pruritus.  The patient has excessive tiredness or fatigability. He has associated abdominal distension and occasional abdominal pain since almost a year now and had multiple paracentesis. No prior history of heavy alcohol, was taking ETOH socially once in few weeks. She was followed and managed at [facility] by Dr Antonio. Her father had cirrhosis and liver cancer. ? Esophageal varices.  Last paracentesis 2 weeks ago. Hep c was not treated. Known to have diastolic heart failure. Also has A-fib.   Recent blood work  and imaging reveal possible cirrhosis. Deny  any change in mental status. No change in bowel habit, rarely constipated.  There is no history of  hematochezia or melena.   Pt denies nausea vomiting or odynophagia or dysphagia. There is a history of occasional acid  reflux. There is no history of anemia. Prior history of EGD in Nov 2019 and was told normal.  Last colonoscopy in 2019 was normal as per pt, both done Dr Antonio. No family history of colon cancer.       Subjective      Past Medical History: @[facility]  Past Surgical History:   Past Surgical History:   Procedure Laterality Date   • ANKLE SURGERY     • CHOLECYSTECTOMY      about 20 years ago per patient   • GALLBLADDER SURGERY     • HYSTERECTOMY     • HYSTERECTOMY      about 20 years ago   • KNEE SURGERY     • REPLACEMENT TOTAL KNEE         Family History:   Family History   Problem Relation Age of Onset   • Diabetes Father    • Cancer Father         malignant neoplasm of brain   • Cancer Sister         bone   • Heart disease Sister        Social History:   Social History     Socioeconomic History   • Marital status:      Spouse name: Not on file   • Number of children: Not on file   • Years of education: Not on file   • Highest education level: Not on file   Tobacco Use   • Smoking status: Current Every Day Smoker   • Smokeless tobacco: Never Used   Substance and Sexual Activity   • Alcohol use: Never     Frequency: Never   • Drug use: Never   • Sexual activity: Defer       Medications:     Current Outpatient Medications:   •  albuterol sulfate  (90 Base) MCG/ACT inhaler, Inhale 1 puff Every 4 (Four) Hours As Needed for Wheezing., Disp: , Rfl:   •  amiodarone (PACERONE) 200 MG tablet, Take 1 tablet by mouth Daily., Disp: 30 tablet, Rfl: 0  •  aspirin (ASPIRIN LOW DOSE) 81 MG EC tablet, Take 1 tablet by mouth daily. For health maintenance, Disp: , Rfl:   •  busPIRone (BUSPAR) 15 MG tablet, Take 15 mg by mouth 2 (Two) Times a Day., Disp: , Rfl:   •  citalopram (CeleXA) 10 MG tablet, Take 10 mg by mouth Daily., Disp: , Rfl:   •  dicyclomine (BENTYL) 10 MG capsule, Take 1 capsule by mouth Daily., Disp: 30 capsule, Rfl: 0  •  ferrous sulfate 325 (65 FE) MG tablet, Take 1 tablet by mouth 3 (Three) Times a Day  "With Meals., Disp: 30 tablet, Rfl: 0  •  fluticasone (FLOVENT DISKUS) 50 MCG/BLIST diskus inhaler, Inhale 1 puff 2 (Two) Times a Day., Disp: , Rfl:   •  furosemide (LASIX) 40 MG tablet, Take 40 mg by mouth 2 (Two) Times a Day., Disp: , Rfl:   •  gabapentin (NEURONTIN) 600 MG tablet, Take 1 tablet by mouth 3 (Three) Times a Day., Disp: 90 tablet, Rfl: 5  •  glucose blood test strip, Use as instructed, Disp: 100 each, Rfl: 0  •  glucose monitor monitoring kit, 1 each As Needed (Diabetes mellitus)., Disp: 1 each, Rfl: 0  •  insulin aspart (novoLOG) 100 UNIT/ML injection, Inject  under the skin into the appropriate area as directed 3 (Three) Times a Day Before Meals., Disp: , Rfl:   •  Insulin Glargine (BASAGLAR KWIKPEN) 100 UNIT/ML injection pen, Inject 18 Units under the skin into the appropriate area as directed 2 (Two) Times a Day., Disp: , Rfl:   •  Insulin Pen Needle (PEN NEEDLES 5/16\") 31G X 8 MM misc, 1 each 2 (Two) Times a Day., Disp: 100 each, Rfl: 0  •  lactulose (CHRONULAC) 10 GM/15ML solution, Take 15 mL by mouth 2 (Two) Times a Day., Disp: 236 mL, Rfl: 0  •  Menthol-Zinc Oxide (CALMOSEPTINE) 0.44-20.6 % ointment, Apply  topically to the appropriate area as directed As Needed., Disp: , Rfl:   •  Multiple Vitamin (MULTI-VITAMIN DAILY PO), Take  by mouth., Disp: , Rfl:   •  ondansetron (ZOFRAN) 4 MG tablet, Take 4 mg by mouth Every 8 (Eight) Hours As Needed for Nausea or Vomiting., Disp: , Rfl:   •  oxyCODONE (ROXICODONE) 10 MG tablet, Take 1 tablet by mouth 4 (Four) Times a Day., Disp: 120 tablet, Rfl: 0  •  pantoprazole (PROTONIX) 40 MG EC tablet, Take 40 mg by mouth 2 (Two) Times a Day., Disp: , Rfl:   •  sodium chloride 1 g tablet, Take 1 g by mouth Daily., Disp: , Rfl:   •  spironolactone (ALDACTONE) 100 MG tablet, Take 100 mg by mouth Daily., Disp: , Rfl:     Allergies:   Allergies   Allergen Reactions   • Codeine Itching   • Morphine Itching       Review of Systems:   Review of Systems   "   Constitutional: Positive for appetite change and fatigue. Negative for unexpected weight loss.   Gastrointestinal: Positive for abdominal distention and diarrhea. Negative for abdominal pain, anal bleeding, blood in stool, constipation, nausea, rectal pain, vomiting, GERD and indigestion.       The following portions of the patient's history were reviewed and updated as appropriate: allergies, current medications, past family history, past medical history, past social history, past surgical history and problem list.    Objective     Physical Exam:  Vital Signs: There were no vitals filed for this visit.    Physical Exam not performed as it is a virtual visit    Results Review:   I reviewed the patient's new clinical results.         Assessment / Plan      Gretta alonzo  Assessment/Recommendations:  1. Chronic hepatitis C virus infection with cirrhosis (CMS/HCC)  Chronic hep C, treatment naïve, genotype 3, with the viral load of 624,000  Decompensated cirrhosis with portal hypertension, ascites and esophageal varices  Her MELD score is fluctuating from 9 to 11 since last 6 months.  CTP score of 9, class B.     Patient was followed for at [facility] by Dr. Antonio.  As per patient she was drinking alcohol socially once in few weeks.  She denies any alcohol abuse no history of any illicit drug use or IV drug abuse.   She was been told she has hepatitis C in the last 2 years.  No prior treatment for hep C.  She has a strong family history of her cirrhosis including her dad and sister.   She is immune to hepatitis A.  Not immune to hepatitis B. her HIV test is negative.  Recent ultrasound abdomen 2/2020  reveals a signs of cirrhosis of the splenomegaly and ascites without any liver lesions.  Needs a hepatitis B vaccine that can be received at [facility], discussed with the staff at nursing home.  Her last EGD done at [place] by Dr. Antonio  in July 2019 revealed grade 1 esophageal varices and mild  portal hypertensive gastropathy.  Her recent liver enzymes are normal except borderline elevated total bilirubin.    She has chronic anemia with hemoglobin between 9-11 as per deciliter and she is on iron pills.  She may not be eligible to receive Ribavarin and we have left with the option of treating her with Epclusa for 24 weeks  Given her decompensated cirrhosis and the genotype 3 response rate is low for sustained virological response, that has been discussed with the patient.  Risk and benefits have been discussed with the patient including the rare possibility of any further decompensation and liver failure.  She is on amiodarone and we are going to hold the amiodarone while the patient is started on Epclusa if approved.  She has been given a explanation of the importance of adherence, and has agreed to adhere to and complete the drug regimen as prescribed; and that the risks of hepatotoxic drugs including acetaminophen have been explained to the patient.     Will get a priorauth for treatment of her hyper chronic hepatitis C infection  We will refer her to transplant unit when once she has been treated for hep C infection  She needs ultrasound abdomen for HCC surveillance in 6 months time that will be September 2020  She needs a surveillance EGD in 1 year that will be in July 2021 (last EGD in July 2019)  Patient does have a constipation associated with the opioid use.  Unclear whether she had any hepatic encephalopathy in the past however she is on the lactulose 15 mL p.o. twice daily with a good BM daily,  will continue the same    2.  Ascites secondary to cirrhosis  4/13/2020  She is on a frequent paracentesis.  Last paracentesis was on 4/10/2020 and about 6 L clear fluid removed.  she is currently on Lasix 40 mg twice daily along with Aldactone 100 mg p.o. Daily  Her sodium is low at 127 (may be slightly spuriously low due to her hyperglycemia) and her creatinine slowly going up to 1.2  There may not be  any possibility of increasing her diuretic dose for now.   I do note that patient was put on salt tablets which will worsen her ascites.  Advised to stop the salt tablets and repeat BMP in 1 week time.  If there is any progressive drop in sodium level will reduce her Lasix dose to 60 mg p.o.daily (40+20).   Strict low-salt diet advice has been given.  She may need a frequent paracentesis for a while longer with the diuretics  We will see how she does with the current management will next 6 months time and may consider to send her to transplant unit for an opinion    2/19/2020  Patient seem to have chronic ascites.  She states that she was getting frequent paracentesis last year at [place].  She had at least twice a paracentesis done in the last 4 to 6 weeks time.   As per record there was no SBP noted with recent paracentesis. She does have a history of diastolic heart failure with EF of 65 to 75%.  Since him to have any systolic heart failure.  No history suggestive of any cardiac cirrhosis.  I am not sure whether she had any episodes of hepatic encephalopathy or not but she is on the lactulose 15 mL p.o. twice daily for now.  Patient currently on Aldactone 100 mg p.o. daily with the Lasix 80 mg p.o. Daily, will continue the same for now.  She may need frequent paracentesis until her diuretic dose is titrated up with the aim to completely stop therapeutic paracentesis  Recommend getting the paracentesis by radiology once in between if there is any progression of the ascites before the next visit  Strict 2 g low-salt or no salt diet at the rehab and at home  Continue lactulose 15 mL p.o. twice daily to titrate for the bowel movement 2 to 3/day  Will repeat CMP in 3 to 4 weeks time and increase the dose of diuretics if her electrolytes and renal function is normal        3. Secondary esophageal varices without bleeding (CMS/HCC) and portal hypertensive gastropathy  EGD done in July 2019 at [place] revealed less  grade 1 esophageal varices also mild portal hypertensive gastropathy  At this time patient blood pressure is on the lower side at 100 -110 systolic  Will put her on lowest dose of propranolol 10 mg p.o. twice daily and see how she does, if there is any drop in her blood pressure will discontinue the beta-blocker medication      4. Anemia, unspecified type  Normocytic anemia.  No current signs of any GI bleed.  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  We will continue iron pills for now. Last EGD colonoscopy was done in July 2019.  Colonoscopy was normal however prep was suboptimal.  Small esophageal varices and mild portal evidence of gastropathy noted with EGD.  No current signs of any bleeding    5. Gastroesophageal reflux disease without esophagitis  Well-controlled on the PPI to continue the same    6.  Protein calorie malnutrition  Advised to be on a high-protein diet.   Advised egg daily and also protein shakes or Ensure 1 can p.o. twice daily     7. Encounter for screening for malignant neoplasm of colon  Average risk.  Last colonoscopy was in 2019 [place]  which was normal.  Prep was suboptimal  Repeat colonoscopy in 5 to 10 years depending on symptoms          Follow Up:   No follow-ups on file.    Amna Woo MD  [facility]  4/14/2020  09:59     Please note that portions of this note may have been completed with a voice recognition program. Efforts were made to edit the dictations, but occasionally words are mistranscribed.

## 2020-04-19 NOTE — PROGRESS NOTES
Nursing Home History and Physical        Brianleslie Rivera DO []  M[facility] Lala []  [address] [place] [address]  Phone: [phone]  Fax: [phone] Montana Haas MD[x]  Vinh Matos DO []   VIRGIL[facility] Santiago []    [address] [place] [address]  Phone: [phone]  Fax: [phone]     PATIENT NAME: Gretta Giles                                                                          YOB: 1960           DATE OF SERVICE: 01/29/2020  FACILITY:   [x] [facility] [] [facility]  [] [facility]    [] [facility]      ______________________________________________________________________    CHIEF COMPLAINT:  Initial visit, liver cirrhosis      HISTORY OF PRESENT ILLNESS:   Mrs. Giles is a 69 years old female with history of hypertension, chronic anemia, type 2 diabetes mellitus, chronic obstructive pulmonary disease and advanced liver cirrhosis with ascites and esophageal varices.  Based on record review, patient was hospitalized at [facility] with a diagnosis of suspected intra-abdominal infection and sepsis as well as uncontrolled blood glucose with initial level of 900.  She frequently came hypotensive with diagnosis of septic shock.  She was intubated and started on mechanical ventilation due to acute respiratory failure along with acute kidney injury and subsequent ARDS felt to be secondary to aspiration.  Echocardiogram revealed ejection fraction 65 to 70%.  Abdominal paracentesis was done as well.  She was found to have atrial fibrillation, anticoagulation was deferred due to end-stage liver cirrhosis.  After successful intubation and initiation of PT and OT, she was by her choice discharged home December 27, 2019.  Meanwhile, patient continued to decline and due to her frail condition and increased burden of care, she elected to be admitted to this facility for PT, OT and skilled nursing care.  During my visit, patient was pleasant but appears  uncomfortable; complained of abdominal pain which is at baseline.  Stated that oxycodone is effective and requested increasing frequency to 4 times daily instead of 3 times daily    PAST MEDICAL & SURGICAL HISTORY:   Past Medical History:   Diagnosis Date   • Acute renal failure (ARF) (CMS/HCC)    • Atrial fibrillation (CMS/HCC)    • CHF (congestive heart failure) (CMS/HCC)    • Chronic hyponatremia    • COPD (chronic obstructive pulmonary disease) (CMS/HCC)    • Diabetes mellitus (CMS/HCC)    • Esophageal varices (CMS/HCC)    • Hepatitis C    • History of abdominal paracentesis    • History of transfusion    • Hyperlipidemia    • Hypertension    • Liver cirrhosis (CMS/HCC)     medical record   • Seizure disorder (CMS/HCC)     grand mal   • Sepsis (CMS/HCC)       Past Surgical History:   Procedure Laterality Date   • ANKLE SURGERY     • CHOLECYSTECTOMY      about 20 years ago per patient   • GALLBLADDER SURGERY     • HYSTERECTOMY     • HYSTERECTOMY      about 20 years ago   • KNEE SURGERY     • REPLACEMENT TOTAL KNEE           MEDICATIONS:  I have reviewed and reconciled the patients medication list in the patients chart at the [facility] today.      ALLERGIES:  Allergies   Allergen Reactions   • Codeine Itching   • Morphine Itching         SOCIAL HISTORY:  Social History     Socioeconomic History   • Marital status:      Spouse name: Not on file   • Number of children: Not on file   • Years of education: Not on file   • Highest education level: Not on file   Tobacco Use   • Smoking status: Current Every Day Smoker   • Smokeless tobacco: Never Used   Substance and Sexual Activity   • Alcohol use: Never     Frequency: Never   • Drug use: Never   • Sexual activity: Defer       FAMILY HISTORY:  Family History   Problem Relation Age of Onset   • Diabetes Father    • Cancer Father         malignant neoplasm of brain   • Cancer Sister         bone   • Heart disease Sister        REVIEW OF  SYSTEMS:  Review of Systems   Constitutional: Positive for fatigue.   HENT: Negative.    Respiratory: Negative.    Cardiovascular: Negative.    Gastrointestinal: Positive for abdominal distention.   Musculoskeletal: Positive for arthralgias and myalgias.   Skin: Negative.    Neurological: Negative.        P  Physical Exam   Constitutional: She is oriented to person, place, and time.   Patient is thin, pale and slightly anxious due to discomfort..   HENT:   Head: Normocephalic and atraumatic.   Eyes: Pupils are equal, round, and reactive to light. EOM are normal.   Neck: Normal range of motion. Neck supple.   Cardiovascular: Normal rate, regular rhythm and normal heart sounds.   Pulmonary/Chest: Effort normal and breath sounds normal.   Abdominal: Soft. She exhibits distension (Ascites  Management from but no rigidity or rebound rigidity noted.,  Vitals are positive.).   Musculoskeletal: Normal range of motion.   Neurological: She is alert and oriented to person, place, and time.   Skin: Skin is warm and dry.   Psychiatric: She has a normal mood and affect.       RECORDS REVIEW:   I have reviewed in detail available records including discharge summary and workup    ASSESSMENT:  · Advanced nonalcoholic fatty liver  · Recurrent ascites  · Esophageal varices  · Status post septic shock  · Status post intubation mechanical ventilation  · Multiple wounds involving right Achilles and left gluteal area, stage II pressure injury  · Chronic obstructive pulmonary disease  · Chronic essential hypertension  · Atrial fibrillation, not on anticoagulation due to liver failure  · Type 2 diabetes mellitus  · Hepatitis C infection  ·     PLAN:  · Preadmission medications reviewed, reconciled and reviewed  · PT and OT as clinically indicated  · Fall and aspiration precautions   · Routine baseline labs  · Insulin correction dose coverage, hypoglycemic precautions  · Nutritional support recommended and monitored with interdisciplinary  support  · Abdominal paracentesis as clinically indicated  · Strict offloading and pressure relief  · Nutritional support and hydration  · Bowel hygiene  · Patient is being monitored closely, further plans were modified accordingly    [x]  Discussed Patient in detail with nursing/staff, addressed all needs today.     [x]  Plan of Care Reviewed   [x]  PT/OT Reviewed     []  Wound assessment and management documentation reviewed    []  Antipsychotic medications and benzodiazepine addressed  []  Order Changes  []  Opioid medications addressed  []  Order Changes  []  Discharge Plans Reviewed   []  Advance Directive on file with Nursing Home.   []  POA on file with Nursing Home.   [x]  Code Status listed on patients chart at the nursing home facility.          Montana Haas MD.  1/29/2020

## 2020-04-20 NOTE — TELEPHONE ENCOUNTER
----- Message from Frank Kang MA sent at 4/16/2020 11:16 AM EDT -----  Regarding: Insurance Requirements for Epclusa  Good Morning,     I am working on the Epclusa PA for Ms. Giles. Her insurance requires that she have a urine toxicology completed within the last 30 days. They also require the HCV FibroSure blood work be completed. He has been ordering that with all of the other blood work, but he didn't on her, not sure why. He will also need to add to her note that she has been given a explanation of the importance of adherence, and has agreed to adhere to and complete the drug regimen as prescribed; and that the risks of hepatotoxic drugs including acetaminophen have been explained to the patient. They are asking for her Trujillo-Child score as well.     I will not be able to submit a PA until all of these have been done.     If you don't mind, please let me know once this is completed.    Thanks,   Frank

## 2020-04-24 NOTE — PROGRESS NOTES
Telemedicine Nursing Home Progress Note        Brian Rivera DO []  MASSIEL Lala []  [address] [place] [address]  Phone: [phone]  Fax: [phone] Montana Haas MD []  Vinh Matos DO []  Regina Nam PA-C [x]   [address] [place] [address]  Phone: [phone]  Fax: [phone]     PATIENT NAME: Gretta Giles                                                                          YOB: 1960           DATE OF SERVICE: 04/24/2020  FACILITY:  [x] [facility]   [] [place]   [] [facility]   [] [facility]   [] Other ______________________________________________________________________     CHIEF COMPLAINT:  Follow up on elevated creatinine.       HISTORY OF PRESENT ILLNESS:   Ms. Giles is a 59-year of age female patient who presents today for follow-up on elevated creatinine.  She is significant medical history of cirrhosis followed by GI.  She requires frequent paracentesis.  In addition it was noted upon routine blood work her creatinine was elevated from baseline.  Patient reports symptoms of constipation.  Lactulose dosage increased from twice daily to 3 times a day.  In addition she was given rectal suppository.  In regards to reported elevated creatinine, Lasix was held x 2 doses.  Repeat BMP obtained next day, showing normal renal function.  Creatinine of 0.9.  It is of note that yesterday patient developed worsening shortness of breath and was transferred to [facility] ER.  Subsequently paracentesis performed removing 5 L of fluid.  Patient reports improvement to respiratory symptoms.  She does state that her abdomen is a little sore, denies any other symptoms.       PAST MEDICAL & SURGICAL HISTORY:   Past Medical History:   Diagnosis Date   • Acute renal failure (ARF) (CMS/HCC)    • Atrial fibrillation (CMS/HCC)    • CHF (congestive heart failure) (CMS/HCC)    • Chronic hyponatremia    • COPD (chronic obstructive pulmonary disease)  (CMS/HCC)    • Diabetes mellitus (CMS/HCC)    • Esophageal varices (CMS/HCC)    • Hepatitis C    • History of abdominal paracentesis    • History of transfusion    • Hyperlipidemia    • Hypertension    • Liver cirrhosis (CMS/HCC)     medical record   • Seizure disorder (CMS/HCC)     grand mal   • Sepsis (CMS/HCC)       Past Surgical History:   Procedure Laterality Date   • ANKLE SURGERY     • CHOLECYSTECTOMY      about 20 years ago per patient   • GALLBLADDER SURGERY     • HYSTERECTOMY     • HYSTERECTOMY      about 20 years ago   • KNEE SURGERY     • REPLACEMENT TOTAL KNEE           MEDICATIONS:  I have reviewed and reconciled the patients medication list in the patients chart at the [facility] nursing [facility] today.      ALLERGIES:  Allergies   Allergen Reactions   • Codeine Itching   • Morphine Itching         SOCIAL HISTORY:  Social History     Socioeconomic History   • Marital status:      Spouse name: Not on file   • Number of children: Not on file   • Years of education: Not on file   • Highest education level: Not on file   Tobacco Use   • Smoking status: Current Every Day Smoker   • Smokeless tobacco: Never Used   Substance and Sexual Activity   • Alcohol use: Never     Frequency: Never   • Drug use: Never   • Sexual activity: Defer       FAMILY HISTORY:  Family History   Problem Relation Age of Onset   • Diabetes Father    • Cancer Father         malignant neoplasm of brain   • Cancer Sister         bone   • Heart disease Sister        REVIEW OF SYSTEMS:  Review of Systems   Constitutional: Negative for activity change, appetite change and fever.   Respiratory: Negative for cough and shortness of breath.    Cardiovascular: Negative for chest pain and leg swelling.   Gastrointestinal: Positive for constipation (improved) and nausea (infrequent. ). Negative for abdominal pain (mild soreness), diarrhea and vomiting.   Genitourinary: Negative for dysuria and hematuria.   Neurological: Positive for  weakness (generalized. ).   Psychiatric/Behavioral: Negative for confusion and sleep disturbance.       VITAL SIGNS:  /76   Pulse 72   Temp 97.8 °F (36.6 °C)   Resp 18   SpO2 95%     PHYSICAL EXAMINATION:   Constitutional:  Pleasant and talkative, NAD.  Appears chronically ill.   HENT:   Head: Normocephalic and atraumatic.   Eyes: PERRLA.  Conjunctivae are normal.   Neck: Normal range of motion. Neck supple.  Trachea midline.    Pulmonary/Chest: Effort normal.   Neurological: alert and oriented.   Psychiatric: Normal mood and affect. Behavior is normal.   Nursing note and vitals reviewed.  * Limited exam due to telemedicine visit      RECORDS REVIEW:   I have reviewed and interpreted the following today.     ASSESSMENT     Diagnoses and all orders for this visit:    Other cirrhosis of liver (CMS/HCC)    Constipation, unspecified constipation type    Elevated serum creatinine      PLAN  Cirrhosis: Followed by GI.  Had recent paracentesis with improvement to symptoms.  No dose adjustments made to diuretics.  Continue to monitor.  Constipation: Improved.  Lactulose dose increased to 3 times daily.  Continue to monitor.  Elevated creatinine: Routine blood draw on 4/21 showed elevation in creatinine at 2.8.  Subsequently repeat was drawn next date, creatinine of 0.9 reported.  Lasix was restarted.  Consider lab error, will repeat a BMP next Friday.  Monitor closely for any acute changes.    [x]  Discussed Patient in detail with nursing/staff, addressed all needs today.     [x]  Plan of Care Reviewed   []  PT/OT Reviewed   []  Order Changes  []  Discharge Plans Reviewed  [x]  Advance Directive on file with Nursing Home.   [x]  POA on file with Nursing Home.    [x]  Code Status listed:  []  Full Code   [x]  DNR    15 minutes was spent with telemedicine visit using Mimeo video and audio to communicate with the patient and nurse at bedside.  Patient is located at [facility].  Verbal consent  obtained prior to telemedicine visit.       Regina Nam PA-C.  4/24/2020

## 2020-05-02 NOTE — PROGRESS NOTES
Nursing Home Progress Note      Brian Rivera DO []  MASSIEL Lala []  [address] [place] [address]  Phone: [phone]  Fax: [phone] Montana Haas MD[x]  Vinh Matos DO []   VIRGILIO Santiago []    [address] [place] [address]  Phone: [phone]  Fax: [phone]     PATIENT NAME: Gretta Giles                                                                          YOB: 1960           DATE OF SERVICE: 2/12/2020  FACILITY:   [x] [place] [] [facility]  [] [facility]    [] [facility]      ______________________________________________________________________    CHIEF COMPLAINT:  Follow-up visit, liver cirrhosis      HISTORY OF PRESENT ILLNESS:   Mrs. Giles is a 69 years old female with history of hypertension, chronic anemia, type 2 diabetes mellitus, chronic obstructive pulmonary disease and advanced liver cirrhosis with ascites and esophageal varices.  Based on record review, patient was hospitalized at [facility] with a diagnosis of suspected intra-abdominal infection and sepsis as well as uncontrolled blood glucose with initial level of 900.  She frequently came hypotensive with diagnosis of septic shock.  She was intubated and started on mechanical ventilation due to acute respiratory failure along with acute kidney injury and subsequent ARDS felt to be secondary to aspiration.  Echocardiogram revealed ejection fraction 65 to 70%.  Abdominal paracentesis was done as well.  She was found to have atrial fibrillation, anticoagulation was deferred due to end-stage liver cirrhosis.  After successful intubation and initiation of PT and OT, she was by her choice discharged home December 27, 2019.  Meanwhile, patient continued to decline and due to her frail condition and increased burden of care, she elected to be admitted to this facility for PT, OT and skilled nursing care.      Since admission, patient required abdominal paracentesis  due to symptomatic ascites, this was done at [facility] on outpatient basis.  Otherwise, she remained in stable condition except for periodic complaints of nausea, insomnia and anxiety.  During today's visit, patient was comfortably laying in her bed.  Stated that PRN Zofran has been effective for her nausea.  She also stated that her pain is controlled and denied other complaints.    PAST MEDICAL & SURGICAL HISTORY:   Past Medical History:   Diagnosis Date   • Acute renal failure (ARF) (CMS/HCC)    • Atrial fibrillation (CMS/HCC)    • CHF (congestive heart failure) (CMS/HCC)    • Chronic hyponatremia    • COPD (chronic obstructive pulmonary disease) (CMS/HCC)    • Diabetes mellitus (CMS/HCC)    • Esophageal varices (CMS/HCC)    • Hepatitis C    • History of abdominal paracentesis    • History of transfusion    • Hyperlipidemia    • Hypertension    • Liver cirrhosis (CMS/HCC)     medical record   • Seizure disorder (CMS/HCC)     grand mal   • Sepsis (CMS/HCC)       Past Surgical History:   Procedure Laterality Date   • ANKLE SURGERY     • CHOLECYSTECTOMY      about 20 years ago per patient   • GALLBLADDER SURGERY     • HYSTERECTOMY     • HYSTERECTOMY      about 20 years ago   • KNEE SURGERY     • REPLACEMENT TOTAL KNEE           MEDICATIONS:  I have reviewed and reconciled the patients medication list in the patients chart at the [facility] nursing facility today.        ALLERGIES:  Codeine, morphine      REVIEW OF SYSTEMS:  Review of Systems   Constitutional: Positive for fatigue.   HENT: Negative.    Respiratory: Negative.    Cardiovascular: Negative.    Gastrointestinal: Positive for abdominal distention.   Musculoskeletal: Positive for arthralgias and myalgias.   Skin: Negative.    Neurological: Negative.        PHYSICAL EXAMINATION:   /68, HR 70/min, RR 18/min, temp 98.2 °F, O2 sat 95% on room air    Physical Exam   Constitutional: She is oriented to person, place, and time.   Patient is thin,  pale and slightly anxious due to discomfort..   HENT:   Head: Normocephalic and atraumatic.   Eyes: Pupils are equal, round, and reactive to light. EOM are normal.   Neck: Normal range of motion. Neck supple.   Cardiovascular: Normal rate, regular rhythm and normal heart sounds.   Pulmonary/Chest: Effort normal and breath sounds normal.   Abdominal: Soft. She exhibits distension (Ascites  Management from but no rigidity or rebound rigidity noted.,  Vitals are positive.).   Musculoskeletal: Normal range of motion.   Neurological: She is alert and oriented to person, place, and time.   Skin: Skin is warm and dry.   Psychiatric: She has a normal mood and affect.       RECORDS REVIEW:   I have reviewed in detail available records including discharge summary and workup    ASSESSMENT:  · Advanced nonalcoholic fatty liver  · Recurrent ascites  · Esophageal varices  · Status post septic shock  · Status post intubation mechanical ventilation  · Multiple wounds involving right Achilles and left gluteal area, stage II pressure injury  · Chronic obstructive pulmonary disease  · Chronic essential hypertension  · Atrial fibrillation, not on anticoagulation due to liver failure  · Type 2 diabetes mellitus  · Hepatitis C infection       PLAN:  · PT and OT as clinically indicated  · Fall and aspiration precautions   · Insulin correction dose coverage, hypoglycemic precautions  · Nutritional support recommended and monitored with interdisciplinary support  · Abdominal paracentesis as clinically indicated  · Strict offloading and pressure relief  · Nutritional support and hydration  · Bowel hygiene  · Patient is being monitored closely, further plans were modified accordingly    [x]  Discussed Patient in detail with nursing/staff, addressed all needs today.     [x]  Plan of Care Reviewed   [x]  PT/OT Reviewed     []  Wound assessment and management documentation reviewed    []  Antipsychotic medications and benzodiazepine addressed   []  Order Changes  []  Opioid medications addressed  []  Order Changes  []  Discharge Plans Reviewed   []  Advance Directive on file with Nursing Home.   []  POA on file with Nursing Home.   [x]  Code Status listed on patients chart at the nursing home facility.          Montana Haas MD.  2/12/2020

## 2020-05-08 NOTE — TELEPHONE ENCOUNTER
The nursing home called today. They had a teleconference with the nursing home PA. They don't feel as if the lactulose is working well for the patient. They wanted to see if there is something else that you would like to order for her. We can fax the order directly to them.

## 2020-05-08 NOTE — PROGRESS NOTES
Telemedicine Nursing Home Progress Note        Brian Rivera DO []  MASSIEL Lala []  [address] [place] [address]  Phone: [phone]  Fax: [phone] Montana Haas MD []  Vinh Matos DO []  Regina Nam PA-C [x]   [address] [place] [address]  Phone: [phone]  Fax: [phone]     PATIENT NAME: Gretta Giles                                                                          YOB: 1960           DATE OF SERVICE: 05/08/2020  FACILITY:  [x] [place]   [] [place]   [] [facility]   [] [facility]   [] Other ______________________________________________________________________     CHIEF COMPLAINT:  Follow up on UTI, hyponatremia, and constipation.      HISTORY OF PRESENT ILLNESS:   Ms. Giles is a 59-year of age female patient who presents today for follow-up on UTI.  Per nursing staff patient was complaining of lower back pain and dysuria.  UA obtained positive for ESBL greater than 100,000.  Sensitivities noted for imipenem and ertapenem.  PICC line was placed and pharmacy consulted to dose ertapenem.  Per patient she does feel her symptoms are mildly improving.  She has been afebrile.  She is followed by GI for history of cirrhosis.  She has had hyponatremia noted upon labs, this continues to be monitored.  She also has complaint of constipation and poor sleep today.  She is on lactulose, which has recently been increased.  She reports she has not had BM in approximately 3 days.  She denies any abdominal pain, nausea, vomiting.  Complaint of poor sleep is a chronic problem.  She states that she had problems with sleep at home, but it has been worsened since admission to the nursing facility.    PAST MEDICAL & SURGICAL HISTORY:   Past Medical History:   Diagnosis Date   • Acute renal failure (ARF) (CMS/HCC)    • Atrial fibrillation (CMS/HCC)    • CHF (congestive heart failure) (CMS/HCC)    • Chronic hyponatremia    • COPD (chronic obstructive  pulmonary disease) (CMS/HCC)    • Diabetes mellitus (CMS/HCC)    • Esophageal varices (CMS/HCC)    • Hepatitis C    • History of abdominal paracentesis    • History of transfusion    • Hyperlipidemia    • Hypertension    • Liver cirrhosis (CMS/HCC)     medical record   • Seizure disorder (CMS/HCC)     grand mal   • Sepsis (CMS/HCC)       Past Surgical History:   Procedure Laterality Date   • ANKLE SURGERY     • CHOLECYSTECTOMY      about 20 years ago per patient   • GALLBLADDER SURGERY     • HYSTERECTOMY     • HYSTERECTOMY      about 20 years ago   • KNEE SURGERY     • REPLACEMENT TOTAL KNEE           MEDICATIONS:  I have reviewed and reconciled the patients medication list in the patients chart at the skilled nursing facility today.      ALLERGIES:  Allergies   Allergen Reactions   • Codeine Itching   • Morphine Itching         SOCIAL HISTORY:  Social History     Socioeconomic History   • Marital status:      Spouse name: Not on file   • Number of children: Not on file   • Years of education: Not on file   • Highest education level: Not on file   Tobacco Use   • Smoking status: Current Every Day Smoker   • Smokeless tobacco: Never Used   Substance and Sexual Activity   • Alcohol use: Never     Frequency: Never   • Drug use: Never   • Sexual activity: Defer       FAMILY HISTORY:  Family History   Problem Relation Age of Onset   • Diabetes Father    • Cancer Father         malignant neoplasm of brain   • Cancer Sister         bone   • Heart disease Sister        REVIEW OF SYSTEMS:  Review of Systems   Constitutional: Positive for fatigue. Negative for activity change, appetite change, chills and fever.   Respiratory: Negative for cough, chest tightness and shortness of breath.    Cardiovascular: Negative for chest pain and leg swelling.   Gastrointestinal: Positive for abdominal distention (chronic.) and constipation. Negative for abdominal pain, diarrhea, nausea and vomiting.   Genitourinary: Positive for  dysuria (improving. ). Negative for hematuria.   Musculoskeletal: Positive for arthralgias and back pain.   Neurological: Positive for weakness (generalized. ).   Psychiatric/Behavioral: Positive for sleep disturbance. Negative for agitation and confusion.       VITAL SIGNS:  BP 95/67   Pulse 66   Temp 98.5 °F (36.9 °C)   Resp 18   Wt 49.5 kg (109 lb 1.6 oz)   SpO2 96%   BMI 18.73 kg/m²     PHYSICAL EXAMINATION:   Constitutional: Chronically ill-appearing, no acute distress.  HENT:   Head: Normocephalic and atraumatic.   Eyes: PERRLA.  Conjunctivae are normal.   Neck: Normal range of motion. Neck supple.  Trachea midline.    Pulmonary/Chest: Effort normal.   Neurological: Alert and oriented.  Psychiatric: Normal mood and affect. Behavior is normal.   Nursing note and vitals reviewed.  * Limited exam due to telemedicine visit      RECORDS REVIEW:   I have reviewed and interpreted the following today.  BMP reviewed.     ASSESSMENT     Diagnoses and all orders for this visit:    Urinary tract infection without hematuria, site unspecified    Chronic hyponatremia    Constipation, unspecified constipation type    Insomnia, unspecified type      PLAN  UTI: Continue with ertapenem via PIC.  Complete therapy for 7 days.  Monitor closely for any worsening signs or symptoms.  Hyponatremia: This is a chronic problem.  Stable per BMP reviewed.  Continue to monitor.  Also followed by GI.  Constipation: Patient is on your lactulose 3 times daily, dose recently increased.  We will give rectal suppository today.  Consult with GI for further recommendations.  Insomnia: Patient reports a chronic infrequent problem.  She does feel symptoms have worsened since long-term placement at nursing facility.  Will start melatonin at 5 mg at bedtime.  Monitor closely for any worsening signs or symptoms.    [x]  Discussed Patient in detail with nursing/staff, addressed all needs today.     [x]  Plan of Care Reviewed   []  PT/OT Reviewed    []  Order Changes  []  Discharge Plans Reviewed  [x]  Advance Directive on file with Nursing Home.   [x]  POA on file with Nursing Home.    [x]  Code Status listed:  []  Full Code   [x]  DNR    25 minutes was spent with telemedicine visit using Facetime video and audio to communicate with the patient and nurse at bedside.  Patient is located at [facility].  Verbal consent obtained prior to telemedicine visit.       Regina Nam PA-C.  5/11/2020

## 2020-05-11 NOTE — TELEPHONE ENCOUNTER
Dr. Woo placed order for Dulcolax 5 mg 2 tabs po 2 times weekly. Increase to 3 times weekly dependent upon bowel movement. Orders have been faxed to [facility] at [phone].

## 2020-05-19 NOTE — PROGRESS NOTES
Prior authorization approval received for Epclusa treatment scheduled for 24 weeks. Notified office of approval and will schedule pickup time with patient. Patient will present to pharmacy for pickup and further counseling. Indication, safety, and efficacy of treatment has been verified. Obtained current med list from [facility], scanned into [employer], and checked for relevant interactions. Will stress importance of simultaneous or 12 hour separation of Epclusa and famotidine administration. Will also verify that amiodarone treatment is withheld during Epclusa treatment, as recommended by Dr. Woo. Will review side effect profile with patient and stress importance of adherence to regimen with patient and [facility] staff when patient picks up medication. Office will be scheduling patient visit and labs 4 weeks after beginning treatment, and pharmacy will coordinate next dispensing of medication for this date.    Yanna CheathamD   no

## 2020-06-05 PROBLEM — E87.20 LACTIC ACIDOSIS: Status: ACTIVE | Noted: 2020-01-01

## 2020-06-05 PROBLEM — G40.909 SEIZURE DISORDER (HCC): Status: ACTIVE | Noted: 2020-01-01

## 2020-06-05 PROBLEM — N17.9 ACUTE RENAL FAILURE (ARF) (HCC): Status: ACTIVE | Noted: 2020-01-01

## 2020-06-05 PROBLEM — I44.2 COMPLETE HEART BLOCK (HCC): Status: ACTIVE | Noted: 2020-01-01

## 2020-06-05 PROBLEM — I48.91 ATRIAL FIBRILLATION (HCC): Status: ACTIVE | Noted: 2020-01-01

## 2020-06-05 NOTE — PLAN OF CARE
Problem: Patient Care Overview  Goal: Plan of Care Review  Outcome: Ongoing (interventions implemented as appropriate)  Flowsheets (Taken 6/5/2020 0479)  Progress: no change  Plan of Care Reviewed With: patient  Outcome Summary: Bedside eval of swallow completed.  Pt. exhibited some lethargy and confusion of speech.  Oral phase was adequate, but pt. did not accept mech soft trial, expelling it immediately.  No difficulty with puree or liquids with oral phase, however.  Suspect pharyngeal phase dysphagia as pt. exhbiited coughing with thin liquid and wet vocal quality following.  She further exhibited belching post thin liq trial indicating third stage dysphagia of which she has prior dx of GERD and esophageal varices.  Due to pt.'s limitations with alertness and awareness for optimal safety and risk of aspiration and choking, recommend:  1. pureed diet with nectar-thick liq as daniel,  2. meds with pudding/applesauce,  3. aspiration precautions, 4. reflux precautions.

## 2020-06-05 NOTE — ED NOTES
1mg Atropine, ivp per verbal order, repeated and verified by Dr. Gamboa.      Zena Keller, RN  06/05/20 4378

## 2020-06-05 NOTE — PROGRESS NOTES
The patient reports she is a DNR/DNI.  Discussed the risk and benefits of temporary pacemaker wire insertion, as well as temporary reversal of DNR status during the procedure.  The patient is agreeable to proceed, with all questions being answered.      Nimesh Dyer MD  Interventional cardiology

## 2020-06-05 NOTE — H&P (VIEW-ONLY)
[facility] Cardiology History and Physical    Gretta Giles  1960  [id number]  06/05/20     Chief Complaint: Altered mental status    History of Present Illness:   Mrs. Gretta Giles is a 59 y.o. female who is being seen by Cardiology for evaluation of bradycardia.  The patient has a past medical history significant for cirrhosis secondary to chronic hepatitis C, type 2 diabetes mellitus, COPD, hypertension, hyperlipidemia, and seizure disorder.  She has a past cardiac history significant for paroxysmal atrial fibrillation, for which she reportedly follows with her primary cardiologist.  The patient resides in a nursing facility.  She presents to the [facility] for evaluation of altered mental status.  Per report, the patient was found to be unresponsive at the nursing facility.  On evaluation, the patient was found to be severely bradycardic.  She was subsequent transferred to the [facility] emergency department for evaluation.  On arrival, the patient remained severely bradycardic and an initial telemetry revealed complete heart block.  The patient was subsequently started on dopamine, with improvement in her heart rate to the 50s.  On initial labs, she was found to have lactic acidosis with initial lactic acid of 5.6.  She was also found of an acute kidney injury with a creatinine of 1.47.  On initial discussion the emergency department, the patient was unclear if she wanted any further interventions as she is a DNR/DNI.  Upon further discussing options including hospice/palliative care versus placement of a temporary wire and subsequent transfer for permanent pacemaker implantation, the patient has decided to proceed with temporary pacemaker implantation and transfer for permanent pacemaker.  We will therefore proceed with temporary pacemaker insertion.      Review of Systems:   Review of Systems   Constitutional: Positive for fatigue. Negative for activity  change, appetite change, chills, diaphoresis, fever, unexpected weight gain and unexpected weight loss.   Eyes: Negative for blurred vision and double vision.   Respiratory: Positive for shortness of breath. Negative for cough, chest tightness and wheezing.    Cardiovascular: Negative for chest pain, palpitations and leg swelling.   Gastrointestinal: Negative for abdominal pain, anal bleeding, blood in stool and GERD.   Endocrine: Negative for cold intolerance and heat intolerance.   Genitourinary: Negative for hematuria.   Neurological: Negative for dizziness, syncope, weakness and light-headedness.   Hematological: Does not bruise/bleed easily.   Psychiatric/Behavioral: Negative for depressed mood and stress. The patient is not nervous/anxious.        Past Medical History:   Past Medical History:   Diagnosis Date   • Acute renal failure (ARF) (CMS/HCC)    • Atrial fibrillation (CMS/HCC)    • CHF (congestive heart failure) (CMS/HCC)    • Chronic hyponatremia    • COPD (chronic obstructive pulmonary disease) (CMS/HCC)    • Diabetes mellitus (CMS/HCC)    • Esophageal varices (CMS/HCC)    • Hepatitis C    • History of abdominal paracentesis    • History of transfusion    • Hyperlipidemia    • Hypertension    • Liver cirrhosis (CMS/HCC)     medical record   • Seizure disorder (CMS/HCC)     grand mal   • Sepsis (CMS/HCC)        Past Surgical History:   Past Surgical History:   Procedure Laterality Date   • ANKLE SURGERY     • CHOLECYSTECTOMY      about 20 years ago per patient   • GALLBLADDER SURGERY     • HYSTERECTOMY     • HYSTERECTOMY      about 20 years ago   • KNEE SURGERY     • REPLACEMENT TOTAL KNEE         Family History:   Family History   Problem Relation Age of Onset   • Diabetes Father    • Cancer Father         malignant neoplasm of brain   • Cancer Sister         bone   • Heart disease Sister        Social History:   Social History     Socioeconomic History   • Marital status:      Spouse name: Not  on file   • Number of children: Not on file   • Years of education: Not on file   • Highest education level: Not on file   Tobacco Use   • Smoking status: Current Every Day Smoker   • Smokeless tobacco: Never Used   Substance and Sexual Activity   • Alcohol use: Never     Frequency: Never   • Drug use: Never   • Sexual activity: Defer       Medications:     Current Facility-Administered Medications:   •  DOPamine 400 mg in 250 mL D5W (1.6 mg/mL) infusion, 2-20 mcg/kg/min, Intravenous, Titrated, Marsha Gamboa MD, Last Rate: 3.92 mL/hr at 06/05/20 1234, 2 mcg/kg/min at 06/05/20 1234  •  piperacillin-tazobactam (ZOSYN) 3.375 g in iso-osmotic dextrose 50 ml (premix), 3.375 g, Intravenous, Once, Marsha Gamboa MD  •  vancomycin 1000 mg in sodium chloride 0.9% 250 mL IVPB, 1,000 mg, Intravenous, Once, Marsha Gamboa MD    Current Outpatient Medications:   •  albuterol sulfate  (90 Base) MCG/ACT inhaler, Inhale 1 puff Every 4 (Four) Hours As Needed for Wheezing., Disp: , Rfl:   •  amiodarone (PACERONE) 200 MG tablet, Take 1 tablet by mouth Daily., Disp: 30 tablet, Rfl: 0  •  aspirin (ASPIRIN LOW DOSE) 81 MG EC tablet, Take 1 tablet by mouth daily. For health maintenance, Disp: , Rfl:   •  bisacodyl (DULCOLAX) 5 MG EC tablet, Take 2 tablets by mouth 2 (Two) Times a Week. Increase to three times weekly depend on bowel movement., Disp: 30 tablet, Rfl: 2  •  busPIRone (BUSPAR) 15 MG tablet, Take 15 mg by mouth 2 (Two) Times a Day., Disp: , Rfl:   •  citalopram (CeleXA) 10 MG tablet, Take 10 mg by mouth Daily., Disp: , Rfl:   •  dicyclomine (BENTYL) 10 MG capsule, Take 1 capsule by mouth Daily., Disp: 30 capsule, Rfl: 0  •  famotidine (Pepcid) 20 MG tablet, Take 1 tablet by mouth 2 (Two) Times a Day., Disp: 60 tablet, Rfl: 2  •  ferrous sulfate 325 (65 FE) MG tablet, Take 1 tablet by mouth 3 (Three) Times a Day With Meals., Disp: 30 tablet, Rfl: 0  •  fluticasone (FLOVENT DISKUS) 50 MCG/BLIST  "diskus inhaler, Inhale 1 puff 2 (Two) Times a Day., Disp: , Rfl:   •  furosemide (LASIX) 40 MG tablet, Take 40 mg by mouth 2 (Two) Times a Day., Disp: , Rfl:   •  gabapentin (NEURONTIN) 600 MG tablet, Take 1 tablet by mouth 3 (Three) Times a Day., Disp: 90 tablet, Rfl: 5  •  glucose blood test strip, Use as instructed, Disp: 100 each, Rfl: 0  •  glucose monitor monitoring kit, 1 each As Needed (Diabetes mellitus)., Disp: 1 each, Rfl: 0  •  insulin aspart (novoLOG) 100 UNIT/ML injection, Inject  under the skin into the appropriate area as directed 3 (Three) Times a Day Before Meals., Disp: , Rfl:   •  Insulin Glargine (BASAGLAR KWIKPEN) 100 UNIT/ML injection pen, Inject 18 Units under the skin into the appropriate area as directed 2 (Two) Times a Day., Disp: , Rfl:   •  Insulin Pen Needle (PEN NEEDLES 5/16\") 31G X 8 MM misc, 1 each 2 (Two) Times a Day., Disp: 100 each, Rfl: 0  •  lactulose (CHRONULAC) 10 GM/15ML solution, Take 15 mL by mouth 2 (Two) Times a Day., Disp: 236 mL, Rfl: 0  •  Menthol-Zinc Oxide (CALMOSEPTINE) 0.44-20.6 % ointment, Apply  topically to the appropriate area as directed As Needed., Disp: , Rfl:   •  Multiple Vitamin (MULTI-VITAMIN DAILY PO), Take  by mouth., Disp: , Rfl:   •  ondansetron (ZOFRAN) 4 MG tablet, Take 4 mg by mouth Every 8 (Eight) Hours As Needed for Nausea or Vomiting., Disp: , Rfl:   •  oxyCODONE (ROXICODONE) 10 MG tablet, Take 1 tablet by mouth 4 (Four) Times a Day., Disp: 120 tablet, Rfl: 0  •  sodium chloride 1 g tablet, Take 1 g by mouth Daily., Disp: , Rfl:   •  Sofosbuvir-Velpatasvir 400-100 MG tablet, Take 1 tablet by mouth Daily for 168 days., Disp: 168 tablet, Rfl: 0  •  spironolactone (ALDACTONE) 100 MG tablet, Take 100 mg by mouth Daily., Disp: , Rfl:     Allergies:   Allergies   Allergen Reactions   • Codeine Itching   • Morphine Itching       Physical Exam:  Vital Signs:   Vitals:    06/05/20 1234 06/05/20 1237 06/05/20 1245 06/05/20 1249   BP:  96/58 118/70    "   Pulse: (!) 31 (!) 33 (!) 47 (!) 48   Resp:       Temp:       TempSrc:       SpO2: 95% 96% 97% 97%   Weight:       Height:           Physical Exam   Constitutional: She is oriented to person, place, and time.   Lying in bed in no acute distress.  Somewhat lethargic.  Appears chronically ill.   HENT:   Head: Normocephalic and atraumatic.   Moist Mucous Membranes.    Eyes: Pupils are equal, round, and reactive to light. EOM are normal. No scleral icterus.   Neck: No tracheal deviation present.   Cardiovascular: Normal heart sounds and intact distal pulses. Exam reveals no gallop and no friction rub.   No murmur heard.  Bradycardic.   Pulmonary/Chest: No stridor. No respiratory distress. She has wheezes. She has rales. She exhibits no tenderness.   Coarse bilateral breath sounds.   Abdominal: Soft. Bowel sounds are normal. She exhibits no distension. There is no tenderness. There is no rebound and no guarding.   Musculoskeletal: Normal range of motion. She exhibits no edema.   Lymphadenopathy:     She has no cervical adenopathy.   Neurological: She is alert and oriented to person, place, and time.   Skin: Skin is warm and dry. She is not diaphoretic. No erythema.   Psychiatric:   Somewhat lethargic.   Nursing note and vitals reviewed.      Results Review:   Results from last 7 days   Lab Units 06/05/20  1228   SODIUM mmol/L 131*   POTASSIUM mmol/L 5.4*   CHLORIDE mmol/L 101   CO2 mmol/L 13.7*   BUN mg/dL 19   CREATININE mg/dL 1.47*   CALCIUM mg/dL 8.2*   BILIRUBIN mg/dL 0.6   ALK PHOS U/L 209*   ALT (SGPT) U/L 13   AST (SGOT) U/L 27   GLUCOSE mg/dL 269*     Results from last 7 days   Lab Units 06/05/20  1228   TROPONIN T ng/mL 0.102*     @LABRCNTbnp@  Results from last 7 days   Lab Units 06/05/20  1228   WBC 10*3/mm3 16.60*   HEMOGLOBIN g/dL 13.0   HEMATOCRIT % 39.9   PLATELETS 10*3/mm3 165         Results from last 7 days   Lab Units 06/05/20  1228   MAGNESIUM mg/dL 2.5         I personally viewed and interpreted the  patient's EKG/Telemetry data     Assessment / Plan:     1.  Third-degree AV block  --Presents with severe bradycardia, and the patient was subsequently found to have complete heart block on telemetry  --Suspect likely underlying conduction system disease, however the patient does have mild hyperkalemia and acidosis which could also precipitate AV block  --On chronic amiodarone, however does not appear to be additional AV daniela blocking agents  --Given the patient remains in complete AV block while in the emergency department, will proceed with temporary pacing wire insertion  --Will correct underlying electrolyte abnormalities and acidosis, reevaluate rhythm  --Admit to ICU following temporary wire insertion, plan to transfer to [facility] for EP evaluation as the patient may eventually require permanent pacemaker implantation if no improvement in rhythm after correction of underlying metabolic abnormalities    2.  Lactic acidosis  --Likely secondary to #1 above    3.  Possible aspiration  --Bilateral basilar opacities on chest x-ray  --Consult hospital medicine for further recommendations and management    4.  Hepatic cirrhosis  --Secondary to chronic hepatitis C    5.  Coagulopathy  --Secondary to cirrhosis  --Last INR 1.2, will repeat    6.  Acute kidney injury  --Likely prerenal etiology  --Hospital medicine consult for management    7.  Hyperkalemia  --In the setting of acute kidney injury  --Hospital medicine consult        FABIO Dyer MD  Interventional Cardiology    )06/05/20  1:18 PM

## 2020-06-05 NOTE — PLAN OF CARE
Problem: Patient Care Overview  Goal: Plan of Care Review  6/5/2020 1924 by Taina Kelly, RN  Flowsheets  Taken 6/5/2020 1627 by Anu Lloyd  Progress: no change  Plan of Care Reviewed With: patient  Taken 6/5/2020 1924 by Taina Kelly, RN  Outcome Summary: ADMITTED FROM CATH LAB WITH TRANSVENOUS PACER. TELE CHANGES NOTED AND SETTINGS ADJUSTED PER DR ROOT. NO C/O CHEST DISCOMFORT OR SOA.

## 2020-06-05 NOTE — DISCHARGE SUMMARY
[facility] Cardiology Discharge Summary     Gretta Giles  1960  [id number]    Admission Date: 6/5/2020  Discharge Date: 06/05/20    Primary Discharge Diagnosis:   1.  Complete heart block  2.  Lactic acidosis   3.  Acute kidney injury  4.  Suspected aspiration  5.  Myocardial injury with mild troponin elevation    Secondary Discharge Diagnosis:   1.  Hepatic cirrhosis   2.  Chronic hepatitis C  3.  Seizure disorder  4.  Coagulopathy  5.  COPD  6.  Anxiety/depression    Consults:   Consults     Date and Time Order Name Status Description    6/5/2020 1434 Inpatient Hospitalist Consult Completed             Hospital Course:   Mrs. Gretta Giles is a 59 y.o. female who presented to the [facility] emergency department on 6/5/2020 after being found minimally responsive at her nursing facility. The patient has a past medical history significant for cirrhosis secondary to chronic hepatitis C, type 2 diabetes mellitus, COPD, hypertension, hyperlipidemia, and seizure disorder.  She has a past cardiac history significant for paroxysmal atrial fibrillation for which she is maintained on amiodarone as an outpatient.  Upon initial evaluation at the emergency department, the patient was found to be severely bradycardic and on telemetry the patient was found to be in complete heart block.  On initial labs, she was found to have lactic acidosis with initial lactic acid of 5.6, acute kidney injury with a creatinine of 1.47, mild hyperkalemia, and mild troponin elevation with an initial troponin of 0.102.  Initially, the patient was a DNR/DNI and she was unsure if she wanted to proceed with invasive management of her pleat heart block.  After further discussion, the patient decided she would be agreeable for permanent pacemaker if required, and she was subsequently taken to the Cath Lab for placement of a temporary pacing wire via the right IJ.  The patient tolerated procedure well with no immediate  periprocedural complications.  She was subsequently admitted to the ICU for further management.  The patient was initially set at a pacing rate of 80 bpm to improve cardiac output given severe lactic acidosis and signs of hypoperfusion.  Underlying rhythm upon temporary wire check in the ICU continued to reveal complete heart block.  A chest x-ray was obtained on presentation, which showed mild bilateral opacities with concern for underlying aspiration.  Hospital medicine was consulted, the patient was started on Zosyn.  With temporary pacing and gentle IV fluids, the patient's lactic acidosis resolved, her troponin is trending down, and her renal function is improving.  The case was discussed with Dr. Carreno electrophysiology at [facility], he was agreeable to see the patient in consultation.  The patient will subsequently be transferred to the intensivist service [facility] for further management and EP evaluation.  A preprocedure COVID-19 test was obtained, with results pending at the time of transfer.      Procedures Performed  1.  Insertion of a temporary pacing wire 6/5/2020    Condition on Discharge: Able for transfer    Discharge Disposition  Transfer to Another Facility ([facility] with decision to readmit)    Discharge Medications     Discharge Medications      New Medications      Instructions Start Date   albuterol (2.5 MG/3ML) 0.083% nebulizer solution  Commonly known as:  PROVENTIL  Replaces:  albuterol sulfate  (90 Base) MCG/ACT inhaler   2.5 mg, Nebulization, Every 6 Hours PRN      budesonide-formoterol 160-4.5 MCG/ACT inhaler  Commonly known as:  SYMBICORT   2 puffs, Inhalation, 2 Times Daily - RT      dextrose 40 % gel  Commonly known as:  GLUTOSE   1 tube, Oral, Every 15 Minutes PRN      insulin detemir 100 UNIT/ML injection  Commonly known as:  LEVEMIR   15 Units, Subcutaneous, Every 12 Hours Scheduled      ondansetron 4 MG/2ML injection  Commonly known as:   "ZOFRAN  Replaces:  ondansetron 4 MG tablet   4 mg, Intravenous, Every 6 Hours PRN      piperacillin-tazobactam 3-0.375 GM/50ML IVPB  Commonly known as:  ZOSYN   3.375 g, Intravenous, Every 8 Hours         Changes to Medications      Instructions Start Date   famotidine 20 MG tablet  Commonly known as:  Pepcid  What changed:  Another medication with the same name was added. Make sure you understand how and when to take each.   20 mg, Oral, 2 Times Daily      famotidine 10 MG/ML solution injection  Commonly known as:  PEPCID  What changed:  You were already taking a medication with the same name, and this prescription was added. Make sure you understand how and when to take each.   20 mg, Intravenous, Daily   Start Date:  June 6, 2020     insulin aspart 100 UNIT/ML injection  Commonly known as:  novoLOG  What changed:    · how much to take  · Another medication with the same name was removed. Continue taking this medication, and follow the directions you see here.   0-7 Units, Subcutaneous, 3 Times Daily Before Meals   Start Date:  June 6, 2020        Continue These Medications      Instructions Start Date   ASPIRIN LOW DOSE 81 MG EC tablet  Generic drug:  aspirin   1 tablet, Oral, Daily      ferrous sulfate 325 (65 FE) MG tablet   325 mg, Oral, 3 Times Daily With Meals      glucose monitor monitoring kit   1 each, Does not apply, As Needed      Pen Needles 5/16\" 31G X 8 MM misc   1 each, Does not apply, 2 Times Daily         Stop These Medications    albuterol sulfate  (90 Base) MCG/ACT inhaler  Commonly known as:  PROVENTIL HFA;VENTOLIN HFA;PROAIR HFA  Replaced by:  albuterol (2.5 MG/3ML) 0.083% nebulizer solution     amiodarone 200 MG tablet  Commonly known as:  PACERONE     azithromycin 250 MG tablet  Commonly known as:  ZITHROMAX     bisacodyl 5 MG EC tablet  Commonly known as:  DULCOLAX     busPIRone 15 MG tablet  Commonly known as:  BUSPAR     Calmoseptine 0.44-20.6 % ointment  Generic drug:  " Menthol-Zinc Oxide     chlorhexidine 0.12 % solution  Commonly known as:  PERIDEX     citalopram 10 MG tablet  Commonly known as:  CeleXA     dicyclomine 10 MG capsule  Commonly known as:  BENTYL     Engerix-B 20 MCG/ML injection  Generic drug:  Hepatitis B Vac Recombinant     fluticasone 44 MCG/ACT inhaler  Commonly known as:  FLOVENT HFA     fluticasone 50 MCG/BLIST diskus inhaler  Commonly known as:  FLOVENT DISKUS     furosemide 40 MG tablet  Commonly known as:  LASIX     gabapentin 600 MG tablet  Commonly known as:  NEURONTIN     glucose blood test strip     Insulin Glargine 100 UNIT/ML injection pen  Commonly known as:  BASAGLAR KWIKPEN     lactulose 10 GM/15ML solution  Commonly known as:  CHRONULAC     Lantus 100 UNIT/ML injection  Generic drug:  insulin glargine     lidocaine 5 %  Commonly known as:  LIDODERM     melatonin 5 MG tablet tablet     MULTI-VITAMIN DAILY PO     ondansetron 4 MG tablet  Commonly known as:  ZOFRAN  Replaced by:  ondansetron 4 MG/2ML injection     oxyCODONE 10 MG tablet  Commonly known as:  ROXICODONE     propranolol 10 MG tablet  Commonly known as:  INDERAL     Rocephin 1 g injection  Generic drug:  cefTRIAXone     Sofosbuvir-Velpatasvir 400-100 MG tablet     spironolactone 100 MG tablet  Commonly known as:  ALDACTONE            Discharge Diet: NPO given failed bedside swallow screen prior to transfer    Activity at Discharge: Per accepting facility    Follow-up Appointments  Future Appointments   Date Time Provider Department Center   6/10/2020  8:00 AM Montana Haas MD [facility] None   6/16/2020 11:15 AM Amna Woo MD [facility] None         Test Results Pending at Discharge   Order Current Status    Blood Culture With HARRIET - Blood, Hand, Left In process    Blood Culture With HARRIET - Blood, Hand, Right In process    COVID-19,[facility] IN-HOUSE, NP SWAB IN TRANSPORT MEDIA 8-12 HR TAT - Swab, Nasopharynx In process           B.Nimesh Dyer MD  Interventional  Cardiology   06/05/20  19:24    Time: Discharge 45 min

## 2020-06-05 NOTE — CONSULTS
[facility] HOSPITALIST   CONSULTATION      Name:  Gretta Gilse   Age:  59 y.o.  Sex:  female  :  1960  MRN:  [id number]   Visit Number:  [id number]  Admission Date:  2020  Date Of Service:  20  Primary Care Physician:  Nighat Kincaid APRN    Consulting Physician:    Gloria Fisher DO    Referring Physician:    Dr. Dyer    Reason For Consult:    Medical management    Chief Complaint:     Confusion bradycardia    History Of Presenting Illness:      Ms. Giles is a 59-year-old female who presented from outlying [facility] facility due to complaints of unresponsiveness and altered mental status.  Medical history of chronic hepatitis C with cirrhosis on treatment, type 2 diabetes, COPD, hypertension, hyperlipidemia, seizure disorder.  Cardiac history of paroxysmal atrial fibrillation.  She had apparently been treated for a pneumonia over the last day or 2 at the nursing facility with concern for aspiration.  This morning, she was found to be severely bradycardic and unresponsive and was transferred to the emergency department for evaluation.  An EKG was consistent with complete heart block.  She was resuscitated initially with an IV fluid bolus in addition to started on dopamine after atropine.  Cardiology was consulted and patient was in agreement with a temporary pacemaker, of note she is a DNR/DNI per records.  She had elevated lactic acid of 5.6, and acute kidney injury, hyperkalemia, chest x-ray findings concerning for pneumonia and pulmonary edema.  She underwent temporary pacemaker placement in the Cath Lab and was transferred to the ICU.  Hospital service was consulted for medical management.     In the ER, above complete heart block noted.  Heart rate in the 20s.  Pressures in the 40s.  She was satting in the upper 90s on nasal cannula.  White blood cell count of 16,000, platelets 165, hemoglobin 13.  Magnesium 2.5.  Procalcitonin 0.1.  proBNP 814.  Troponin 0 0.102.  CMP  with creatinine 1.47, sodium 131, potassium 5.4, CO2 of 13.7, alk phos 209.  ABG with pH 7.35, PCO2 70.8, PO2 of 194 on 2 L nasal cannula.  Influenza swab negative.  Covid-19 testing pending.  Lactic acid repeat pending.  Chest x-ray with diminished lung volumes with interstitial prominence could be related to vascular congestion, with left greater than right basilar opacities may represent atelectasis or developing infiltrate or pneumonia.  Patient is currently in the ICU.    Review Of Systems:     Complete review of systems unobtainable secondary to patient's confused status.  Past Medical History:    Past Medical History:   Diagnosis Date   • Acute renal failure (ARF) (CMS/HCC)    • Atrial fibrillation (CMS/HCC)    • CHF (congestive heart failure) (CMS/HCC)    • Chronic hyponatremia    • COPD (chronic obstructive pulmonary disease) (CMS/HCC)    • Diabetes mellitus (CMS/HCC)    • Esophageal varices (CMS/HCC)    • Hepatitis C    • History of abdominal paracentesis    • History of transfusion    • Hyperlipidemia    • Hypertension    • Liver cirrhosis (CMS/HCC)     medical record   • Seizure disorder (CMS/HCC)     grand mal   • Sepsis (CMS/HCC)        Past Surgical history:    Past Surgical History:   Procedure Laterality Date   • ANKLE SURGERY     • CHOLECYSTECTOMY      about 20 years ago per patient   • GALLBLADDER SURGERY     • HYSTERECTOMY     • HYSTERECTOMY      about 20 years ago   • KNEE SURGERY     • REPLACEMENT TOTAL KNEE         Social History:    Social History     Socioeconomic History   • Marital status:      Spouse name: Not on file   • Number of children: Not on file   • Years of education: Not on file   • Highest education level: Not on file   Tobacco Use   • Smoking status: Current Every Day Smoker   • Smokeless tobacco: Never Used   Substance and Sexual Activity   • Alcohol use: Yes     Frequency: Never     Comment: lastr drink 1 month ago   • Drug use: Never   • Sexual activity: Defer        Family History:    Family History   Problem Relation Age of Onset   • Diabetes Father    • Cancer Father         malignant neoplasm of brain   • Cancer Sister         bone   • Heart disease Sister        Allergies:      Codeine and Morphine    Home Medications:    Prior to Admission Medications     Prescriptions Last Dose Informant Patient Reported? Taking?    furosemide (LASIX) 40 MG tablet 6/4/2020  Yes Yes    Take 40 mg by mouth 2 (Two) Times a Day.    albuterol sulfate  (90 Base) MCG/ACT inhaler Unknown  Yes No    Inhale 1 puff Every 4 (Four) Hours As Needed for Wheezing.    amiodarone (PACERONE) 200 MG tablet   No No    Take 1 tablet by mouth Daily.    aspirin (ASPIRIN LOW DOSE) 81 MG EC tablet  Pharmacy Yes No    Take 1 tablet by mouth daily. For health maintenance    bisacodyl (DULCOLAX) 5 MG EC tablet Unknown  No No    Take 2 tablets by mouth 2 (Two) Times a Week. Increase to three times weekly depend on bowel movement.    busPIRone (BUSPAR) 15 MG tablet  Pharmacy Yes No    Take 15 mg by mouth 2 (Two) Times a Day.    cefTRIAXone (Rocephin) 1 g injection Unknown  Yes No    Inject 1 g into the appropriate muscle as directed by prescriber Daily.    chlorhexidine (PERIDEX) 0.12 % solution Unknown  Yes No    Apply 30 mL to the mouth or throat 2 (Two) Times a Day.    citalopram (CeleXA) 10 MG tablet Unknown  Yes No    Take 10 mg by mouth Daily.    dicyclomine (BENTYL) 10 MG capsule Unknown  No No    Take 1 capsule by mouth Daily.    famotidine (Pepcid) 20 MG tablet Unknown  No No    Take 1 tablet by mouth 2 (Two) Times a Day.    ferrous sulfate 325 (65 FE) MG tablet Unknown  No No    Take 1 tablet by mouth 3 (Three) Times a Day With Meals.    fluticasone (FLOVENT DISKUS) 50 MCG/BLIST diskus inhaler Unknown  Yes No    Inhale 1 puff 2 (Two) Times a Day.    gabapentin (NEURONTIN) 600 MG tablet Unknown  No No    Take 1 tablet by mouth 3 (Three) Times a Day.    glucose blood test strip Unknown  No No     "Use as instructed    glucose monitor monitoring kit Unknown  No No    1 each As Needed (Diabetes mellitus).    Hepatitis B Vac Recombinant (Engerix-B) 20 MCG/ML injection Unknown  Yes No    Inject 1 mL into the appropriate muscle as directed by prescriber Every Night.    insulin aspart (novoLOG) 100 UNIT/ML injection Unknown  Yes No    Inject  under the skin into the appropriate area as directed 3 (Three) Times a Day Before Meals.    Insulin Glargine (BASAGLAR KWIKPEN) 100 UNIT/ML injection pen Unknown  Yes No    Inject 16 Units under the skin into the appropriate area as directed 2 (Two) Times a Day.    Insulin Pen Needle (PEN NEEDLES 5/16\") 31G X 8 MM misc Unknown  No No    1 each 2 (Two) Times a Day.    lactulose (CHRONULAC) 10 GM/15ML solution Unknown  No No    Take 15 mL by mouth 2 (Two) Times a Day.    Patient taking differently:  Take 22.5 mL by mouth 3 (Three) Times a Day.    lidocaine (LIDODERM) 5 % Unknown  Yes No    Place 1 patch on the skin as directed by provider Daily. Remove & Discard patch within 12 hours or as directed by MD    melatonin 5 MG tablet tablet Unknown  Yes No    Take 5 mg by mouth Every Night.    Menthol-Zinc Oxide (CALMOSEPTINE) 0.44-20.6 % ointment   Yes No    Apply  topically to the appropriate area as directed As Needed.    Multiple Vitamin (MULTI-VITAMIN DAILY PO)   Yes No    Take  by mouth.    ondansetron (ZOFRAN) 4 MG tablet Unknown Pharmacy Yes No    Take 4 mg by mouth Every 8 (Eight) Hours As Needed for Nausea or Vomiting.    oxyCODONE (ROXICODONE) 10 MG tablet Unknown  No No    Take 1 tablet by mouth 4 (Four) Times a Day.    propranolol (INDERAL) 10 MG tablet Unknown  Yes No    Take 10 mg by mouth 2 (Two) Times a Day.    Sofosbuvir-Velpatasvir 400-100 MG tablet Unknown  No No    Take 1 tablet by mouth Daily for 168 days.    spironolactone (ALDACTONE) 100 MG tablet Unknown Pharmacy Yes No    Take 100 mg by mouth Daily.             Hospital Scheduled Meds:      aspirin 81 mg " Oral Daily   budesonide-formoterol 2 puff Inhalation BID - RT   [START ON 6/6/2020] citalopram 10 mg Oral Daily   insulin aspart 0-7 Units Subcutaneous TID AC   insulin detemir 15 Units Subcutaneous Q12H   lactulose enema  Rectal Once   lactulose 10 g Oral BID   [START ON 6/6/2020] multivitamin with minerals 1 tablet Oral Daily   piperacillin-tazobactam 3.375 g Intravenous Once   piperacillin-tazobactam 3.375 g Intravenous Q8H   sodium chloride 10 mL Intravenous Q12H         Pharmacy to Dose Zosyn         Vital Signs:    Temp:  [97.5 °F (36.4 °C)-97.7 °F (36.5 °C)] 97.7 °F (36.5 °C)  Heart Rate:  [23-91] 84  Resp:  [20-32] 20  BP: ()/() 128/101        06/05/20  1204 06/05/20  1429   Weight: 52.2 kg (115 lb) 54.1 kg (119 lb 4.8 oz)       Body mass index is 21.13 kg/m².    Physical Exam:    General Appearance:   Arousable but confused, not in any acute distress.   Head:  Atraumatic and normocephalic, without obvious abnormality.   Eyes:          PERRLA, conjunctivae and sclerae normal, no Icterus. No pallor. Extraocular movements are within normal limits.   Ears:  Ears appear intact with no abnormalities noted.   Throat: No oral lesions, no thrush, oral mucosa moist.   Neck: Supple, trachea midline, no thyromegaly, no carotid bruit.   Back:   No kyphoscoliosis present. No tenderness to palpation,   range of motion normal.   Lungs:    Coarse breath sounds bilaterally, rhonchi present   Heart:  Normal S1 and S2, no murmur, no gallop, no rub. No JVD.  Right IJ with external pacemaker noted.   Abdomen:   Normal bowel sounds, no masses, no organomegaly. Soft, distended, mildly tender, no guarding, no rebound tenderness.   Extremities: Moves all extremities well, trace edema, no cyanosis, no clubbing.   Pulses: Pulses palpable and equal bilaterally.   Skin: No bleeding, bruising or rash.   Neurologic:  Arousable, confused, incomprehensible speech.  No asterixis.  No jaundice.  Does move extremities.      Laboratory data:    Results from last 7 days   Lab Units 06/05/20  1228   SODIUM mmol/L 131*   POTASSIUM mmol/L 5.4*   CHLORIDE mmol/L 101   CO2 mmol/L 13.7*   BUN mg/dL 19   CREATININE mg/dL 1.47*   CALCIUM mg/dL 8.2*   BILIRUBIN mg/dL 0.6   ALK PHOS U/L 209*   ALT (SGPT) U/L 13   AST (SGOT) U/L 27   GLUCOSE mg/dL 269*     Results from last 7 days   Lab Units 06/05/20  1228   WBC 10*3/mm3 16.60*   HEMOGLOBIN g/dL 13.0   HEMATOCRIT % 39.9   PLATELETS 10*3/mm3 165         Results from last 7 days   Lab Units 06/05/20  1228   TROPONIN T ng/mL 0.102*     Results from last 7 days   Lab Units 06/05/20  1228   PROBNP pg/mL 814.8             Results from last 7 days   Lab Units 06/05/20  1238   PH, ARTERIAL pH units 7.385   PO2 ART mm Hg 194.0*   PCO2, ARTERIAL mm Hg 17.8*   HCO3 ART mmol/L 10.6*           Invalid input(s): USDES,  BLOODU, NITRITITE, BACT, EP  Pain Management Panel     There is no flowsheet data to display.              EKG:      Complete heart block    Radiology:    Imaging Results (Last 72 Hours)     Procedure Component Value Units Date/Time    XR Chest 1 View [[id number]] Collected:  06/05/20 1251     Updated:  06/05/20 1254    Narrative:       Portable chest     INDICATION: Respiratory distress.     FINDINGS: Single frontal portable chest. No previous. EKG leads overlie  the chest. Cutaneous pacer lead also overlies the left chest. Patient is  rotated to the right. No pneumothorax. There is presumed ingested  medication material in the stomach. Cardiac silhouette is borderline.  Lung volumes are diminished. No pneumothorax. Interstitial prominence is  present bilaterally. Patchy opacities left greater than the right lung  base.       Impression:       1. Diminished lung volume with interstitial prominence which could be  related to vascular congestion and mild edema.  2. Left greater than the right basilar opacities which may represent  atelectasis or developing infiltrates for pneumonia. Continued  followup  recommended.     This report was finalized on 6/5/2020 12:52 PM by Benito Mckeon MD.          Assessment:     1.  Complete heart block, acute, present admission, status post external pacemaker, right IJ, placement  2.  Lactic acidosis, present on admission  3.  Elevated troponin, likely related to severe bradycardia, present on admission  4.  Acute renal failure, baseline of 1, 1.47 currently, present on admission  5.  Hyperkalemia, present on admission  6.  Hyponatremia, present on admission  7.  Insulin-dependent diabetes mellitus, present on admission  8.  Possible aspiration pneumonia, present on admission  9.  Hepatitis C, chronic, with cirrhosis, on treatment with gastroenterology  10.  COPD  11.  Anxiety  12.  Chronic pain  13.  GERD  14.  Diabetic peripheral neuropathy  15.  Paroxysmal atrial fibrillation  16.  History of seizure disorder  17.  History of esophageal varices     Recommendations/Plan:     Primary services cardiology.  Wean dopamine.  We were asked to see for medical management of other conditions.  Have repeat labs pending to evaluate potassium and renal function.  She had received 2 L bolus in the emergency room.  Judicious use of IV fluids in setting of patient of known cirrhosis and heart failure with mild abdominal distention.  Based off history of choking on eggs at the nursing home, suspect possible aspiration pneumonia and will be treated with Zosyn for now.  Will also cover for SBP prophylaxis.  Cultures have been obtained.  Will trend lactic acid.  Order ammonia.  Patient will likely need a speech evaluation.  Continue with rectal lactulose for now.  Monitor mental status closely.  We will continue nebulizer treatments and bronchodilators, oxygen therapy as necessary.  Monitor urine output closely.     Patient's apparent plan is to be transferred to [facility] for permanent pacemaker.  Continue treatments as above.  Please contact hospitalist service with any  questions or concerns.  We will continue to follow-up patient is here.    Gloria Fisher DO  06/05/20  15:49

## 2020-06-05 NOTE — H&P
Intensive Care Admission Note     Chief Complaint:  Complete heart block (CMS/HCC)    History of Present Illness       Mrs. Gretta Giles is a 59 y.o. female who presented to the [facility] emergency department on 6/5/2020 after being found minimally responsive at her nursing facility. The patient has a past medical history significant for cirrhosis secondary to chronic hepatitis C (on therapy w/ Epclusa, started 5/19), type 2 diabetes mellitus, COPD, hypertension, hyperlipidemia, and seizure disorder.  She has a past cardiac history significant for paroxysmal atrial fibrillation for which she is maintained on amiodarone as an outpatient.  Upon initial evaluation at the emergency department, the patient was found to be severely bradycardic and an ECG revealed complete heart block.  On initial labs, she was found to have lactic acidosis with initial lactic acid of 5.6, acute kidney injury with a creatinine of 1.47, mild hyperkalemia, and mild troponin elevation with an initial troponin of 0.102.  The patient was taken to the Cath Lab for placement of a temporary pacing wire via the right IJ.  The patient tolerated procedure well with no immediate periprocedural complications.  She was subsequently admitted to the ICU for further management.  A chest x-ray was obtained on presentation, which showed mild bilateral opacities with concern for underlying aspiration & pulmonary edema.  The patient was started on Zosyn.  Dr. Wai de la cruz/ Electrophysiology was consulted who agreed to see the patient for evaluation and possible PPM placement.     Lactic acid, K+, troponin, and Cr have been improving (1.1, 5.2, 0.087, 1.24 respectively).  CoVid-19 testing pending.     Problem List, Surgical History, Family, Social History, and ROS     Patient Active Problem List    Diagnosis   • *Complete heart block (CMS/HCC) [I44.2]   • Lactic acidosis [E87.2]   • Acute renal failure (ARF) (CMS/HCC) [N17.9]   • Chronic obstructive  pulmonary disease (CMS/HCC) [J44.9]   • Diabetes mellitus (CMS/HCC) [E11.9]   • Atrial fibrillation (CMS/HCC) [I48.91]   • Chronic hepatitis C virus infection with cirrhosis (CMS/HCC) [B18.2, K74.60]   • Seizure disorder (CMS/HCC) [G40.909]   • Other ascites [R18.8]   • Secondary esophageal varices without bleeding (CMS/HCC) [I85.10]   • Other cirrhosis of liver (CMS/HCC) [K74.69]   • Debility [R53.81]   • Gastroesophageal reflux disease [K21.9]   • Atopic rhinitis [J30.9]   • Chronic low back pain [M54.5, G89.29]   • Chronic obstructive pulmonary disease with acute exacerbation (CMS/HCC) [J44.1]   • Diabetic peripheral neuropathy (CMS/HCC) [E11.42]   • Dyslipidemia [E78.5]   • Nonalcoholic fatty liver disease [K76.0]   • Gastrointestinal ulcer due to Helicobacter pylori [K28.9, B96.81]   • Hypertension [I10]   • Menopausal symptom [N95.1]   • Mixed anxiety depressive disorder [F41.8]   • Seasonal allergic rhinitis [J30.2]   • Tobacco dependence syndrome [F17.200]   • Vitamin D deficiency [E55.9]   • Hyperlipidemia [E78.5]   • Anxiety and depression [F41.9, F32.9]   • Menopause syndrome [N95.1]   • Allergic rhinitis [J30.9]   • Type 2 diabetes mellitus, controlled (CMS/HCC) [E11.9]   • GERD without esophagitis [K21.9]   • Nausea [R11.0]     Past Surgical History:   Procedure Laterality Date   • ANKLE SURGERY     • CHOLECYSTECTOMY      about 20 years ago per patient   • GALLBLADDER SURGERY     • HYSTERECTOMY     • HYSTERECTOMY      about 20 years ago   • KNEE SURGERY     • REPLACEMENT TOTAL KNEE         Allergies   Allergen Reactions   • Codeine Itching   • Morphine Itching     Current Facility-Administered Medications on File Prior to Encounter   Medication   • albuterol (PROVENTIL) nebulizer solution 0.083% 2.5 mg/3mL   • [COMPLETED] atropine 0.4 MG/ML injection  - ADS Override Pull   • budesonide-formoterol (SYMBICORT) 160-4.5 MCG/ACT inhaler 2 puff   • [COMPLETED] calcium gluconate 10 % injection  - ADS Override  Pull   • dextrose (D50W) 25 g/ 50mL Intravenous Solution 25 g   • dextrose (GLUTOSE) oral gel 1 tube   • famotidine (PEPCID) injection 20 mg   • glucagon (human recombinant) (GLUCAGEN DIAGNOSTIC) injection 1 mg   • insulin aspart (novoLOG) injection 0-7 Units   • insulin detemir (LEVEMIR) injection 15 Units   • [COMPLETED] lactulose enema   • lactulose solution 10 g   • Morphine sulfate (PF) injection 1 mg   • ondansetron (ZOFRAN) injection 4 mg   • Pharmacy to Dose Zosyn   • [COMPLETED] piperacillin-tazobactam (ZOSYN) 3.375 g in iso-osmotic dextrose 50 ml (premix)   • piperacillin-tazobactam (ZOSYN) 3.375 g in iso-osmotic dextrose 50 ml (premix)   • [COMPLETED] sodium chloride 0.9 % bolus 2,000 mL   • sodium chloride 0.9 % flush 10 mL   • sodium chloride 0.9 % flush 10 mL   • [DISCONTINUED] aspirin EC tablet 81 mg   • [DISCONTINUED] citalopram (CeleXA) tablet 10 mg   • [DISCONTINUED] DOPamine 400 mg in 250 mL D5W (1.6 mg/mL) infusion   • [DISCONTINUED] insulin aspart (novoLOG) injection 0-9 Units   • [DISCONTINUED] lidocaine (XYLOCAINE) 1 % injection   • [DISCONTINUED] multivitamin with minerals 1 tablet   • [DISCONTINUED] piperacillin-tazobactam (ZOSYN) 3.375 g in iso-osmotic dextrose 50 ml (premix)   • [DISCONTINUED] vancomycin 1000 mg in sodium chloride 0.9% 250 mL IVPB     Current Outpatient Medications on File Prior to Encounter   Medication Sig   • albuterol (PROVENTIL) (2.5 MG/3ML) 0.083% nebulizer solution Take 2.5 mg by nebulization Every 6 (Six) Hours As Needed for Shortness of Air.   • albuterol sulfate  (90 Base) MCG/ACT inhaler Inhale 1 puff Every 4 (Four) Hours As Needed for Wheezing.   • amiodarone (PACERONE) 200 MG tablet Take 1 tablet by mouth Daily.   • aspirin (ASPIRIN LOW DOSE) 81 MG EC tablet Take 1 tablet by mouth Daily.   • azithromycin (ZITHROMAX) 250 MG tablet 1 tablet at bedtime for 4 doses   • bisacodyl (DULCOLAX) 5 MG EC tablet Take 2 tablets by mouth 2 (Two) Times a Week.  Increase to three times weekly depend on bowel movement. (Patient taking differently: Take 2 tablets by mouth every 2 days)   • budesonide-formoterol (SYMBICORT) 160-4.5 MCG/ACT inhaler Inhale 2 puffs 2 (Two) Times a Day.   • busPIRone (BUSPAR) 15 MG tablet Take 15 mg by mouth 2 (Two) Times a Day.   • cefTRIAXone (Rocephin) 1 g injection Inject 1 g into the appropriate muscle as directed by prescriber Daily.   • chlorhexidine (PERIDEX) 0.12 % solution Apply 30 mL to the mouth or throat 2 (Two) Times a Day. Swish and spit 30mL prior to oral care   • citalopram (CeleXA) 10 MG tablet Take 10 mg by mouth Daily.   • dextrose (GLUTOSE) 40 % gel Take 1 tube by mouth Every 15 (Fifteen) Minutes As Needed for Low Blood Sugar (Blood Glucose Less Than 70, Patient Alert, Is not NPO, Can Safely Swallow).   • dicyclomine (BENTYL) 10 MG capsule Take 1 capsule by mouth Daily. (Patient not taking: Reported on 6/5/2020)   • [START ON 6/6/2020] famotidine (PEPCID) 10 MG/ML solution injection Infuse 2 mL into a venous catheter Daily.   • famotidine (Pepcid) 20 MG tablet Take 1 tablet by mouth 2 (Two) Times a Day.   • ferrous sulfate 325 (65 FE) MG tablet Take 1 tablet by mouth 3 (Three) Times a Day With Meals.   • fluticasone (FLOVENT DISKUS) 50 MCG/BLIST diskus inhaler Inhale 1 puff 2 (Two) Times a Day.   • fluticasone (FLOVENT HFA) 44 MCG/ACT inhaler Inhale 2 puffs 2 (Two) Times a Day. Rinse mouth after each use   • furosemide (LASIX) 40 MG tablet Take 40 mg by mouth 2 (Two) Times a Day.   • gabapentin (NEURONTIN) 600 MG tablet Take 1 tablet by mouth 3 (Three) Times a Day.   • glucose blood test strip Use as instructed   • glucose monitor monitoring kit 1 each As Needed (Diabetes mellitus).   • Hepatitis B Vac Recombinant (Engerix-B) 20 MCG/ML injection Inject 1 mL into the appropriate muscle as directed by prescriber Every Night.   • insulin aspart (NovoLOG FlexPen) 100 UNIT/ML solution pen-injector sc pen Sliding scale before meals   "  • insulin aspart (novoLOG) 100 UNIT/ML injection Inject  under the skin into the appropriate area as directed 3 (Three) Times a Day Before Meals.   • [START ON 6/6/2020] insulin aspart (novoLOG) 100 UNIT/ML injection Inject 0-7 Units under the skin into the appropriate area as directed 3 (Three) Times a Day Before Meals.   • insulin detemir (LEVEMIR) 100 UNIT/ML injection Inject 15 Units under the skin into the appropriate area as directed Every 12 (Twelve) Hours.   • Insulin Glargine (BASAGLAR KWIKPEN) 100 UNIT/ML injection pen Inject 16 Units under the skin into the appropriate area as directed 2 (Two) Times a Day.   • insulin glargine (Lantus) 100 UNIT/ML injection Inject 16 Units under the skin into the appropriate area as directed 2 (Two) Times a Day.   • Insulin Pen Needle (PEN NEEDLES 5/16\") 31G X 8 MM misc 1 each 2 (Two) Times a Day.   • lactulose (CHRONULAC) 10 GM/15ML solution Take 15 mL by mouth 2 (Two) Times a Day. (Patient taking differently: Take 22.5 mL by mouth 3 (Three) Times a Day.)   • lidocaine (LIDODERM) 5 % Place 1 patch on the skin as directed by provider Daily. Remove & Discard patch within 12 hours or as directed by MD   • melatonin 5 MG tablet tablet Take 5 mg by mouth Every Night.   • Menthol-Zinc Oxide (CALMOSEPTINE) 0.44-20.6 % ointment Apply  topically to the appropriate area as directed As Needed.   • Multiple Vitamin (MULTI-VITAMIN DAILY PO) Take  by mouth.   • ondansetron (ZOFRAN) 4 MG tablet Take 4 mg by mouth Every 8 (Eight) Hours As Needed for Nausea or Vomiting.   • ondansetron (ZOFRAN) 4 MG/2ML injection Infuse 2 mL into a venous catheter Every 6 (Six) Hours As Needed for Nausea or Vomiting.   • oxyCODONE (ROXICODONE) 10 MG tablet Take 1 tablet by mouth 4 (Four) Times a Day.   • piperacillin-tazobactam (ZOSYN) 3-0.375 GM/50ML IVPB Infuse 50 mL into a venous catheter Every 8 (Eight) Hours for 15 doses. Indications: Pneumonia   • propranolol (INDERAL) 10 MG tablet Take 10 mg by " mouth 2 (Two) Times a Day.   • Sofosbuvir-Velpatasvir 400-100 MG tablet Take 1 tablet by mouth Daily for 168 days.   • spironolactone (ALDACTONE) 100 MG tablet Take 100 mg by mouth Daily.   • [DISCONTINUED] sodium chloride 1 g tablet Take 1 g by mouth Daily.     MEDICATION LIST AND ALLERGIES REVIEWED.    Family History   Problem Relation Age of Onset   • Diabetes Father    • Cancer Father         malignant neoplasm of brain   • Cancer Sister         bone   • Heart disease Sister      Social History     Tobacco Use   • Smoking status: Current Every Day Smoker   • Smokeless tobacco: Never Used   Substance Use Topics   • Alcohol use: Yes     Frequency: Never     Comment: lastr drink 1 month ago   • Drug use: Never     Social History     Social History Narrative   • Not on file     FAMILY AND SOCIAL HISTORY REVIEWED.    Review of Systems   Constitutional: Positive for fatigue. Negative for activity change, appetite change, chills and fever.   HENT: Negative for congestion, sore throat and voice change.    Eyes: Negative for photophobia and visual disturbance.   Respiratory: Positive for choking and shortness of breath. Negative for cough and wheezing.    Cardiovascular: Positive for palpitations. Negative for chest pain and leg swelling.   Gastrointestinal: Negative for abdominal distention and abdominal pain.   Genitourinary: Negative for difficulty urinating and flank pain.   Musculoskeletal: Negative for myalgias and neck stiffness.   Skin: Negative for color change and rash.   Neurological: Negative for dizziness, seizures and headaches.   Hematological: Negative for adenopathy.   Psychiatric/Behavioral: Negative for agitation, hallucinations and sleep disturbance.     ALL OTHER SYSTEMS REVIEWED AND ARE NEGATIVE.    Physical Exam and Clinical Information   There were no vitals taken for this visit.  Physical Exam   Constitutional: She is oriented to person, place, and time. She appears well-developed and  well-nourished.   HENT:   Head: Normocephalic and atraumatic.   Mouth/Throat: Oropharynx is clear and moist.   Eyes: Pupils are equal, round, and reactive to light. Conjunctivae and EOM are normal.   Neck: Normal range of motion. Neck supple.   Cardiovascular: Normal rate, regular rhythm and normal heart sounds.   Pulmonary/Chest: Effort normal. No respiratory distress. She has no wheezes. She has rales.   Abdominal: Soft. Bowel sounds are normal.   Musculoskeletal: Normal range of motion. She exhibits edema.   Neurological: She is alert and oriented to person, place, and time.   Skin: Skin is warm and dry. Capillary refill takes less than 2 seconds.   Psychiatric: She has a normal mood and affect. Thought content normal.   Nursing note and vitals reviewed.      Results from last 7 days   Lab Units 06/05/20  1228   WBC 10*3/mm3 16.60*   HEMOGLOBIN g/dL 13.0   PLATELETS 10*3/mm3 165     Results from last 7 days   Lab Units 06/05/20  1552 06/05/20  1228   SODIUM mmol/L 133* 131*   POTASSIUM mmol/L 5.2 5.4*   CO2 mmol/L 19.7* 13.7*   BUN mg/dL 18 19   CREATININE mg/dL 1.24* 1.47*   MAGNESIUM mg/dL  --  2.5   GLUCOSE mg/dL 168* 269*     Estimated Creatinine Clearance: 42.8 mL/min (A) (by C-G formula based on SCr of 1.24 mg/dL (H)).      Results from last 7 days   Lab Units 06/05/20  1238   PH, ARTERIAL pH units 7.385   PCO2, ARTERIAL mm Hg 17.8*   PO2 ART mm Hg 194.0*     Lab Results   Component Value Date    LACTATE 1.1 06/05/2020        Images:     I reviewed the patient's results/ images and I agree with the reports.     Impression     Complete heart block (CMS/HCC)    Chronic obstructive pulmonary disease (CMS/HCC)    Diabetes mellitus (CMS/HCC)    Lactic acidosis    Acute renal failure (ARF) (CMS/HCC)    Chronic hepatitis C virus infection with cirrhosis (CMS/HCC)    Atrial fibrillation (CMS/HCC)    Hyperlipidemia    Anxiety and depression    GERD without esophagitis    Secondary esophageal varices without bleeding  (CMS/HCC)    Seizure disorder (CMS/HCC)      Plan/Recommendations     59-year-old female with alcoholic cirrhosis, diabetes mellitus, lipidemia, hepatitis C.  Who presents to [facility] on 6/6/2020 after she was found to be in complete heart block about his health regimen.  Underlying rhythm on arrival to the ICU was sinus tach at 70 bpm, pacer rate was set to 60 bpm down from 80bpm the patient's inherent conduction.  Patient was hemodynamically stable with this.    - ICU  - Consult Dr. Carreno in am  - Obtain paracentesis today looking for possible SBP  - continue temporary pacer, decrease rate to 60 bpm  - Lactulose for cirrhosis  - monitor K+ & renal function  - continue Zosyn, obtain sputum Cx  - NPO, Swallow eval in am  - BD  - SSI  - DNR/DNI in event of arrest    Critical Care time spent in direct patient care: 35 minutes (excluding procedure time, if applicable) including high complexity decision making to assess, manipulate, and support vital organ system failure in this individual who has impairment of one or more vital organ systems such that there is a high probability of imminent or life threatening deterioration in the patient's condition.      TARA Samuels DO  Pulmonary and Critical Care Medicine  06/05/20 19:50     CC: Nighat Kincaid APRN

## 2020-06-05 NOTE — H&P
[facility]   HISTORY AND PHYSICAL      Name:  Gretta Giles   Age:  59 y.o.  Sex:  female  :  1960  MRN:  [id number]   Visit Number:  [id number]  Admission Date:  2020  Date Of Service:  20  Primary Care Physician:  Nighat Kincaid APRN    Chief Complaint:     Confusion, bradycardia    History Of Presenting Illness:      Ms. Giles is a 59-year-old female who presented from outlying nursing facility due to complaints of unresponsiveness and altered mental status.  Medical history of chronic hepatitis C with cirrhosis on treatment, type 2 diabetes, COPD, hypertension, hyperlipidemia, seizure disorder.  Cardiac history of paroxysmal atrial fibrillation.  She had apparently been treated for a pneumonia over the last day or 2 at the nursing facility with concern for aspiration.  This morning, she was found to be severely bradycardic and unresponsive and was transferred to the emergency department for evaluation.  An EKG was consistent with complete heart block.  She was resuscitated initially with an IV fluid bolus in addition to started on dopamine after atropine.  Cardiology was consulted and patient was in agreement with a temporary pacemaker, of note she is a DNR/DNI per records.  She had elevated lactic acid of 5.6, and acute kidney injury, hyperkalemia, chest x-ray findings concerning for pneumonia and pulmonary edema.  She underwent temporary pacemaker placement in the Cath Lab and was transferred to the ICU.  Hospital service was consulted for medical management.    In the ER, above complete heart block noted.  Heart rate in the 20s.  Pressures in the 40s.  She was satting in the upper 90s on nasal cannula.  White blood cell count of 16,000, platelets 165, hemoglobin 13.  Magnesium 2.5.  Procalcitonin 0.1.  proBNP 814.  Troponin 0 0.102.  CMP with creatinine 1.47, sodium 131, potassium 5.4, CO2 of 13.7, alk phos 209.  ABG with pH 7.35, PCO2 70.8, PO2 of 194 on 2 L  nasal cannula.  Influenza swab negative.  Covid-19 testing pending.  Lactic acid repeat pending.  Chest x-ray with diminished lung volumes with interstitial prominence could be related to vascular congestion, with left greater than right basilar opacities may represent atelectasis or developing infiltrate or pneumonia.  Patient is currently in the ICU.    Review Of Systems:     A complete review of systems unobtainable secondary to patient's confused status currently.     Past Medical History:    Past Medical History:   Diagnosis Date   • Acute renal failure (ARF) (CMS/HCC)    • Atrial fibrillation (CMS/HCC)    • CHF (congestive heart failure) (CMS/HCC)    • Chronic hyponatremia    • COPD (chronic obstructive pulmonary disease) (CMS/HCC)    • Diabetes mellitus (CMS/HCC)    • Esophageal varices (CMS/HCC)    • Hepatitis C    • History of abdominal paracentesis    • History of transfusion    • Hyperlipidemia    • Hypertension    • Liver cirrhosis (CMS/HCC)     medical record   • Seizure disorder (CMS/HCC)     grand mal   • Sepsis (CMS/HCC)        Past Surgical history:    Past Surgical History:   Procedure Laterality Date   • ANKLE SURGERY     • CHOLECYSTECTOMY      about 20 years ago per patient   • GALLBLADDER SURGERY     • HYSTERECTOMY     • HYSTERECTOMY      about 20 years ago   • KNEE SURGERY     • REPLACEMENT TOTAL KNEE         Social History:    Social History     Socioeconomic History   • Marital status:      Spouse name: Not on file   • Number of children: Not on file   • Years of education: Not on file   • Highest education level: Not on file   Tobacco Use   • Smoking status: Current Every Day Smoker   • Smokeless tobacco: Never Used   Substance and Sexual Activity   • Alcohol use: Yes     Frequency: Never     Comment: lastr drink 1 month ago   • Drug use: Never   • Sexual activity: Defer       Family History:    Family History   Problem Relation Age of Onset   • Diabetes Father    • Cancer Father          malignant neoplasm of brain   • Cancer Sister         bone   • Heart disease Sister        Allergies:      Codeine and Morphine    Home Medications:    Prior to Admission Medications     Prescriptions Last Dose Informant Patient Reported? Taking?    albuterol sulfate  (90 Base) MCG/ACT inhaler   Yes No    Inhale 1 puff Every 4 (Four) Hours As Needed for Wheezing.    amiodarone (PACERONE) 200 MG tablet   No No    Take 1 tablet by mouth Daily.    aspirin (ASPIRIN LOW DOSE) 81 MG EC tablet  Pharmacy Yes No    Take 1 tablet by mouth daily. For health maintenance    bisacodyl (DULCOLAX) 5 MG EC tablet   No No    Take 2 tablets by mouth 2 (Two) Times a Week. Increase to three times weekly depend on bowel movement.    busPIRone (BUSPAR) 15 MG tablet  Pharmacy Yes No    Take 15 mg by mouth 2 (Two) Times a Day.    citalopram (CeleXA) 10 MG tablet   Yes No    Take 10 mg by mouth Daily.    dicyclomine (BENTYL) 10 MG capsule   No No    Take 1 capsule by mouth Daily.    famotidine (Pepcid) 20 MG tablet   No No    Take 1 tablet by mouth 2 (Two) Times a Day.    ferrous sulfate 325 (65 FE) MG tablet   No No    Take 1 tablet by mouth 3 (Three) Times a Day With Meals.    fluticasone (FLOVENT DISKUS) 50 MCG/BLIST diskus inhaler   Yes No    Inhale 1 puff 2 (Two) Times a Day.    furosemide (LASIX) 40 MG tablet   Yes No    Take 40 mg by mouth 2 (Two) Times a Day.    gabapentin (NEURONTIN) 600 MG tablet   No No    Take 1 tablet by mouth 3 (Three) Times a Day.    glucose blood test strip   No No    Use as instructed    glucose monitor monitoring kit   No No    1 each As Needed (Diabetes mellitus).    insulin aspart (novoLOG) 100 UNIT/ML injection   Yes No    Inject  under the skin into the appropriate area as directed 3 (Three) Times a Day Before Meals.    Insulin Glargine (BASAGLAR KWIKPEN) 100 UNIT/ML injection pen   Yes No    Inject 18 Units under the skin into the appropriate area as directed 2 (Two) Times a Day.    Insulin  "Pen Needle (PEN NEEDLES 5/16\") 31G X 8 MM misc   No No    1 each 2 (Two) Times a Day.    lactulose (CHRONULAC) 10 GM/15ML solution   No No    Take 15 mL by mouth 2 (Two) Times a Day.    Menthol-Zinc Oxide (CALMOSEPTINE) 0.44-20.6 % ointment   Yes No    Apply  topically to the appropriate area as directed As Needed.    Multiple Vitamin (MULTI-VITAMIN DAILY PO)   Yes No    Take  by mouth.    ondansetron (ZOFRAN) 4 MG tablet  Pharmacy Yes No    Take 4 mg by mouth Every 8 (Eight) Hours As Needed for Nausea or Vomiting.    oxyCODONE (ROXICODONE) 10 MG tablet   No No    Take 1 tablet by mouth 4 (Four) Times a Day.    sodium chloride 1 g tablet   Yes No    Take 1 g by mouth Daily.    Sofosbuvir-Velpatasvir 400-100 MG tablet   No No    Take 1 tablet by mouth Daily for 168 days.    spironolactone (ALDACTONE) 100 MG tablet  Pharmacy Yes No    Take 100 mg by mouth Daily.             ED Medications:    Medications   sodium chloride 0.9 % flush 10 mL (has no administration in time range)   sodium chloride 0.9 % flush 10 mL (has no administration in time range)   dextrose (GLUTOSE) oral gel 1 tube (has no administration in time range)   dextrose (D50W) 25 g/ 50mL Intravenous Solution 25 g (has no administration in time range)   glucagon (human recombinant) (GLUCAGEN DIAGNOSTIC) injection 1 mg (has no administration in time range)   insulin aspart (novoLOG) injection 0-7 Units (has no administration in time range)   Pharmacy to Dose Zosyn (has no administration in time range)   insulin detemir (LEVEMIR) injection 15 Units (has no administration in time range)   albuterol (PROVENTIL) nebulizer solution 0.083% 2.5 mg/3mL (has no administration in time range)   citalopram (CeleXA) tablet 10 mg (has no administration in time range)   lactulose solution 10 g (has no administration in time range)   multivitamin with minerals 1 tablet (has no administration in time range)   aspirin EC tablet 81 mg (has no administration in time range)   "   piperacillin-tazobactam (ZOSYN) 3.375 g in iso-osmotic dextrose 50 ml (premix) (has no administration in time range)   piperacillin-tazobactam (ZOSYN) 3.375 g in iso-osmotic dextrose 50 ml (premix) (has no administration in time range)   lactulose enema (has no administration in time range)   budesonide-formoterol (SYMBICORT) 160-4.5 MCG/ACT inhaler 2 puff (has no administration in time range)   atropine 0.4 MG/ML injection  - ADS Override Pull (0.4 mg  Given 6/5/20 1209)   calcium gluconate 10 % injection  - ADS Override Pull (1 g  Given 6/5/20 1215)   sodium chloride 0.9 % bolus 2,000 mL (0 mL Intravenous Stopped 6/5/20 1253)       Vital Signs:    Temp:  [97.5 °F (36.4 °C)-97.7 °F (36.5 °C)] 97.7 °F (36.5 °C)  Heart Rate:  [23-91] 84  Resp:  [20-32] 20  BP: ()/(20-89) 132/81        06/05/20  1204 06/05/20  1429   Weight: 52.2 kg (115 lb) 54.1 kg (119 lb 4.8 oz)       Body mass index is 21.13 kg/m².    Physical Exam:    General Appearance:   Arousable, however confused, not in any acute distress.   Head:  Atraumatic and normocephalic, without obvious abnormality.   Eyes:          PERRLA, conjunctivae and sclerae normal, no Icterus. No pallor. Extraocular movements are within normal limits.   Ears:  Ears appear intact with no abnormalities noted.   Throat: No oral lesions, no thrush, oral mucosa moist.   Neck: Supple, trachea midline, no thyromegaly, no carotid bruit.   Back:   No tenderness to palpation, range of motion normal.   Lungs:    Coarse breath sounds bilaterally, rhonchi present.   Heart:  Normal S1 and S2, no murmur, no gallop, no rub. No JVD.  External pacemaker noted.   Abdomen:   Normal bowel sounds, no masses, no organomegaly. Soft, tended, tender, no guarding, no rebound tenderness.   Extremities: Moves all extremities well, no edema, no cyanosis, no clubbing.   Pulses: Pulses palpable and equal bilaterally.   Skin: No bleeding, bruising or rash.   Neurologic:  Arousable, however confused.   Incomprehensible speech.  No asterixis.  No jaundice. No facial asymmetry.     Laboratory data:    I have reviewed the labs done in the emergency room.    Results from last 7 days   Lab Units 06/05/20  1228   SODIUM mmol/L 131*   POTASSIUM mmol/L 5.4*   CHLORIDE mmol/L 101   CO2 mmol/L 13.7*   BUN mg/dL 19   CREATININE mg/dL 1.47*   CALCIUM mg/dL 8.2*   BILIRUBIN mg/dL 0.6   ALK PHOS U/L 209*   ALT (SGPT) U/L 13   AST (SGOT) U/L 27   GLUCOSE mg/dL 269*     Results from last 7 days   Lab Units 06/05/20  1228   WBC 10*3/mm3 16.60*   HEMOGLOBIN g/dL 13.0   HEMATOCRIT % 39.9   PLATELETS 10*3/mm3 165         Results from last 7 days   Lab Units 06/05/20  1228   TROPONIN T ng/mL 0.102*     Results from last 7 days   Lab Units 06/05/20  1228   PROBNP pg/mL 814.8             Results from last 7 days   Lab Units 06/05/20  1238   PH, ARTERIAL pH units 7.385   PO2 ART mm Hg 194.0*   PCO2, ARTERIAL mm Hg 17.8*   HCO3 ART mmol/L 10.6*           Invalid input(s): USDES,  BLOODU, NITRITITE, BACT, EP  Pain Management Panel     There is no flowsheet data to display.              EKG:      Patient showed complete heart block, rate of 19, no other specific changes.    Radiology:    Imaging Results (Last 72 Hours)     Procedure Component Value Units Date/Time    XR Chest 1 View [[id number]] Collected:  06/05/20 1251     Updated:  06/05/20 1254    Narrative:       Portable chest     INDICATION: Respiratory distress.     FINDINGS: Single frontal portable chest. No previous. EKG leads overlie  the chest. Cutaneous pacer lead also overlies the left chest. Patient is  rotated to the right. No pneumothorax. There is presumed ingested  medication material in the stomach. Cardiac silhouette is borderline.  Lung volumes are diminished. No pneumothorax. Interstitial prominence is  present bilaterally. Patchy opacities left greater than the right lung  base.       Impression:       1. Diminished lung volume with interstitial prominence which could  be  related to vascular congestion and mild edema.  2. Left greater than the right basilar opacities which may represent  atelectasis or developing infiltrates for pneumonia. Continued followup  recommended.     This report was finalized on 6/5/2020 12:52 PM by Benito Mckeon MD.            Complete heart block (CMS/HCC)      Assessment:    1.  Complete heart block, acute, present admission, status post external pacemaker, right IJ, placement  2.  Lactic acidosis, present on admission  3.  Elevated troponin, likely related to severe bradycardia, present on admission  4.  Acute renal failure, baseline of 1, 1.47 currently, present on admission  5.  Hyperkalemia, present on admission  6.  Hyponatremia, present on admission  7.  Insulin-dependent diabetes mellitus, present on admission  8.  Possible aspiration pneumonia, present on admission  9.  Hepatitis C, chronic, with cirrhosis, on treatment with gastroenterology  10.  COPD  11.  Anxiety  12.  Chronic pain  13.  GERD  14.  Diabetic peripheral neuropathy  15.  Paroxysmal atrial fibrillation  16.  History of seizure disorder  17.  History of esophageal varices    Plan:    Primary services cardiology.  Wean dopamine.  We were asked to see for medical management of other conditions.  Have repeat labs pending to evaluate potassium and renal function.  She had received 2 L bolus in the emergency room.  Judicious use of IV fluids in setting of patient of known cirrhosis and heart failure with mild abdominal distention.  Based off history of choking on eggs at the nursing home, suspect possible aspiration pneumonia and will be treated with Zosyn for now.  Will also cover for SBP prophylaxis.  Cultures have been obtained.  Will trend lactic acid.  Order ammonia.  Patient will likely need a speech evaluation.  Continue with rectal lactulose for now.  Monitor mental status closely.  We will continue nebulizer treatments and bronchodilators, oxygen therapy as necessary.  Monitor  urine output closely.    Patient's apparent plan is to be transferred to [facility] for permanent pacemaker.  Continue treatments as above.  Please contact hospitalist service with any questions or concerns.  We will continue to follow-up patient is here.    Advance Care Planning   ACP discussion was declined by the patient. Patient too confused at this time to discuss.  Apparently is a DNR per records, but did agree for pacemaker.    Gloria Fisher,   06/05/20  15:36    Dictated utilizing Dragon dictation.

## 2020-06-05 NOTE — ED PROVIDER NOTES
TRIAGE CHIEF COMPLAINT:     Nursing and triage notes reviewed    Chief Complaint   Patient presents with   • Respiratory Distress      HPI: Gretta Giles is a 59 y.o. female who presents to the emergency department complaining of respiratory distress as well as bradycardia.  Patient is brought in by EMS providers from nursing home after the patient had been found unresponsive at the nursing home.  Patient had been started on treatment for pneumonia starting yesterday evening.  The nursing home was concerned that patient had aspirated earlier this morning.  Patient had to be placed on increasing oxygen.  EMS providers state that patient had been bradycardic upon their arrival but was alert and able to answer questions.  Patient has a history of multiple medical conditions including congestive heart failure, COPD, and cirrhosis.  Patient has a DNR order from the nursing home.    REVIEW OF SYSTEMS: All other systems reviewed and are negative     PAST MEDICAL HISTORY:   Past Medical History:   Diagnosis Date   • Acute renal failure (ARF) (CMS/HCC)    • Atrial fibrillation (CMS/HCC)    • CHF (congestive heart failure) (CMS/HCC)    • Chronic hyponatremia    • COPD (chronic obstructive pulmonary disease) (CMS/HCC)    • Diabetes mellitus (CMS/HCC)    • Esophageal varices (CMS/HCC)    • Hepatitis C    • History of abdominal paracentesis    • History of transfusion    • Hyperlipidemia    • Hypertension    • Liver cirrhosis (CMS/HCC)     medical record   • Seizure disorder (CMS/HCC)     grand mal   • Sepsis (CMS/HCC)         FAMILY HISTORY:   Family History   Problem Relation Age of Onset   • Diabetes Father    • Cancer Father         malignant neoplasm of brain   • Cancer Sister         bone   • Heart disease Sister         SOCIAL HISTORY:   Social History     Socioeconomic History   • Marital status:      Spouse name: Not on file   • Number of children: Not on file   • Years of education: Not on file   • Highest  "education level: Not on file   Tobacco Use   • Smoking status: Current Every Day Smoker   • Smokeless tobacco: Never Used   Substance and Sexual Activity   • Alcohol use: Never     Frequency: Never   • Drug use: Never   • Sexual activity: Defer        SURGICAL HISTORY:   Past Surgical History:   Procedure Laterality Date   • ANKLE SURGERY     • CHOLECYSTECTOMY      about 20 years ago per patient   • GALLBLADDER SURGERY     • HYSTERECTOMY     • HYSTERECTOMY      about 20 years ago   • KNEE SURGERY     • REPLACEMENT TOTAL KNEE          CURRENT MEDICATIONS:      Medication List      CONTINUE taking these medications    glucose monitor monitoring kit  1 each As Needed (Diabetes mellitus).     Pen Needles 5/16\" 31G X 8 MM misc  1 each 2 (Two) Times a Day.        ASK your doctor about these medications    albuterol sulfate  (90 Base) MCG/ACT inhaler  Commonly known as:  PROVENTIL HFA;VENTOLIN HFA;PROAIR HFA     amiodarone 200 MG tablet  Commonly known as:  PACERONE  Take 1 tablet by mouth Daily.     ASPIRIN LOW DOSE 81 MG EC tablet  Generic drug:  aspirin     bisacodyl 5 MG EC tablet  Commonly known as:  DULCOLAX  Take 2 tablets by mouth 2 (Two) Times a Week. Increase to three times   weekly depend on bowel movement.     busPIRone 15 MG tablet  Commonly known as:  BUSPAR     Calmoseptine 0.44-20.6 % ointment  Generic drug:  Menthol-Zinc Oxide     citalopram 10 MG tablet  Commonly known as:  CeleXA     dicyclomine 10 MG capsule  Commonly known as:  BENTYL  Take 1 capsule by mouth Daily.     famotidine 20 MG tablet  Commonly known as:  Pepcid  Take 1 tablet by mouth 2 (Two) Times a Day.     ferrous sulfate 325 (65 FE) MG tablet  Take 1 tablet by mouth 3 (Three) Times a Day With Meals.     fluticasone 50 MCG/BLIST diskus inhaler  Commonly known as:  FLOVENT DISKUS     furosemide 40 MG tablet  Commonly known as:  LASIX     gabapentin 600 MG tablet  Commonly known as:  NEURONTIN  Take 1 tablet by mouth 3 (Three) Times a " Day.     glucose blood test strip  Use as instructed     insulin aspart 100 UNIT/ML injection  Commonly known as:  novoLOG     Insulin Glargine 100 UNIT/ML injection pen  Commonly known as:  VENKAT ALVAREZ     lactulose 10 GM/15ML solution  Commonly known as:  CHRONULAC  Take 15 mL by mouth 2 (Two) Times a Day.     MULTI-VITAMIN DAILY PO     ondansetron 4 MG tablet  Commonly known as:  ZOFRAN     oxyCODONE 10 MG tablet  Commonly known as:  ROXICODONE  Take 1 tablet by mouth 4 (Four) Times a Day.     sodium chloride 1 g tablet     Sofosbuvir-Velpatasvir 400-100 MG tablet  Take 1 tablet by mouth Daily for 168 days.     spironolactone 100 MG tablet  Commonly known as:  ALDACTONE           ALLERGIES: Codeine and Morphine     PHYSICAL EXAM:   VITAL SIGNS:   Vitals:    06/05/20 1315   BP: 121/78   Pulse:    Resp:    Temp:    SpO2:       CONSTITUTIONAL: Awake, oriented, coughing frequently, diaphoretic, pale  HENT: Atraumatic, normocephalic, oral mucosa pink and moist, airway patent. Nares patent without drainage. External ears normal.   EYES: Conjunctiva clear  NECK: Trachea midline, non-tender, supple   CARDIOVASCULAR: Bradycardic with an irregular rhythm.  PULMONARY/CHEST: Clear to auscultation, no rhonchi, wheezes, or rales. Symmetrical breath sounds.  ABDOMINAL: Distended abdomen, soft, non-tender - no rebound or guarding.   NEUROLOGIC: Non-focal, moving all four extremities, no gross sensory or motor deficits.   EXTREMITIES: No clubbing, cyanosis, or edema   SKIN: Warm, Dry, No erythema, No rash     ED COURSE / MEDICAL DECISION MAKING:   Gretta Giles is a 59 y.o. female who presents to the emergency department for evaluation of respiratory distress and bradycardia.  On arrival in the emergency department the patient is breathing appropriately on nasal cannula.  However, patient is found to be hypotensive and bradycardic on arrival.  Her heart rate is irregular and there appears to be a heart block when patient is  placed on the monitor.  An IV had been established by EMS providers but atropine was administered on arrival in the emergency department.  A quick review of history did reveal patient has a history of cirrhosis as well as renal failure.  A dose of calcium was given to the patient preemptively in case patient had hyperkalemia.    I had a conversation with patient on arrival discussing the goals of her care given she did have a DNR order.  Patient confirmed that she does not want CPR should that become necessary.  The patient was much more equivocal about any other therapies to be administered in the emergency department.  Patient was uncertain whether she would want a central line or pacemaker if necessary.  Patient stated she would be okay with intubation.    EKG interpreted by me revealed a complete heart block with a rate of 19 bpm.  There are some other nonspecific changes.  This was an abnormal appearing EKG.    Patient was given several doses of atropine shortly after arrival with a modest improvement in her heart rate.  The pads were placed on the patient in case they became necessary.  We did update patient's family about her current condition.  Given patient was not ready to allow me to place a central line we started intravenous dopamine through peripheral line in order to attempt to raise her heart rate and blood pressure.  We did have successful improvement with a heart rate in the 40s and 50s and a blood pressure approaching 100 systolic.    Laboratory testing revealed several abnormalities including an elevated lactic acid of 5.6.  A troponin of 0.1.  Creatinine elevated 1.4.  Potassium of 5.4.  White blood cell count of 16.  Chest x-ray revealed findings most consistent with likely edema, there was also possible developing pneumonia in the lower lobes.  Patient be given some IV fluids on arrival and antibiotics have been ordered.    The interventional cardiologist was contacted and came to evaluate the  patient and discuss potential placement of a temporary pacemaker.  After this discussion which I was present for the patient consented to have a temporary and then eventually a permanent pacemaker placed.  She consented that should anything occur in the cardiac Cath Lab that required CPR it would be administered at that time and then her goals of care could be moved back to no CPR after the procedure.    Critical care time excluding any procedure: 60 minutes    DECISION TO DISCHARGE/ADMIT: see ED care timeline     FINAL IMPRESSION:   1 --complete heart block  2 --symptomatic bradycardia  3 --lactic acidosis  4 --elevated troponin    Electronically signed by: Marsha Gamboa MD, 6/5/2020 13:28       Marsha Gamboa MD  06/05/20 1353

## 2020-06-05 NOTE — PROGRESS NOTES
Psychiatric Cardiology History and Physical    Gretta Giles  1960  [id number]  06/05/20     Chief Complaint: Altered mental status    History of Present Illness:   Mrs. Gretta Giles is a 59 y.o. female who is being seen by Cardiology for evaluation of bradycardia.  The patient has a past medical history significant for cirrhosis secondary to chronic hepatitis C, type 2 diabetes mellitus, COPD, hypertension, hyperlipidemia, and seizure disorder.  She has a past cardiac history significant for paroxysmal atrial fibrillation, for which she reportedly follows with her primary cardiologist.  The patient resides in a nursing facility.  She presents to the [facility] for evaluation of altered mental status.  Per report, the patient was found to be unresponsive at the nursing facility.  On evaluation, the patient was found to be severely bradycardic.  She was subsequent transferred to the [facility] emergency department for evaluation.  On arrival, the patient remained severely bradycardic and an initial ECG was consistent with complete heart block.  The patient was subsequent started on dopamine, with improvement in her heart rate to the 50s.  On initial labs, she was found to have lactic acidosis with initial lactic acid of 5.6.  She was also found of an acute kidney injury with a creatinine of 1.47.  On initial discussion the emergency department, the patient was unclear if she wanted any further interventions as she is a DNR/DNI.  Upon further discussing options including hospice/palliative care versus placement of a temporary wire and subsequent transfer for permanent pacemaker implantation, the patient has decided to proceed with temporary pacemaker implantation and transfer for permanent pacemaker.  We will therefore proceed with temporary pacemaker insertion.      Review of Systems:   Review of Systems   Constitutional: Positive for fatigue. Negative for activity  change, appetite change, chills, diaphoresis, fever, unexpected weight gain and unexpected weight loss.   Eyes: Negative for blurred vision and double vision.   Respiratory: Positive for shortness of breath. Negative for cough, chest tightness and wheezing.    Cardiovascular: Negative for chest pain, palpitations and leg swelling.   Gastrointestinal: Negative for abdominal pain, anal bleeding, blood in stool and GERD.   Endocrine: Negative for cold intolerance and heat intolerance.   Genitourinary: Negative for hematuria.   Neurological: Negative for dizziness, syncope, weakness and light-headedness.   Hematological: Does not bruise/bleed easily.   Psychiatric/Behavioral: Negative for depressed mood and stress. The patient is not nervous/anxious.        Past Medical History:   Past Medical History:   Diagnosis Date   • Acute renal failure (ARF) (CMS/HCC)    • Atrial fibrillation (CMS/HCC)    • CHF (congestive heart failure) (CMS/HCC)    • Chronic hyponatremia    • COPD (chronic obstructive pulmonary disease) (CMS/HCC)    • Diabetes mellitus (CMS/HCC)    • Esophageal varices (CMS/HCC)    • Hepatitis C    • History of abdominal paracentesis    • History of transfusion    • Hyperlipidemia    • Hypertension    • Liver cirrhosis (CMS/HCC)     medical record   • Seizure disorder (CMS/HCC)     grand mal   • Sepsis (CMS/HCC)        Past Surgical History:   Past Surgical History:   Procedure Laterality Date   • ANKLE SURGERY     • CHOLECYSTECTOMY      about 20 years ago per patient   • GALLBLADDER SURGERY     • HYSTERECTOMY     • HYSTERECTOMY      about 20 years ago   • KNEE SURGERY     • REPLACEMENT TOTAL KNEE         Family History:   Family History   Problem Relation Age of Onset   • Diabetes Father    • Cancer Father         malignant neoplasm of brain   • Cancer Sister         bone   • Heart disease Sister        Social History:   Social History     Socioeconomic History   • Marital status:      Spouse name: Not  on file   • Number of children: Not on file   • Years of education: Not on file   • Highest education level: Not on file   Tobacco Use   • Smoking status: Current Every Day Smoker   • Smokeless tobacco: Never Used   Substance and Sexual Activity   • Alcohol use: Never     Frequency: Never   • Drug use: Never   • Sexual activity: Defer       Medications:     Current Facility-Administered Medications:   •  DOPamine 400 mg in 250 mL D5W (1.6 mg/mL) infusion, 2-20 mcg/kg/min, Intravenous, Titrated, Marsha Gamboa MD, Last Rate: 3.92 mL/hr at 06/05/20 1234, 2 mcg/kg/min at 06/05/20 1234  •  piperacillin-tazobactam (ZOSYN) 3.375 g in iso-osmotic dextrose 50 ml (premix), 3.375 g, Intravenous, Once, Marsha Gamboa MD  •  vancomycin 1000 mg in sodium chloride 0.9% 250 mL IVPB, 1,000 mg, Intravenous, Once, Marsha Gamboa MD    Current Outpatient Medications:   •  albuterol sulfate  (90 Base) MCG/ACT inhaler, Inhale 1 puff Every 4 (Four) Hours As Needed for Wheezing., Disp: , Rfl:   •  amiodarone (PACERONE) 200 MG tablet, Take 1 tablet by mouth Daily., Disp: 30 tablet, Rfl: 0  •  aspirin (ASPIRIN LOW DOSE) 81 MG EC tablet, Take 1 tablet by mouth daily. For health maintenance, Disp: , Rfl:   •  bisacodyl (DULCOLAX) 5 MG EC tablet, Take 2 tablets by mouth 2 (Two) Times a Week. Increase to three times weekly depend on bowel movement., Disp: 30 tablet, Rfl: 2  •  busPIRone (BUSPAR) 15 MG tablet, Take 15 mg by mouth 2 (Two) Times a Day., Disp: , Rfl:   •  citalopram (CeleXA) 10 MG tablet, Take 10 mg by mouth Daily., Disp: , Rfl:   •  dicyclomine (BENTYL) 10 MG capsule, Take 1 capsule by mouth Daily., Disp: 30 capsule, Rfl: 0  •  famotidine (Pepcid) 20 MG tablet, Take 1 tablet by mouth 2 (Two) Times a Day., Disp: 60 tablet, Rfl: 2  •  ferrous sulfate 325 (65 FE) MG tablet, Take 1 tablet by mouth 3 (Three) Times a Day With Meals., Disp: 30 tablet, Rfl: 0  •  fluticasone (FLOVENT DISKUS) 50 MCG/BLIST  "diskus inhaler, Inhale 1 puff 2 (Two) Times a Day., Disp: , Rfl:   •  furosemide (LASIX) 40 MG tablet, Take 40 mg by mouth 2 (Two) Times a Day., Disp: , Rfl:   •  gabapentin (NEURONTIN) 600 MG tablet, Take 1 tablet by mouth 3 (Three) Times a Day., Disp: 90 tablet, Rfl: 5  •  glucose blood test strip, Use as instructed, Disp: 100 each, Rfl: 0  •  glucose monitor monitoring kit, 1 each As Needed (Diabetes mellitus)., Disp: 1 each, Rfl: 0  •  insulin aspart (novoLOG) 100 UNIT/ML injection, Inject  under the skin into the appropriate area as directed 3 (Three) Times a Day Before Meals., Disp: , Rfl:   •  Insulin Glargine (BASAGLAR KWIKPEN) 100 UNIT/ML injection pen, Inject 18 Units under the skin into the appropriate area as directed 2 (Two) Times a Day., Disp: , Rfl:   •  Insulin Pen Needle (PEN NEEDLES 5/16\") 31G X 8 MM misc, 1 each 2 (Two) Times a Day., Disp: 100 each, Rfl: 0  •  lactulose (CHRONULAC) 10 GM/15ML solution, Take 15 mL by mouth 2 (Two) Times a Day., Disp: 236 mL, Rfl: 0  •  Menthol-Zinc Oxide (CALMOSEPTINE) 0.44-20.6 % ointment, Apply  topically to the appropriate area as directed As Needed., Disp: , Rfl:   •  Multiple Vitamin (MULTI-VITAMIN DAILY PO), Take  by mouth., Disp: , Rfl:   •  ondansetron (ZOFRAN) 4 MG tablet, Take 4 mg by mouth Every 8 (Eight) Hours As Needed for Nausea or Vomiting., Disp: , Rfl:   •  oxyCODONE (ROXICODONE) 10 MG tablet, Take 1 tablet by mouth 4 (Four) Times a Day., Disp: 120 tablet, Rfl: 0  •  sodium chloride 1 g tablet, Take 1 g by mouth Daily., Disp: , Rfl:   •  Sofosbuvir-Velpatasvir 400-100 MG tablet, Take 1 tablet by mouth Daily for 168 days., Disp: 168 tablet, Rfl: 0  •  spironolactone (ALDACTONE) 100 MG tablet, Take 100 mg by mouth Daily., Disp: , Rfl:     Allergies:   Allergies   Allergen Reactions   • Codeine Itching   • Morphine Itching       Physical Exam:  Vital Signs:   Vitals:    06/05/20 1234 06/05/20 1237 06/05/20 1245 06/05/20 1249   BP:  96/58 118/70    "   Pulse: (!) 31 (!) 33 (!) 47 (!) 48   Resp:       Temp:       TempSrc:       SpO2: 95% 96% 97% 97%   Weight:       Height:           Physical Exam   Constitutional: She is oriented to person, place, and time.   Lying in bed in no acute distress.  Somewhat lethargic.  Appears chronically ill.   HENT:   Head: Normocephalic and atraumatic.   Moist Mucous Membranes.    Eyes: Pupils are equal, round, and reactive to light. EOM are normal. No scleral icterus.   Neck: No tracheal deviation present.   Cardiovascular: Normal heart sounds and intact distal pulses. Exam reveals no gallop and no friction rub.   No murmur heard.  Bradycardic.   Pulmonary/Chest: No stridor. No respiratory distress. She has wheezes. She has rales. She exhibits no tenderness.   Coarse bilateral breath sounds.   Abdominal: Soft. Bowel sounds are normal. She exhibits no distension. There is no tenderness. There is no rebound and no guarding.   Musculoskeletal: Normal range of motion. She exhibits no edema.   Lymphadenopathy:     She has no cervical adenopathy.   Neurological: She is alert and oriented to person, place, and time.   Skin: Skin is warm and dry. She is not diaphoretic. No erythema.   Psychiatric:   Somewhat lethargic.   Nursing note and vitals reviewed.      Results Review:   Results from last 7 days   Lab Units 06/05/20  1228   SODIUM mmol/L 131*   POTASSIUM mmol/L 5.4*   CHLORIDE mmol/L 101   CO2 mmol/L 13.7*   BUN mg/dL 19   CREATININE mg/dL 1.47*   CALCIUM mg/dL 8.2*   BILIRUBIN mg/dL 0.6   ALK PHOS U/L 209*   ALT (SGPT) U/L 13   AST (SGOT) U/L 27   GLUCOSE mg/dL 269*     Results from last 7 days   Lab Units 06/05/20  1228   TROPONIN T ng/mL 0.102*     @LABRCNTbnp@  Results from last 7 days   Lab Units 06/05/20  1228   WBC 10*3/mm3 16.60*   HEMOGLOBIN g/dL 13.0   HEMATOCRIT % 39.9   PLATELETS 10*3/mm3 165         Results from last 7 days   Lab Units 06/05/20  1228   MAGNESIUM mg/dL 2.5         I personally viewed and interpreted the  patient's EKG/Telemetry data     Assessment / Plan:     1.  Third-degree AV block  --Presents with severe bradycardia, with initial ECG demonstrating complete heart block  --Suspect likely underlying conduction system disease, however the patient does have mild hyperkalemia and acidosis which could also precipitate AV block  --On chronic amiodarone, however does not appear to be additional AV daniela blocking agents  --Discussed the risks and benefits of temporary pacemaker placement with subsequent consideration of permanent pacemaker, and the patient is agreeable to proceed.  --Admit to ICU following temporary wire insertion, plan to [facility]    2.  Lactic acidosis  --Likely secondary to #1 above    3.  Possible aspiration  --Bilateral basilar opacities on chest x-ray  --Consult hospital medicine for further recommendations and management    4.  Hepatic cirrhosis  --Secondary to chronic hepatitis C    5.  Coagulopathy  --Secondary to cirrhosis  --Last INR 1.2, will repeat    6.  Acute kidney injury  --Likely prerenal etiology  --Hospital medicine consult for management    7.  Hyperkalemia  --In the setting of acute kidney injury  --Hospital medicine consult        FABIO Dyer MD  Interventional Cardiology    )06/05/20  1:18 PM

## 2020-06-05 NOTE — ED NOTES
1mg atropine ivp per verbal order, repeated and verified from Dr. Gamboa.      Zena Keller, RN  06/05/20 2559

## 2020-06-05 NOTE — PLAN OF CARE
Problem: Patient Care Overview  Goal: Plan of Care Review  Flowsheets (Taken 6/5/2020 1627 by Anu Lloyd)  Progress: no change  Plan of Care Reviewed With: patient

## 2020-06-05 NOTE — THERAPY EVALUATION
Acute Care - Speech Language Pathology   Swallow Initial Evaluation  Brian     Patient Name: Gretta Giles  : 1960  MRN: [id number]  Today's Date: 2020               Admit Date: 2020    Visit Dx:     ICD-10-CM ICD-9-CM   1. Complete heart block (CMS/HCC) I44.2 426.0   2. Bradycardia R00.1 427.89   3. Lactic acidosis E87.2 276.2   4. Elevated troponin R79.89 790.6     Patient Active Problem List   Diagnosis   • Gastroesophageal reflux disease   • Atopic rhinitis   • Chronic low back pain   • Chronic obstructive pulmonary disease (CMS/HCC)   • Chronic obstructive pulmonary disease with acute exacerbation (CMS/HCC)   • Diabetes mellitus (CMS/HCC)   • Diabetic peripheral neuropathy (CMS/HCC)   • Dyslipidemia   • Nonalcoholic fatty liver disease   • Gastrointestinal ulcer due to Helicobacter pylori   • Hypertension   • Menopausal symptom   • Mixed anxiety depressive disorder   • Seasonal allergic rhinitis   • Tobacco dependence syndrome   • Vitamin D deficiency   • Hyperlipidemia   • Anxiety and depression   • Menopause syndrome   • Allergic rhinitis   • Type 2 diabetes mellitus, controlled (CMS/HCC)   • GERD without esophagitis   • Nausea   • Debility   • Other cirrhosis of liver (CMS/HCC)   • Chronic hepatitis C virus infection with cirrhosis (CMS/HCC)   • Secondary esophageal varices without bleeding (CMS/HCC)   • Other ascites   • Complete heart block (CMS/HCC)     Past Medical History:   Diagnosis Date   • Acute renal failure (ARF) (CMS/HCC)    • Atrial fibrillation (CMS/HCC)    • CHF (congestive heart failure) (CMS/HCC)    • Chronic hyponatremia    • COPD (chronic obstructive pulmonary disease) (CMS/HCC)    • Diabetes mellitus (CMS/HCC)    • Esophageal varices (CMS/HCC)    • Hepatitis C    • History of abdominal paracentesis    • History of transfusion    • Hyperlipidemia    • Hypertension    • Liver cirrhosis (CMS/HCC)     medical record   • Seizure disorder (CMS/HCC)     grand mal   • Sepsis  (CMS/[facility])      Past Surgical History:   Procedure Laterality Date   • ANKLE SURGERY     • CHOLECYSTECTOMY      about 20 years ago per patient   • GALLBLADDER SURGERY     • HYSTERECTOMY     • HYSTERECTOMY      about 20 years ago   • KNEE SURGERY     • REPLACEMENT TOTAL KNEE          SWALLOW EVALUATION (last 72 hours)      SLP Adult Swallow Evaluation     Row Name 06/05/20 1553                   Rehab Evaluation    Document Type  evaluation  -TM        Subjective Information  no complaints  -TM        Patient Observations  poorly cooperative;lethargic confused  -TM        Patient/Family Observations  no family present  -TM        Patient Effort  adequate  -TM           General Information    Patient Profile Reviewed  yes  -TM        Pertinent History Of Current Problem  pneu, GERD, COPD, cirrhosis, seizures, cardiac  -TM        Current Method of Nutrition  regular textures;thin liquids  -TM        Prior Level of Function-Communication  other (see comments) unknown  -TM        Prior Level of Function-Swallowing  no diet consistency restrictions  -TM        Plans/Goals Discussed with  other (see comments) RN  -TM        Barriers to Rehab  medically complex;previous functional deficit;cognitive status  -TM        Patient's Goals for Discharge  patient could not state  -TM           Pain Assessment    Additional Documentation  Pain Scale: FACES Pre/Post-Treatment (Group)  -TM           Pain Scale: FACES Pre/Post-Treatment    Pain: FACES Scale, Pretreatment  0-->no hurt  -TM        Pain: FACES Scale, Post-Treatment  0-->no hurt  -TM           Oral Motor and Function    Oral Lesions or Structural Abnormalities and/or variants  none identified  -TM        Dentition Assessment  upper dentures/partial in place;lower dentures/partial in place  -TM        Secretion Management  WNL/WFL  -TM        Mucosal Quality  moist, healthy  -TM           Oral Musculature and Cranial Nerve Assessment    Oral Motor General Assessment   generalized oral motor weakness  -TM           General Eating/Swallowing Observations    Respiratory Support Currently in Use  nasal cannula  -TM        Eating/Swallowing Skills  fed by SLP  -TM        Positioning During Eating  upright in bed  -TM        Utensils Used  spoon;straw  -TM        Consistencies Trialed  mechanical soft, no mixed consistencies;pureed;thin liquids;nectar/syrup-thick liquids  -TM        Pre SpO2 (%)  97  -TM        Post SpO2 (%)  97  -TM           Respiratory    Respiratory Status  WFL;during swallowing/eating  -TM           Clinical Swallow Eval    Oral Prep Phase  impaired  -TM        Oral Transit  WFL  -TM        Oral Residue  WFL  -TM        Pharyngeal Phase  suspected pharyngeal impairment  -TM        Esophageal Phase  suspected esophageal impairment  -TM        Clinical Swallow Evaluation Summary  Bedside eval of swallow completed.  Pt. exhibited some lethargy and confusion of speech.  Oral phase was adequate, but pt. did not accept mech soft trial, expelling it immediately.  No difficulty with puree or liquids with oral phase, however.  Suspect pharyngeal phase dysphagia as pt. exhbiited coughing with thin liquid and wet vocal quality following.  She further exhibited belching post thin liq trial indicating third stage dysphagia of which she has prior dx of GERD and esophageal varices.  Due to pt.'s limitations with alertness and awareness for optimal safety and risk of aspiration and choking, recommend:  1. pureed diet with nectar-thick liq as daniel,  2. meds with pudding/applesauce,  3. aspiration precautions, 4. reflux precautions.    -TM           Oral Prep Concerns    Oral Prep Concerns  spits out food prior to swallow  -TM        Spits Out Food Prior to Swallow  mechanical soft  -TM           Pharyngeal Phase Concerns    Pharyngeal Phase Concerns  cough;wet vocal quality  -TM        Wet Vocal Quality  thin  -TM        Cough  thin  -TM           Esophageal Phase Concerns     Esophageal Phase Concerns  belching;other (see comments) prior dx of GERD and esophageal varices  -TM        Belching  thin  -TM           Clinical Impression    SLP Swallowing Diagnosis  other (see comments);suspected pharyngeal dysfunction;oral dysfunction;mild-moderate;esophageal dysfunction confusion affecting oral phase  -TM        Functional Impact  risk of aspiration/pneumonia;risk of malnutrition;risk of dehydration  -TM        Swallow Criteria for Skilled Therapeutic Interventions Met  not appropriate  -TM           Recommendations    Therapy Frequency (Swallow)  PRN  -TM        Predicted Duration Therapy Intervention (Days)  until discharge  -TM        SLP Diet Recommendation  puree;nectar thick liquids  -TM        Recommended Precautions and Strategies  upright posture during/after eating;other (see comments) reflux precautions  -TM        SLP Rec. for Method of Medication Administration  meds whole;with pudding or applesauce;as tolerated  -TM        Monitor for Signs of Aspiration  notify SLP if any concerns;yes;cough;gurgly voice;throat clearing  -TM        Anticipated Dischage Disposition (SLP)  unknown  -TM          User Key  (r) = Recorded By, (t) = Taken By, (c) = Cosigned By    Initials Name Effective Dates     Anu Lloyd 03/07/18 -           EDUCATION  The patient has been educated in the following areas:   Dysphagia (Swallowing Impairment) Modified Diet Instruction.    SLP Recommendation and Plan  SLP Swallowing Diagnosis: other (see comments), suspected pharyngeal dysfunction, oral dysfunction, mild-moderate, esophageal dysfunction(confusion affecting oral phase)  SLP Diet Recommendation: puree, nectar thick liquids  Recommended Precautions and Strategies: upright posture during/after eating, other (see comments)(reflux precautions)  SLP Rec. for Method of Medication Administration: meds whole, with pudding or applesauce, as tolerated     Monitor for Signs of Aspiration: notify SLP if  any concerns, yes, cough, gurgly voice, throat clearing     Swallow Criteria for Skilled Therapeutic Interventions Met: not appropriate  Anticipated Dischage Disposition (SLP): unknown     Therapy Frequency (Swallow): PRN  Predicted Duration Therapy Intervention (Days): until discharge       Plan of Care Reviewed With: patient  Progress: no change  Outcome Summary: Bedside eval of swallow completed.  Pt. exhibited some lethargy and confusion of speech.  Oral phase was adequate, but pt. did not accept mech soft trial, expelling it immediately.  No difficulty with puree or liquids with oral phase, however.  Suspect pharyngeal phase dysphagia as pt. exhbiited coughing with thin liquid and wet vocal quality following.  She further exhibited belching post thin liq trial indicating third stage dysphagia of which she has prior dx of GERD and esophageal varices.  Due to pt.'s limitations with alertness and awareness for optimal safety and risk of aspiration and choking, recommend:  1. pureed diet with nectar-thick liq as daniel,  2. meds with pudding/applesauce,  3. aspiration precautions, 4. reflux precautions.           Time Calculation:   Time Calculation- SLP     Row Name 06/05/20 1628             Time Calculation- SLP    SLP Start Time  1502  -TM      SLP Stop Time  1605  -TM      SLP Time Calculation (min)  63 min  -TM        User Key  (r) = Recorded By, (t) = Taken By, (c) = Cosigned By    Initials Name Provider Type    TM Anu Lloyd Speech and Language Pathologist          Therapy Charges for Today     Code Description Service Date Service Provider Modifiers Qty    [id number]  ST EVAL ORAL PHARYNG SWALLOW 4 6/5/2020 Anu Lloyd GN 1               Anu Lloyd  6/5/2020

## 2020-06-05 NOTE — H&P
[facility] Cardiology History and Physical    Gretta Giles  1960  [id number]  06/05/20     Chief Complaint: Altered mental status    History of Present Illness:   Mrs. Gretta Giles is a 59 y.o. female who is being seen by Cardiology for evaluation of bradycardia.  The patient has a past medical history significant for cirrhosis secondary to chronic hepatitis C, type 2 diabetes mellitus, COPD, hypertension, hyperlipidemia, and seizure disorder.  She has a past cardiac history significant for paroxysmal atrial fibrillation, for which she reportedly follows with her primary cardiologist.  The patient resides in a nursing facility.  She presents to the [facility] for evaluation of altered mental status.  Per report, the patient was found to be unresponsive at the nursing facility.  On evaluation, the patient was found to be severely bradycardic.  She was subsequent transferred to the [facility] emergency department for evaluation.  On arrival, the patient remained severely bradycardic and an initial telemetry revealed complete heart block.  The patient was subsequently started on dopamine, with improvement in her heart rate to the 50s.  On initial labs, she was found to have lactic acidosis with initial lactic acid of 5.6.  She was also found of an acute kidney injury with a creatinine of 1.47.  On initial discussion the emergency department, the patient was unclear if she wanted any further interventions as she is a DNR/DNI.  Upon further discussing options including hospice/palliative care versus placement of a temporary wire and subsequent transfer for permanent pacemaker implantation, the patient has decided to proceed with temporary pacemaker implantation and transfer for permanent pacemaker.  We will therefore proceed with temporary pacemaker insertion.      Review of Systems:   Review of Systems   Constitutional: Positive for fatigue. Negative for activity  change, appetite change, chills, diaphoresis, fever, unexpected weight gain and unexpected weight loss.   Eyes: Negative for blurred vision and double vision.   Respiratory: Positive for shortness of breath. Negative for cough, chest tightness and wheezing.    Cardiovascular: Negative for chest pain, palpitations and leg swelling.   Gastrointestinal: Negative for abdominal pain, anal bleeding, blood in stool and GERD.   Endocrine: Negative for cold intolerance and heat intolerance.   Genitourinary: Negative for hematuria.   Neurological: Negative for dizziness, syncope, weakness and light-headedness.   Hematological: Does not bruise/bleed easily.   Psychiatric/Behavioral: Negative for depressed mood and stress. The patient is not nervous/anxious.        Past Medical History:   Past Medical History:   Diagnosis Date   • Acute renal failure (ARF) (CMS/HCC)    • Atrial fibrillation (CMS/HCC)    • CHF (congestive heart failure) (CMS/HCC)    • Chronic hyponatremia    • COPD (chronic obstructive pulmonary disease) (CMS/HCC)    • Diabetes mellitus (CMS/HCC)    • Esophageal varices (CMS/HCC)    • Hepatitis C    • History of abdominal paracentesis    • History of transfusion    • Hyperlipidemia    • Hypertension    • Liver cirrhosis (CMS/HCC)     medical record   • Seizure disorder (CMS/HCC)     grand mal   • Sepsis (CMS/HCC)        Past Surgical History:   Past Surgical History:   Procedure Laterality Date   • ANKLE SURGERY     • CHOLECYSTECTOMY      about 20 years ago per patient   • GALLBLADDER SURGERY     • HYSTERECTOMY     • HYSTERECTOMY      about 20 years ago   • KNEE SURGERY     • REPLACEMENT TOTAL KNEE         Family History:   Family History   Problem Relation Age of Onset   • Diabetes Father    • Cancer Father         malignant neoplasm of brain   • Cancer Sister         bone   • Heart disease Sister        Social History:   Social History     Socioeconomic History   • Marital status:      Spouse name: Not  on file   • Number of children: Not on file   • Years of education: Not on file   • Highest education level: Not on file   Tobacco Use   • Smoking status: Current Every Day Smoker   • Smokeless tobacco: Never Used   Substance and Sexual Activity   • Alcohol use: Never     Frequency: Never   • Drug use: Never   • Sexual activity: Defer       Medications:     Current Facility-Administered Medications:   •  DOPamine 400 mg in 250 mL D5W (1.6 mg/mL) infusion, 2-20 mcg/kg/min, Intravenous, Titrated, Marsha Gamboa MD, Last Rate: 3.92 mL/hr at 06/05/20 1234, 2 mcg/kg/min at 06/05/20 1234  •  piperacillin-tazobactam (ZOSYN) 3.375 g in iso-osmotic dextrose 50 ml (premix), 3.375 g, Intravenous, Once, Marsha Gamboa MD  •  vancomycin 1000 mg in sodium chloride 0.9% 250 mL IVPB, 1,000 mg, Intravenous, Once, Marsha Gamboa MD    Current Outpatient Medications:   •  albuterol sulfate  (90 Base) MCG/ACT inhaler, Inhale 1 puff Every 4 (Four) Hours As Needed for Wheezing., Disp: , Rfl:   •  amiodarone (PACERONE) 200 MG tablet, Take 1 tablet by mouth Daily., Disp: 30 tablet, Rfl: 0  •  aspirin (ASPIRIN LOW DOSE) 81 MG EC tablet, Take 1 tablet by mouth daily. For health maintenance, Disp: , Rfl:   •  bisacodyl (DULCOLAX) 5 MG EC tablet, Take 2 tablets by mouth 2 (Two) Times a Week. Increase to three times weekly depend on bowel movement., Disp: 30 tablet, Rfl: 2  •  busPIRone (BUSPAR) 15 MG tablet, Take 15 mg by mouth 2 (Two) Times a Day., Disp: , Rfl:   •  citalopram (CeleXA) 10 MG tablet, Take 10 mg by mouth Daily., Disp: , Rfl:   •  dicyclomine (BENTYL) 10 MG capsule, Take 1 capsule by mouth Daily., Disp: 30 capsule, Rfl: 0  •  famotidine (Pepcid) 20 MG tablet, Take 1 tablet by mouth 2 (Two) Times a Day., Disp: 60 tablet, Rfl: 2  •  ferrous sulfate 325 (65 FE) MG tablet, Take 1 tablet by mouth 3 (Three) Times a Day With Meals., Disp: 30 tablet, Rfl: 0  •  fluticasone (FLOVENT DISKUS) 50 MCG/BLIST  "diskus inhaler, Inhale 1 puff 2 (Two) Times a Day., Disp: , Rfl:   •  furosemide (LASIX) 40 MG tablet, Take 40 mg by mouth 2 (Two) Times a Day., Disp: , Rfl:   •  gabapentin (NEURONTIN) 600 MG tablet, Take 1 tablet by mouth 3 (Three) Times a Day., Disp: 90 tablet, Rfl: 5  •  glucose blood test strip, Use as instructed, Disp: 100 each, Rfl: 0  •  glucose monitor monitoring kit, 1 each As Needed (Diabetes mellitus)., Disp: 1 each, Rfl: 0  •  insulin aspart (novoLOG) 100 UNIT/ML injection, Inject  under the skin into the appropriate area as directed 3 (Three) Times a Day Before Meals., Disp: , Rfl:   •  Insulin Glargine (BASAGLAR KWIKPEN) 100 UNIT/ML injection pen, Inject 18 Units under the skin into the appropriate area as directed 2 (Two) Times a Day., Disp: , Rfl:   •  Insulin Pen Needle (PEN NEEDLES 5/16\") 31G X 8 MM misc, 1 each 2 (Two) Times a Day., Disp: 100 each, Rfl: 0  •  lactulose (CHRONULAC) 10 GM/15ML solution, Take 15 mL by mouth 2 (Two) Times a Day., Disp: 236 mL, Rfl: 0  •  Menthol-Zinc Oxide (CALMOSEPTINE) 0.44-20.6 % ointment, Apply  topically to the appropriate area as directed As Needed., Disp: , Rfl:   •  Multiple Vitamin (MULTI-VITAMIN DAILY PO), Take  by mouth., Disp: , Rfl:   •  ondansetron (ZOFRAN) 4 MG tablet, Take 4 mg by mouth Every 8 (Eight) Hours As Needed for Nausea or Vomiting., Disp: , Rfl:   •  oxyCODONE (ROXICODONE) 10 MG tablet, Take 1 tablet by mouth 4 (Four) Times a Day., Disp: 120 tablet, Rfl: 0  •  sodium chloride 1 g tablet, Take 1 g by mouth Daily., Disp: , Rfl:   •  Sofosbuvir-Velpatasvir 400-100 MG tablet, Take 1 tablet by mouth Daily for 168 days., Disp: 168 tablet, Rfl: 0  •  spironolactone (ALDACTONE) 100 MG tablet, Take 100 mg by mouth Daily., Disp: , Rfl:     Allergies:   Allergies   Allergen Reactions   • Codeine Itching   • Morphine Itching       Physical Exam:  Vital Signs:   Vitals:    06/05/20 1234 06/05/20 1237 06/05/20 1245 06/05/20 1249   BP:  96/58 118/70  "   Pulse: (!) 31 (!) 33 (!) 47 (!) 48   Resp:       Temp:       TempSrc:       SpO2: 95% 96% 97% 97%   Weight:       Height:           Physical Exam   Constitutional: She is oriented to person, place, and time.   Lying in bed in no acute distress.  Somewhat lethargic.  Appears chronically ill.   HENT:   Head: Normocephalic and atraumatic.   Moist Mucous Membranes.    Eyes: Pupils are equal, round, and reactive to light. EOM are normal. No scleral icterus.   Neck: No tracheal deviation present.   Cardiovascular: Normal heart sounds and intact distal pulses. Exam reveals no gallop and no friction rub.   No murmur heard.  Bradycardic.   Pulmonary/Chest: No stridor. No respiratory distress. She has wheezes. She has rales. She exhibits no tenderness.   Coarse bilateral breath sounds.   Abdominal: Soft. Bowel sounds are normal. She exhibits no distension. There is no tenderness. There is no rebound and no guarding.   Musculoskeletal: Normal range of motion. She exhibits no edema.   Lymphadenopathy:     She has no cervical adenopathy.   Neurological: She is alert and oriented to person, place, and time.   Skin: Skin is warm and dry. She is not diaphoretic. No erythema.   Psychiatric:   Somewhat lethargic.   Nursing note and vitals reviewed.      Results Review:   Results from last 7 days   Lab Units 06/05/20  1228   SODIUM mmol/L 131*   POTASSIUM mmol/L 5.4*   CHLORIDE mmol/L 101   CO2 mmol/L 13.7*   BUN mg/dL 19   CREATININE mg/dL 1.47*   CALCIUM mg/dL 8.2*   BILIRUBIN mg/dL 0.6   ALK PHOS U/L 209*   ALT (SGPT) U/L 13   AST (SGOT) U/L 27   GLUCOSE mg/dL 269*     Results from last 7 days   Lab Units 06/05/20  1228   TROPONIN T ng/mL 0.102*     @LABRCNTbnp@  Results from last 7 days   Lab Units 06/05/20  1228   WBC 10*3/mm3 16.60*   HEMOGLOBIN g/dL 13.0   HEMATOCRIT % 39.9   PLATELETS 10*3/mm3 165         Results from last 7 days   Lab Units 06/05/20  1228   MAGNESIUM mg/dL 2.5         I personally viewed and interpreted the  patient's EKG/Telemetry data     Assessment / Plan:     1.  Third-degree AV block  --Presents with severe bradycardia, and the patient was subsequently found to have complete heart block on telemetry  --Suspect likely underlying conduction system disease, however the patient does have mild hyperkalemia and acidosis which could also precipitate AV block  --On chronic amiodarone, however does not appear to be additional AV daniela blocking agents  --Given the patient remains in complete AV block while in the emergency department, will proceed with temporary pacing wire insertion  --Will correct underlying electrolyte abnormalities and acidosis, reevaluate rhythm  --Admit to ICU following temporary wire insertion, plan to transfer to [facility] for EP evaluation as the patient may eventually require permanent pacemaker implantation if no improvement in rhythm after correction of underlying metabolic abnormalities    2.  Lactic acidosis  --Likely secondary to #1 above    3.  Possible aspiration  --Bilateral basilar opacities on chest x-ray  --Consult hospital medicine for further recommendations and management    4.  Hepatic cirrhosis  --Secondary to chronic hepatitis C    5.  Coagulopathy  --Secondary to cirrhosis  --Last INR 1.2, will repeat    6.  Acute kidney injury  --Likely prerenal etiology  --Hospital medicine consult for management    7.  Hyperkalemia  --In the setting of acute kidney injury  --Hospital medicine consult        FABIO Dyer MD  Interventional Cardiology    )06/05/20  1:18 PM

## 2020-06-06 PROBLEM — I45.9 HEART BLOCK: Status: ACTIVE | Noted: 2020-01-01

## 2020-06-06 NOTE — NURSING NOTE
ACC REVIEW REPORT: [facility]        PATIENT NAME: Gretta Giles    PATIENT ID: [id number]        COVID-19 ACC SCREENING       DOES THE PATIENT HAVE A FEVER GREATER THAN OR EQUAL .4: no    IS THE PATIENT EXPERIENCING SHORTNESS OF BREATH: SOB (aspirated on eggs today)    DOES THE PATIENT HAVE A COUGH: no    DOES THE PATIENT HAVE ANY OF THE FOLLOWING RISK FACTORS:    EXPOSURE TO SUSPECTED OR KNOWN COVID-19: resides at nursing home    RECENT TRAVEL HISTORY TO ENDEMIC AREA (DOMESTIC/LOCAL): no    IS THE PATIENT A HEALTHCARE WORKER: no    HAS THE PATIENT BEEN TESTED FOR COVID-19: yes    DATE TESTED: 6/5 - negative, 5/29 - negative    LAB TESTING SENT TO: in house at [facility]          BED: N221    BED TYPE: ICU    BED GIVEN TO: Kristen Pan RN    TIME BED GIVEN: 2125    YOB: 1960    AGE: 59    GENDER: female    PREVIOUS ADMIT TO [facility]:     PREVIOUS ADMISSION DATE:     PATIENT CLASS: inpatient    TODAY'S DATE: 6/5/2020    TRANSFER DATE: 6/5/2020    ETA: 2230    TRANSFERRING FACILITY: [facility] ICU    TRANSFERRING FACILITY PHONE # : [phone]    TRANSFERRING MD: Dr. Dyer    DATE/TIME REQUEST RECEIVED: 6/5 1430    [facility] RN: Debbie Childs    REPORT FROM: Kristen Pan RN    TIME REPORT TAKEN: 2107    DIAGNOSIS: Complete Heart Block    REASON FOR TRANSFER TO [facility]: higher level of care    TRANSPORTATION: local ground    CLINICAL REASON FOR TRANSFER TO [facility]: higher level of care      CLINICAL INFORMATION    HEIGHT: 64 inches    WEIGHT: 115 lbs    ALLERGIES: codeine, morphine    SANABRIA: no    INFECTIOUS DISEASE: no    ISOLATION: no    LAST VITAL SIGNS:  TIME: 2000  TEMP: 98.4  PULSE: 80  B/P: 120/82  RESP: 20    LAB INFORMATION: troponin 0.087, initial lactate 5.6, recheck 1.1, ammonia 45, BUN/Cr 19/1.47, GFR 37, Na 131, K 5.4, Ca 8.2, Alk phos 209, Anion gap 16.3, WBC 16.60,     CULTURE INFORMATION: BCx pending, COVID 6/5 - negative, COVID 5/29 - negative, FLU A/B - negative    MEDS/IV  FLUIDS: #20 left wrist - SL        CARDIAC SYSTEM:    CHEST PAIN: no    RATE:     SCALE:     RHYTHM: paced    Is patient taking or has patient been given any drugs that could increase bleeding? no  (Plavix, Brilinta, Effient, Eliquis, Xarelto, Warfarin, Integrilin, Angiomax)    DRUG:      DOSE/FREQUENCY:     CARDIAC ENZYMES:    DATE:   TIME: 1552  TROP: 0.087    DATE:   TIME: 1228  TROP: 0.102    SHEATH:    SITE: right IJ  SIZE: 6  Sheath measurement: 37 at site insertion  DATE INSERTED: 2020    Transvenous PACEMAKER:     RATE: 80    MODE:    DATE INSERTED: 2020  Ventricular pacer: 5  SENSITIVITY SETTIN  TYPE: VVI    CARDIAC NOTES: Cardiologist had to advance the sheath earlier today due to ectopy, none sense advancement.       RESPIRATORY SYSTEM:    LUNG SOUNDS:    CLEAR: n  CRACKLES: n  WHEEZES: n  RHONCHI: yes - all lobes  DIMINISHED: y    ABG DATE:         ABG TIME: 1240    ABG RESULTS:    PH: 7.385  PO2: 194.0  PCO2: 17.8  HCO3: 10.6  O2 SAT: 99.8 on 2L NC    OXYGEN: 2L NC    O2 SAT: 96% on 2L, patient did remove it and she has been on RA and remains satting in the 's    RADIOLOGY RESULTS: CXR - vascular congestion and mild edema, bilateral opacities - atelectasis v PNA    RESPIRATORY STATUS: patient aspirated on EGGS this morning, family reports she has aspirated on food in the past and does so quite frequently       CNS/MUSCULOSKELETAL    ALERT AND ORIENTED:    PERSON: y  PLACE: n  TIME: n    CNS/MUSCULOSKELETAL NOTES: lives at nursing home ([facility]), uncertain of her baseline      GI//GY      ABDOMINAL PAIN: n    VOMITING: n    DIARRHEA: combination of diarrhea and constipation. Received Kayexalate enema    NAUSEA: n    BOWEL SOUNDS: +    GI//GY NOTES: small amount of blood noted in stool, most likely from internal hemorrhoids per nurse providing report    PAST MEDICAL HISTORY: Acute renal failure, Afib, CHF, COPD, chronic hyponatremia, DM, Esophageal varices, Hep C,  "Abdominal paracentesis, transfusions, hyperlipidemia, HTN, Liver cirrhosis, seizures, sepsis, ankle surgery, josé, hysterectomy, TKR, chronic constipation, history of pressure ulcers (none currently)    OTHER SYMPTOM NOTES: patient lives at [facility] in [place]. Patient reports it is temporary, family states it is permanent.     Patient has POA    Patient voiced to Providers at [place] - no CPR under the code status    ADDITIONAL NOTES: Patient presents to [facility] ED with reports of possible aspiration today on EGGS. Upon arrival patient's heart rate was 18, atropine given. She was taken to cath lab immediately for placement of transvenous pacer.     Patient is currently NPO    Patient has scattered bruising, no skin issues otherwise (hx of pressure ulcers)    Nurse reports patient's mouth/lips are \"atrocious\". She has upper dentures in and mouth care has been provided every hour or so.           Nicole Childs RN  6/5/2020  21:16  "

## 2020-06-06 NOTE — PLAN OF CARE
Problem: Patient Care Overview  Goal: Plan of Care Review  Outcome: Ongoing (interventions implemented as appropriate)  Flowsheets  Taken 6/6/2020 8645 by Anayeli Em, RN  Progress: improving  Outcome Summary: TVP remains in place, rate decreased to 50 by Dr. Rossi, has not fired once throughout the day, pt maintaining sinus rhythm 55-70s on her own. PICC line placed FABRICIO. CT-guided paracentesis performed: only 60ml taken off for labs/cultures. Home dose oxycodone and neurontin reordered for pain. Echo done. Lactic acid slightly elevated again (2.3, 2.2), awaiting repeat draw before ordering diet (speech eval complete). Neuro status still waxes and wanes from oriented x4 to confused with hallucinations and spontaneous irrational conversations.

## 2020-06-06 NOTE — NURSING NOTE
Report given to RN at [facility].  Pt transferred by EMS.  Belongings including pants, shirt, socks, shoes and a grey metal ring placed in a small bag with patient's sticker in belongings bag.  VSS.

## 2020-06-06 NOTE — CONSULTS
Placed by DUSTIN Solis RN - confirmed with 3cg technology.  LUE double lumen PICC with 35cm total and 1cm exposed.  Right IJ pacer wire.

## 2020-06-06 NOTE — PLAN OF CARE
Problem: Patient Care Overview  Goal: Plan of Care Review  Outcome: Ongoing (interventions implemented as appropriate)  Flowsheets (Taken 6/6/2020 1041)  Plan of Care Reviewed With: patient  Note:   SLP evaluation completed. Will sign-off as patient does not require skilled intervention. Re-consult SLP services if any changes occur or as indicated. Please see note for further details and recommendations.

## 2020-06-06 NOTE — NURSING NOTE
Patient brought from ICU with RN, for paracentesis. Vitals stable. Images taken and reviewed by Dr. Mcclain. Small amount of fluid in abdomen. A chiba needle used to obtain 60 ml serous sample. Patient tolerated well. Transported back to the ICU with RN.

## 2020-06-06 NOTE — PLAN OF CARE
Problem: Patient Care Overview  Goal: Plan of Care Review  Flowsheets (Taken 6/6/2020 0200)  Plan of Care Reviewed With: patient  Note:   Pt arrived as transfer from [facility] at 0100 this morning.  Pt reported to have been found obtunded in her room at nursing home by staff having difficulty breathing.  Taken to [facility] via EMS was noted to be in a third degree AV conduction block with suspected aspiration pneumonia (as is common for this patient) and was subsequently taken to the cath lab for temporary transvenous pacer insertion.  Following cath pt was stabilized and respiratory status improved.  Upon arrival pt was on room air and voiced feelings of comfort.  Has been afebrile since that time and remains paced at a rate of roughly 80.  Mental status is labile as orientation is anywhere from x2-x4 sometimes forgetting time and sometimes forgetting where she is.  Pt at times makes confused statements (such as mistaking staff for her daughter).  Pt afebrile, vitals within defined limits.

## 2020-06-06 NOTE — PROGRESS NOTES
"Critical Care Note     LOS: 0 days   Patient Care Team:  Nighat Kincaid APRN as PCP - General (Nurse Practitioner)    Chief Complaint/Reason for visit:     Complete heart block      Subjective     59-year-old woman with cirrhosis secondary to chronic hepatitis C, underlying seizure disorder, COPD, chronic pain, atrial fibrillation on amiodarone who presented with unresponsiveness and complete heart block.  There was also a question of aspiration pneumonia.  She was severely bradycardic and was in acute renal failure with a creatinine of 1.47.  Temporary pacing wire was placed.  She was then transferred to [place].    Interval History:     She was initially paced with a rate of 80.  She was arousable and oriented to person and place but forgetful and made confusing statements.  No fever.  Speech therapy evaluated and recommended whole foods thin liquids without restrictions  She was taken to radiology for a diagnostic paracentesis.  60 mL's of serous fluid removed, minimal ascites seen on ultrasound   PICC line placed  She is requesting that her narcotics be restarted for chronic pain and gabapentin for neuropathy    Review of Systems:    All systems were reviewed and negative except as noted in subjective.    Medical history, surgical history, social history, family history reviewed    Objective     Intake/Output:    Intake/Output Summary (Last 24 hours) at 6/6/2020 1228  Last data filed at 6/6/2020 0823  Gross per 24 hour   Intake 50 ml   Output --   Net 50 ml       Nutrition:  NPO Diet    Infusions:         Telemetry: She is currently in sinus rhythm             Vital Signs  Blood pressure 119/65, pulse 59, temperature 97.6 °F (36.4 °C), temperature source Oral, resp. rate 20, height 162.6 cm (64\"), weight 61 kg (134 lb 7.7 oz), SpO2 96 %.    Physical Exam:  General Appearance:   Middle-aged woman in no acute distress   Head:   Atraumatic   Eyes:             Ears:   No jaundice   Throat:  Oral mucosa moist "   Neck:  Right IJ sheath, temporary pacemaker   Back:      Lungs:    Basilar crackles bilaterally    Heart:   Regular, S1, S2 auscultated   Abdomen:    Bowel sounds present, slightly distended, nontender   Rectal:   Deferred   Extremities:  Pretibial edema   Pulses:    Skin:  Warm and dry no rash   Lymph nodes:    Neurologic:  Awake, confused, no focal weakness      Results Review:     I reviewed the patient's new clinical results.   Results from last 7 days   Lab Units 06/06/20  0830 06/05/20  1552 06/05/20  1228   SODIUM mmol/L 138 133* 131*   POTASSIUM mmol/L 3.7 5.2 5.4*   CHLORIDE mmol/L 105 103 101   CO2 mmol/L 18.0* 19.7* 13.7*   BUN mg/dL 21* 18 19   CREATININE mg/dL 1.31* 1.24* 1.47*   CALCIUM mg/dL 8.5* 8.3* 8.2*   BILIRUBIN mg/dL 1.1  --  0.6   ALK PHOS U/L 155*  --  209*   ALT (SGPT) U/L 13  --  13   AST (SGOT) U/L 26  --  27   GLUCOSE mg/dL 60* 168* 269*     Results from last 7 days   Lab Units 06/06/20  1113 06/05/20  1228   WBC 10*3/mm3 16.33* 16.60*   HEMOGLOBIN g/dL 10.6* 13.0   HEMATOCRIT % 32.3* 39.9   PLATELETS 10*3/mm3 113* 165     Results from last 7 days   Lab Units 06/05/20  1238   PH, ARTERIAL pH units 7.385   PO2 ART mm Hg 194.0*   PCO2, ARTERIAL mm Hg 17.8*   HCO3 ART mmol/L 10.6*     Lab Results   Component Value Date    BLOODCX No growth at less than 24 hours 06/05/2020     No results found for: URINECX    I reviewed the patient's new imaging including images and reports.      All medications reviewed.     budesonide-formoterol 2 puff Inhalation BID - RT   famotidine 20 mg Intravenous Q12H   gabapentin 600 mg Oral Q8H   heparin (porcine) 5,000 Units Subcutaneous Q12H   insulin detemir 10 Units Subcutaneous Nightly   insulin regular 0-9 Units Subcutaneous Q6H   lactulose 30 mL Oral BID   lidocaine PF 1% 10 mL Injection Once   piperacillin-tazobactam 3.375 g Intravenous Q8H   sodium chloride 10 mL Intravenous Q12H   sodium chloride 10 mL Intravenous Q12H         Assessment/Plan       Heart  block    Complete heart block (CMS/HCC)    Chronic hepatitis C virus infection with cirrhosis (CMS/HCC)    Secondary esophageal varices without bleeding (CMS/HCC)    Acute renal failure (ARF) (CMS/HCC)    Seizure disorder (CMS/HCC)    Atrial fibrillation (CMS/HCC)    Chronic obstructive pulmonary disease (CMS/HCC)    Diabetes mellitus (CMS/HCC)    Hyperlipidemia    Anxiety and depression    GERD without esophagitis    Lactic acidosis    Unfortunate 59-year-old woman with cirrhosis both from alcohol and hepatitis C, diabetes, dyslipidemia, atrial fibrillation found poorly responsive at her nursing home and complete heart block.  Transvenous temporary pacemaker placed in [place].  Upon arrival here she is in sinus rhythm.  Chest x-ray appears to have pulmonary vascular congestion.  Left ventricular ejection fraction is unknown.  Serum creatinine is 1.31, and oxygenation is adequate on 2 L nasal cannula, 96%.      PLAN:  EP to evaluate today  Resume chronic scheduled narcotics and gabapentin, after confirmed dosing with Nathaniel  Empiric antibiotics for possible aspiration  Long-acting and sliding scale insulin  Monitor electrolytes and replace as needed    VTE Prophylaxis: Subcutaneous heparin    Stress Ulcer Prophylaxis: Samson Farah MD  06/06/20  12:28      Time: 25 minutes

## 2020-06-06 NOTE — CONSULTS
Date of Hospital Visit: 20  Encounter Provider: Rodrick Rossi MD  Place of Service: [facility]  Patient Name: Gretta Giles  :1960  Referral Provider: Julio C Samuels*  Primary Care Provider: Nighat Kincaid APRN    Chief complaint: Bradycardia, AV block, need for permanent pacemaker implant.    History of Present Illness:  The patient is an unfortunate 59-year-old female with past medical history of liver cirrhosis due to chronic hepatitis C, recurrent ascites needing frequent abdominal paracentesis procedures.  She is currently a resident of nursing home due to unable to do to care for herself.  She also has history of hypertension, dyslipidemia, seizure disorder and paroxysmal atrial fibrillation.  She has been on chronic diuretic therapy with Lasix and spironolactone and has been on amiodarone.  It is not clear who her primary cardiologist this, who prescribed amiodarone however from review of her records it is noted that she has not been on any chronic anticoagulation therapy.  The patient was noted to be extremely lethargic and confused at the nursing home.  She was bradycardic and unresponsive.  She was sent to [facility] and was evaluated by cardiology.  Initially patient's heart rate was slow and improved with dopamine.  She was noted to have intermittent AV block and was taken to the cardiac atherogenesis laboratory and underwent placement of a right IJ temporary transvenous pacemaker.  Subsequently she was transferred to [facility] for higher level of care.  Case was discussed with Dr. Marcos regarding need of permanent pacemaker implant.  On my evaluation today patient is awake and alert.  She is denying any chest pain or shortness of breath.  States that she is feeling better.  She attributes her current status to recently started liver medication.  She does not feel that her abdomen is swollen enough to need a paracentesis.  She is not  "currently feeling dizzy or lightheaded.  She has not had any recent palpitations.  She herself did not know that she had atrial fibrillation.  She did not know that she takes a medicine called amiodarone however her medications are given to her by the nursing home staff.  Since admission her heart rate has improved and she has not been pacer dependent overnight.  She denies prior cardiac history of CAD or VHD.    Past Medical History:   Diagnosis Date   • Acute renal failure (ARF) (CMS/[facility])    • Atrial fibrillation (CMS/[facility])    • CHF (congestive heart failure) (CMS/[facility])    • Chronic hyponatremia    • COPD (chronic obstructive pulmonary disease) (CMS/[facility])    • Diabetes mellitus (CMS/HCC)    • Esophageal varices (CMS/[facility])    • Hepatitis C    • History of abdominal paracentesis    • History of transfusion    • Hyperlipidemia    • Hypertension    • Liver cirrhosis (CMS/[facility])     medical record   • Seizure disorder (CMS/[facility])     grand mal   • Sepsis (CMS/[facility])        Past Surgical History:   Procedure Laterality Date   • ANKLE SURGERY     • CHOLECYSTECTOMY      about 20 years ago per patient   • GALLBLADDER SURGERY     • HYSTERECTOMY     • HYSTERECTOMY      about 20 years ago   • KNEE SURGERY     • REPLACEMENT TOTAL KNEE         Medications Prior to Admission   Medication Sig Dispense Refill Last Dose   • cefTRIAXone 250 mg/mL in lidocaine PF 1% injection IM syringe Inject 1 g into the appropriate muscle as directed by prescriber Daily. Nursing home record states \"intramuscular 1G ceftriaxone\", unsure of formulation      • chlorhexidine (PERIDEX) 0.12 % solution Apply 30 mL to the mouth or throat 2 (Two) Times a Day.      • dicyclomine (BENTYL) 10 MG capsule Take 10 mg by mouth Daily As Needed.      • famotidine (PEPCID) 40 MG tablet Take 40 mg by mouth 2 (Two) Times a Day.      • fluticasone (FLOVENT HFA) 44 MCG/ACT inhaler Inhale 2 puffs 2 (Two) Times a Day.      • furosemide (LASIX) 40 MG tablet Take 40 mg by mouth 2 " "(Two) Times a Day.      • gabapentin (NEURONTIN) 600 MG tablet Take 600 mg by mouth 3 (Three) Times a Day.      • [START ON 9/21/2020] Hepatitis B Vac Recombinant (Engerix-B) 20 MCG/ML injection Inject  into the appropriate muscle as directed by prescriber 1 (One) Time.      • insulin glargine (LANTUS) 100 UNIT/ML injection Inject 16 Units under the skin into the appropriate area as directed 2 (Two) Times a Day.      • lactulose (CHRONULAC) 10 GM/15ML solution Take 22.5 mL by mouth 3 (Three) Times a Day. 3 soft bm's a day      • lidocaine (LIDODERM) 5 % Place 1 patch on the skin as directed by provider Daily. Remove & Discard patch within 12 hours or as directed by MD      • melatonin 5 MG tablet tablet Take 5 mg by mouth At Night As Needed.      • oxyCODONE (ROXICODONE) 10 MG tablet Take 10 mg by mouth 4 (Four) Times a Day.      • propranolol (INDERAL) 10 MG tablet Take 10 mg by mouth 2 (Two) Times a Day.      • Sofosbuvir-Velpatasvir (Epclusa) 400-100 MG tablet Take 1 tablet by mouth Daily.      • spironolactone (ALDACTONE) 100 MG tablet Take 100 mg by mouth Daily.      • dextrose (GLUTOSE) 40 % gel Take 1 tube by mouth Every 15 (Fifteen) Minutes As Needed for Low Blood Sugar (Blood Glucose Less Than 70, Patient Alert, Is not NPO, Can Safely Swallow).      • ferrous sulfate 325 (65 FE) MG tablet Take 1 tablet by mouth 3 (Three) Times a Day With Meals. 30 tablet 0 6/5/2020 at Unknown time   • glucose monitor monitoring kit 1 each As Needed (Diabetes mellitus). 1 each 0 Unknown at Unknown time   • insulin aspart (novoLOG) 100 UNIT/ML injection Inject 0-7 Units under the skin into the appropriate area as directed 3 (Three) Times a Day Before Meals.  12    • Insulin Pen Needle (PEN NEEDLES 5/16\") 31G X 8 MM misc 1 each 2 (Two) Times a Day. 100 each 0 Unknown at Unknown time   • ondansetron (ZOFRAN) 4 MG/2ML injection Infuse 2 mL into a venous catheter Every 6 (Six) Hours As Needed for Nausea or Vomiting.    " "      Social History     Socioeconomic History   • Marital status:      Spouse name: Not on file   • Number of children: Not on file   • Years of education: Not on file   • Highest education level: Not on file   Tobacco Use   • Smoking status: Current Every Day Smoker   • Smokeless tobacco: Never Used   Substance and Sexual Activity   • Alcohol use: Yes     Frequency: Never     Comment: lastr drink 1 month ago   • Drug use: Never   • Sexual activity: Defer       REVIEW OF SYSTEMS:     12 point ROS was performed and is Negative except as outlined in HPI     Objective:     Vitals:    06/06/20 0911 06/06/20 1158 06/06/20 1205 06/06/20 1211   BP:  122/69 119/65    BP Location:       Patient Position:       Pulse: 66 56 63 59   Resp: 18 20 20 20   Temp:       TempSrc:       SpO2: 96% 99% 96% 96%   Weight:       Height:         Body mass index is 23.08 kg/m².  Flowsheet Rows      First Filed Value   Admission Height  162.6 cm (64\") Documented at 06/06/2020 0101   Admission Weight  61 kg (134 lb 7.7 oz) Documented at 06/06/2020 0101          Physical Exam   General: No acute distress, well-developed and well-nourished.    Skin: Skin is warm and dry. No obvious cyanosis, erythema or pallor.   HEENT: Atraumatic, normocephalic, no conjunctival pallor, no scleral icterus.   Neck: Supple, no JVD. Normal carotid upstrokes, no bruits.    Chest:No respiratory distres No chest wall tenderness. Breath sounds are normal. No wheezes,  rhonchi or rales.  Cardiovascular: Normal S1 and S2, no murmer, gallop or rub. PMI is not displaced.    Pulses:Radial and pedal pulses are 2+ and symmetric.    Abdomen: Soft, non tender, normal bowel sounds. Slightly distended.  Musculoskeletal/Extremities:  No clubbing, cyanosis or edema. No gross deformity.   Neurological: Alert and oriented to person, place, and time, no gross focal deficits.   Psychiatric: Normal mood and affect.Speech and behavior is normal.    Lab Review:              "   Results from last 7 days   Lab Units 06/06/20  0830   SODIUM mmol/L 138   POTASSIUM mmol/L 3.7   CHLORIDE mmol/L 105   CO2 mmol/L 18.0*   BUN mg/dL 21*   CREATININE mg/dL 1.31*   GLUCOSE mg/dL 60*   CALCIUM mg/dL 8.5*     Results from last 7 days   Lab Units 06/06/20  0830 06/05/20  1552 06/05/20  1228   TROPONIN T ng/mL 0.115* 0.087* 0.102*     Results from last 7 days   Lab Units 06/06/20  1113   WBC 10*3/mm3 16.33*   HEMOGLOBIN g/dL 10.6*   HEMATOCRIT % 32.3*   PLATELETS 10*3/mm3 113*     Results from last 7 days   Lab Units 06/06/20  1113   INR  1.43*     Results from last 7 days   Lab Units 06/06/20  0830   MAGNESIUM mg/dL 2.1         @LABRCNT(bnp)@  Imaging Results (Last 24 Hours)     Procedure Component Value Units Date/Time    CT Guided Paracentesis [[id number]] Resulted:  06/06/20 1224     Updated:  06/06/20 1222    XR Chest 1 View [[id number]] Collected:  06/06/20 0827     Updated:  06/06/20 0829    Narrative:       EXAMINATION: XR CHEST 1 VW-     INDICATION: eval RLL Linfiltrate     COMPARISON: NONE     FINDINGS: A line superimposed over the left upper heart shadow appears  to represent a transvenous pacemaker, via right IJ approach. Heart is  normal in size. There is mild to moderate perihilar edema and small left  effusion, probably also some focal left lower lobe atelectasis. There  appears to be trace fluid or atelectasis associated with the right minor  fissure.. No pneumothorax is seen.          Impression:       1. Pulmonary vascular congestion with mild perihilar edema. Mild left  basilar atelectasis and effusion and trace right midlung atelectasis. No  evidence of pneumothorax.                  EKG: We have only 1 EKG on the chart which shows paced rhythm.  I do not have any rhythm strips or EKG from [facility].       Assessment:   1. Reported severe bradycardia and complete AV block, no rhythm strips available for review.  Currently she is in sinus rhythm.  2. Reported history of  paroxysmal atrial fibrillation, currently in sinus rhythm, she has been on chronic amiodarone therapy.  3. Minimal troponin elevation, probably type II mechanism.    4. History of hypertension, currently she is normotensive.  5. Type 2 diabetes.  6. History of liver cirrhosis due to chronic hepatitis C.  7. History of recurrent ascites managed with chronic diuretic therapy and frequent paracentesis procedures.  8. Stage III kidney disease.    Plan:   1. I decreased her pacemaker backup rate to 50/min, if she does not show any further bradycardia or AV block we can consider stopping tomorrow.  2. Agree with subcu heparin.  3. Do not restart amiodarone or any AV node blocking agents.  4. Echocardiogram to assess her EF in the setting of troponin bump.  5. If she exhibits breakthrough atrial fibrillation with RVR and rate control becomes an issue permanent pacemaker implant can be considered.  6. Thank you for this consultation, we will follow.    Rodrick Rossi MD, FACC, [facility]

## 2020-06-06 NOTE — THERAPY EVALUATION
Acute Care - Speech Language Pathology   Swallow Initial Evaluation [facility]   Clinical Swallow Evaluation     Patient Name: Gretta Giles  : 1960  MRN: [id number]  Today's Date: 2020               Admit Date: 2020    Visit Dx:   No diagnosis found.  Patient Active Problem List   Diagnosis   • Gastroesophageal reflux disease   • Atopic rhinitis   • Chronic low back pain   • Chronic obstructive pulmonary disease (CMS/HCC)   • Chronic obstructive pulmonary disease with acute exacerbation (CMS/HCC)   • Diabetes mellitus (CMS/HCC)   • Diabetic peripheral neuropathy (CMS/HCC)   • Dyslipidemia   • Nonalcoholic fatty liver disease   • Gastrointestinal ulcer due to Helicobacter pylori   • Hypertension   • Menopausal symptom   • Mixed anxiety depressive disorder   • Seasonal allergic rhinitis   • Tobacco dependence syndrome   • Vitamin D deficiency   • Hyperlipidemia   • Anxiety and depression   • Menopause syndrome   • Allergic rhinitis   • Type 2 diabetes mellitus, controlled (CMS/HCC)   • GERD without esophagitis   • Nausea   • Debility   • Other cirrhosis of liver (CMS/HCC)   • Chronic hepatitis C virus infection with cirrhosis (CMS/HCC)   • Secondary esophageal varices without bleeding (CMS/HCC)   • Other ascites   • Complete heart block (CMS/HCC)   • Lactic acidosis   • Acute renal failure (ARF) (CMS/HCC)   • Seizure disorder (CMS/HCC)   • Atrial fibrillation (CMS/HCC)   • Heart block     Past Medical History:   Diagnosis Date   • Acute renal failure (ARF) (CMS/HCC)    • Atrial fibrillation (CMS/HCC)    • CHF (congestive heart failure) (CMS/HCC)    • Chronic hyponatremia    • COPD (chronic obstructive pulmonary disease) (CMS/HCC)    • Diabetes mellitus (CMS/HCC)    • Esophageal varices (CMS/HCC)    • Hepatitis C    • History of abdominal paracentesis    • History of transfusion    • Hyperlipidemia    • Hypertension    • Liver cirrhosis (CMS/HCC)     medical record   • Seizure disorder (CMS/HCC)      grand mal   • Sepsis (CMS/HCC)      Past Surgical History:   Procedure Laterality Date   • ANKLE SURGERY     • CHOLECYSTECTOMY      about 20 years ago per patient   • GALLBLADDER SURGERY     • HYSTERECTOMY     • HYSTERECTOMY      about 20 years ago   • KNEE SURGERY     • REPLACEMENT TOTAL KNEE          SWALLOW EVALUATION (last 72 hours)      SLP Adult Swallow Evaluation     Row Name 06/06/20 8453                   Rehab Evaluation    Document Type  evaluation  -SA        Subjective Information  no complaints  -SA        Patient Observations  alert;cooperative  -SA        Patient/Family Observations  No family present  -SA        Patient Effort  good  -SA           General Information    Patient Profile Reviewed  yes  -SA        Pertinent History Of Current Problem  59 y.o F w/ PMH significant for alcohol cirrhosis, type 2 diabetes mellitus, COPD, hypertension, hyperlipidemia, and seizure disorder. Atrial fib. CXR: Mild left basilar atelectasis.   -SA        Current Method of Nutrition  NPO  -SA        Precautions/Limitations, Vision  WFL;for purposes of eval  -SA        Precautions/Limitations, Hearing  WFL;for purposes of eval  -SA        Prior Level of Function-Communication  other (see comments) Baseline unknown  -SA        Prior Level of Function-Swallowing  no diet consistency restrictions  -SA        Plans/Goals Discussed with  patient  -SA        Barriers to Rehab  none identified  -SA           Pain Assessment    Additional Documentation  Pain Scale: FACES Pre/Post-Treatment (Group)  -SA           Pain Scale: FACES Pre/Post-Treatment    Pain: FACES Scale, Pretreatment  0-->no hurt  -SA        Pain: FACES Scale, Post-Treatment  0-->no hurt  -SA           Oral Motor and Function    Dentition Assessment  upper dentures/partial in place  -SA        Secretion Management  WNL/WFL  -SA        Mucosal Quality  moist, healthy  -SA        Volitional Swallow  WFL  -SA           Oral Musculature and Cranial Nerve  Assessment    Oral Motor General Assessment  WFL  -SA           General Eating/Swallowing Observations    Respiratory Support Currently in Use  room air  -SA        Eating/Swallowing Skills  self-fed;fed by SLP  -SA        Positioning During Eating  upright 90 degree;upright in bed  -SA        Utensils Used  spoon;cup;straw  -SA        Consistencies Trialed  thin liquids;pureed;regular textures  -SA           Clinical Swallow Eval    Oral Prep Phase  impaired  -SA        Oral Transit  --  -SA        Pharyngeal Phase  no overt signs/symptoms of pharyngeal impairment  -SA        Clinical Swallow Evaluation Summary  Clinical swallow evaluation completed. Pt given ice chips, thin via tsp/cup/straw, puree, and regular solid. Prolonged but adequate mastication w/ regular solids. No s/sxs of aspiration noted across trials. Discussed regular vs. soft solids w/ pt to ask for preference, pt prefers soft solids. Recommend soft, whole diet/thin liquids.   -SA           Oral Prep Concerns    Oral Prep Concerns  prolonged mastication;other (see comments) Prolonged but adequate  -SA        Prolonged Mastication  regular consistencies  -SA           Clinical Impression    SLP Swallowing Diagnosis  functional oral phase;other (see comments) No s/sxs of aspiration   -SA        Functional Impact  no impact on function  -SA        Swallow Criteria for Skilled Therapeutic Interventions Met  no problems identified which require skilled intervention  -SA           Recommendations    SLP Diet Recommendation  soft textures;other (see comments);whole;thin liquids Soft whole per pt preference   -SA        Recommended Precautions and Strategies  upright posture during/after eating  -SA        SLP Rec. for Method of Medication Administration  meds whole;with thin liquids;with pudding or applesauce;as tolerated  -SA        Monitor for Signs of Aspiration  yes;notify SLP if any concerns  -SA        Anticipated Dischage Disposition (SLP)   unknown;anticipate therapy at next level of care  -          User Key  (r) = Recorded By, (t) = Taken By, (c) = Cosigned By    Initials Name Effective Dates    Cristobal Koehler MS CCC-SLP 03/16/20 -           EDUCATION  The patient has been educated in the following areas:   Dysphagia (Swallowing Impairment).    SLP Recommendation and Plan  SLP Swallowing Diagnosis: functional oral phase, other (see comments)(No s/sxs of aspiration )  SLP Diet Recommendation: soft textures, other (see comments), whole, thin liquids(Soft whole per pt preference )  Recommended Precautions and Strategies: upright posture during/after eating  SLP Rec. for Method of Medication Administration: meds whole, with thin liquids, with pudding or applesauce, as tolerated     Monitor for Signs of Aspiration: yes, notify SLP if any concerns     Swallow Criteria for Skilled Therapeutic Interventions Met: no problems identified which require skilled intervention  Anticipated Dischage Disposition (SLP): unknown, anticipate therapy at next level of care                Plan of Care Reviewed With: patient           Time Calculation:   Time Calculation- SLP     Row Name 06/06/20 1042             Time Calculation- SLP    SLP Start Time  0930  -      SLP Received On  06/06/20  -        User Key  (r) = Recorded By, (t) = Taken By, (c) = Cosigned By    Initials Name Provider Type    Cristobal Koehler MS CCC-SLP Speech and Language Pathologist          Therapy Charges for Today     Code Description Service Date Service Provider Modifiers Qty    [id number] HC ST EVAL ORAL PHARYNG SWALLOW 3 6/6/2020 Cristobal Pitts MS CCC-SLP GN 1               Cristobal Pitts MS CCC-ALESSANDRA  6/6/2020

## 2020-06-06 NOTE — POST-PROCEDURE NOTE
Interventional Radiology Operative Note    Date: 06/06/20     Time: 12:19     Pre-op Diagnosis: Cirrhosis. Ascites. 3rd degree AV block. Lactic acidosis.  Post-op Diagnosis: Same    Procedure: CT guided paracentesis    Surgeon: MARGARITA Mcclain M.D.  Assistants: None    Sedation: None    Estimated Blood Loss (EBL): Trace     Urine Output (UOP): N/A (short procedure)    IVF: N/A (short procedure)    Findings: Small volume ascites    Specimens: 60 mL clear yellow fluid. Sent for requested laboratory analysis.    Complications: No immediate    Disposition: Back to ICU room. Stable

## 2020-06-07 NOTE — PROGRESS NOTES
"[facility]  IP Progress Note      Chief Complaint: Follow-up of bradycardia, AV block, need for permanent pacemaker implant.    Subjective   Feels well, no chest pain or shortness of breath.  States that she has a little more energy.  ECG from [facility] not in epic, shows severe bradycardia, cannot rule out high-grade AV block.  Here she has not required any pacing for almost 48 hours.    Objective     Blood pressure 119/98, pulse (!) 49, temperature 97.7 °F (36.5 °C), temperature source Oral, resp. rate 14, height 162.6 cm (64\"), weight 61 kg (134 lb 7.7 oz), SpO2 95 %.     Intake/Output Summary (Last 24 hours) at 6/7/2020 0853  Last data filed at 6/7/2020 0812  Gross per 24 hour   Intake 177.8 ml   Output 550 ml   Net -372.2 ml       Physical Exam:  General: No acute distress.   Neck: no JVD.  Chest:No respiratory distress, breath sounds are normal. No wheezes,  rhonchi or rales.  Cardiovascular: Normal S1 and S2, no murmer, gallop or rub.    Extremities: No edema.    Results Review:     I reviewed the patient's new clinical results.    Results from last 7 days   Lab Units 06/07/20  0437   WBC 10*3/mm3 9.06   HEMOGLOBIN g/dL 10.8*   HEMATOCRIT % 34.1   PLATELETS 10*3/mm3 90*     Results from last 7 days   Lab Units 06/07/20  0437   SODIUM mmol/L 135*   POTASSIUM mmol/L 3.9   CHLORIDE mmol/L 105   CO2 mmol/L 17.0*   BUN mg/dL 25*   CREATININE mg/dL 1.55*   CALCIUM mg/dL 8.1*   BILIRUBIN mg/dL 0.8   ALK PHOS U/L 143*   ALT (SGPT) U/L 12   AST (SGOT) U/L 23   GLUCOSE mg/dL 101*     Results from last 7 days   Lab Units 06/07/20  0437   SODIUM mmol/L 135*   POTASSIUM mmol/L 3.9   CHLORIDE mmol/L 105   CO2 mmol/L 17.0*   BUN mg/dL 25*   CREATININE mg/dL 1.55*   GLUCOSE mg/dL 101*   CALCIUM mg/dL 8.1*     Results from last 7 days   Lab Units 06/07/20  0437 06/06/20  1113   INR  1.40* 1.43*     Lab Results   Component Value Date    TROPONINT 0.115 (C) 06/06/2020     Results " from last 7 days   Lab Units 06/06/20  0830   TSH uIU/mL 2.910               Tele: Sinus Rythym  Echocardiogram: EF 55%, mild to moderate mitral and tricuspid regurgitation, RVSP 34 mmHg.    Assessment:  1. Severe bradycardia and probable complete AV block, currently sinus rhythm with normal rate.  2. Reported history of paroxysmal atrial fibrillation, currently in sinus rhythm, she has been on chronic amiodarone therapy.  3. Minimal troponin elevation, probably type II mechanism.    4. History of hypertension, currently she is normotensive.  5. Type 2 diabetes.  6. History of liver cirrhosis due to chronic hepatitis C.  7. History of recurrent ascites managed with chronic diuretic therapy and frequent paracentesis procedures.  8. Stage III kidney disease.    Plan:  1. Suspect amiodarone toxicity in the setting of chronic kidney disease and liver disease may have contributed to her severe bradycardia.  She is now off amiodarone and her heart rate and rhythm has normalized.  2. We will watch in the hospital another day, keep off all antiarrhythmics and AV node blocking agents.    3. If she has breakthrough atrial fibrillation requiring rate slowing medications she will definitely need a permanent pacemaker implant otherwise she can be observed and further evaluated with a long-term cardiac monitor.  4. Can be transferred to telemetry.  5. Dr. Flores to see tomorrow for further evaluation of need of pacemaker implant.    Rodrick Rossi MD, FACC, [facility]

## 2020-06-07 NOTE — PLAN OF CARE
Problem: Patient Care Overview  Goal: Plan of Care Review  Outcome: Ongoing (interventions implemented as appropriate)  Flowsheets  Taken 6/7/2020 1819  Progress: improving  Outcome Summary: TVP d/c'd per Aslam. Sheath pulled. HR in 40s-60s. Up to chair. Renal ultrasound ordered for tomorrow. Oriented for shift. VSS.  Taken 6/7/2020 0800  Plan of Care Reviewed With: patient

## 2020-06-07 NOTE — PLAN OF CARE
Problem: Patient Care Overview  Goal: Plan of Care Review  Flowsheets  Taken 6/7/2020 0417  Progress: improving  Taken 6/7/2020 0400  Plan of Care Reviewed With: patient  Note:   Pt rested well tonight, has remained fully cogent.  Levemir held tonight as it was reported by her daughter that pt baseline blood sugar is 250 to 300 and anytime her glucose drops below 150 she becomes confused and lethargic.  At the time her glucose was 217.  Oxycodone given for chronic back pain, melatonin given for sleep.  Pt remained in sinus rhythm, however when fully asleep her EKG displayed a heart rate which would drop as low as 47 and would remain as such for several minutes while the pacer would not pacer her over 50.  Pt unable to produce sputum.  No distress has been appreciated.

## 2020-06-07 NOTE — NURSING NOTE
Prior to central line removal, order for the removal of catheter was verified, patient was assessed, necessary materials were gathered and patient educated.    Patient was positioned supine to ensure that the insertion site was at or below the level of the heart.    Hands were washed, clean gloves were applied and central line dressing was gently removed. Catheter exit site was not cultured.     A new pair of clean gloves were then applied. Insertion site was cleansed with 2% Chlorhexidine swab using a circular motion beginning at the insertion site and moving outward for 30 seconds and allowed to dry.     Clamp on line not present.     Patient performed valsalva maneuver.     The central line was grasped at the insertion site and slowly pulled outward parallel to the skin. Resistance not met.    After central line was completely removed, a sterile 4x4 gauze pad was used to apply light pressure until bleeding stopped. At that time, petroleum-based gauze and a sterile occlusive dressing was applied to exit site.     Patient was instructed to keep dressing in place for at least 24 hours and lay flat for 30 minutes.     Catheter was inspected after removal and intact. Tip of central line was not sent for culture. Patient tolerated procedure.

## 2020-06-07 NOTE — PROGRESS NOTES
Case Management Discharge Note                Destination - Selection Complete      Service Provider Request Status Selected Services Address Phone Number Fax Number    [facility] [address] [address] [phone] --      Durable Medical Equipment      No service has been selected for the patient.      Dialysis/Infusion      No service has been selected for the patient.      Home Medical Care      No service has been selected for the patient.      Therapy      No service has been selected for the patient.      Community Resources      No service has been selected for the patient.        Transportation Services  Ambulance: [facility]    Final Discharge Disposition Code: 02 - [facility]

## 2020-06-07 NOTE — PROGRESS NOTES
"Critical Care Note     LOS: 1 day   Patient Care Team:  Nighat Kincaid APRN as PCP - General (Nurse Practitioner)    Chief Complaint/Reason for visit:     Complete heart block      Subjective     59-year-old woman with cirrhosis secondary to chronic hepatitis C, underlying seizure disorder, COPD, chronic pain, atrial fibrillation on amiodarone who presented with unresponsiveness and complete heart block.  There was also a question of aspiration pneumonia.  She was severely bradycardic and was in acute renal failure with a creatinine of 1.47.  Temporary pacing wire was placed.  She was then transferred to [place].  Upon arrival in [place] she was in sinus rhythm.  She had no further arrhythmias    Interval History:     She admits to a nonproductive cough.  At her nursing home she will smoke 2 to 3 cigarettes/day outside.  Room air saturation 93%.  Urine output 550 mL's plus unmeasured    Review of Systems:    All systems were reviewed and negative except as noted in subjective.    Medical history, surgical history, social history, family history reviewed    Objective     Intake/Output:    Intake/Output Summary (Last 24 hours) at 6/7/2020 1026  Last data filed at 6/7/2020 0812  Gross per 24 hour   Intake 177.8 ml   Output 550 ml   Net -372.2 ml       Nutrition:  Diet Soft Texture; Whole Foods; Thin; Consistent Carbohydrate    Infusions:         Telemetry: She is currently in sinus rhythm             Vital Signs  Blood pressure 103/79, pulse 53, temperature 97.7 °F (36.5 °C), temperature source Oral, resp. rate 16, height 162.6 cm (64\"), weight 61 kg (134 lb 7.7 oz), SpO2 95 %.    Physical Exam:  General Appearance:   Middle-aged woman in no acute distress   Head:   Atraumatic   Eyes:             Ears:   No jaundice   Throat:  Oral mucosa moist   Neck:  Right IJ site dressing intact without bleeding   Back:      Lungs:    Decreased breath sounds left base, end expiratory wheeze anteriorly    Heart:   Regular, S1, " S2 auscultated   Abdomen:    Bowel sounds present, slightly distended, nontender   Rectal:   Deferred   Extremities:  Pretibial edema   Pulses:    Skin:  Warm and dry no rash   Lymph nodes:    Neurologic:  Awake, less confused, no focal weakness      Results Review:     I reviewed the patient's new clinical results.   Results from last 7 days   Lab Units 06/07/20  0437 06/06/20  0830 06/05/20  1552 06/05/20  1228   SODIUM mmol/L 135* 138 133* 131*   POTASSIUM mmol/L 3.9 3.7 5.2 5.4*   CHLORIDE mmol/L 105 105 103 101   CO2 mmol/L 17.0* 18.0* 19.7* 13.7*   BUN mg/dL 25* 21* 18 19   CREATININE mg/dL 1.55* 1.31* 1.24* 1.47*   CALCIUM mg/dL 8.1* 8.5* 8.3* 8.2*   BILIRUBIN mg/dL 0.8 1.1  --  0.6   ALK PHOS U/L 143* 155*  --  209*   ALT (SGPT) U/L 12 13  --  13   AST (SGOT) U/L 23 26  --  27   GLUCOSE mg/dL 101* 60* 168* 269*     Results from last 7 days   Lab Units 06/07/20  0437 06/06/20  1113 06/05/20  1228   WBC 10*3/mm3 9.06 16.33* 16.60*   HEMOGLOBIN g/dL 10.8* 10.6* 13.0   HEMATOCRIT % 34.1 32.3* 39.9   PLATELETS 10*3/mm3 90* 113* 165     Results from last 7 days   Lab Units 06/05/20  1238   PH, ARTERIAL pH units 7.385   PO2 ART mm Hg 194.0*   PCO2, ARTERIAL mm Hg 17.8*   HCO3 ART mmol/L 10.6*     Lab Results   Component Value Date    BLOODCX No growth at 24 hours 06/05/2020   Peritoneal fluid culture no growth  Sputum culture pending  COVID-19 negative    No results found for: URINECX    I reviewed the patient's new imaging including images and reports.     COMPARISON: 06/06/2020     FINDINGS: Right IJ approach transvenous pacemaker is stable in position.  The heart is normal in size. There is improved perihilar disease,  presumably resolving interstitial edema. There is persistent small left  effusion. No pneumothorax or other new chest disease is seen. Left upper  extremity PICC line has been placed, tip in the proximal to mid SVC.        IMPRESSION:  1. Improving perihilar edema.  2. Stable left basilar  effusion.     Interpretation Summary     · Left atrial cavity size is borderline dilated.  · Left ventricular systolic function is normal. Estimated EF = 55%.  · Mild-to-moderate mitral valve regurgitation is present  · Mild to moderate tricuspid valve regurgitation is present.  · Calculated right ventricular systolic pressure from tricuspid regurgitation is 34 mmHg.         All medications reviewed.     budesonide-formoterol 2 puff Inhalation BID - RT   [START ON 6/8/2020] famotidine 20 mg Intravenous Daily   gabapentin 600 mg Oral Q8H   heparin (porcine) 5,000 Units Subcutaneous Q12H   insulin detemir 10 Units Subcutaneous Nightly   insulin regular 0-9 Units Subcutaneous 4x Daily AC & at Bedtime   lactulose 30 mL Oral BID   lidocaine PF 1% 10 mL Injection Once   piperacillin-tazobactam 3.375 g Intravenous Q8H   sodium chloride 10 mL Intravenous Q12H   sodium chloride 10 mL Intravenous Q12H         Assessment/Plan       Heart block    Complete heart block (CMS/HCC)    Chronic hepatitis C virus infection with cirrhosis (CMS/HCC)    Secondary esophageal varices without bleeding (CMS/HCC)    Acute renal failure (ARF) (CMS/HCC)    Seizure disorder (CMS/HCC)    Atrial fibrillation (CMS/HCC)    Chronic obstructive pulmonary disease (CMS/HCC)    Diabetes mellitus (CMS/HCC)    Hyperlipidemia    Anxiety and depression    GERD without esophagitis    Lactic acidosis    Unfortunate 59-year-old woman with cirrhosis both from alcohol and hepatitis C, diabetes, dyslipidemia, atrial fibrillation found poorly responsive at her nursing home and complete heart block.  Transvenous temporary pacemaker placed in [place].  Upon arrival here she is in sinus rhythm.  Chest x-ray appears to have pulmonary vascular congestion.  Left ventricular ejection fraction is 55% with mild to moderate mitral regurgitation and an RV systolic pressure of 34.  Cardiology removed her pacemaker wire today.  She is sinus bradycardia 50 to 60 bpm.  Serum  creatinine is continuing to climb and is 1.55.  Diuretics are on hold.  On room air her saturations 93%      PLAN:  EP to evaluate tomorrow  chronic scheduled narcotics and gabapentin ordered, after confirmed dosing with Nathaniel  Empiric antibiotics for possible aspiration  Follow-up respiratory culture  Long-acting and sliding scale insulin  Monitor electrolytes and replace as needed  Urine eosinophils  Renal ultrasound   Urinalysis  1 L of saline    VTE Prophylaxis: Subcutaneous heparin    Stress Ulcer Prophylaxis: Peppardeep Farah MD  06/07/20  10:26      Time: 25 minutes

## 2020-06-08 NOTE — PROGRESS NOTES
Discharge Planning Assessment  [facility]     Patient Name: Gretta Glies  MRN: [id number]  Today's Date: 6/8/2020    Admit Date: 6/6/2020    Discharge Needs Assessment     Row Name 06/08/20 1051       Living Environment    Lives With  facility resident    Current Living Arrangements  extended care facility Been at [facility] in [place]    Duration at Residence  2-3 months    Primary Care Provided by  other (see comments)    Provides Primary Care For  no one, unable/limited ability to care for self    Family Caregiver if Needed  none    Quality of Family Relationships  unable to assess    Able to Return to Prior Arrangements  other (see comments)       Resource/Environmental Concerns    Resource/Environmental Concerns  none    Transportation Concerns  other (see comments)       Transition Planning    Patient/Family Anticipates Transition to  long term care facility    Patient/Family Anticipated Services at Transition      Transportation Anticipated  health plan transportation Possible ambulance vs facility. Pt reports her daughter works 12 hour shifts in [place] and can't get off work to transport.        Discharge Needs Assessment    Concerns to be Addressed  discharge planning    Equipment Currently Used at Home  walker, rolling;wheelchair All other DME per facility    Anticipated Changes Related to Illness  none    Equipment Needed After Discharge  none    Discharge Facility/Level of Care Needs  nursing facility, intermediate    Provided Post Acute Provider List?  Refused    Refused Provider List Comment  Wants to go back to [facility]    Current Discharge Risk  dependent with mobility/activities of daily living;chronically ill        Discharge Plan     Row Name 06/08/20 1056       Plan    Plan  Back to [facility] long term care    Patient/Family in Agreement with Plan  yes    Plan Comments  Met with pt at . Pt states she has been at [facility] for the last few months. She was getting therapy and states she will be living there since no one in her family can help care for her. She states she uses a RW and WC and does receive some assistance if needed to bathe. Called and left a voicemail for admissions at [facility] to call CM to verify any bed holds and if pt may return at d/c. Pt doesn't think her daughter can transport her back to the facility due to working so she may need an ambulance or lyft to return if [facility] cannot transport back to the facility. CM will cont to follow for d/c needs. Idania Dimas RN, CM ext [phone]    1100: Received call from Hermelinda at [facility] and the patient has Medicaid bed hold days. Pt was at facility for STR for a short time and unsure if she will be returning as long term, but she has been there for a few months. They are able to accept the pt back when medically ready but are not able to transport. If pt doesn't qualify for an ambulance she may need a lyft since pt reports her daughter cannot. She has a sister but unsure she could transport her.     Final Discharge Disposition Code  30 - still a patient        Destination      Service Provider Request Status Selected Services Address Phone Number Fax Number    [facility] Pending - No Request Sent N/A [address] [facility] [address] [phone]      Durable Medical Equipment      Coordination has not been started for this encounter.      Dialysis/Infusion      Coordination has not been started for this encounter.      Home Medical Care      Coordination has not been started for this encounter.      Therapy      Coordination has not been started for this encounter.      Community Resources      Coordination has not been started for this encounter.        Expected Discharge Date and Time     Expected Discharge Date Expected Discharge Time    Jun 12, 2020         Demographic Summary     Row Name 06/08/20 5231       General Information     Referral Source  admission list    Preferred Language  English     Used During This Interaction  no    General Information Comments  PCP is Nighat Kincaid       Contact Information    Permission Granted to Share Info With  ;family/designee Lorna Carlos(dtr) POA        Functional Status     Row Name 06/08/20 1051       Functional Status    Usual Activity Tolerance  fair    Current Activity Tolerance  fair       Functional Status, IADL    Medications  completely dependent    Meal Preparation  completely dependent    Housekeeping  completely dependent    Laundry  completely dependent    Shopping  completely dependent       Employment/    Employment/ Comments  Has [employer] Medicaid. Meds provided by facility        Psychosocial    No documentation.       Abuse/Neglect    No documentation.       Legal    No documentation.       Substance Abuse    No documentation.       Patient Forms    No documentation.           Idania Dimas RN

## 2020-06-08 NOTE — PROGRESS NOTES
CARDIOLOGY PROGRESS NOTE           6/8/2020 08:14    Admit Date: 6/6/2020    Admit Diagnosis: Heart block [I45.9]    Chief Complaint: Follow up for Afib, Bradycardia    Subjective:   Patient's status has remained cardiac stable.  No chest pain or sob. No significant bradycardia or AV Block seen      ROS:  GEN:  No fever or chills  Cardiovascular:  No CP or SOB  Pulmonary:  No Cough  Neuro:  No new focal motor or sensory loss      Objective:     Vitals:    06/08/20 0600 06/08/20 0700 06/08/20 0740 06/08/20 0757   BP: 109/74 111/76     BP Location: Right arm Right arm     Patient Position: Lying Lying     Pulse: 59 55 55 70   Resp: 14 16     Temp:    98 °F (36.7 °C)   TempSrc:    Oral   SpO2: 94% 94% 94% 97%   Weight:       Height:           Physical Exam:  General-Well Nourished, Well developed  Eyes - PERRLA  Neck- supple, No mass  CV- regular rate and rhythm, no MRG  Lung- clear bilaterally  Abd- soft, +BS  Musc/skel - Norm strength and range of motion  Skin- warm and dry  Neuro - Alert & Oriented x 3, appropriate mood.        Current Facility-Administered Medications:   •  budesonide-formoterol (SYMBICORT) 160-4.5 MCG/ACT inhaler 2 puff, 2 puff, Inhalation, BID - RT, Jose Luis Lucero APRN, 2 puff at 06/08/20 0740  •  dextrose (D50W) 25 g/ 50mL Intravenous Solution 25 g, 25 g, Intravenous, Q15 Min PRN, Jose Luis Lucero APRN  •  dextrose (GLUTOSE) oral gel 15 g, 15 g, Oral, Q15 Min PRN, Jose Luis Lucero APRN  •  famotidine (PEPCID) injection 20 mg, 20 mg, Intravenous, Daily, So Farah MD, 20 mg at 06/08/20 0800  •  gabapentin (NEURONTIN) capsule 600 mg, 600 mg, Oral, Q8H, So Farah MD, 600 mg at 06/08/20 0624  •  glucagon (human recombinant) (GLUCAGEN DIAGNOSTIC) injection 1 mg, 1 mg, Subcutaneous, Q15 Min PRN, Jose Luis Lucero APRN  •  heparin (porcine) 5000 UNIT/ML injection 5,000 Units, 5,000 Units, Subcutaneous, Q12H, Jose Luis Lucero, MASSIEL, 5,000 Units at 06/07/20 2034  •   insulin detemir (LEVEMIR) injection 10 Units, 10 Units, Subcutaneous, Nightly, Jose Luis Lucero APRN, 10 Units at 06/07/20 2035  •  insulin lispro (humaLOG) injection 0-9 Units, 0-9 Units, Subcutaneous, 4x Daily With Meals & Nightly, Mariela Staley APRN  •  ipratropium-albuterol (DUO-NEB) nebulizer solution 3 mL, 3 mL, Nebulization, Q4H PRN, Jose Luis Lucero APRN  •  lactulose (CHRONULAC) 10 GM/15ML solution 30 mL, 30 mL, Oral, BID, Julio C Samuels DO, 30 mL at 06/08/20 0800  •  lidocaine PF 1% (XYLOCAINE) injection 10 mL, 10 mL, Injection, Once, Julio C Samuels DO  •  melatonin tablet 5 mg, 5 mg, Oral, Nightly PRN, Katerin Alexis APRN, 5 mg at 06/06/20 2120  •  oxyCODONE (ROXICODONE) immediate release tablet 10 mg, 10 mg, Oral, Q8H PRN, So Farah MD, 10 mg at 06/08/20 0624  •  piperacillin-tazobactam (ZOSYN) 3.375 g in iso-osmotic dextrose 50 ml (premix), 3.375 g, Intravenous, Q8H, Jose Luis Lucero APRN, 3.375 g at 06/08/20 0757  •  sodium chloride 0.9 % flush 10 mL, 10 mL, Intravenous, Q12H, Julio C Samuels DO, 10 mL at 06/08/20 0800  •  sodium chloride 0.9 % flush 10 mL, 10 mL, Intravenous, PRN, Julio C Samuels DO, 10 mL at 06/08/20 0803      Data Review:   Recent Results (from the past 24 hour(s))   Respiratory Culture - Sputum, Cough    Collection Time: 06/07/20  8:31 AM   Result Value Ref Range    Gram Stain Few (2+) Epithelial cells per low power field     Gram Stain Few (2+) WBCs per low power field     Gram Stain       Few (2+) Mixed bacterial morphotypes seen on Gram Stain   POC Glucose Once    Collection Time: 06/07/20 11:39 AM   Result Value Ref Range    Glucose 206 (H) 70 - 130 mg/dL   Eosinophil Smear - Urine, Urine, Clean Catch    Collection Time: 06/07/20  1:02 PM   Result Value Ref Range    Eosinophil Smear 0 0 - 0 % EOS/100 Cells   Urinalysis With Culture If Indicated - Urine, Clean Catch    Collection Time: 06/07/20   1:02 PM   Result Value Ref Range    Color, UA Yellow Yellow, Straw    Appearance, UA Cloudy (A) Clear    pH, UA 6.0 5.0 - 8.0    Specific Gravity, UA 1.026 1.001 - 1.030    Glucose, UA Negative Negative    Ketones, UA Negative Negative    Bilirubin, UA Negative Negative    Blood, UA Large (3+) (A) Negative    Protein,  mg/dL (2+) (A) Negative    Leuk Esterase, UA Moderate (2+) (A) Negative    Nitrite, UA Negative Negative    Urobilinogen, UA 1.0 E.U./dL 0.2 - 1.0 E.U./dL   Urinalysis, Microscopic Only - Urine, Clean Catch    Collection Time: 06/07/20  1:02 PM   Result Value Ref Range    RBC, UA Too Numerous to Count (A) None Seen, 0-2 /HPF    WBC, UA 21-30 (A) None Seen, 0-2 /HPF    Bacteria, UA None Seen None Seen, Trace /HPF    Squamous Epithelial Cells, UA 13-20 (A) None Seen, 0-2 /HPF    Yeast, UA Large/3+ Budding Yeast None Seen /HPF    Hyaline Casts, UA None Seen 0 - 6 /LPF    Methodology Manual Light Microscopy    POC Glucose Once    Collection Time: 06/07/20  4:47 PM   Result Value Ref Range    Glucose 67 (L) 70 - 130 mg/dL   POC Glucose Once    Collection Time: 06/07/20  5:24 PM   Result Value Ref Range    Glucose 136 (H) 70 - 130 mg/dL   POC Glucose Once    Collection Time: 06/07/20  8:18 PM   Result Value Ref Range    Glucose 242 (H) 70 - 130 mg/dL   CBC (No Diff)    Collection Time: 06/08/20  6:28 AM   Result Value Ref Range    WBC 9.29 3.40 - 10.80 10*3/mm3    RBC 3.37 (L) 3.77 - 5.28 10*6/mm3    Hemoglobin 10.5 (L) 12.0 - 15.9 g/dL    Hematocrit 32.1 (L) 34.0 - 46.6 %    MCV 95.3 79.0 - 97.0 fL    MCH 31.2 26.6 - 33.0 pg    MCHC 32.7 31.5 - 35.7 g/dL    RDW 16.3 (H) 12.3 - 15.4 %    RDW-SD 57.1 (H) 37.0 - 54.0 fl    MPV 10.6 6.0 - 12.0 fL    Platelets 82 (L) 140 - 450 10*3/mm3   Comprehensive Metabolic Panel    Collection Time: 06/08/20  6:28 AM   Result Value Ref Range    Glucose 52 (L) 65 - 99 mg/dL    BUN 19 6 - 20 mg/dL    Creatinine 1.24 (H) 0.57 - 1.00 mg/dL    Sodium 131 (L) 136 - 145  mmol/L    Potassium 4.2 3.5 - 5.2 mmol/L    Chloride 103 98 - 107 mmol/L    CO2 17.0 (L) 22.0 - 29.0 mmol/L    Calcium 8.0 (L) 8.6 - 10.5 mg/dL    Total Protein 6.4 6.0 - 8.5 g/dL    Albumin 2.50 (L) 3.50 - 5.20 g/dL    ALT (SGPT) 11 1 - 33 U/L    AST (SGOT) 20 1 - 32 U/L    Alkaline Phosphatase 134 (H) 39 - 117 U/L    Total Bilirubin 0.8 0.2 - 1.2 mg/dL    eGFR Non African Amer 45 (L) >60 mL/min/1.73    Globulin 3.9 gm/dL    A/G Ratio 0.6 g/dL    BUN/Creatinine Ratio 15.3 7.0 - 25.0    Anion Gap 11.0 5.0 - 15.0 mmol/L   POC Glucose Once    Collection Time: 06/08/20  7:09 AM   Result Value Ref Range    Glucose 54 (L) 70 - 130 mg/dL       Assessment/Plan:   1.Severe bradycardia  in setting of hyperkalemia and acidosis, early tachybrady syndrome. Temporary pacing wire placed and now discontinued. Improved off Amiodarone.   2.  Paroxysmal atrial fibrillation-treated in the past with amiodarone Amiodarone therapy for >3years, Now discontinued- Followed by Dr. Patrick.  [facility] Records indicate patient not good candidate for anticoagulation secondary to fall risk.    3. Elevated troponin Type II mechanism followed by Dr. Rossi  4. Liver cirrhosis, ETOH abuse, chronic hepatitis C. History of paracentesis in past.   5. ARF, Improving  6. COPD/possible aspiration PNA  7. Echocardiogram: Moderate MR, EF 55%  8. DNR status       Electronically signed by VIRGILIO Adame, 06/08/20, 8:46 AM.    I have personally performed a face to face diagnostic evaluation on this patient.  I have reviewed and agree with the care plan.  History and Exam by me shows: 59-year-old patient with multiple medical issues developed significant bradycardia but has now resolved    Vitals:    06/08/20 1500   BP: 107/89   Pulse: 65   Resp:    Temp:    SpO2: 96%    General-Well Nourished, Well developed  Eyes - PERRLA  Neck- supple, No mass  CV- regular rate and rhythm, no MRG, No edema  Lung- clear bilaterally  Abd- soft, +BS  Musc/skel - Norm strength  and range of motion  Skin- warm and dry  Neuro - Alert & Oriented x 3, appropriate mood.    A/P:  1.  Tachybradycardia syndrome-has now improved off of amiodarone  2.  Atrial fibrillation-currently normal sinus rhythm will continue to monitor    Devan Carreno M.D., ZENIA, F.H.R.S.  Cardiology/Electrophysiology  6/8/2020  17:48

## 2020-06-08 NOTE — PLAN OF CARE
Problem: Patient Care Overview  Goal: Plan of Care Review  Outcome: Ongoing (interventions implemented as appropriate)  Flowsheets  Taken 6/8/2020 1512  Progress: improving  Outcome Summary: VSS. Remains on RA. Continues to c/o chronic pain. PRN's used. Renal US done. Monitor showing SR throughout shift. Being transfered to . Message left on daughter's  phone of transfer.  Taken 6/8/2020 1402  Plan of Care Reviewed With: patient

## 2020-06-08 NOTE — PROGRESS NOTES
"INTENSIVIST NOTE     Hospital Day: 2    Ms. Gretta Giles, 59 y.o. female is followed for:   Complete heart block       SUBJECTIVE     59-year-old female admitted from her nursing home for complete heart block.  Originally seen in [place] and transferred here after placing a temporary venous pacing wire which was not used after arrival here.    Interval history:    Pacing wire removed yesterday.  Currently in normal sinus rhythm.  Normotensive.    The patient's relevant past medical, surgical and social history were reviewed and updated in Epic as appropriate.       OBJECTIVE     Vital Sign Min/Max for last 24 hours  Temp  Min: 98 °F (36.7 °C)  Max: 98.6 °F (37 °C)   BP  Min: 80/66  Max: 133/78   Pulse  Min: 49  Max: 73   Resp  Min: 14  Max: 20   SpO2  Min: 93 %  Max: 98 %   No data recorded   No data recorded      Intake/Output Summary (Last 24 hours) at 6/8/2020 1424  Last data filed at 6/8/2020 1402  Gross per 24 hour   Intake 1900.3 ml   Output 750 ml   Net 1150.3 ml      Flowsheet Rows      First Filed Value   Admission Height  162.6 cm (64\") Documented at 06/06/2020 0101   Admission Weight  61 kg (134 lb 7.7 oz) Documented at 06/06/2020 0101             06/06/20  0101   Weight: 61 kg (134 lb 7.7 oz)            Objective:  General Appearance:  In no acute distress.    Vital signs: (most recent): Blood pressure 131/78, pulse 63, temperature 98.2 °F (36.8 °C), temperature source Oral, resp. rate 20, height 162.6 cm (64\"), weight 61 kg (134 lb 7.7 oz), SpO2 97 %.    HEENT: Normal HEENT exam.    Lungs:  Normal effort and normal respiratory rate.  Breath sounds clear to auscultation.  She is not in respiratory distress.  No rales, wheezes or rhonchi.    Heart: Normal rate.  Regular rhythm.  S1 normal and S2 normal.  No murmur, gallop or friction rub.   Chest: Symmetric chest wall expansion.   Abdomen: Abdomen is soft and non-distended.  Bowel sounds are normal.   There is no abdominal tenderness.   There is no " mass. There is no splenomegaly. There is no hepatomegaly.   Extremities: There is no deformity or dependent edema.  (Left upper extremity PIC)  Neurological: Patient is alert and oriented to person, place and time.    Pupils:  Pupils are equal, round, and reactive to light.    Skin:  Warm and dry.              I reviewed the patient's new clinical results.  I reviewed the patient's new imaging results/reports including actual images and agree with reports.      Chest X-Ray: Perihilar prominence    INFUSIONS       Results from last 7 days   Lab Units 06/08/20 0628 06/07/20  0437 06/06/20  1113   WBC 10*3/mm3 9.29 9.06 16.33*   HEMOGLOBIN g/dL 10.5* 10.8* 10.6*   HEMATOCRIT % 32.1* 34.1 32.3*   PLATELETS 10*3/mm3 82* 90* 113*     Results from last 7 days   Lab Units 06/08/20  0628 06/07/20 0437 06/06/20  0830  06/05/20  1228   SODIUM mmol/L 131* 135* 138   < > 131*   POTASSIUM mmol/L 4.2 3.9 3.7   < > 5.4*   CHLORIDE mmol/L 103 105 105   < > 101   CO2 mmol/L 17.0* 17.0* 18.0*   < > 13.7*   BUN mg/dL 19 25* 21*   < > 19   GLUCOSE mg/dL 52* 101* 60*   < > 269*   CREATININE mg/dL 1.24* 1.55* 1.31*   < > 1.47*   MAGNESIUM mg/dL  --  2.6 2.1  --  2.5   CALCIUM mg/dL 8.0* 8.1* 8.5*   < > 8.2*    < > = values in this interval not displayed.         Results from last 7 days   Lab Units 06/05/20  1238   PH, ARTERIAL pH units 7.385   PCO2, ARTERIAL mm Hg 17.8*   PO2 ART mm Hg 194.0*   FIO2 % 58         [facility]:      I reviewed the patient's medications.    Assessment/Plan   ASSESSMENT/PLAN     Active Hospital Problems    Diagnosis   • **Heart block   • Acute renal failure (ARF) (CMS/HCC)   • Lactic acidosis   • Seizure disorder (CMS/HCC)     grand mal     • Atrial fibrillation (CMS/HCC)   • Chronic hepatitis C virus infection with cirrhosis (CMS/HCC)   • Secondary esophageal varices without bleeding (CMS/HCC)   • Anxiety and depression   • Chronic obstructive pulmonary disease (CMS/HCC)   • Diabetes mellitus  (CMS/HCC)   • GERD without esophagitis   • Hyperlipidemia       59-year-old female with history of cirrhosis from both alcohol hepatitis C, diabetes mellitus, dyslipidemia, atrial fibrillation, and COPD.  She was found poorly responsive at her nursing home with complete heart block.  A transvenous temporary pacing wire was placed in [place] but upon arrival here she was found to be in sinus rhythm and the wire was removed the subsequent day.  It was felt that amiodarone was playing a role.    1. Okay to telemetry  2. Continue off amiodarone  3. Elevated troponin felt to be a type II mechanism  4. Acute kidney injury: Improving  5. Continue bronchodilators  6. Monitor platelet count: 4T score low risk for HIT  7. Decrease Levemir dose due to morning hypoglycemia  8. Discontinue antibiotics     I discussed the patient's findings and my recommendations with patient and nursing staff     Plan of care and goals reviewed with multidisciplinary team at daily rounds.    .    Gamal Pearson MD  Pulmonary and Critical Care Medicine  06/08/20 14:24

## 2020-06-08 NOTE — PLAN OF CARE
Problem: Patient Care Overview  Goal: Plan of Care Review  Outcome: Ongoing (interventions implemented as appropriate)  Flowsheets  Taken 6/8/2020 0731  Progress: improving  Outcome Summary: Vital signs stable; afebrile. No significant bradycardic episodes through the night. Pain tolerable with PRN oxycodone. Renal ultrasound scheduled for today. Potential transfer to telemetry.  Taken 6/7/2020 2000  Plan of Care Reviewed With: patient

## 2020-06-08 NOTE — PROGRESS NOTES
Multidisciplinary Rounds    Patient Name: Gretta Giles  Date of Encounter: 06/08/20 09:16  MRN: [id number]  Admission date: 6/6/2020    Reason for visit: MDR. RD to continue to follow per protocol.     Additional information obtained during MDR:        Current diet: Diet Soft Texture; Whole Foods; Thin; Consistent Carbohydrate  Supplement: No active supplement orders    Evaluation of Received Nutrient/Fluid Intake:  Insufficient data  100% x 1 meal      EMR reviewed     Intervention:  Follow treatment plan  Care plan reviewed    Follow up:   Per protocol      Charito Desai RD  9:16 AM  Time: 10min

## 2020-06-09 PROBLEM — N39.0 ACUTE UTI (URINARY TRACT INFECTION): Status: ACTIVE | Noted: 2020-01-01

## 2020-06-09 PROBLEM — E87.20 LACTIC ACIDOSIS: Status: RESOLVED | Noted: 2020-01-01 | Resolved: 2020-01-01

## 2020-06-09 NOTE — PROGRESS NOTES
CARDIOLOGY PROGRESS NOTE           6/9/2020 08:20    Admit Date: 6/6/2020    Admit Diagnosis: Heart block [I45.9]    Chief Complaint: Follow up for Afib, Bradycardia    Subjective:   Patient's status has remained cardiac stable.  No chest pain or sob. No significant bradycardia or AV Block seen      ROS:  GEN:  No fever or chills  Cardiovascular:  No CP or SOB  Pulmonary:  No Cough  Neuro:  No new focal motor or sensory loss      Objective:     Vitals:    06/08/20 2340 06/09/20 0418 06/09/20 0521 06/09/20 0712   BP: 123/77 115/72  110/71   BP Location: Right arm Right arm  Right arm   Patient Position: Lying Lying  Lying   Pulse: 79   65   Resp: 18 18  18   Temp: 98.9 °F (37.2 °C) 98.3 °F (36.8 °C)  97.8 °F (36.6 °C)   TempSrc: Oral Oral  Oral   SpO2:    94%   Weight:   59.5 kg (131 lb 3.2 oz)    Height:           Physical Exam:  General-Well Nourished, Well developed  Eyes - PERRLA  Neck- supple, No mass  CV- regular rate and rhythm, no MRG  Lung- clear bilaterally  Abd- soft, +BS  Musc/skel - Norm strength and range of motion  Skin- warm and dry  Neuro - Alert & Oriented x 3, appropriate mood.        Current Facility-Administered Medications:   •  acetaminophen (TYLENOL) tablet 650 mg, 650 mg, Oral, Q6H PRN, Sandra Delatorre PA-C, 650 mg at 06/08/20 2358  •  budesonide-formoterol (SYMBICORT) 160-4.5 MCG/ACT inhaler 2 puff, 2 puff, Inhalation, BID - RT, Jose Luis Lucero APRN, 2 puff at 06/08/20 2058  •  dextrose (D50W) 25 g/ 50mL Intravenous Solution 25 g, 25 g, Intravenous, Q15 Min PRN, Jose Luis Lucero APRN  •  dextrose (GLUTOSE) oral gel 15 g, 15 g, Oral, Q15 Min PRN, Jose Luis Lucero APRN  •  famotidine (PEPCID) tablet 20 mg, 20 mg, Oral, BID AC, Gamal Pearson MD, 20 mg at 06/09/20 0606  •  gabapentin (NEURONTIN) capsule 600 mg, 600 mg, Oral, Q8H, So Farah MD, 600 mg at 06/09/20 0605  •  glucagon (human recombinant) (GLUCAGEN DIAGNOSTIC) injection 1 mg, 1 mg, Subcutaneous,  Q15 Min PRN, Jose Luis Lucero APRN  •  heparin (porcine) 5000 UNIT/ML injection 5,000 Units, 5,000 Units, Subcutaneous, Q12H, Jose Luis Lucero APRN, 5,000 Units at 06/08/20 2149  •  insulin detemir (LEVEMIR) injection 5 Units, 5 Units, Subcutaneous, Nightly, Gamal Pearson MD, 5 Units at 06/08/20 2150  •  insulin lispro (humaLOG) injection 0-9 Units, 0-9 Units, Subcutaneous, 4x Daily With Meals & Nightly, Mariela Staley, MASSIEL, 2 Units at 06/08/20 1810  •  ipratropium-albuterol (DUO-NEB) nebulizer solution 3 mL, 3 mL, Nebulization, Q4H PRN, Jose Luis Lucero APRN  •  lactulose (CHRONULAC) 10 GM/15ML solution 30 mL, 30 mL, Oral, BID, Julio C Samuels DO, 30 mL at 06/08/20 2149  •  lidocaine PF 1% (XYLOCAINE) injection 10 mL, 10 mL, Injection, Once, Julio C Samuels DO  •  melatonin tablet 5 mg, 5 mg, Oral, Nightly PRN, Katerin Alexis APRN, 5 mg at 06/06/20 2120  •  oxyCODONE (ROXICODONE) immediate release tablet 10 mg, 10 mg, Oral, Q8H PRN, So Farah MD, 10 mg at 06/09/20 0605  •  sodium chloride 0.9 % flush 10 mL, 10 mL, Intravenous, Q12H, Julio C Samuels DO, 10 mL at 06/08/20 2150  •  sodium chloride 0.9 % flush 10 mL, 10 mL, Intravenous, PRN, Julio C Samuels DO, 10 mL at 06/08/20 0803      Data Review:   Recent Results (from the past 24 hour(s))   POC Glucose Once    Collection Time: 06/08/20 11:20 AM   Result Value Ref Range    Glucose 189 (H) 70 - 130 mg/dL   POC Glucose Once    Collection Time: 06/08/20  6:13 PM   Result Value Ref Range    Glucose 176 (H) 70 - 130 mg/dL   POC Glucose Once    Collection Time: 06/08/20  8:30 PM   Result Value Ref Range    Glucose 117 70 - 130 mg/dL   Basic Metabolic Panel    Collection Time: 06/09/20  6:06 AM   Result Value Ref Range    Glucose 128 (H) 65 - 99 mg/dL    BUN 17 6 - 20 mg/dL    Creatinine 1.16 (H) 0.57 - 1.00 mg/dL    Sodium 135 (L) 136 - 145 mmol/L    Potassium 4.2 3.5 - 5.2 mmol/L     Chloride 104 98 - 107 mmol/L    CO2 18.0 (L) 22.0 - 29.0 mmol/L    Calcium 8.0 (L) 8.6 - 10.5 mg/dL    eGFR Non African Amer 48 (L) >60 mL/min/1.73    BUN/Creatinine Ratio 14.7 7.0 - 25.0    Anion Gap 13.0 5.0 - 15.0 mmol/L   CBC Auto Differential    Collection Time: 06/09/20  6:06 AM   Result Value Ref Range    WBC 10.28 3.40 - 10.80 10*3/mm3    RBC 3.36 (L) 3.77 - 5.28 10*6/mm3    Hemoglobin 10.4 (L) 12.0 - 15.9 g/dL    Hematocrit 31.9 (L) 34.0 - 46.6 %    MCV 94.9 79.0 - 97.0 fL    MCH 31.0 26.6 - 33.0 pg    MCHC 32.6 31.5 - 35.7 g/dL    RDW 15.9 (H) 12.3 - 15.4 %    RDW-SD 55.6 (H) 37.0 - 54.0 fl    MPV 10.2 6.0 - 12.0 fL    Platelets 89 (L) 140 - 450 10*3/mm3    Neutrophil % 64.2 42.7 - 76.0 %    Lymphocyte % 20.5 19.6 - 45.3 %    Monocyte % 9.7 5.0 - 12.0 %    Eosinophil % 3.7 0.3 - 6.2 %    Basophil % 1.3 0.0 - 1.5 %    Immature Grans % 0.6 (H) 0.0 - 0.5 %    Neutrophils, Absolute 6.60 1.70 - 7.00 10*3/mm3    Lymphocytes, Absolute 2.11 0.70 - 3.10 10*3/mm3    Monocytes, Absolute 1.00 (H) 0.10 - 0.90 10*3/mm3    Eosinophils, Absolute 0.38 0.00 - 0.40 10*3/mm3    Basophils, Absolute 0.13 0.00 - 0.20 10*3/mm3    Immature Grans, Absolute 0.06 (H) 0.00 - 0.05 10*3/mm3    nRBC 0.0 0.0 - 0.2 /100 WBC   POC Glucose Once    Collection Time: 06/09/20  7:29 AM   Result Value Ref Range    Glucose 113 70 - 130 mg/dL       Assessment/Plan:   1.Severe bradycardia  in setting of hyperkalemia and acidosis, tachybrady syndrome. Temporary pacing wire placed and now discontinued. Improved off Amiodarone.   2.  Paroxysmal atrial fibrillation-treated in the past with Amiodarone therapy for >3years, Now discontinued- Followed by Dr. Patrick.  [facility] Records indicate patient not good candidate for anticoagulation secondary to fall risk and liver disease.    3. Elevated troponin Type II mechanism   4. Liver cirrhosis, ETOH abuse, chronic hepatitis C. History of paracentesis in past.   5. ARF, Improving  6. COPD/possible aspiration  PNA  7. Echocardiogram: Moderate MR, EF 55%  8  Patient has remained cardiac stable for >48hrs. Is cardiac stable for discharge with follow up as outpt.          Devan Carreno M.D., [email or web address], F.H.R.S.  Cardiology/Electrophysiology  6/9/2020  08:20

## 2020-06-09 NOTE — PROGRESS NOTES
Continued Stay Note  [facility]     Patient Name: Gretta Giles  MRN: [id number]  Today's Date: 6/9/2020    Admit Date: 6/6/2020    Discharge Plan     Row Name 06/09/20 1539       Plan    Plan  update    Patient/Family in Agreement with Plan  yes    Plan Comments  Received  a call from Hermelinda with [facility] who advises that patient has a 14 day bed hold and they will take her back when she is ready.  CM following.      Final Discharge Disposition Code  03 - skilled nursing facility (SNF)    Row Name 06/09/20 1322       Plan    Plan  update    Patient/Family in Agreement with Plan  yes    Plan Comments  Spoke with patient at bedside regarding discharge plan who reports that she plans to go back to [place] at discharge and that she thinks she is ready for LTC at this point.  No other discharge needs verbalized.  Called [facility] [phone] and left voicemail for addmission coordinator with name, title and callback number.  CM following.  Patient plan is to discharge back to [facility] for SNF potentially LTC.      Final Discharge Disposition Code  03 - skilled nursing facility (SNF)        Discharge Codes    No documentation.       Expected Discharge Date and Time     Expected Discharge Date Expected Discharge Time    Jun 12, 2020             Matilda Galloway, RN

## 2020-06-09 NOTE — PROGRESS NOTES
[facility] Medicine Services  PROGRESS NOTE    Patient Name: Gretta Giles  : 1960  MRN: [id number]    Date of Admission: 2020  Primary Care Physician: Nighat Kincaid APRN    Subjective   Subjective     CC:  Follow-up heart block/bradycardia    HPI:  Denies cardiac symptoms.  Feels okay.  Denies any other new complaints overnight.    Review of Systems  No current fevers or chills  No current shortness of breath or cough  No current nausea, vomiting, or diarrhea  No current chest pain or palpitations      Objective   Objective     Vital Signs:   Temp:  [97.8 °F (36.6 °C)-99.1 °F (37.3 °C)] 97.8 °F (36.6 °C)  Heart Rate:  [61-79] 65  Resp:  [18-20] 18  BP: (107-138)/(70-99) 110/71        Physical Exam:  Constitutional:Awake, alert  HENT: NCAT, mucous membranes moist, neck supple  Respiratory: Clear to auscultation bilaterally, respiratory effort normal, nonlabored breathing   Cardiovascular: Regular rate and rhythm, normal radial pulses  Gastrointestinal: Positive bowel sounds, soft, nontender, nondistended  Musculoskeletal: Normal musculature for age, no lower extremity edema, BMI 22  Psychiatric: Appropriate affect, cooperative, conversational  Neurologic: No slurred speech or facial droop, follows commands   Skin: No rashes or jaundice, warm      Results Reviewed:  Results from last 7 days   Lab Units 20  0437 20  1113 20  1228   WBC 10*3/mm3 10.28 9.29 9.06 16.33* 16.60*   HEMOGLOBIN g/dL 10.4* 10.5* 10.8* 10.6* 13.0   HEMATOCRIT % 31.9* 32.1* 34.1 32.3* 39.9   PLATELETS 10*3/mm3 89* 82* 90* 113* 165   INR   --   --  1.40* 1.43*  --    PROCALCITONIN ng/mL  --   --   --   --  0.10     Results from last 7 days   Lab Units 20  062028 20  0437 20  0830 20  1552 20  1228   SODIUM mmol/L 135* 131* 135* 138 133* 131*   POTASSIUM mmol/L 4.2 4.2 3.9 3.7 5.2 5.4*   CHLORIDE mmol/L 104 103 105  105 103 101   CO2 mmol/L 18.0* 17.0* 17.0* 18.0* 19.7* 13.7*   BUN mg/dL 17 19 25* 21* 18 19   CREATININE mg/dL 1.16* 1.24* 1.55* 1.31* 1.24* 1.47*   GLUCOSE mg/dL 128* 52* 101* 60* 168* 269*   CALCIUM mg/dL 8.0* 8.0* 8.1* 8.5* 8.3* 8.2*   ALT (SGPT) U/L  --  11 12 13  --  13   AST (SGOT) U/L  --  20 23 26  --  27   TROPONIN T ng/mL  --   --   --  0.115* 0.087* 0.102*   PROBNP pg/mL  --   --   --  5,858.0*  --  814.8     Estimated Creatinine Clearance: 50.3 mL/min (A) (by C-G formula based on SCr of 1.16 mg/dL (H)).    Microbiology Results Abnormal     Procedure Component Value - Date/Time    Urine Culture - Urine, Urine, Clean Catch [[id number]]  (Abnormal) Collected:  06/07/20 1302    Lab Status:  Final result Specimen:  Urine, Clean Catch Updated:  06/09/20 0927     Urine Culture <25,000 CFU/mL Gram Negative Bacilli     Comment: Call if further workup needed.         Yeast isolated     Comment: No further work up.       Narrative:             Body Fluid Culture - Body Fluid, Peritoneum [[id number]] Collected:  06/06/20 1251    Lab Status:  Final result Specimen:  Body Fluid from Peritoneum Updated:  06/09/20 0922     Body Fluid Culture No growth at 3 days     Gram Stain Occasional WBCs per low power field      No organisms seen    Respiratory Culture - Sputum, Cough [[id number]] Collected:  06/07/20 0831    Lab Status:  Preliminary result Specimen:  Sputum from Cough Updated:  06/08/20 0933     Respiratory Culture Scant growth (1+) Normal Respiratory Mray     Gram Stain Few (2+) Epithelial cells per low power field      Few (2+) WBCs per low power field      Few (2+) Mixed bacterial morphotypes seen on Gram Stain    Eosinophil Smear - Urine, Urine, Clean Catch [[id number]]  (Normal) Collected:  06/07/20 1302    Lab Status:  Final result Specimen:  Urine, Clean Catch Updated:  06/07/20 1552     Eosinophil Smear 0 % EOS/100 Cells     Narrative:       No eosinophil seen          Imaging Results (Last 24 Hours)      Procedure Component Value Units Date/Time    US Renal Limited [[id number]] Collected:  06/08/20 1501     Updated:  06/08/20 1634    Narrative:       EXAMINATION: US RENAL LIMITED- 06/08/2020     INDICATION: Worsening BUN and creatinine; renal insufficiency     TECHNIQUE: Sonographic imaging was obtained of the kidneys in both the  sagittal and transverse planes.     COMPARISON: NONE     FINDINGS: There is some fluid seen surrounding the liver. The right  kidney measures in length from pole to pole 9.7 cm. There is no solid  cortical mass or renal cortical cysts seen within the right kidney. No  hydronephrosis or nephrolithiasis.     The left kidney measures in length from pole to pole 9.4 cm. No solid  cortical mass or renal cortical cysts seen within the left kidney. No  hydronephrosis or nephrolithiasis. The bladder is incompletely  visualized with fluid identified in the pelvis.       Impression:       Unremarkable bilateral renal ultrasound with fluid  identified in all 4 quadrants.      D:  06/08/2020  E:  06/08/2020             Results for orders placed during the hospital encounter of 06/06/20   Adult Transthoracic Echo Complete W/ Cont if Necessary Per Protocol    Narrative · Left atrial cavity size is borderline dilated.  · Left ventricular systolic function is normal. Estimated EF = 55%.  · Mild-to-moderate mitral valve regurgitation is present  · Mild to moderate tricuspid valve regurgitation is present.  · Calculated right ventricular systolic pressure from tricuspid   regurgitation is 34 mmHg.          I have reviewed the medications:  Scheduled Meds:    budesonide-formoterol 2 puff Inhalation BID - RT   cefTRIAXone 1 g Intravenous Q24H   famotidine 20 mg Oral BID AC   gabapentin 600 mg Oral Q8H   heparin (porcine) 5,000 Units Subcutaneous Q12H   insulin detemir 5 Units Subcutaneous Nightly   insulin lispro 0-9 Units Subcutaneous 4x Daily With Meals & Nightly   lactulose 30 mL Oral BID   lidocaine PF 1%  10 mL Injection Once   sodium chloride 10 mL Intravenous Q12H     Continuous Infusions:   PRN Meds:.•  acetaminophen  •  dextrose  •  dextrose  •  glucagon (human recombinant)  •  ipratropium-albuterol  •  melatonin  •  oxyCODONE  •  sodium chloride    Assessment/Plan   Assessment & Plan     Active Hospital Problems    Diagnosis  POA   • **Heart block [I45.9]  Yes   • Acute UTI (urinary tract infection) [N39.0]  Yes   • Acute renal failure (ARF) (CMS/HCC) [N17.9]  Yes   • Seizure disorder (CMS/HCC) [G40.909]  Yes   • Atrial fibrillation (CMS/HCC) [I48.91]  Yes   • Chronic hepatitis C virus infection with cirrhosis (CMS/HCC) [B18.2, K74.60]  Yes   • Secondary esophageal varices without bleeding (CMS/HCC) [I85.10]  Yes   • Anxiety and depression [F41.9, F32.9]  Yes   • Chronic obstructive pulmonary disease (CMS/HCC) [J44.9]  Yes   • Diabetes mellitus (CMS/HCC) [E11.9]  Yes   • GERD without esophagitis [K21.9]  Yes   • Hyperlipidemia [E78.5]  Yes      Resolved Hospital Problems    Diagnosis Date Resolved POA   • Lactic acidosis [E87.2] 06/09/2020 Yes        Brief Hospital Course to date:  Gretta Giles is a 59 y.o. female with reported history of alcoholic and hepatitis C cirrhosis, diabetes mellitus, dyslipidemia, atrial fibrillation, and COPD.  She was found poorly responsive at nursing home with complete heart block/bradycardia.  She received a temporary transvenous pacemaker prior to transfer.  Her amiodarone was stopped and she became sinus rhythm.  It was felt that amiodarone was contributing to heart block.    Discussion/plan:  Continue to monitor off of amiodarone.  Appreciate EP recommendations.  UTI noted.  Start ceftriaxone.  Gram-negative rods less than 25,000 growing.  Lactic acidosis resolved.  Appears appropriately hydrated.  Completed paracentesis, negative for SBP.  Chest x-ray images reviewed with some edema.  COPD stable continue breathing treatments.  Add low-dose prandial insulin, continue  current basal.  Monitor glucose.  Recent glucose reviewed.  Continue gabapentin  Possible discharge back to facility when cleared by cardiology.    DVT Prophylaxis: Subcutaneous heparin      CODE STATUS:   Code Status and Medical Interventions:   Ordered at: 06/06/20 0104     Limited Support to NOT Include:    Intubation     Code Status:    No CPR     Medical Interventions (Level of Support Prior to Arrest):    Limited         Electronically signed by Eber Jones MD, 06/09/20, 10:23.

## 2020-06-09 NOTE — PROGRESS NOTES
Continued Stay Note  [facility]     Patient Name: Gretta Giles  MRN: [id number]  Today's Date: 6/9/2020    Admit Date: 6/6/2020    Discharge Plan     Row Name 06/09/20 1452       Plan    Plan  update    Patient/Family in Agreement with Plan  yes    Plan Comments  Spoke with patient at bedside regarding discharge plan who reports that she plans to go back to [place] at discharge and that she thinks she is ready for LTC at this point.  No other discharge needs verbalized.  Called [facility] [phone] and left voicemail for addmission coordinator with name, title and callback number.  CM following.  Patient plan is to discharge back to [place] for SNF potentially LTC.      Final Discharge Disposition Code  03 - skilled nursing facility (SNF)        Discharge Codes    No documentation.       Expected Discharge Date and Time     Expected Discharge Date Expected Discharge Time    Jun 12, 2020             Matilda Galloway RN

## 2020-06-09 NOTE — PLAN OF CARE
Problem: Patient Care Overview  Goal: Plan of Care Review  Outcome: Ongoing (interventions implemented as appropriate)  Flowsheets (Taken 6/8/2020 2000)  Plan of Care Reviewed With: patient  Note:   Pt remains on room air. Complained of chronic back pain. Pain meds given around 2200. Pt rested well afterwards. NSR on monitor. Blood sugar was 117 at 2030. No other concerns this shift. VSS will continue to monitor.

## 2020-06-10 NOTE — DISCHARGE SUMMARY
[facility] Medicine Services  DISCHARGE SUMMARY    Patient Name: Gretta Giles  : 1960  MRN: [id number]    Date of Admission: 2020 12:47 AM  Date of Discharge:  6/10/2020  Primary Care Physician: Nighat Kincaid APRN    Consults     Date and Time Order Name Status Description    2020 0104 Inpatient Cardiac Electrophysiologist Consult Completed     2020 1434 Inpatient Hospitalist Consult Completed           Hospital Course     Presenting Problem:   Heart block [I45.9]    Active Hospital Problems    Diagnosis  POA   • **Heart block [I45.9]  Yes   • Acute UTI (urinary tract infection) [N39.0]  Yes   • Acute renal failure (ARF) (CMS/HCC) [N17.9]  Yes   • Seizure disorder (CMS/HCC) [G40.909]  Yes   • Atrial fibrillation (CMS/HCC) [I48.91]  Yes   • Chronic hepatitis C virus infection with cirrhosis (CMS/HCC) [B18.2, K74.60]  Yes   • Secondary esophageal varices without bleeding (CMS/HCC) [I85.10]  Yes   • Anxiety and depression [F41.9, F32.9]  Yes   • Chronic obstructive pulmonary disease (CMS/HCC) [J44.9]  Yes   • Diabetes mellitus (CMS/HCC) [E11.9]  Yes   • GERD without esophagitis [K21.9]  Yes   • Hyperlipidemia [E78.5]  Yes      Resolved Hospital Problems    Diagnosis Date Resolved POA   • Lactic acidosis [E87.2] 2020 Yes          Hospital Course:  Gretta Giles is a 59 y.o. female with reported history of alcoholic and hepatitis C cirrhosis, diabetes mellitus, dyslipidemia, atrial fibrillation, and COPD.  She was found poorly responsive at nursing home with complete heart block/bradycardia.  She received a temporary transvenous pacemaker prior to transfer.  Her amiodarone was stopped and she became sinus rhythm.  It was felt that amiodarone was contributing to heart block.  She also had her propranolol beta-blocker discontinued this as well.  She was stabilized in the intensive care unit and transferred to hospitalist medicine service for further medical  management.  Telemetry monitor showed that she gradually normalized and electrophysiology has now signed off and cleared her for discharge home with follow-up in cardiology clinic.  She was started on ceftriaxone for urinary tract infection culture showed gram-negative rods less than 25,000 units.  Plan to discharge on Omnicef to complete antibiotic course for 6 additional days.  She is eating less since she has been sick and her home insulin was decreased.  She also had elevated renal function this hospital stay and diuretics were temporarily held.  We will restart her diuretics of the lower dose but this may need to be adjusted by her GI or primary care doctor at follow-up depending on ascites.  Recommend careful monitoring and please call her physician if she has significant low or high blood sugars at the skilled facility for further medication adjustments.  She was continued on her home gabapentin and Percocet which she takes for diabetic neuropathy and chronic pain in the back.  She otherwise says she feels back to her typical baseline and is eager to return to her facility today.  She had no other new complaints.    At the time of discharge patient was told to take all medications as prescribed, keep all follow-up appointments, and call their doctor or return to the hospital with any worsening or concerning symptoms.    Please note that dragon voice recognition software was used to create this note and that transcription errors are possible.    Discharge was delayed due to skilled facility requiring COVID-19 screen which returned negative.  Patient is stable to discharge with unchanged plan.    Discharge Follow Up Recommendations for outpatient labs/diagnostics:  Primary care follow-up in 1 week, cardiology follow-up in 1 month, patient was told to keep any other previously scheduled or recommended follow-up appointment.    Day of Discharge     HPI:   Patient states she is doing well today and wants to go  back to her facility.  She says she has no cardiac symptoms or other complaints.  She agrees to follow-up as recommended.    Review of Systems  No current fevers or chills  No current shortness of breath or cough  No current nausea, vomiting, or diarrhea  No current chest pain or palpitations      Vital Signs:   Temp:  [97.3 °F (36.3 °C)-99.5 °F (37.5 °C)] 98.3 °F (36.8 °C)  Heart Rate:  [63-81] 79  Resp:  [16-18] 16  BP: (111-131)/(74-86) 111/85     Physical Exam:  Constitutional:Awake, alert  HENT: NCAT, mucous membranes moist, neck supple  Respiratory: Clear to auscultation bilaterally, respiratory effort normal, nonlabored breathing   Cardiovascular: Regular rate and rhythm, normal radial pulses  Gastrointestinal: Positive bowel sounds, soft, nontender, nondistended  Musculoskeletal: Normal musculature for age, no lower extremity edema, BMI 22  Psychiatric: Appropriate affect, cooperative, conversational  Neurologic: No slurred speech or facial droop, follows commands   Skin: No rashes or jaundice, warm    Pertinent  and/or Most Recent Results     Results from last 7 days   Lab Units 06/10/20  0510 06/09/20  0606 06/08/20  0628 06/07/20  0437 06/06/20  1113 06/06/20  0830 06/05/20  1552 06/05/20  1228   WBC 10*3/mm3 11.55* 10.28 9.29 9.06 16.33*  --   --  16.60*   HEMOGLOBIN g/dL 10.2* 10.4* 10.5* 10.8* 10.6*  --   --  13.0   HEMATOCRIT % 31.4* 31.9* 32.1* 34.1 32.3*  --   --  39.9   PLATELETS 10*3/mm3 89* 89* 82* 90* 113*  --   --  165   SODIUM mmol/L 132* 135* 131* 135*  --  138 133* 131*   POTASSIUM mmol/L 4.4 4.2 4.2 3.9  --  3.7 5.2 5.4*   CHLORIDE mmol/L 104 104 103 105  --  105 103 101   CO2 mmol/L 18.0* 18.0* 17.0* 17.0*  --  18.0* 19.7* 13.7*   BUN mg/dL 14 17 19 25*  --  21* 18 19   CREATININE mg/dL 0.93 1.16* 1.24* 1.55*  --  1.31* 1.24* 1.47*   GLUCOSE mg/dL 95 128* 52* 101*  --  60* 168* 269*   CALCIUM mg/dL 8.0* 8.0* 8.0* 8.1*  --  8.5* 8.3* 8.2*     Results from last 7 days   Lab Units  06/08/20  0628 06/07/20  0437 06/06/20  1113 06/06/20  0830 06/05/20  1228   BILIRUBIN mg/dL 0.8 0.8  --  1.1 0.6   ALK PHOS U/L 134* 143*  --  155* 209*   ALT (SGPT) U/L 11 12  --  13 13   AST (SGOT) U/L 20 23  --  26 27   PROTIME Seconds  --  16.9* 17.1*  --   --    INR   --  1.40* 1.43*  --   --            Invalid input(s): TG, LDLCALC, LDLREALC  Results from last 7 days   Lab Units 06/06/20  1747 06/06/20  1223 06/06/20  1113 06/06/20  0830  06/05/20  1552  06/05/20  1228   TSH uIU/mL  --   --   --  2.910  --   --   --   --    HEMOGLOBIN A1C %  --   --  6.80*  --   --   --   --   --    PROBNP pg/mL  --   --   --  5,858.0*  --   --   --  814.8   TROPONIN T ng/mL  --   --   --  0.115*  --  0.087*  --  0.102*   PROCALCITONIN ng/mL  --   --   --   --   --   --   --  0.10   LACTATE mmol/L 1.9 2.2*  --  2.3*   < >  --    < >  --     < > = values in this interval not displayed.       Brief Urine Lab Results  (Last result in the past 365 days)      Color   Clarity   Blood   Leuk Est   Nitrite   Protein   CREAT   Urine HCG        06/07/20 1302 Yellow Cloudy Large (3+) Moderate (2+) Negative 100 mg/dL (2+)               Microbiology Results Abnormal     Procedure Component Value - Date/Time    Respiratory Culture - Sputum, Cough [[id number]] Collected:  06/07/20 0831    Lab Status:  Final result Specimen:  Sputum from Cough Updated:  06/09/20 1038     Respiratory Culture Scant growth (1+) Normal Respiratory Mary     Gram Stain Few (2+) Epithelial cells per low power field      Few (2+) WBCs per low power field      Few (2+) Mixed bacterial morphotypes seen on Gram Stain    Urine Culture - Urine, Urine, Clean Catch [[id number]]  (Abnormal) Collected:  06/07/20 1302    Lab Status:  Final result Specimen:  Urine, Clean Catch Updated:  06/09/20 0927     Urine Culture <25,000 CFU/mL Gram Negative Bacilli     Comment: Call if further workup needed.         Yeast isolated     Comment: No further work up.       Narrative:              Body Fluid Culture - Body Fluid, Peritoneum [[id number]] Collected:  06/06/20 1251    Lab Status:  Final result Specimen:  Body Fluid from Peritoneum Updated:  06/09/20 0922     Body Fluid Culture No growth at 3 days     Gram Stain Occasional WBCs per low power field      No organisms seen    Eosinophil Smear - Urine, Urine, Clean Catch [[id number]]  (Normal) Collected:  06/07/20 1302    Lab Status:  Final result Specimen:  Urine, Clean Catch Updated:  06/07/20 1552     Eosinophil Smear 0 % EOS/100 Cells     Narrative:       No eosinophil seen          Imaging Results (All)     Procedure Component Value Units Date/Time     Renal Limited [[id number]] Collected:  06/08/20 1501     Updated:  06/10/20 1121    Narrative:       EXAMINATION: US RENAL LIMITED- 06/08/2020     INDICATION: Worsening BUN and creatinine; renal insufficiency     TECHNIQUE: Sonographic imaging was obtained of the kidneys in both the  sagittal and transverse planes.     COMPARISON: NONE     FINDINGS: There is some fluid seen surrounding the liver. The right  kidney measures in length from pole to pole 9.7 cm. There is no solid  cortical mass or renal cortical cysts seen within the right kidney. No  hydronephrosis or nephrolithiasis.     The left kidney measures in length from pole to pole 9.4 cm. No solid  cortical mass or renal cortical cysts seen within the left kidney. No  hydronephrosis or nephrolithiasis. The bladder is incompletely  visualized with fluid identified in the pelvis.       Impression:       Unremarkable bilateral renal ultrasound with fluid  identified in all 4 quadrants.      D:  06/08/2020  E:  06/08/2020     This report was finalized on 6/10/2020 11:18 AM by Dr. Rosanne Winn MD.       XR Chest 1 View [[id number]] Collected:  06/07/20 0848     Updated:  06/07/20 2256    Narrative:       EXAMINATION: XR CHEST 1 VW - 06/07/2020     INDICATION: Follow-up pulmonary infiltrate.     COMPARISON: 06/06/2020     FINDINGS: Right  IJ approach transvenous pacemaker is stable in position.  The heart is normal in size. There is improved perihilar disease,  presumably resolving interstitial edema. There is persistent small left  effusion. No pneumothorax or other new chest disease is seen. Left upper  extremity PICC line has been placed, tip in the proximal to mid-SVC.       Impression:       1. Improving perihilar edema.  2. Stable left basilar effusion.  3. No evidence of pneumothorax or other new chest disease.  4. PICC line tip in the SVC.      DICTATED:   06/07/2020  EDITED/ls :   06/07/2020      This report was finalized on 6/7/2020 10:52 PM by Dr. Avi Hurd MD.       XR Chest 1 View [[id number]] Collected:  06/06/20 0827     Updated:  06/06/20 2322    Narrative:       EXAMINATION: XR CHEST 1 VW - 06/06/2020     INDICATION: Evaluate right lower lobe infiltrate.      COMPARISON: NONE     FINDINGS: A line superimposed over the left upper heart shadow appears  to represent a transvenous pacemaker, via right IJ approach. Heart is  normal in size. There is mild to moderate perihilar edema and small left  effusion, probably also some focal left lower lobe atelectasis. There  appears to be trace fluid or atelectasis associated with the right minor  fissure.. No pneumothorax is seen.       Impression:       Pulmonary vascular congestion with mild perihilar edema.  Mild left basilar atelectasis and effusion and trace right midlung  atelectasis. No evidence of pneumothorax.     DICTATED:   06/06/2020  EDITED/ls :   06/06/2020          This report was finalized on 6/6/2020 11:19 PM by Dr. Avi Hurd MD.       CT Guided Paracentesis [[id number]] Collected:  06/06/20 1223     Updated:  06/06/20 1412    Narrative:       PROCEDURE: CT-guided paracentesis     Procedural Personnel  Attending physician(s): MARGARITA Mcclain M.D.  Fellow physician(s): None  Resident physician(s): None  Advanced practice provider(s): None     Pre-procedure diagnosis: Cirrhosis.  Ascites. 3rd degree AV block. Lactic  acidosis.  Post-procedure diagnosis: Same  Indication: Diagnostic. Evaluate for SBP.  Additional clinical history: None     Complications: No immediate complications.       Impression:          CT-guided paracentesis with drainage of 60 mL of clear yellow fluid.  Portion sent for requested laboratory analysis.     Plan:      Resume care by clinical team.  _______________________________________________________________     PROCEDURE SUMMARY:  - Limited CT  - CT-guided paracentesis  - Additional procedure(s): None     PROCEDURE DETAILS:     Pre-procedure  Consent: Informed consent for the procedure including risks, benefits  and alternatives was obtained and time-out was performed prior to the  procedure.  Preparation: The site was prepared and draped using maximal sterile  barrier technique including cutaneous antisepsis.     Anesthesia/sedation  Level of anesthesia/sedation: No sedation     Initial abdominal CT  Initial abdominal CT was performed.  Findings: Small volume ascites. A safe window for paracentesis was  identified.     Paracentesis  Local anesthesia was administered. The peritoneal cavity was accessed  and needle position confirmed by CT. Ascites was drained. The catheter  was then removed, and a sterile bandage was applied.  Paracentesis access technique: CT-guided  Needle placed: 22-gauge spinal  Post-drainage CT: No immediate complication     Radiation Dose  CT dose length product (mGy-cm): See ICU CT log for details.      Additional Details  Additional description of procedure: None  Equipment details: None  Specimens removed: Abdominal fluid  Estimated blood loss (mL): Less than 10  Standardized report: Paracentesis     Attestation  I was present and scrubbed for the entire procedure. Imaging reviewed.  Agree with final report as written.     This report was finalized on 6/6/2020 12:28 PM by Gianni Mcclain.                       Results for orders placed during  the hospital encounter of 06/06/20   Adult Transthoracic Echo Complete W/ Cont if Necessary Per Protocol    Narrative · Left atrial cavity size is borderline dilated.  · Left ventricular systolic function is normal. Estimated EF = 55%.  · Mild-to-moderate mitral valve regurgitation is present  · Mild to moderate tricuspid valve regurgitation is present.  · Calculated right ventricular systolic pressure from tricuspid   regurgitation is 34 mmHg.            Discharge Details        Discharge Medications      New Medications      Instructions Start Date   budesonide-formoterol 160-4.5 MCG/ACT inhaler  Commonly known as:  SYMBICORT   2 puffs, Inhalation, 2 Times Daily - RT      cefdinir 300 MG capsule  Commonly known as:  OMNICEF   300 mg, Oral, 2 Times Daily, For 6 more days         Changes to Medications      Instructions Start Date   furosemide 40 MG tablet  Commonly known as:  LASIX  What changed:  when to take this   40 mg, Oral, Daily      insulin glargine 100 UNIT/ML injection  Commonly known as:  LANTUS  What changed:    · how much to take  · when to take this   12 Units, Subcutaneous, Nightly      spironolactone 100 MG tablet  Commonly known as:  ALDACTONE  What changed:  how much to take   50 mg, Oral, Daily         Continue These Medications      Instructions Start Date   chlorhexidine 0.12 % solution  Commonly known as:  PERIDEX   30 mL, Mouth/Throat, 2 Times Daily      dextrose 40 % gel  Commonly known as:  GLUTOSE   1 tube, Oral, Every 15 Minutes PRN      dicyclomine 10 MG capsule  Commonly known as:  BENTYL   10 mg, Oral, Daily PRN      Engerix-B 20 MCG/ML injection  Generic drug:  Hepatitis B Vac Recombinant   Intramuscular, Once   Start Date:  September 21, 2020     Epclusa 400-100 MG tablet  Generic drug:  Sofosbuvir-Velpatasvir   1 tablet, Oral, Daily      famotidine 40 MG tablet  Commonly known as:  PEPCID   40 mg, Oral, 2 Times Daily      ferrous sulfate 325 (65 FE) MG tablet   325 mg, Oral, 3 Times  "Daily With Meals      fluticasone 44 MCG/ACT inhaler  Commonly known as:  FLOVENT HFA   2 puffs, Inhalation, 2 Times Daily - RT      gabapentin 600 MG tablet  Commonly known as:  NEURONTIN   600 mg, Oral, 3 Times Daily      glucose monitor monitoring kit   1 each, Does not apply, As Needed      insulin aspart 100 UNIT/ML injection  Commonly known as:  novoLOG   0-7 Units, Subcutaneous, 3 Times Daily Before Meals      lactulose 10 GM/15ML solution  Commonly known as:  CHRONULAC   22.5 mL, Oral, 3 Times Daily, 3 soft bm's a day       lidocaine 5 %  Commonly known as:  LIDODERM   1 patch, Transdermal, Every 24 Hours, Remove & Discard patch within 12 hours or as directed by MD       melatonin 5 MG tablet tablet   5 mg, Oral, Nightly PRN      ondansetron 4 MG/2ML injection  Commonly known as:  ZOFRAN   4 mg, Intravenous, Every 6 Hours PRN      oxyCODONE 10 MG tablet  Commonly known as:  ROXICODONE   10 mg, Oral, 4 Times Daily      Pen Needles 5/16\" 31G X 8 MM misc   1 each, Does not apply, 2 Times Daily         Stop These Medications    cefTRIAXone 250 mg/mL in lidocaine PF 1% injection IM syringe     propranolol 10 MG tablet  Commonly known as:  INDERAL            Allergies   Allergen Reactions   • Codeine Itching   • Morphine Itching         Discharge Disposition:  Skilled Nursing Facility (DC - External)    Diet:  Hospital:  Diet Order   Procedures   • Diet Soft Texture; Whole Foods; Thin; Consistent Carbohydrate       Activity:  Activity Instructions     Up WIth Assist               CODE STATUS:    Code Status and Medical Interventions:   Ordered at: 06/06/20 0104     Limited Support to NOT Include:    Intubation     Code Status:    No CPR     Medical Interventions (Level of Support Prior to Arrest):    Limited       Future Appointments   Date Time Provider Department Center   6/16/2020 11:15 AM Amna Woo MD [facility] None       Additional Instructions for the Follow-ups that You Need to Schedule     " Discharge Follow-up with PCP   As directed       Currently Documented PCP:    Nighat Kincaid APRN    PCP Phone Number:    [phone]     Follow Up Details:  1 week         Discharge Follow-up with Specified Provider: Dr Wai FLORES; 1 Month   As directed      To:  Dr Wai FLORES    Follow Up:  1 Month             40 minutes spent on chart review, coronation of care, and counseling on 6/10/2020 with 20 minutes at the bedside in direct counseling regarding patient's discharge plan and cardiac and infectious treatment.    32 minutes spent on 6/11/2020 for final discharge.    Electronically signed by Eber Jones MD, 06/10/20, 11:30 AM.

## 2020-06-10 NOTE — PLAN OF CARE
Problem: Patient Care Overview  Goal: Plan of Care Review  Outcome: Ongoing (interventions implemented as appropriate)  Flowsheets  Taken 6/10/2020 0537 by Marva Chan RN  Progress: improving  Taken 6/9/2020 1820 by Lexis Doss RN  Plan of Care Reviewed With: patient  Note:   Pt VSS, pain controlled by PO pain medication. Seems to have rested well throughout the night. Will cont to monitor.

## 2020-06-10 NOTE — PLAN OF CARE
The patient remained calm and cooperative during the shift. She was supposed to be discharged today but [facility] who just realized patient COVID test was too far out for them to take her back today [facility] realized today that the patient's COVID test was too far out for them to take her back today. A COVID test was administered. Vital signs remained stable. Will continue POC.

## 2020-06-10 NOTE — PROGRESS NOTES
CARDIOLOGY PROGRESS NOTE           6/10/2020 10:01    Admit Date: 6/6/2020    Admit Diagnosis: Heart block [I45.9]    Chief Complaint: Follow up for Afib, Bradycardia    Subjective:   Patient's status has remained cardiac stable.  No chest pain or sob. No significant bradycardia or AV Block seen. Hrs remain >60BPM      ROS:  GEN:  No fever or chills  Cardiovascular:  No CP or SOB  Pulmonary:  No Cough  Neuro:  No new focal motor or sensory loss      Objective:     Vitals:    06/10/20 0533 06/10/20 0536 06/10/20 0711 06/10/20 0806   BP:   124/85    BP Location:   Right arm    Patient Position:   Lying    Pulse:  69 75 64   Resp:   18 16   Temp:   98.9 °F (37.2 °C)    TempSrc:   Oral    SpO2:       Weight: 61.8 kg (136 lb 4.8 oz)      Height:           Physical Exam:  General-Well Nourished, Well developed  Eyes - PERRLA  Neck- supple, No mass  CV- regular rate and rhythm, no MRG  Lung- clear bilaterally  Abd- soft, +BS  Musc/skel - Norm strength and range of motion  Skin- warm and dry  Neuro - Alert & Oriented x 3, appropriate mood.        Current Facility-Administered Medications:   •  acetaminophen (TYLENOL) tablet 650 mg, 650 mg, Oral, Q6H PRN, Sandra Delatorre PA-C, 650 mg at 06/09/20 1206  •  budesonide-formoterol (SYMBICORT) 160-4.5 MCG/ACT inhaler 2 puff, 2 puff, Inhalation, BID - RT, Jose Luis Lucero APRN, 2 puff at 06/10/20 0806  •  cefTRIAXone (ROCEPHIN) 1 g/50 mL 0.9% NS VTB (mbp), 1 g, Intravenous, Q24H, Eber Jones MD, 1 g at 06/09/20 1203  •  dextrose (D50W) 25 g/ 50mL Intravenous Solution 25 g, 25 g, Intravenous, Q15 Min PRN, Jose Luis Lucero APRN  •  dextrose (GLUTOSE) oral gel 15 g, 15 g, Oral, Q15 Min PRN, Jose Luis Lucero APRN  •  famotidine (PEPCID) tablet 20 mg, 20 mg, Oral, BID AC, Gamal Pearson MD, 20 mg at 06/10/20 0839  •  gabapentin (NEURONTIN) capsule 600 mg, 600 mg, Oral, Q8H, So Farah MD, 600 mg at 06/10/20 0633  •  glucagon (human  recombinant) (GLUCAGEN DIAGNOSTIC) injection 1 mg, 1 mg, Subcutaneous, Q15 Min PRN, Jose Luis Lucero APRN  •  heparin (porcine) 5000 UNIT/ML injection 5,000 Units, 5,000 Units, Subcutaneous, Q12H, Jose Luis Lucero APRN, 5,000 Units at 06/10/20 0839  •  insulin detemir (LEVEMIR) injection 5 Units, 5 Units, Subcutaneous, Nightly, Gamal Pearson MD, 5 Units at 06/08/20 2150  •  insulin lispro (humaLOG) injection 0-7 Units, 0-7 Units, Subcutaneous, TID With Meals, Eber Jones MD, 3 Units at 06/09/20 1811  •  insulin lispro (humaLOG) injection 2 Units, 2 Units, Subcutaneous, TID With Meals, Eber Jones MD, 2 Units at 06/10/20 0839  •  ipratropium-albuterol (DUO-NEB) nebulizer solution 3 mL, 3 mL, Nebulization, Q4H PRN, Jose Luis Lucero APRN  •  lactulose (CHRONULAC) 10 GM/15ML solution 30 mL, 30 mL, Oral, BID, Julio C Samuels DO, 30 mL at 06/10/20 0839  •  lidocaine PF 1% (XYLOCAINE) injection 10 mL, 10 mL, Injection, Once, Julio C Samuels, DO  •  melatonin tablet 5 mg, 5 mg, Oral, Nightly PRN, Katerin Alexis APRN, 5 mg at 06/09/20 2155  •  oxyCODONE (ROXICODONE) immediate release tablet 10 mg, 10 mg, Oral, Q8H PRN, So Farah MD, 10 mg at 06/10/20 0632  •  sodium chloride 0.9 % flush 10 mL, 10 mL, Intravenous, Q12H, Julio C Samuels DO, 10 mL at 06/10/20 0840  •  sodium chloride 0.9 % flush 10 mL, 10 mL, Intravenous, PRN, Julio C Samuels DO, 10 mL at 06/08/20 0803      Data Review:   Recent Results (from the past 24 hour(s))   POC Glucose Once    Collection Time: 06/09/20 11:07 AM   Result Value Ref Range    Glucose 227 (H) 70 - 130 mg/dL   POC Glucose Once    Collection Time: 06/09/20  5:42 PM   Result Value Ref Range    Glucose 213 (H) 70 - 130 mg/dL   POC Glucose Once    Collection Time: 06/09/20  9:07 PM   Result Value Ref Range    Glucose 148 (H) 70 - 130 mg/dL   CBC (No Diff)    Collection Time: 06/10/20  5:10  AM   Result Value Ref Range    WBC 11.55 (H) 3.40 - 10.80 10*3/mm3    RBC 3.30 (L) 3.77 - 5.28 10*6/mm3    Hemoglobin 10.2 (L) 12.0 - 15.9 g/dL    Hematocrit 31.4 (L) 34.0 - 46.6 %    MCV 95.2 79.0 - 97.0 fL    MCH 30.9 26.6 - 33.0 pg    MCHC 32.5 31.5 - 35.7 g/dL    RDW 16.0 (H) 12.3 - 15.4 %    RDW-SD 56.0 (H) 37.0 - 54.0 fl    MPV 11.4 6.0 - 12.0 fL    Platelets 89 (L) 140 - 450 10*3/mm3   Basic Metabolic Panel    Collection Time: 06/10/20  5:10 AM   Result Value Ref Range    Glucose 95 65 - 99 mg/dL    BUN 14 6 - 20 mg/dL    Creatinine 0.93 0.57 - 1.00 mg/dL    Sodium 132 (L) 136 - 145 mmol/L    Potassium 4.4 3.5 - 5.2 mmol/L    Chloride 104 98 - 107 mmol/L    CO2 18.0 (L) 22.0 - 29.0 mmol/L    Calcium 8.0 (L) 8.6 - 10.5 mg/dL    eGFR Non African Amer 62 >60 mL/min/1.73    BUN/Creatinine Ratio 15.1 7.0 - 25.0    Anion Gap 10.0 5.0 - 15.0 mmol/L   POC Glucose Once    Collection Time: 06/10/20  7:12 AM   Result Value Ref Range    Glucose 94 70 - 130 mg/dL       Assessment/Plan:   1.Severe bradycardia  in setting of hyperkalemia, acidosis, and possible amio toxicity. Temporary pacing wire placed and now discontinued. Improved off Amiodarone.   2.  Paroxysmal atrial fibrillation-treated in the past with Amiodarone therapy for >3years, Now discontinued- Followed by Dr. Patrick.  [facility] Records indicate patient not good candidate for anticoagulation secondary to fall risk and liver disease.  Consider low dose sotalol in the future  3. Elevated troponin Type II mechanism   4. Liver cirrhosis, ETOH abuse, chronic hepatitis C. History of paracentesis in past.   5. ARF, Improving  6. COPD/possible aspiration PNA  7. Echocardiogram: Moderate MR, EF 55%  8.  Patient has remained cardiac stable for >48hrs. Is cardiac stable for discharge with follow up as outpt.          Devan Carreno M.D., FNORYC, F.H.R.S.  Cardiology/Electrophysiology  6/10/2020  10:01

## 2020-06-10 NOTE — PROGRESS NOTES
Continued Stay Note  [facility]     Patient Name: Gretta Giles  MRN: [id number]  Today's Date: 6/10/2020    Admit Date: 6/6/2020    Discharge Plan     Row Name 06/10/20 1028       Plan    Plan  update    Patient/Family in Agreement with Plan  yes    Plan Comments  Per MD rounds, patient may discharge today.  Spoke with patient at bedside regarding discharge plan and advises that she may be able to return to [place] today however, she indicates that she will not have a ride today because her daughter is working and works 12 hour shifts.  Patient agreeable for CM to call her daughter.  No other discharge needs verbalized.  Called patient daughter, Lorna, who reports that she is off work today and can transport her mom back to [place] however she lives in [place] and will need to find  for her children so it will be later today.  Called [facility] [phone] and spoke to Hermelinda who is agreeable to patient coming later today as long as they can get the discharge summary and report earlier in the day.  CM following.  Patient plan is to discharge back to [facility] via car with daughter to transport.      Final Discharge Disposition Code  03 - skilled nursing facility (SNF)        Discharge Codes    No documentation.       Expected Discharge Date and Time     Expected Discharge Date Expected Discharge Time    Jun 12, 2020             Matilda Galloway RN

## 2020-06-10 NOTE — PROGRESS NOTES
Case Management Discharge Note      Final Note: Spoke to Hermelinda with [facility] to advise patient will be coming back to facility today and advised of time frame who is agreeable to patient returning.  Called patient daughter, Lorna, to advise patient has orders to discharge and can return to [facility] today who reports she will arrive this afternoon around 1600.  Spoke with patient at bedside to advise benjamin daughter will be coming this afternoon and [facility] is expecting her back.  Patient in agreement with plan.  Patient to discharge to [facility] today via car with family to transport.  Nursing to call report to [phone].      Provided Post Acute Provider List?: Refused  Refused Provider List Comment: [facility]    Destination      No service has been selected for the patient.      Durable Medical Equipment      No service has been selected for the patient.      Dialysis/Infusion      No service has been selected for the patient.      Home Medical Care      No service has been selected for the patient.      Therapy      No service has been selected for the patient.      Community Resources      No service has been selected for the patient.             Final Discharge Disposition Code: 03 - skilled nursing facility (SNF)

## 2020-06-10 NOTE — PROGRESS NOTES
Continued Stay Note  [facility]     Patient Name: Gretta Giles  MRN: [id number]  Today's Date: 6/10/2020    Admit Date: 6/6/2020    Discharge Plan     Row Name 06/10/20 1318       Plan    Plan  update    Patient/Family in Agreement with Plan  yes    Plan Comments  Received a call from Hermelinda with [facility] who just realized patient COVID test was too far out for them to take her back today and will need the Nasopharnyx test repeated.  RN advised who will contact MD for order.  Spoke with patient at bedside who verbalizes understanding.  Called patient daughterLorna, who was agreeable and understood the issue and will transport her to [facility] tomorrow.      Final Discharge Disposition Code  03 - skilled nursing facility (SNF)    Row Name 06/10/20 1249       Plan    [facility]    Patient/Family in Agreement with Plan  yes    Plan Comments  Spoke to Hermelinda with [facility] to advise patient will be coming back to facility today and advised of time frame who is agreeable to patient returning.  Called patient daughter, Lorna, to advise patient has orders to discharge and can return to [facility] who reports she will arrive this afternoon around 1600.  Spoke with patient at bedside to advise benjamin daughter will be coming this afternoon and [facility] is expecting her back.  Patient in agreement with plan.  [facility] with family to transport.  Nursing to call report to [phone].      Final Discharge Disposition Code  03 - skilled nursing facility (SNF)    Final Note  Spoke to Hermelinda with [facility] to advise patient will be coming back to facility today and advised of time frame who is agreeable to patient returning.  Called patient daughter, Lorna, to advise patient has orders to discharge and can return to [facility] who reports she will arrive this afternoon around 1600.  Spoke with patient at bedside to advise benjamin polanco will be  coming this afternoon and [place] is expecting her back.  Patient in agreement with plan.  Patient to discharge to [facility] today via car with family to transport.  Nursing to call report to [phone].      Grecia Name 06/10/20 1028       Plan    Plan  update    Patient/Family in Agreement with Plan  yes    Plan Comments  Per MD rounds, patient may discharge today.  Spoke with patient at bedside regarding discharge plan and advises that she may be able to return to [place] today however, she indicates that she will not have a ride today because her daughter is working and works 12 hour shifts.  Patient agreeable for CM to call her daughter.  No other discharge needs verbalized.  Called patient daughter, Lorna, who reports that she is off work today and can transport her mom back to [place] however she lives in [place] and will need to find  for her children so it will be later today.  Called [place] [phone] and spoke to Hermelinda who is agreeable to patient coming later today as long as they can get the discharge summary and report earlier in the day.  CM following.  Patient plan is to discharge back to [facility] via car with daughter to transport.      Final Discharge Disposition Code  03 - skilled nursing facility (SNF)        Discharge Codes    No documentation.       Expected Discharge Date and Time     Expected Discharge Date Expected Discharge Time    Lee 10, 2020             Matilda Galloway RN

## 2020-06-11 NOTE — PROGRESS NOTES
Case Management Discharge Note      Final Note: Patient COVID testing negative, per MD rounds d/c summary to be updated and patient to go to [facility] today.  Called [facility] and spoke to Hermelinda who has already ready reviewed patient labs and is expecting her today.  Called patient Lorna girard, to advise who reports that she should be at the hospital to pick her mother up around 1400, RN advised.  Spoke with patient at bedside regarding discharge plan and advises that her testing was negative and that her daughter will be coming to pick her up this afternoon, patient agreeable to plan . Patient plan is to discharge to [facility] today via car with family to transport.  Nursing to call report to the [facility] at [phone].    Provided Post Acute Provider List?: Refused  Refused Provider List Comment: Wants to go back to [facility]    Destination - Selection Complete      Service Provider Request Status Selected Services Address Phone Number Fax Number    [facility] [address] [address] [phone]      Durable Medical Equipment      No service has been selected for the patient.      Dialysis/Infusion      No service has been selected for the patient.      Home Medical Care      No service has been selected for the patient.      Therapy      No service has been selected for the patient.      Community Resources      No service has been selected for the patient.             Final Discharge Disposition Code: 03 - skilled nursing facility (SNF)

## 2020-06-11 NOTE — PLAN OF CARE
Patient has no new complaints. Vitals within normal limits. Plan for discharge today via family transport.

## 2020-06-11 NOTE — PLAN OF CARE
Problem: Patient Care Overview  Goal: Plan of Care Review  Outcome: Ongoing (interventions implemented as appropriate)  Flowsheets (Taken 6/10/2020 3174 by Lexis Doss RN)  Plan of Care Reviewed With: patient  Note:   Pt VSS, pain controlled by PO pain medication. Seems to be resting well throughout the night. Will cont to monitor.

## 2020-06-11 NOTE — PROGRESS NOTES
[facility] Medicine Services  PROGRESS NOTE    Patient Name: Gretta Giles  : 1960  MRN: [id number]    Date of Admission: 2020  Primary Care Physician: Nighat Kincaid APRN    Subjective   Subjective     CC:  Follow-up heart block/bradycardia    HPI:  Feels well today.  Eager to leave.  No other new complaints.       Review of Systems  No current fevers or chills  No current shortness of breath or cough  No current nausea, vomiting, or diarrhea  No current chest pain or palpitations      Objective   Objective     Vital Signs:   Temp:  [95.9 °F (35.5 °C)-98.6 °F (37 °C)] 98.6 °F (37 °C)  Heart Rate:  [65-81] 72  Resp:  [14-18] 14  BP: (111-145)/(68-91) 119/68        Physical Exam:  Constitutional:Awake, alert  Respiratory: Clear to auscultation bilaterally, respiratory effort normal, nonlabored breathing   Cardiovascular: Regular rate and rhythm, normal radial pulses  Gastrointestinal: Positive bowel sounds, soft, nontender, nondistended  Musculoskeletal: Normal musculature for age, no lower extremity edema  Psychiatric: Appropriate affect, cooperative, conversational  Neurologic: No slurred speech or facial droop, follows commands   Skin: No rashes or jaundice, warm      Results Reviewed:  Results from last 7 days   Lab Units 06/10/20  0510 06/09/20  0606 06/08/20  0628 06/07/20  0437 20  1113 20  1228   WBC 10*3/mm3 11.55* 10.28 9.29 9.06 16.33* 16.60*   HEMOGLOBIN g/dL 10.2* 10.4* 10.5* 10.8* 10.6* 13.0   HEMATOCRIT % 31.4* 31.9* 32.1* 34.1 32.3* 39.9   PLATELETS 10*3/mm3 89* 89* 82* 90* 113* 165   INR   --   --   --  1.40* 1.43*  --    PROCALCITONIN ng/mL  --   --   --   --   --  0.10     Results from last 7 days   Lab Units 06/10/20  0510 06/09/20  062028 20  0437 20  0830 20  1552 20  1228   SODIUM mmol/L 132* 135* 131* 135* 138 133* 131*   POTASSIUM mmol/L 4.4 4.2 4.2 3.9 3.7 5.2 5.4*   CHLORIDE mmol/L 104 104 103 105  105 103 101   CO2 mmol/L 18.0* 18.0* 17.0* 17.0* 18.0* 19.7* 13.7*   BUN mg/dL 14 17 19 25* 21* 18 19   CREATININE mg/dL 0.93 1.16* 1.24* 1.55* 1.31* 1.24* 1.47*   GLUCOSE mg/dL 95 128* 52* 101* 60* 168* 269*   CALCIUM mg/dL 8.0* 8.0* 8.0* 8.1* 8.5* 8.3* 8.2*   ALT (SGPT) U/L  --   --  11 12 13  --  13   AST (SGOT) U/L  --   --  20 23 26  --  27   TROPONIN T ng/mL  --   --   --   --  0.115* 0.087* 0.102*   PROBNP pg/mL  --   --   --   --  5,858.0*  --  814.8     Estimated Creatinine Clearance: 65.1 mL/min (by C-G formula based on SCr of 0.93 mg/dL).    Microbiology Results Abnormal     Procedure Component Value - Date/Time    COVID PRE-OP / PRE-PROCEDURE SCREENING ORDER (NO ISOLATION) - Swab, Nasopharynx [[id number]] Collected:  06/10/20 1501    Lab Status:  Final result Specimen:  Swab from Nasopharynx Updated:  06/10/20 2227    Narrative:       The following orders were created for panel order COVID PRE-OP / PRE-PROCEDURE SCREENING ORDER (NO ISOLATION) - Swab, Nasopharynx.  Procedure                               Abnormality         Status                     ---------                               -----------         ------                     COVID-19,[facility] IN-HOUSE...[[id number]]  Normal              Final result                 Please view results for these tests on the individual orders.    COVID-19,[facility] IN-HOUSE, NP SWAB IN TRANSPORT MEDIA 8-12 HR TAT - Swab, Nasopharynx [[id number]]  (Normal) Collected:  06/10/20 1501    Lab Status:  Final result Specimen:  Swab from Nasopharynx Updated:  06/10/20 2227     COVID19 Not Detected    Respiratory Culture - Sputum, Cough [[id number]] Collected:  06/07/20 0831    Lab Status:  Final result Specimen:  Sputum from Cough Updated:  06/09/20 1038     Respiratory Culture Scant growth (1+) Normal Respiratory Mary     Gram Stain Few (2+) Epithelial cells per low power field      Few (2+) WBCs per low power field      Few (2+) Mixed bacterial morphotypes seen on Gram Stain      Urine Culture - Urine, Urine, Clean Catch [[id number]]  (Abnormal) Collected:  06/07/20 1302    Lab Status:  Final result Specimen:  Urine, Clean Catch Updated:  06/09/20 0927     Urine Culture <25,000 CFU/mL Gram Negative Bacilli     Comment: Call if further workup needed.         Yeast isolated     Comment: No further work up.       Narrative:             Body Fluid Culture - Body Fluid, Peritoneum [[id number]] Collected:  06/06/20 1251    Lab Status:  Final result Specimen:  Body Fluid from Peritoneum Updated:  06/09/20 0922     Body Fluid Culture No growth at 3 days     Gram Stain Occasional WBCs per low power field      No organisms seen    Eosinophil Smear - Urine, Urine, Clean Catch [[id number]]  (Normal) Collected:  06/07/20 1302    Lab Status:  Final result Specimen:  Urine, Clean Catch Updated:  06/07/20 1552     Eosinophil Smear 0 % EOS/100 Cells     Narrative:       No eosinophil seen          Imaging Results (Last 24 Hours)     Procedure Component Value Units Date/Time    US Renal Limited [[id number]] Collected:  06/08/20 1501     Updated:  06/10/20 1121    Narrative:       EXAMINATION: US RENAL LIMITED- 06/08/2020     INDICATION: Worsening BUN and creatinine; renal insufficiency     TECHNIQUE: Sonographic imaging was obtained of the kidneys in both the  sagittal and transverse planes.     COMPARISON: NONE     FINDINGS: There is some fluid seen surrounding the liver. The right  kidney measures in length from pole to pole 9.7 cm. There is no solid  cortical mass or renal cortical cysts seen within the right kidney. No  hydronephrosis or nephrolithiasis.     The left kidney measures in length from pole to pole 9.4 cm. No solid  cortical mass or renal cortical cysts seen within the left kidney. No  hydronephrosis or nephrolithiasis. The bladder is incompletely  visualized with fluid identified in the pelvis.       Impression:       Unremarkable bilateral renal ultrasound with fluid  identified in all 4  quadrants.      D:  06/08/2020  E:  06/08/2020     This report was finalized on 6/10/2020 11:18 AM by Dr. Rosanne Winn MD.             Results for orders placed during the hospital encounter of 06/06/20   Adult Transthoracic Echo Complete W/ Cont if Necessary Per Protocol    Narrative · Left atrial cavity size is borderline dilated.  · Left ventricular systolic function is normal. Estimated EF = 55%.  · Mild-to-moderate mitral valve regurgitation is present  · Mild to moderate tricuspid valve regurgitation is present.  · Calculated right ventricular systolic pressure from tricuspid   regurgitation is 34 mmHg.          I have reviewed the medications:  Scheduled Meds:    budesonide-formoterol 2 puff Inhalation BID - RT   cefTRIAXone 1 g Intravenous Q24H   famotidine 20 mg Oral BID AC   gabapentin 600 mg Oral Q8H   heparin (porcine) 5,000 Units Subcutaneous Q12H   insulin detemir 5 Units Subcutaneous Nightly   insulin lispro 0-7 Units Subcutaneous TID With Meals   insulin lispro 2 Units Subcutaneous TID With Meals   lactulose 30 mL Oral BID   lidocaine PF 1% 10 mL Injection Once   sodium chloride 10 mL Intravenous Q12H     Continuous Infusions:   PRN Meds:.•  acetaminophen  •  dextrose  •  dextrose  •  glucagon (human recombinant)  •  ipratropium-albuterol  •  melatonin  •  oxyCODONE  •  sodium chloride    Assessment/Plan   Assessment & Plan     Active Hospital Problems    Diagnosis  POA   • **Heart block [I45.9]  Yes   • Acute UTI (urinary tract infection) [N39.0]  Yes   • Acute renal failure (ARF) (CMS/HCC) [N17.9]  Yes   • Seizure disorder (CMS/HCC) [G40.909]  Yes   • Atrial fibrillation (CMS/HCC) [I48.91]  Yes   • Chronic hepatitis C virus infection with cirrhosis (CMS/HCC) [B18.2, K74.60]  Yes   • Secondary esophageal varices without bleeding (CMS/HCC) [I85.10]  Yes   • Anxiety and depression [F41.9, F32.9]  Yes   • Chronic obstructive pulmonary disease (CMS/HCC) [J44.9]  Yes   • Diabetes mellitus  (CMS/HCC) [E11.9]  Yes   • GERD without esophagitis [K21.9]  Yes   • Hyperlipidemia [E78.5]  Yes      Resolved Hospital Problems    Diagnosis Date Resolved POA   • Lactic acidosis [E87.2] 06/09/2020 Yes        Brief Hospital Course to date:  Gretta Giles is a 59 y.o. female with reported history of alcoholic and hepatitis C cirrhosis, diabetes mellitus, dyslipidemia, atrial fibrillation, and COPD.  She was found poorly responsive at nursing home with complete heart block/bradycardia.  She received a temporary transvenous pacemaker prior to transfer.  Her amiodarone was stopped and she became sinus rhythm.  It was felt that amiodarone and propranolol were contributing to heart block.    Discussion/plan:  Screen required for transfer for COVID-19 returned and is negative.  Patient appears stable to transfer today.  Overall discharge plan remains the same.  Case discussed on multidisciplinary rounds.  32 minutes spent today on discharge.    DVT Prophylaxis: Subcutaneous heparin      CODE STATUS:   Code Status and Medical Interventions:   Ordered at: 06/06/20 0104     Limited Support to NOT Include:    Intubation     Code Status:    No CPR     Medical Interventions (Level of Support Prior to Arrest):    Limited         Electronically signed by Eber Jones MD, 06/11/20, 09:50.

## 2020-06-11 NOTE — DISCHARGE INSTR - APPOINTMENTS
You have an appointment with Nighat Kincaid APRN  on June 17th 2020 @ 10:15am.   Call them if you have any questions. Phone: [phone] [address] [place] [id number]

## 2020-06-15 NOTE — PROGRESS NOTES
Nursing Home Progress Note        Brian Rivera DO []  MASSIEL Lala []  [address] [place] [address]  Phone: [phone]  Fax: [phone] Montana Haas MD []  Vinh Matos DO []  Regina Nam PA-C [x]   [address] [place] [address]  Phone: [phone]  Fax: [phone]     PATIENT NAME: Gretta Giles                                                                          YOB: 1960           DATE OF SERVICE: 06/15/2020  FACILITY:  [x] [place]   [] [facility]   [] [facility]   [] [facility]   [] Other ______________________________________________________________________     CHIEF COMPLAINT:  Vaginal itching       HISTORY OF PRESENT ILLNESS:   Ms. Giles is a 58 y/o female who presents today at request of staff with c/o vaginal itching.  Patient was recently hospitalized at [facility] for complete heart block.  In addition she was noted to have UTI and was treated with Rocephin and transitioned to Omnicef.  Patient describes vaginal itching that is constant.  No dysuria or hematuria.  No notable discharge.  In addition she also notes that she was taking BuSpar prior to her hospitalization, which has been discontinued.  She continues to have some problems with sleeping and anxiety.  She also notes that since discharge from the hospital she has had tremor to both hands.  It is of ntoe that her propanolol was discontinued as well during admission.  She is followed by GI services for chronic hepatitis C infection.  Lactulose was adjusted during her inpatient admission.  Patient reports she is having approximately 3 stools per day.       PAST MEDICAL & SURGICAL HISTORY:   Past Medical History:   Diagnosis Date   • Acute renal failure (ARF) (CMS/HCC)    • Atrial fibrillation (CMS/HCC)    • CHF (congestive heart failure) (CMS/HCC)    • Chronic hyponatremia    • COPD (chronic obstructive pulmonary disease) (CMS/HCC)    • Diabetes mellitus (CMS/HCC)     • Esophageal varices (CMS/HCC)    • Hepatitis C    • History of abdominal paracentesis    • History of transfusion    • Hyperlipidemia    • Hypertension    • Liver cirrhosis (CMS/HCC)     medical record   • Seizure disorder (CMS/HCC)     grand mal   • Sepsis (CMS/HCC)       Past Surgical History:   Procedure Laterality Date   • ANKLE SURGERY     • CARDIAC ELECTROPHYSIOLOGY PROCEDURE N/A 6/5/2020    Procedure: Temporary Pacemaker;  Surgeon: Josh Dyer MD;  Location: [facility] INVASIVE LOCATION;  Service: Cardiology;  Laterality: N/A;   • CHOLECYSTECTOMY      about 20 years ago per patient   • GALLBLADDER SURGERY     • HYSTERECTOMY     • HYSTERECTOMY      about 20 years ago   • KNEE SURGERY     • REPLACEMENT TOTAL KNEE           MEDICATIONS:  I have reviewed and reconciled the patients medication list in the patients chart at the [facility] nursing facility today.      ALLERGIES:  Allergies   Allergen Reactions   • Codeine Itching   • Morphine Itching         SOCIAL HISTORY:  Social History     Socioeconomic History   • Marital status:      Spouse name: Not on file   • Number of children: Not on file   • Years of education: Not on file   • Highest education level: Not on file   Tobacco Use   • Smoking status: Current Every Day Smoker   • Smokeless tobacco: Never Used   Substance and Sexual Activity   • Alcohol use: Yes     Frequency: Never     Comment: lastr drink 1 month ago   • Drug use: Never   • Sexual activity: Defer       FAMILY HISTORY:  Family History   Problem Relation Age of Onset   • Diabetes Father    • Cancer Father         malignant neoplasm of brain   • Cancer Sister         bone   • Heart disease Sister        REVIEW OF SYSTEMS:  Review of Systems   Constitutional: Positive for appetite change. Negative for activity change, chills, fatigue and fever.   Respiratory: Negative for cough and shortness of breath.    Cardiovascular: Negative for chest pain and leg swelling.    Gastrointestinal: Negative for abdominal pain, constipation, diarrhea, nausea and vomiting.   Genitourinary: Negative for dysuria, hematuria, pelvic pain and vaginal discharge.        Vaginal itching   Neurological: Positive for tremors. Negative for dizziness and light-headedness.   Psychiatric/Behavioral: Positive for sleep disturbance. Negative for agitation and behavioral problems.          PHYSICAL EXAMINATION:     VITAL SIGNS:  /70   Pulse 77   Temp 98.1 °F (36.7 °C)   Resp 18   SpO2 98%     Physical Exam   Constitutional: She appears well-nourished. No distress.   Chronically ill appearing, NAD.   HENT:   Head: Normocephalic and atraumatic.   Eyes: Pupils are equal, round, and reactive to light. Conjunctivae and EOM are normal.   Neck: Normal range of motion. Neck supple. No JVD present.   Cardiovascular: Normal rate, regular rhythm, normal heart sounds and intact distal pulses.   Pulmonary/Chest: Effort normal. No respiratory distress. She has wheezes (scant, infrequent. ). She has no rales.   Abdominal: Soft. Bowel sounds are normal. She exhibits no distension. There is no tenderness.   Musculoskeletal: Normal range of motion. She exhibits edema (mild, bilateral LE.).   Neurological: She is alert.   Bilateral fine tremor to both hands.  There is no flapping hand movements noted.    Skin: Skin is warm and dry. Capillary refill takes less than 2 seconds. She is not diaphoretic.   Psychiatric: She has a normal mood and affect. Her behavior is normal.   Nursing note and vitals reviewed.      RECORDS REVIEW:   I have reviewed and interpreted the following discharge summary today.     ASSESSMENT     Diagnoses and all orders for this visit:    Vaginal candida    Anxiety and depression    Insomnia, unspecified type    Tremor    Urinary tract infection without hematuria, site unspecified      PLAN  Vaginal Candida: Start Lotrisone topically twice daily for 5 days.  Monitor closely for any worsening signs  or symptoms.  Anxiety/depression/insomnia: She continues on Celexa.  BuSpar was discontinued inpatient.  Per patient she did not feel the medication was helping with her symptoms anyways.  We will continue to monitor closely.  I will schedule her melatonin at night.  Consider starting Remeron at bedtime, she has also had some decreased appetite since hospitalization.  Tremor: Patient unable to give any history on whether she had diagnosis of essential tremor.  Beta-blocker was discontinued and patient.  Continue to monitor closely.  Discussed this with patient and she is agreeable to monitoring and waiting on starting any additional medications.   UTI:  Continue Omnicef as ordered by hospitalist.      I discussed the plan in detail with attending, Dr. Haas.  Patient also has follow up with GI in the morning.        [x]  Discussed Patient in detail with nursing/staff, addressed all needs today.     [x]  Plan of Care Reviewed   []  PT/OT Reviewed   []  Order Changes  []  Discharge Plans Reviewed  [x]  Advance Directive on file with Nursing Home.   [x]  POA on file with Nursing Home.    [x]  Code Status listed:  []  Full Code   [x]  DNR    I spent 30 minutes in direct patient care including face to face and floor time.        Regina Nam PA-C.  6/15/2020

## 2020-06-16 NOTE — PROGRESS NOTES
Follow Up Note     Date: 2020   Patient Name: Gretta Giles  MRN: [id number]  : 1960     Referring Physician: No ref. provider found    Chief Complaint:    Chief Complaint   Patient presents with   • Hepatitis       Interval History:   2020  Gretta Giles is a 59 y.o. female who is here today for follow up for follow-up of chronic hepatitis C untreated.  She was started on Epclusa at nursing home recently however she was continued with amiodarone but nursing.  She and nursing home was been informed before to stop the amiodarone before starting the Epclusa.  She developed severe bradycardia and was recently admitted with a complete heart block.  She had a temporary pacemaker initially and later on amiodarone was stopped.  She also had a CT scan of the abdomen pelvis which did not reveal any significant ascites.   She was temporarily taken off from diuretics however started back with the furosemide today.  She seems like gaining weight, gained at least 6 pounds is 1 week..  She started back on a Epclusa on the morning 2020  Gretta Giles is a 59 y.o. female who is here today for follow up for her recent lab work done to work for hep C infection and also for follow-up for her progressive ascites and cirrhosis.  She states that she is compliant with a low-salt diet.  She was been started on salt tablets recently for hyponatremia.  She has been taking lactulose with a good bowel movements daily.  Denies any altered mental status..   States that she does not have a good appetite and she is losing weight.  She had a recent paracentesis and 6 L of fluid removed.     2020   Gretta Giles is a 59 y.o. female who is here today to establish care with Gastroenterology for Cirrhosis of the liver and suspected hepatitis C.   The patient has prior history of liver disease with cirrhosis and also hepatitis C.  No prior h/o illicit drug use, no IVDA. She had tattoo in the past. Deny  jaundice, change in urine color, or stool color. The patient denies history of pruritus.  The patient has excessive tiredness or fatigability. He has associated abdominal distension and occasional abdominal pain since almost a year now and had multiple paracentesis. No prior history of heavy alcohol, was taking ETOH socially once in few weeks. She was followed and managed at [facility] by Dr Antonio. Her father had cirrhosis and liver cancer. ? Esophageal varices.  Last paracentesis 2 weeks ago. Hep c was not treated. Known to have diastolic heart failure. Also has A-fib.   Recent blood work  and imaging reveal possible cirrhosis. Deny  any change in mental status. No change in bowel habit, rarely constipated.  There is no history of  hematochezia or melena.   Pt denies nausea vomiting or odynophagia or dysphagia. There is a history of occasional acid reflux. There is no history of anemia. Prior history of EGD in Nov 2019 and was told normal.  Last colonoscopy in 2019 was normal as per pt, both done Dr Antonio. No family history of colon cancer.       Subjective      Past Medical History:   Past Medical History:   Diagnosis Date   • Acute renal failure (ARF) (CMS/HCC)    • Atrial fibrillation (CMS/HCC)    • CHF (congestive heart failure) (CMS/HCC)    • Chronic hyponatremia    • COPD (chronic obstructive pulmonary disease) (CMS/HCC)    • Diabetes mellitus (CMS/HCC)    • Esophageal varices (CMS/HCC)    • Hepatitis C    • History of abdominal paracentesis    • History of transfusion    • Hyperlipidemia    • Hypertension    • Liver cirrhosis (CMS/HCC)     medical record   • Seizure disorder (CMS/HCC)     grand mal   • Sepsis (CMS/HCC)      Past Surgical History:   Past Surgical History:   Procedure Laterality Date   • ANKLE SURGERY     • CARDIAC ELECTROPHYSIOLOGY PROCEDURE N/A 6/5/2020    Procedure: Temporary Pacemaker;  Surgeon: Josh Dyer MD;  [facility]: [facility];  Service: Cardiology;   Laterality: N/A;   • CHOLECYSTECTOMY      about 20 years ago per patient   • GALLBLADDER SURGERY     • HYSTERECTOMY     • HYSTERECTOMY      about 20 years ago   • KNEE SURGERY     • REPLACEMENT TOTAL KNEE         Family History:   Family History   Problem Relation Age of Onset   • Diabetes Father    • Cancer Father         malignant neoplasm of brain   • Cancer Sister         bone   • Heart disease Sister        Social History:   Social History     Socioeconomic History   • Marital status:      Spouse name: Not on file   • Number of children: Not on file   • Years of education: Not on file   • Highest education level: Not on file   Tobacco Use   • Smoking status: Current Every Day Smoker   • Smokeless tobacco: Never Used   Substance and Sexual Activity   • Alcohol use: Yes     Frequency: Never     Comment: lastr drink 1 month ago   • Drug use: Never   • Sexual activity: Defer       Medications:     Current Outpatient Medications:   •  budesonide-formoterol (SYMBICORT) 160-4.5 MCG/ACT inhaler, Inhale 2 puffs 2 (Two) Times a Day., Disp: , Rfl: 12  •  cefdinir (OMNICEF) 300 MG capsule, Take 1 capsule by mouth 2 (Two) Times a Day for 6 days. For 6 more days, Disp: 12 capsule, Rfl: 0  •  chlorhexidine (PERIDEX) 0.12 % solution, Apply 30 mL to the mouth or throat 2 (Two) Times a Day., Disp: , Rfl:   •  citalopram (CeleXA) 10 MG tablet, Take 10 mg by mouth Daily., Disp: , Rfl:   •  clotrimazole-betamethasone (LOTRISONE) 1-0.05 % cream, Apply  topically to the appropriate area as directed 2 (Two) Times a Day., Disp: , Rfl:   •  dextrose (GLUTOSE) 40 % gel, Take 1 tube by mouth Every 15 (Fifteen) Minutes As Needed for Low Blood Sugar (Blood Glucose Less Than 70, Patient Alert, Is not NPO, Can Safely Swallow)., Disp: , Rfl:   •  dicyclomine (BENTYL) 10 MG capsule, Take 10 mg by mouth Daily As Needed., Disp: , Rfl:   •  famotidine (PEPCID) 40 MG tablet, Take 40 mg by mouth 2 (Two) Times a Day., Disp: , Rfl:   •   "ferrous sulfate 325 (65 FE) MG tablet, Take 1 tablet by mouth 3 (Three) Times a Day With Meals., Disp: 30 tablet, Rfl: 0  •  fluticasone (FLOVENT HFA) 44 MCG/ACT inhaler, Inhale 2 puffs 2 (Two) Times a Day., Disp: , Rfl:   •  furosemide (LASIX) 40 MG tablet, Take 1 tablet by mouth Daily., Disp: , Rfl:   •  gabapentin (NEURONTIN) 600 MG tablet, Take 1 tablet by mouth 3 (Three) Times a Day., Disp: 30 tablet, Rfl: 0  •  glucose monitor monitoring kit, 1 each As Needed (Diabetes mellitus)., Disp: 1 each, Rfl: 0  •  insulin aspart (novoLOG) 100 UNIT/ML injection, Inject 0-7 Units under the skin into the appropriate area as directed 3 (Three) Times a Day Before Meals., Disp: , Rfl: 12  •  insulin glargine (LANTUS) 100 UNIT/ML injection, Inject 12 Units under the skin into the appropriate area as directed Every Night., Disp: , Rfl: 12  •  Insulin Pen Needle (PEN NEEDLES 5/16\") 31G X 8 MM misc, 1 each 2 (Two) Times a Day., Disp: 100 each, Rfl: 0  •  lactulose (CHRONULAC) 10 GM/15ML solution, Take 22.5 mL by mouth 3 (Three) Times a Day. 3 soft bm's a day, Disp: , Rfl:   •  lidocaine (LIDODERM) 5 %, Place 1 patch on the skin as directed by provider Daily. Remove & Discard patch within 12 hours or as directed by MD, Disp: , Rfl:   •  melatonin 5 MG tablet tablet, Take 5 mg by mouth At Night As Needed., Disp: , Rfl:   •  oxyCODONE (ROXICODONE) 10 MG tablet, Take 1 tablet by mouth 4 (Four) Times a Day., Disp: 12 tablet, Rfl: 0  •  Sofosbuvir-Velpatasvir (Epclusa) 400-100 MG tablet, Take 1 tablet by mouth Daily., Disp: , Rfl:   •  spironolactone (ALDACTONE) 100 MG tablet, Take 0.5 tablets by mouth Daily., Disp: , Rfl:     Allergies:   Allergies   Allergen Reactions   • Codeine Itching   • Morphine Itching       Review of Systems:   Review of Systems   Constitutional: Negative for unexpected weight gain and unexpected weight loss.   HENT: Positive for trouble swallowing.    Cardiovascular: Positive for leg swelling.   " "  Gastrointestinal: Positive for abdominal distention and nausea. Negative for abdominal pain, anal bleeding, blood in stool, constipation, diarrhea, rectal pain, vomiting, GERD and indigestion.       The following portions of the patient's history were reviewed and updated as appropriate: allergies, current medications, past family history, past medical history, past social history, past surgical history and problem list.    Objective     Physical Exam:  Vital Signs:   Vitals:    06/16/20 1111   BP: 110/62   Pulse: 57   Resp: 18   Temp: 98 °F (36.7 °C)   TempSrc: Temporal   SpO2: 98%   Weight: 61.7 kg (136 lb)   Height: 162.6 cm (64\")       Physical Exam   Constitutional: She is oriented to person, place, and time. She appears well-developed and well-nourished.   She is alert awake and oriented   HENT:   Mouth/Throat: Oropharynx is clear and moist.   Eyes: EOM are normal.   Mild pallor   present   Neck: Neck supple.   Cardiovascular: Normal rate and regular rhythm.   No murmur heard.  Pulmonary/Chest: Effort normal and breath sounds normal. No respiratory distress.   Abdominal: Soft. Bowel sounds are normal. She exhibits distension (Mild abdominal distention noted patient appears to have a mild to moderate ascites now). She exhibits no mass. There is no tenderness. There is no guarding. No hernia.   Musculoskeletal: She exhibits edema.   Lymphadenopathy:     She has no cervical adenopathy.   Neurological: She is alert and oriented to person, place, and time. No sensory deficit.   Skin: Skin is warm and dry.   Nursing note and vitals reviewed.      Results Review:   I reviewed the patient's new clinical results.    No results displayed because visit has over 200 results.      Admission on 06/05/2020, Discharged on 06/05/2020   Component Date Value Ref Range Status   • Glucose 06/05/2020 269* 65 - 99 mg/dL Final    Glucose >180, Hemoglobin A1C recommended.   • BUN 06/05/2020 19  6 - 20 mg/dL Final   • Creatinine " 06/05/2020 1.47* 0.57 - 1.00 mg/dL Final   • Sodium 06/05/2020 131* 136 - 145 mmol/L Final   • Potassium 06/05/2020 5.4* 3.5 - 5.2 mmol/L Final   • Chloride 06/05/2020 101  98 - 107 mmol/L Final   • CO2 06/05/2020 13.7* 22.0 - 29.0 mmol/L Final   • Calcium 06/05/2020 8.2* 8.6 - 10.5 mg/dL Final   • Total Protein 06/05/2020 7.6  6.0 - 8.5 g/dL Final   • Albumin 06/05/2020 2.80* 3.50 - 5.20 g/dL Final   • ALT (SGPT) 06/05/2020 13  1 - 33 U/L Final   • AST (SGOT) 06/05/2020 27  1 - 32 U/L Final   • Alkaline Phosphatase 06/05/2020 209* 39 - 117 U/L Final   • Total Bilirubin 06/05/2020 0.6  0.2 - 1.2 mg/dL Final   • eGFR Non African Amer 06/05/2020 37* >60 mL/min/1.73 Final   • Globulin 06/05/2020 4.8  gm/dL Final   • A/G Ratio 06/05/2020 0.6  g/dL Final   • BUN/Creatinine Ratio 06/05/2020 12.9  7.0 - 25.0 Final   • Anion Gap 06/05/2020 16.3* 5.0 - 15.0 mmol/L Final   • Troponin T 06/05/2020 0.102* 0.000 - 0.030 ng/mL Final   • proBNP 06/05/2020 814.8  5.0 - 900.0 pg/mL Final   • Lactate 06/05/2020 5.6* 0.5 - 2.0 mmol/L Final   • Procalcitonin 06/05/2020 0.10  0.10 - 0.25 ng/mL Final   • Influenza A Ag, EIA 06/05/2020 Negative  Negative Final   • Influenza B Ag, EIA 06/05/2020 Negative  Negative Final   • Magnesium 06/05/2020 2.5  1.6 - 2.6 mg/dL Final   • WBC 06/05/2020 16.60* 3.40 - 10.80 10*3/mm3 Final   • RBC 06/05/2020 4.15  3.77 - 5.28 10*6/mm3 Final   • Hemoglobin 06/05/2020 13.0  12.0 - 15.9 g/dL Final   • Hematocrit 06/05/2020 39.9  34.0 - 46.6 % Final   • MCV 06/05/2020 96.1  79.0 - 97.0 fL Final   • MCH 06/05/2020 31.3  26.6 - 33.0 pg Final   • MCHC 06/05/2020 32.6  31.5 - 35.7 g/dL Final   • RDW 06/05/2020 16.4* 12.3 - 15.4 % Final   • RDW-SD 06/05/2020 58.8* 37.0 - 54.0 fl Final   • MPV 06/05/2020 10.9  6.0 - 12.0 fL Final   • Platelets 06/05/2020 165  140 - 450 10*3/mm3 Final   • Neutrophil % 06/05/2020 41.0* 42.7 - 76.0 % Final   • Lymphocyte % 06/05/2020 41.3  19.6 - 45.3 % Final   • Monocyte % 06/05/2020  10.4  5.0 - 12.0 % Final   • Eosinophil % 06/05/2020 3.0  0.3 - 6.2 % Final   • Basophil % 06/05/2020 1.9* 0.0 - 1.5 % Final   • Immature Grans % 06/05/2020 2.4* 0.0 - 0.5 % Final   • Neutrophils, Absolute 06/05/2020 6.81  1.70 - 7.00 10*3/mm3 Final   • Lymphocytes, Absolute 06/05/2020 6.86* 0.70 - 3.10 10*3/mm3 Final   • Monocytes, Absolute 06/05/2020 1.72* 0.10 - 0.90 10*3/mm3 Final   • Eosinophils, Absolute 06/05/2020 0.49* 0.00 - 0.40 10*3/mm3 Final   • Basophils, Absolute 06/05/2020 0.32* 0.00 - 0.20 10*3/mm3 Final   • Immature Grans, Absolute 06/05/2020 0.40* 0.00 - 0.05 10*3/mm3 Final   • nRBC 06/05/2020 0.0  0.0 - 0.2 /100 WBC Final   • Site 06/05/2020 Right Brachial   Final   • Qamar's Test 06/05/2020 N/A   Final   • pH, Arterial 06/05/2020 7.385  7.300 - 7.500 pH units Final   • pCO2, Arterial 06/05/2020 17.8* 35.0 - 45.0 mm Hg Final    85 Value below critical limit   • pO2, Arterial 06/05/2020 194.0* 75.0 - 100.0 mm Hg Final    83 Value above reference range   • HCO3, Arterial 06/05/2020 10.6* 22.0 - 28.0 mmol/L Final    84 Value below reference range   • Base Excess, Arterial 06/05/2020 -12.0* 0.0 - 2.0 mmol/L Final    84 Value below reference range   • O2 Saturation, Arterial 06/05/2020 99.8  94.0 - 100.0 % Final    83 Value above reference range   • Hematocrit, Blood Gas 06/05/2020 39.0  % Final   • Oxyhemoglobin 06/05/2020 97.8  94 - 99 % Final   • Methemoglobin 06/05/2020 0.80  0.00 - 1.50 % Final   • Carboxyhemoglobin 06/05/2020 1.2  0 - 2 % Final   • A-a Gradiant 06/05/2020 184.9  mmHg Final   • Barometric Pressure for Blood Gas 06/05/2020 733  mmHg Final   • Modality 06/05/2020 Nasal Cannula   Final   • FIO2 06/05/2020 58  % Final   • Flow Rate 06/05/2020 2.0  lpm Final   • Ventilator Mode 06/05/2020 NA   Final    Meter: [id number]     :  [place]   • Hold Tube 06/05/2020 Hold for add-ons.   Final    Auto resulted.   • COVID19 06/05/2020 Not Detected  Not Detected - Ref. Range Final   •  Ammonia 06/05/2020 45  11 - 51 umol/L Final   • Glucose 06/05/2020 168* 65 - 99 mg/dL Final   • BUN 06/05/2020 18  6 - 20 mg/dL Final   • Creatinine 06/05/2020 1.24* 0.57 - 1.00 mg/dL Final   • Sodium 06/05/2020 133* 136 - 145 mmol/L Final   • Potassium 06/05/2020 5.2  3.5 - 5.2 mmol/L Final   • Chloride 06/05/2020 103  98 - 107 mmol/L Final   • CO2 06/05/2020 19.7* 22.0 - 29.0 mmol/L Final   • Calcium 06/05/2020 8.3* 8.6 - 10.5 mg/dL Final   • eGFR Non African Amer 06/05/2020 45* >60 mL/min/1.73 Final   • BUN/Creatinine Ratio 06/05/2020 14.5  7.0 - 25.0 Final   • Anion Gap 06/05/2020 10.3  5.0 - 15.0 mmol/L Final   • Troponin T 06/05/2020 0.087* 0.000 - 0.030 ng/mL Final   • Blood Culture 06/05/2020 No growth at 5 days   Final   • Blood Culture 06/05/2020 No growth at 5 days   Final   • Glucose 06/05/2020 162* 70 - 130 mg/dL Final    Serial Number: [id number]:  [id number]   • Lactate 06/05/2020 1.1  0.5 - 2.0 mmol/L Final   • Glucose 06/05/2020 166* 70 - 130 mg/dL Final    Serial Number: [id number]:  [id number]   Lab Requisition on 05/27/2020   Component Date Value Ref Range Status   • Reference Lab Report 05/27/2020 See attachment   Final   • COVID19 05/27/2020 Not Detected  Not Detected - Ref. Range Final      Us Renal Limited    Result Date: 6/10/2020  Unremarkable bilateral renal ultrasound with fluid identified in all 4 quadrants.   D:  06/08/2020 E:  06/08/2020  This report was finalized on 6/10/2020 11:18 AM by Dr. Rosanne Winn MD.      Xr Chest 1 View    Result Date: 6/7/2020  1. Improving perihilar edema. 2. Stable left basilar effusion. 3. No evidence of pneumothorax or other new chest disease. 4. PICC line tip in the SVC.   DICTATED:   06/07/2020 EDITED/ls :   06/07/2020  This report was finalized on 6/7/2020 10:52 PM by Dr. Avi Hurd MD.      Xr Chest 1 View    Result Date: 6/6/2020  Pulmonary vascular congestion with mild perihilar edema. Mild left basilar atelectasis and effusion and  trace right midlung atelectasis. No evidence of pneumothorax.  DICTATED:   06/06/2020 EDITED/ls :   06/06/2020    This report was finalized on 6/6/2020 11:19 PM by Dr. Avi Hurd MD.      Xr Chest 1 View    Result Date: 6/5/2020  1. Diminished lung volume with interstitial prominence which could be related to vascular congestion and mild edema. 2. Left greater than the right basilar opacities which may represent atelectasis or developing infiltrates for pneumonia. Continued followup recommended.  This report was finalized on 6/5/2020 12:52 PM by Benito Mckeon MD.    Ct Guided Paracentesis    Result Date: 6/6/2020   CT-guided paracentesis with drainage of 60 mL of clear yellow fluid. Portion sent for requested laboratory analysis.  Plan:  Resume care by clinical team. _______________________________________________________________  PROCEDURE SUMMARY: - Limited CT - CT-guided paracentesis - Additional procedure(s): None  PROCEDURE DETAILS:  Pre-procedure Consent: Informed consent for the procedure including risks, benefits and alternatives was obtained and time-out was performed prior to the procedure. Preparation: The site was prepared and draped using maximal sterile barrier technique including cutaneous antisepsis.  Anesthesia/sedation Level of anesthesia/sedation: No sedation  Initial abdominal CT Initial abdominal CT was performed. Findings: Small volume ascites. A safe window for paracentesis was identified.  Paracentesis Local anesthesia was administered. The peritoneal cavity was accessed and needle position confirmed by CT. Ascites was drained. The catheter was then removed, and a sterile bandage was applied. Paracentesis access technique: CT-guided Needle placed: 22-gauge spinal Post-drainage CT: No immediate complication  Radiation Dose CT dose length product (mGy-cm): See ICU CT log for details.  Additional Details Additional description of procedure: None Equipment details: None Specimens removed: Abdominal  fluid Estimated blood loss (mL): Less than 10 Standardized report: Paracentesis  Attestation I was present and scrubbed for the entire procedure. Imaging reviewed. Agree with final report as written.  This report was finalized on 6/6/2020 12:28 PM by Gianni Mcclain.      Us Paracentesis    Result Date: 4/10/2020  Ultrasound guided left lower quadrant paracentesis with removal of six liters of clear yellow.  This report was finalized on 4/10/2020 12:47 PM by Dhruv Santa M.D..      Assessment / Plan      1. Chronic hepatitis C virus infection with cirrhosis (CMS/HCC)  6/16/2020  It appears from the record that her diuretics were stopped recently after hospital admission.  We will continue Lasix 40 with Aldactone 50 mg p.o. Daily, Need titrating the dose up depending on her renal function after 1 to 2 weeks.   She started accumulating fluid again in the belly.  She seem to responded well to diuretics and paracentesis with almost clearance of ascites recently as per recent CT scan abdomen pelvis.   She started back on Epclusa on 6/13/2020. 9 (24 weeks)  Amiodarone was discontinued.  Unable to tolerate nonselective beta-blockers due to hypotension.  Had a hep B vaccine and she is immune to hep a  We will get repeat CMP and the hep C viral load in 4 weeks time while on treatment with Epclusa  Follow up 6 weeks    4/14/2020  Chronic hep C, treatment naïve, genotype 3, with the viral load of 624,000  Decompensated hep C cirrhosis with portal hypertension, ascites and esophageal varices  Her MELD score is fluctuating from 9 to 11 since last 6 months.  CTP score of 9, class B.  Hep c fibro-sure F3 bridging fibrosis  Status post hep B vaccine  Continue lactulose     Patient was followed for at [facility] by Dr. Antonio.  As per patient she was drinking alcohol socially once in few weeks.  She denies any alcohol abuse no history of any illicit drug use or IV drug abuse. She was been told she has hepatitis C in the last 2  years.  No prior treatment for hep C.  She has a strong family history of her cirrhosis including her dad and sister.   She is immune to hepatitis A.  Not immune to hepatitis B. her HIV test is negative.  Recent ultrasound abdomen 2/2020  reveals a signs of cirrhosis of the splenomegaly and ascites without any liver lesions.  Needs a hepatitis B vaccine that can be received at [facility], discussed with the staff at nursing home.  Her last EGD done at [facility] by Dr. Antonio  in July 2019 revealed grade 1 esophageal varices and mild portal hypertensive gastropathy.  Her recent liver enzymes are normal except borderline elevated total bilirubin.  She has chronic anemia with hemoglobin between 9-11 as per deciliter and she is on iron pills.  She may not be eligible to receive Ribavarin and we have left with the option of treating her with Epclusa for 24 weeks. Given her decompensated cirrhosis and the genotype 3 response rate is low for sustained virological response, that has been discussed with the patient.  Risk and benefits have been discussed with the patient including the rare possibility of any further decompensation and liver failure.  She is on amiodarone and we are going to hold the amiodarone while the patient is started on Epclusa if approved.  She has been given a explanation of the importance of adherence, and has agreed to adhere to and complete the drug regimen as prescribed; and that the risks of hepatotoxic drugs including acetaminophen have been explained to the patient.      Will get a priorauth for treatment of her hyper chronic hepatitis C infection  We will refer her to transplant unit when once she has been treated for hep C infection  She needs ultrasound abdomen for HCC surveillance in 6 months time that will be September 2020  She needs a surveillance EGD in 1 year that will be in July 2021 (last EGD in July 2019)  Patient does have a constipation associated with the opioid  use.  Unclear whether she had any hepatic encephalopathy in the past however she is on the lactulose 15 mL p.o. twice daily with a good BM daily,  will continue the same     Prior history  2.  Ascites secondary to cirrhosis  4/13/2020  She is on a frequent paracentesis.  Last paracentesis was on 4/10/2020 and about 6 L clear fluid removed.  she is currently on Lasix 40 mg twice daily along with Aldactone 100 mg p.o. Daily  Her sodium is low at 127 (may be slightly spuriously low due to her hyperglycemia) and her creatinine slowly going up to 1.2  There may not be any possibility of increasing her diuretic dose for now.   I do note that patient was put on salt tablets which will worsen her ascites.  Advised to stop the salt tablets and repeat BMP in 1 week time.  If there is any progressive drop in sodium level will reduce her Lasix dose to 60 mg p.o.daily (40+20).   Strict low-salt diet advice has been given.  She may need a frequent paracentesis for a while longer with the diuretics  We will see how she does with the current management will next 6 months time and may consider to send her to transplant unit for an opinion     2/19/2020  Patient seem to have chronic ascites.  She states that she was getting frequent paracentesis last year at [place].  She had at least twice a paracentesis done in the last 4 to 6 weeks time.   As per record there was no SBP noted with recent paracentesis. She does have a history of diastolic heart failure with EF of 65 to 75%.  Since him to have any systolic heart failure.  No history suggestive of any cardiac cirrhosis.  I am not sure whether she had any episodes of hepatic encephalopathy or not but she is on the lactulose 15 mL p.o. twice daily for now.  Patient currently on Aldactone 100 mg p.o. daily with the Lasix 80 mg p.o. Daily, will continue the same for now.  She may need frequent paracentesis until her diuretic dose is titrated up with the aim to completely stop therapeutic  paracentesis  Recommend getting the paracentesis by radiology once in between if there is any progression of the ascites before the next visit  Strict 2 g low-salt or no salt diet at the rehab and at home  Continue lactulose 15 mL p.o. twice daily to titrate for the bowel movement 2 to 3/day  Will repeat CMP in 3 to 4 weeks time and increase the dose of diuretics if her electrolytes and renal function is normal        3. Secondary esophageal varices without bleeding (CMS/HCC) and portal hypertensive gastropathy  EGD done in July 2019 at [facility] revealed less grade 1 esophageal varices also mild portal hypertensive gastropathy  At this time patient blood pressure is on the lower side at 100 -110 systolic  Will put her on lowest dose of propranolol 10 mg p.o. twice daily and see how she does, if there is any drop in her blood pressure will discontinue the beta-blocker medication        4. Anemia, unspecified type  Normocytic anemia.  No current signs of any GI bleed.  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  We will continue iron pills for now. Last EGD colonoscopy was done in July 2019.  Colonoscopy was normal however prep was suboptimal.  Small esophageal varices and mild portal evidence of gastropathy noted with EGD.  No current signs of any bleeding     5. Gastroesophageal reflux disease without esophagitis  Well-controlled on the PPI to continue the same     6.  Protein calorie malnutrition  Advised to be on a high-protein diet.   Advised egg daily and also protein shakes or Ensure 1 can p.o. twice daily     7. Encounter for screening for malignant neoplasm of colon  Average risk.  Last colonoscopy was in 2019 [facility]  which was normal.  Prep was suboptimal  Repeat colonoscopy in 5 to 10 years depending on symptoms       Follow Up:   No follow-ups on file.    Amna Woo MD  [facility] [place]  6/16/2020  11:20     Please note that portions of this note may have been completed  with a voice recognition program. Efforts were made to edit the dictations, but occasionally words are mistranscribed.

## 2020-06-17 PROBLEM — K92.2 UGI BLEED: Status: ACTIVE | Noted: 2020-01-01

## 2020-06-18 PROBLEM — D62 ACUTE BLOOD LOSS ANEMIA: Status: ACTIVE | Noted: 2020-01-01

## 2020-06-18 NOTE — ANESTHESIA POSTPROCEDURE EVALUATION
Patient: Gretta Giles    Procedure Summary     Date:  06/18/20 Room / Location:   [facility]    Anesthesia Start:  1152 Anesthesia Stop:  1242    Procedure:  ESOPHAGOGASTRODUODENOSCOPY (N/A ) Diagnosis:      Surgeon:  Brunner, Mark I, MD Provider:  Rodolfo Pierson MD    Anesthesia Type:  Not recorded ASA Status:  3 - Emergent          Anesthesia Type: No value filed.    Vitals  Vitals Value Taken Time   /78 6/18/2020 12:42 PM   Temp 98 °F (36.7 °C) 6/18/2020 12:42 PM   Pulse 74 6/18/2020 12:42 PM   Resp 12 6/18/2020 12:42 PM   SpO2 99 % 6/18/2020 12:42 PM           Post Anesthesia Care and Evaluation    Patient location during evaluation: bedside  Patient participation: complete - patient cannot participate  Level of consciousness: awake and alert  Pain score: 0  Pain management: adequate  Airway patency: patent  Anesthetic complications: No anesthetic complications  PONV Status: none  Cardiovascular status: hemodynamically stable and acceptable  Respiratory status: acceptable and ETT  Hydration status: acceptable    Comments: ICU RN + respiratory therapy to endo bedside for report. All questions answered. Pt reversed with sugammadex, 4 twitches present. Vital signs stable. Pt transported to ICU via 100% FiO2 BVM and propofol infusion.

## 2020-06-18 NOTE — ANESTHESIA PROCEDURE NOTES
Airway  Urgency: elective    Date/Time: 6/18/2020 11:58 AM  Airway not difficult    General Information and Staff    Patient location during procedure: OR  CRNA: Jazzmine Torres CRNA    Indications and Patient Condition  Indications for airway management: airway protection    Preoxygenated: yes  MILS not maintained throughout      Final Airway Details  Final airway type: endotracheal airway      Successful airway: ETT  Cuffed: yes   Successful intubation technique: direct laryngoscopy and RSI  Facilitating devices/methods: cricoid pressure and intubating stylet  Endotracheal tube insertion site: oral  Blade: Gaurav  Blade size: 3  ETT size (mm): 8.0  Cormack-Lehane Classification: grade I - full view of glottis  Placement verified by: chest auscultation and capnometry   Measured from: lips  ETT/EBT  to lips (cm): 21  Number of attempts at approach: 1  Assessment: lips, teeth, and gum same as pre-op and atraumatic intubation    Additional Comments  Negative epigastric sounds, Breath sound equal bilaterally with symmetric chest rise and fall    NG placed on max suction, drained 100ml fluid prior to induction. Cricoid pressure maintained until ETCO2 confirmed.

## 2020-06-18 NOTE — ANESTHESIA PREPROCEDURE EVALUATION
Anesthesia Evaluation     Patient summary reviewed and Nursing notes reviewed   NPO Solid Status: > 8 hours  NPO Liquid Status: > 8 hours           Airway   Dental      Pulmonary    (+) a smoker Current, COPD moderate,   Cardiovascular     ECG reviewed    (+) hypertension, dysrhythmias, CHF , hyperlipidemia,     ROS comment: Normal sinus rhythm  Low voltage QRS    ECHO EF = 55%.  moderate MR TR RVSP (TR) 34 mmHg.      Neuro/Psych  (+) seizures, numbness, psychiatric history Anxiety and Depression,     GI/Hepatic/Renal/Endo    (+)  GERD, GI bleeding upper , hepatitis (Chronic ) C, liver disease (cirrhosis ) history of elevated LFT, renal disease, diabetes mellitus type 2,     ROS Comment: VARICES Ascites     Musculoskeletal     Abdominal    Substance History      OB/GYN          Other        ROS/Med Hx Other: HCT22--->33   Creat 1.2  K 5.6                   Anesthesia Plan    ASA 3 - emergent       Rapid sequence(Hematemesis needs CP RSI ETT (Sux relatively contraindicated)  so Filippo with post op ventilation )  intravenous induction     Anesthetic plan, all risks, benefits, and alternatives have been provided, discussed and informed consent has been obtained with: patient.    Plan discussed with CRNA.

## 2020-06-19 NOTE — ANESTHESIA POSTPROCEDURE EVALUATION
Patient: Gretta Giles    Procedure Summary     Date:  06/19/20 Room / Location:  [facility] INTERVENTIONAL RADIOLOGY    Anesthesia Start:  1703 Anesthesia Stop:      Procedure:  IR TRANSJUGULAR INTRAHEPATIC PORTOSYSTEMIC SHUNT Diagnosis:  (TIPS)    Scheduled Providers:   Provider:  Murali Way MD    Anesthesia Type:  general ASA Status:  3          Anesthesia Type: general    Vitals  Vitals Value Taken Time   /75 6/19/2020  6:54 PM   Temp     Pulse 87 6/19/2020  6:55 PM   Resp     SpO2 90 % 6/19/2020  6:56 PM   Vitals shown include unvalidated device data.        Post Anesthesia Care and Evaluation    Patient location during evaluation: ICU  Level of consciousness: sleepy but conscious  Pain score: 0  Pain management: adequate  Airway patency: patent  Anesthetic complications: No anesthetic complications  PONV Status: none  Cardiovascular status: acceptable  Respiratory status: spontaneous ventilation  Hydration status: acceptable

## 2020-06-19 NOTE — ANESTHESIA PROCEDURE NOTES
Airway  Urgency: elective    Date/Time: 6/19/2020 5:12 PM  Airway not difficult    General Information and Staff    Patient location during procedure: OR  CRNA: Inocente Lara CRNA    Indications and Patient Condition  Indications for airway management: airway protection    Preoxygenated: yes  MILS not maintained throughout  Mask difficulty assessment: 1 - vent by mask    Final Airway Details  Final airway type: endotracheal airway      Successful airway: ETT  Cuffed: yes   Successful intubation technique: direct laryngoscopy  Endotracheal tube insertion site: oral  Blade: Gaurav  Blade size: 3  ETT size (mm): 7.0  Cormack-Lehane Classification: grade I - full view of glottis  Placement verified by: chest auscultation and capnometry   Measured from: lips  ETT/EBT  to lips (cm): 20  Number of attempts at approach: 1    Additional Comments  Negative epigastric sounds, Breath sound equal bilaterally with symmetric chest rise and fall

## 2020-06-25 NOTE — TELEPHONE ENCOUNTER
[facility] calls and will be switching patient medicine refills to the [facility]

## 2020-07-01 NOTE — PROGRESS NOTES
Nursing Home Progress Note        Brian Rivera DO []  MASSIEL Lala []  [address] [place] [address]  Phone: [phone]  Fax: [phone] Montana Haas MD []  Vinh Matos DO []  Regina Nam PA-C [x]   [address] [place] [address]  Phone: [phone]  Fax: [phone]     PATIENT NAME: Gretta Giles                                                                          YOB: 1960           DATE OF SERVICE: 06/29/2020  FACILITY:  [x] [facility]   [] [facility]   [] [facility]   [] [facility]   [] Other ______________________________________________________________________     CHIEF COMPLAINT:  Constipation, edema.       HISTORY OF PRESENT ILLNESS:   Ms. Giles is a 58 y/o female who presents today at request of staff with c/o constipation.  Nursing staff report that patient has been taking most of her lactulose, at times refusing to drink all of the medication.  She has had constipation for the past several days.  She is reporting passing flatus.  There is some associated abdominal discomfort and back pain.  In addition, since discharge from [facility] she has had notable edema.  During hospitalization Lasix was discontinued.  Since readmission to [facility], systolic BP has been borderline.  Upon assessment today, it was reported as 95/56.  Repeat /58.  Patient continues to be weak, fatigued, and presenting with intermittent confusion.  She has expressed to myself and staff that she does not wish to have additional hospitalizations at this time.      PAST MEDICAL & SURGICAL HISTORY:   Past Medical History:   Diagnosis Date   • Acute renal failure (ARF) (CMS/HCC)    • Atrial fibrillation (CMS/HCC)    • CHF (congestive heart failure) (CMS/HCC)    • Chronic hyponatremia    • COPD (chronic obstructive pulmonary disease) (CMS/HCC)    • Diabetes mellitus (CMS/HCC)    • Esophageal varices (CMS/HCC)    • Hepatitis C    • History of abdominal  paracentesis    • History of transfusion    • Hyperlipidemia    • Hypertension    • Liver cirrhosis (CMS/HCC)     medical record   • Seizure disorder (CMS/HCC)     grand mal   • Sepsis (CMS/HCC)       Past Surgical History:   Procedure Laterality Date   • ANKLE SURGERY     • CARDIAC ELECTROPHYSIOLOGY PROCEDURE N/A 6/5/2020    Procedure: Temporary Pacemaker;  Surgeon: Josh Dyer MD;  Location: [facility] CATH INVASIVE LOCATION;  Service: Cardiology;  Laterality: N/A;   • CHOLECYSTECTOMY      about 20 years ago per patient   • ENDOSCOPY N/A 6/18/2020    Procedure: ESOPHAGOGASTRODUODENOSCOPY;  Surgeon: Brunner, Mark I, MD;  Location: [facility] ENDOSCOPY;  Service: Gastroenterology;  Laterality: N/A;  with banding of distal esophagus   • GALLBLADDER SURGERY     • HYSTERECTOMY     • HYSTERECTOMY      about 20 years ago   • KNEE SURGERY     • KNEE SURGERY Right    • REPLACEMENT TOTAL KNEE           MEDICATIONS:  I have reviewed and reconciled the patients medication list in the patients chart at the skilled nursing facility today.      ALLERGIES:  Allergies   Allergen Reactions   • Amiodarone Unknown - High Severity     Complete heart block   • Codeine Itching   • Morphine Itching         SOCIAL HISTORY:  Social History     Socioeconomic History   • Marital status:      Spouse name: Not on file   • Number of children: Not on file   • Years of education: Not on file   • Highest education level: Not on file   Tobacco Use   • Smoking status: Current Every Day Smoker     Packs/day: 0.25     Types: Cigarettes   • Smokeless tobacco: Never Used   Substance and Sexual Activity   • Alcohol use: Yes     Frequency: Never     Comment: last drink 1 month ago   • Drug use: Never   • Sexual activity: Defer       FAMILY HISTORY:  Family History   Problem Relation Age of Onset   • Diabetes Father    • Cancer Father         malignant neoplasm of brain   • Cancer Sister         bone   • Heart disease Sister        REVIEW OF  SYSTEMS:  Review of Systems   Constitutional: Positive for appetite change. Negative for activity change, chills, fatigue and fever.   Respiratory: Negative for cough and shortness of breath.    Cardiovascular: Negative for chest pain.   Gastrointestinal: Positive for abdominal distention and constipation. Negative for abdominal pain, diarrhea, nausea and vomiting.   Genitourinary: Negative for dysuria, hematuria, pelvic pain and vaginal discharge.   Musculoskeletal: Positive for back pain.   Neurological: Positive for weakness (generalized. ). Negative for dizziness, tremors and light-headedness.   Psychiatric/Behavioral: Positive for confusion (infrequent. ). Negative for agitation, behavioral problems and sleep disturbance.          PHYSICAL EXAMINATION:     VITAL SIGNS:  BP 95/56   Pulse 78   Temp 97.7 °F (36.5 °C)   Resp 16   SpO2 100%     Physical Exam   Constitutional: She appears well-nourished. No distress.   Chronically ill appearing, NAD.   HENT:   Head: Normocephalic and atraumatic.   Eyes: Pupils are equal, round, and reactive to light. Conjunctivae and EOM are normal.   Neck: Normal range of motion. Neck supple. No JVD present.   Cardiovascular: Normal rate, regular rhythm, normal heart sounds and intact distal pulses.   Pulmonary/Chest: Effort normal and breath sounds normal. No respiratory distress.   Abdominal: Soft. Bowel sounds are normal. She exhibits distension. There is no tenderness.   Genitourinary: Rectal exam shows no tenderness and anal tone normal.   Musculoskeletal: Normal range of motion. She exhibits edema (noted to whole body, back and sacral region have significant dependent edema. ). She exhibits no tenderness.   Neurological: She is alert.   Skin: Skin is warm and dry. Capillary refill takes less than 2 seconds. She is not diaphoretic. There is pallor.   Psychiatric: She has a normal mood and affect. Her behavior is normal.   Nursing note and vitals reviewed.      RECORDS  REVIEW:   6/24 CMP, CBC.    ASSESSMENT     Diagnoses and all orders for this visit:    Chronic hepatitis C virus infection with cirrhosis (CMS/HCC)    UGI bleed    Acute blood loss anemia    Constipation, unspecified constipation type      PLAN  Upon discharge from [facility] patient restarted Epclusa for treatment of her chronic hepatitis C virus she is followed by GI.  No additional reports of hematemesis.  CBC reviewed shows H&H is stable.  Electrolytes within normal limits.  She does continue to have significant edema.  Lasix was held during admission and unfortunately her blood pressure continues to be on the lower side.  We will continue to monitor and hold Lasix.  She has had constipation for the past several days.  Will give suppository and obtain KUB to rule out obstruction.  Will place order for lactulose enema.  Have encouraged with patient compliance with lactulose as scheduled.  Discussed case with attending.  Patient continues to express she does not want any further hospitalization at this time.  Her prognosis is guarded.  She has had significant decline over the past month.  Continue to monitor closely.      [x]  Discussed Patient in detail with nursing/staff, addressed all needs today.     [x]  Plan of Care Reviewed   []  PT/OT Reviewed   []  Order Changes  []  Discharge Plans Reviewed  [x]  Advance Directive on file with Nursing Home.   [x]  POA on file with Nursing Home.    [x]  Code Status listed:  []  Full Code   [x]  DNR    I spent 37 minutes in direct patient care including face to face and floor time.        Regina Nam PA-C.  7/1/2020

## 2020-07-06 NOTE — PROGRESS NOTES
Gretta Giles  1960  PCP: Montana Haas MD    SUBJECTIVE:   Gretta Giles is a 59 y.o. female seen for a follow up visit regarding the following:     Chief Complaint: Follow up for bradycardia     HPI:    Patient is a 59 year old female who presents today as a hospital follow-up after being admitting in June 2020 in setting of bradycardia and AMS. She had a temporary wire placed which was subsequently removed as rates improved with cessation of amiodarone. It was felt the bradycardia was secondary to amio toxicity in setting of severe liver disease. Rates have been stable since discharge and she denies any syncope or dizziness. She does report having issues with peripheral edema and ascites. She tells me today she has been instructed to hold her diuretics due to low BP and is unsure who instructed her to do so. She also is not sure if she has a primary cardiologist. She denies any chest pain or SOB.     History:   1. Atrial Fibrillation- on chronic Amio therapy >3 years prior to cessation 6/2020   2. Liver Cirrhosis/ETOH abuse   3. Echo 6/2020 w/ EF 55%. Mod MR   4. Bradycardia- likely secondary to amio toxicity.     Cardiac PMH: (Old records have been reviewed and summarized below)      Current Outpatient Medications:   •  budesonide-formoterol (SYMBICORT) 160-4.5 MCG/ACT inhaler, Inhale 2 puffs 2 (Two) Times a Day., Disp: , Rfl: 12  •  chlorhexidine (PERIDEX) 0.12 % solution, Apply 30 mL to the mouth or throat 2 (Two) Times a Day., Disp: , Rfl:   •  dicyclomine (BENTYL) 10 MG capsule, Take 10 mg by mouth Daily As Needed., Disp: , Rfl:   •  ferrous sulfate 324 (65 Fe) MG tablet delayed-release EC tablet, Take 324 mg by mouth 3 (Three) Times a Day With Meals., Disp: , Rfl:   •  fluticasone (FLOVENT HFA) 44 MCG/ACT inhaler, Inhale 2 puffs 2 (Two) Times a Day., Disp: , Rfl:   •  gabapentin (NEURONTIN) 100 MG capsule, Take 2 capsules by mouth 3 (Three) Times a Day As Needed (pain)., Disp: 18  capsule, Rfl: 0  •  insulin aspart (novoLOG) 100 UNIT/ML injection, Inject 0-7 Units under the skin into the appropriate area as directed 3 (Three) Times a Day Before Meals., Disp: , Rfl: 12  •  insulin glargine (LANTUS) 100 UNIT/ML injection, Inject 12 Units under the skin into the appropriate area as directed Every Night., Disp: , Rfl: 12  •  lactulose (CHRONULAC) 10 GM/15ML solution, Take 30 mL by mouth 3 (Three) Times a Day., Disp: , Rfl:   •  lidocaine (LIDODERM) 5 %, Place 1 patch on the skin as directed by provider Daily. Remove & Discard patch within 12 hours or as directed by MD, Disp: , Rfl:   •  melatonin 5 MG tablet tablet, Take 5 mg by mouth At Night As Needed., Disp: , Rfl:   •  OMEPRAZOLE PO, Take 40 mg by mouth., Disp: , Rfl:   •  oxyCODONE (ROXICODONE) 10 MG tablet, Take 1 tablet by mouth Every 6 (Six) Hours As Needed for Moderate Pain ., Disp: 12 tablet, Rfl: 0  •  Sofosbuvir-Velpatasvir 400-100 MG tablet, Take 1 tablet by mouth Daily for 168 days., Disp: 168 tablet, Rfl: 0  •  spironolactone (ALDACTONE) 100 MG tablet, Take 0.5 tablets by mouth Daily. (Patient taking differently: Take 50 mg by mouth Every Night.), Disp: , Rfl:     Past Medical History, Past Surgical History, Family history, Social History, and Medications were all reviewed with the patient today and updated as necessary.       Patient Active Problem List   Diagnosis   • Gastroesophageal reflux disease   • Atopic rhinitis   • Chronic low back pain   • Chronic obstructive pulmonary disease (CMS/HCC)   • Chronic obstructive pulmonary disease with acute exacerbation (CMS/HCC)   • Diabetes mellitus (CMS/HCC)   • Diabetic peripheral neuropathy (CMS/HCC)   • Dyslipidemia   • Nonalcoholic fatty liver disease   • Gastrointestinal ulcer due to Helicobacter pylori   • Hypertension   • Menopausal symptom   • Mixed anxiety depressive disorder   • Seasonal allergic rhinitis   • Tobacco dependence syndrome   • Vitamin D deficiency   •  Hyperlipidemia   • Anxiety and depression   • Menopause syndrome   • Allergic rhinitis   • Type 2 diabetes mellitus, controlled (CMS/HCC)   • GERD without esophagitis   • Nausea   • Debility   • Other cirrhosis of liver (CMS/HCC)   • Chronic hepatitis C virus infection with cirrhosis (CMS/HCC)   • Secondary esophageal varices without bleeding (CMS/HCC)   • Other ascites   • Acute renal failure (ARF) (CMS/HCC)   • Seizure disorder (CMS/HCC)   • Atrial fibrillation (CMS/HCC)   • Heart block   • Acute UTI (urinary tract infection)   • UGI bleed   • Acute blood loss anemia     Allergies   Allergen Reactions   • Amiodarone Unknown - High Severity     Complete heart block   • Codeine Itching   • Morphine Itching     Past Medical History:   Diagnosis Date   • Acute renal failure (ARF) (CMS/HCC)    • Atrial fibrillation (CMS/HCC)    • CHF (congestive heart failure) (CMS/HCC)    • Chronic hyponatremia    • COPD (chronic obstructive pulmonary disease) (CMS/HCC)    • Diabetes mellitus (CMS/HCC)    • Esophageal varices (CMS/HCC)    • Hepatitis C    • History of abdominal paracentesis    • History of transfusion    • Hyperlipidemia    • Hypertension    • Liver cirrhosis (CMS/HCC)     medical record   • Seizure disorder (CMS/HCC)     grand mal   • Sepsis (CMS/HCC)      Past Surgical History:   Procedure Laterality Date   • ANKLE SURGERY     • CARDIAC ELECTROPHYSIOLOGY PROCEDURE N/A 6/5/2020    Procedure: Temporary Pacemaker;  Surgeon: Johs Dyer MD;  Location: [facility] CATH INVASIVE LOCATION;  Service: Cardiology;  Laterality: N/A;   • CHOLECYSTECTOMY      about 20 years ago per patient   • ENDOSCOPY N/A 6/18/2020    Procedure: ESOPHAGOGASTRODUODENOSCOPY;  Surgeon: Brunner, Mark I, MD;  Location: [facility] ENDOSCOPY;  Service: Gastroenterology;  Laterality: N/A;  with banding of distal esophagus   • GALLBLADDER SURGERY     • HYSTERECTOMY     • HYSTERECTOMY      about 20 years ago   • KNEE SURGERY     • KNEE SURGERY Right    •  "REPLACEMENT TOTAL KNEE       Family History   Problem Relation Age of Onset   • Diabetes Father    • Cancer Father         malignant neoplasm of brain   • Cancer Sister         bone   • Heart disease Sister      Social History     Tobacco Use   • Smoking status: Former Smoker     Packs/day: 0.25     Types: Cigarettes     Last attempt to quit: 2020     Years since quittin.0   • Smokeless tobacco: Never Used   Substance Use Topics   • Alcohol use: Yes     Frequency: Never     Comment: last drink 1 month ago         PHYSICAL EXAM:    /64 (BP Location: Left arm, Patient Position: Sitting)   Pulse 67   Ht 162.6 cm (64\")   Wt 59 kg (130 lb)   BMI 22.31 kg/m²        Wt Readings from Last 5 Encounters:   20 59 kg (130 lb)   20 72.5 kg (159 lb 14.4 oz)   20 61.7 kg (136 lb)   06/10/20 61.8 kg (136 lb 4.8 oz)   20 54.1 kg (119 lb 4.8 oz)       BP Readings from Last 5 Encounters:   20 110/64   20 95/56   20 120/72   20 110/62   06/15/20 118/70       General-Well Nourished, Well developed  Eyes - PERRLA  Neck- supple, No mass  CV- regular rate and rhythm, no MRG, No edema  Lung- clear bilaterally  Abd- soft, +BS  Musc/skel - Norm strength and range of motion  Skin- warm and dry  Neuro - Alert & Oriented x 3, appropriate mood.        Medical problems and test results were reviewed with the patient today.     Recent Results (from the past 672 hour(s))   POC Glucose Once    Collection Time: 20  6:13 PM   Result Value Ref Range    Glucose 176 (H) 70 - 130 mg/dL   POC Glucose Once    Collection Time: 20  8:30 PM   Result Value Ref Range    Glucose 117 70 - 130 mg/dL   Basic Metabolic Panel    Collection Time: 20  6:06 AM   Result Value Ref Range    Glucose 128 (H) 65 - 99 mg/dL    BUN 17 6 - 20 mg/dL    Creatinine 1.16 (H) 0.57 - 1.00 mg/dL    Sodium 135 (L) 136 - 145 mmol/L    Potassium 4.2 3.5 - 5.2 mmol/L    Chloride 104 98 - 107 mmol/L    CO2 " 18.0 (L) 22.0 - 29.0 mmol/L    Calcium 8.0 (L) 8.6 - 10.5 mg/dL    eGFR Non African Amer 48 (L) >60 mL/min/1.73    BUN/Creatinine Ratio 14.7 7.0 - 25.0    Anion Gap 13.0 5.0 - 15.0 mmol/L   CBC Auto Differential    Collection Time: 06/09/20  6:06 AM   Result Value Ref Range    WBC 10.28 3.40 - 10.80 10*3/mm3    RBC 3.36 (L) 3.77 - 5.28 10*6/mm3    Hemoglobin 10.4 (L) 12.0 - 15.9 g/dL    Hematocrit 31.9 (L) 34.0 - 46.6 %    MCV 94.9 79.0 - 97.0 fL    MCH 31.0 26.6 - 33.0 pg    MCHC 32.6 31.5 - 35.7 g/dL    RDW 15.9 (H) 12.3 - 15.4 %    RDW-SD 55.6 (H) 37.0 - 54.0 fl    MPV 10.2 6.0 - 12.0 fL    Platelets 89 (L) 140 - 450 10*3/mm3    Neutrophil % 64.2 42.7 - 76.0 %    Lymphocyte % 20.5 19.6 - 45.3 %    Monocyte % 9.7 5.0 - 12.0 %    Eosinophil % 3.7 0.3 - 6.2 %    Basophil % 1.3 0.0 - 1.5 %    Immature Grans % 0.6 (H) 0.0 - 0.5 %    Neutrophils, Absolute 6.60 1.70 - 7.00 10*3/mm3    Lymphocytes, Absolute 2.11 0.70 - 3.10 10*3/mm3    Monocytes, Absolute 1.00 (H) 0.10 - 0.90 10*3/mm3    Eosinophils, Absolute 0.38 0.00 - 0.40 10*3/mm3    Basophils, Absolute 0.13 0.00 - 0.20 10*3/mm3    Immature Grans, Absolute 0.06 (H) 0.00 - 0.05 10*3/mm3    nRBC 0.0 0.0 - 0.2 /100 WBC   POC Glucose Once    Collection Time: 06/09/20  7:29 AM   Result Value Ref Range    Glucose 113 70 - 130 mg/dL   POC Glucose Once    Collection Time: 06/09/20 11:07 AM   Result Value Ref Range    Glucose 227 (H) 70 - 130 mg/dL   POC Glucose Once    Collection Time: 06/09/20  5:42 PM   Result Value Ref Range    Glucose 213 (H) 70 - 130 mg/dL   POC Glucose Once    Collection Time: 06/09/20  9:07 PM   Result Value Ref Range    Glucose 148 (H) 70 - 130 mg/dL   CBC (No Diff)    Collection Time: 06/10/20  5:10 AM   Result Value Ref Range    WBC 11.55 (H) 3.40 - 10.80 10*3/mm3    RBC 3.30 (L) 3.77 - 5.28 10*6/mm3    Hemoglobin 10.2 (L) 12.0 - 15.9 g/dL    Hematocrit 31.4 (L) 34.0 - 46.6 %    MCV 95.2 79.0 - 97.0 fL    MCH 30.9 26.6 - 33.0 pg    MCHC 32.5 31.5 -  35.7 g/dL    RDW 16.0 (H) 12.3 - 15.4 %    RDW-SD 56.0 (H) 37.0 - 54.0 fl    MPV 11.4 6.0 - 12.0 fL    Platelets 89 (L) 140 - 450 10*3/mm3   Basic Metabolic Panel    Collection Time: 06/10/20  5:10 AM   Result Value Ref Range    Glucose 95 65 - 99 mg/dL    BUN 14 6 - 20 mg/dL    Creatinine 0.93 0.57 - 1.00 mg/dL    Sodium 132 (L) 136 - 145 mmol/L    Potassium 4.4 3.5 - 5.2 mmol/L    Chloride 104 98 - 107 mmol/L    CO2 18.0 (L) 22.0 - 29.0 mmol/L    Calcium 8.0 (L) 8.6 - 10.5 mg/dL    eGFR Non African Amer 62 >60 mL/min/1.73    BUN/Creatinine Ratio 15.1 7.0 - 25.0    Anion Gap 10.0 5.0 - 15.0 mmol/L   POC Glucose Once    Collection Time: 06/10/20  7:12 AM   Result Value Ref Range    Glucose 94 70 - 130 mg/dL   POC Glucose Once    Collection Time: 06/10/20 11:25 AM   Result Value Ref Range    Glucose 249 (H) 70 - 130 mg/dL   COVID-19,[facility] IN-HOUSE, NP SWAB IN TRANSPORT MEDIA 8-12 HR TAT - Swab, Nasopharynx    Collection Time: 06/10/20  3:01 PM   Result Value Ref Range    COVID19 Not Detected Not Detected - Ref. Range   POC Glucose Once    Collection Time: 06/10/20  4:38 PM   Result Value Ref Range    Glucose 122 70 - 130 mg/dL   POC Glucose Once    Collection Time: 06/10/20  8:05 PM   Result Value Ref Range    Glucose 236 (H) 70 - 130 mg/dL   POC Glucose Once    Collection Time: 06/11/20  7:34 AM   Result Value Ref Range    Glucose 139 (H) 70 - 130 mg/dL   POC Glucose Once    Collection Time: 06/11/20 11:31 AM   Result Value Ref Range    Glucose 277 (H) 70 - 130 mg/dL   Type & Screen    Collection Time: 06/17/20  5:55 PM   Result Value Ref Range    ABO Type A     RH type Positive     Antibody Screen Negative     T&S Expiration Date 6/20/2020 11:59:59 PM    Comprehensive Metabolic Panel    Collection Time: 06/17/20  6:05 PM   Result Value Ref Range    Glucose 268 (H) 65 - 99 mg/dL    BUN 14 6 - 20 mg/dL    Creatinine 1.36 (H) 0.57 - 1.00 mg/dL    Sodium 128 (L) 136 - 145 mmol/L    Potassium 5.4 (H) 3.5 - 5.2 mmol/L     Chloride 98 98 - 107 mmol/L    CO2 23.0 22.0 - 29.0 mmol/L    Calcium 7.7 (L) 8.6 - 10.5 mg/dL    Total Protein 5.7 (L) 6.0 - 8.5 g/dL    Albumin 2.30 (L) 3.50 - 5.20 g/dL    ALT (SGPT) 9 1 - 33 U/L    AST (SGOT) 18 1 - 32 U/L    Alkaline Phosphatase 169 (H) 39 - 117 U/L    Total Bilirubin 0.4 0.2 - 1.2 mg/dL    eGFR Non African Amer 40 (L) >60 mL/min/1.73    Globulin 3.4 gm/dL    A/G Ratio 0.7 g/dL    BUN/Creatinine Ratio 10.3 7.0 - 25.0    Anion Gap 7.0 5.0 - 15.0 mmol/L   Protime-INR    Collection Time: 06/17/20  6:05 PM   Result Value Ref Range    Protime 16.8 (H) 11.5 - 14.0 Seconds    INR 1.39 (H) 0.85 - 1.16   Lipase    Collection Time: 06/17/20  6:05 PM   Result Value Ref Range    Lipase 19 13 - 60 U/L   Troponin    Collection Time: 06/17/20  6:05 PM   Result Value Ref Range    Troponin T 0.085 (C) 0.000 - 0.030 ng/mL   Lactic Acid, Plasma    Collection Time: 06/17/20  6:05 PM   Result Value Ref Range    Lactate 2.4 (C) 0.5 - 2.0 mmol/L   Magnesium    Collection Time: 06/17/20  6:05 PM   Result Value Ref Range    Magnesium 1.8 1.6 - 2.6 mg/dL   CBC Auto Differential    Collection Time: 06/17/20  6:05 PM   Result Value Ref Range    WBC 12.50 (H) 3.40 - 10.80 10*3/mm3    RBC 2.53 (L) 3.77 - 5.28 10*6/mm3    Hemoglobin 7.9 (L) 12.0 - 15.9 g/dL    Hematocrit 24.5 (L) 34.0 - 46.6 %    MCV 96.8 79.0 - 97.0 fL    MCH 31.2 26.6 - 33.0 pg    MCHC 32.2 31.5 - 35.7 g/dL    RDW 16.7 (H) 12.3 - 15.4 %    RDW-SD 59.4 (H) 37.0 - 54.0 fl    MPV 10.3 6.0 - 12.0 fL    Platelets 155 140 - 450 10*3/mm3    Neutrophil % 72.3 42.7 - 76.0 %    Lymphocyte % 11.3 (L) 19.6 - 45.3 %    Monocyte % 11.6 5.0 - 12.0 %    Eosinophil % 2.3 0.3 - 6.2 %    Basophil % 1.4 0.0 - 1.5 %    Immature Grans % 1.1 (H) 0.0 - 0.5 %    Neutrophils, Absolute 9.04 (H) 1.70 - 7.00 10*3/mm3    Lymphocytes, Absolute 1.41 0.70 - 3.10 10*3/mm3    Monocytes, Absolute 1.45 (H) 0.10 - 0.90 10*3/mm3    Eosinophils, Absolute 0.29 0.00 - 0.40 10*3/mm3     Basophils, Absolute 0.17 0.00 - 0.20 10*3/mm3    Immature Grans, Absolute 0.14 (H) 0.00 - 0.05 10*3/mm3    nRBC 0.0 0.0 - 0.2 /100 WBC   Lactic Acid, Reflex Timer (This will reflex a repeat order 3-3:15 hours after ordered.)    Collection Time: 06/17/20  6:05 PM   Result Value Ref Range    Hold Tube Hold for add-ons.    COVID-19,[facility] IN-HOUSE(OR EMERGENT/ADD-ON),NP SWAB IN TRANSPORT MEDIA 3-4 HR TAT - Swab, Nasopharynx    Collection Time: 06/17/20  6:35 PM   Result Value Ref Range    COVID19 Not Detected Not Detected - Ref. Range   ABO RH Specimen Verification    Collection Time: 06/17/20  6:39 PM   Result Value Ref Range    ABO Type A     RH type Positive    POC Glucose Once    Collection Time: 06/17/20  9:51 PM   Result Value Ref Range    Glucose 410 (C) 70 - 130 mg/dL   POC Glucose Once    Collection Time: 06/17/20 11:36 PM   Result Value Ref Range    Glucose 252 (H) 70 - 130 mg/dL   Blood Culture - Blood, Arm, Left    Collection Time: 06/18/20  1:48 AM   Result Value Ref Range    Blood Culture No growth at 5 days    Blood Culture - Blood, Arm, Left    Collection Time: 06/18/20  1:48 AM   Result Value Ref Range    Blood Culture Staphylococcus, coagulase negative (C)     Isolated from Anaerobic Bottle     Gram Stain Anaerobic Bottle Gram positive cocci in groups (C)    Blood Culture ID, PCR - Blood, Arm, Left    Collection Time: 06/18/20  1:48 AM   Result Value Ref Range    BCID, PCR (C) No organism detected by BCID PCR.     Staphylococcus spp, not aureus. Identification by BCID PCR.   Hemoglobin & Hematocrit, Blood    Collection Time: 06/18/20  3:43 AM   Result Value Ref Range    Hemoglobin 7.3 (L) 12.0 - 15.9 g/dL    Hematocrit 22.2 (L) 34.0 - 46.6 %   Lactic Acid, Plasma    Collection Time: 06/18/20  3:43 AM   Result Value Ref Range    Lactate 1.5 0.5 - 2.0 mmol/L   Lactic Acid, Reflex    Collection Time: 06/18/20  3:43 AM   Result Value Ref Range    Lactate 1.5 0.5 - 2.0 mmol/L   Protime-INR     Collection Time: 06/18/20  3:43 AM   Result Value Ref Range    Protime 19.6 (H) 11.5 - 14.0 Seconds    INR 1.70 (H) 0.85 - 1.16   Comprehensive Metabolic Panel    Collection Time: 06/18/20  3:43 AM   Result Value Ref Range    Glucose 177 (H) 65 - 99 mg/dL    BUN 15 6 - 20 mg/dL    Creatinine 1.16 (H) 0.57 - 1.00 mg/dL    Sodium 131 (L) 136 - 145 mmol/L    Potassium 5.6 (H) 3.5 - 5.2 mmol/L    Chloride 101 98 - 107 mmol/L    CO2 23.0 22.0 - 29.0 mmol/L    Calcium 7.5 (L) 8.6 - 10.5 mg/dL    Total Protein 4.6 (L) 6.0 - 8.5 g/dL    Albumin 2.20 (L) 3.50 - 5.20 g/dL    ALT (SGPT) 6 1 - 33 U/L    AST (SGOT) 14 1 - 32 U/L    Alkaline Phosphatase 104 39 - 117 U/L    Total Bilirubin 0.9 0.2 - 1.2 mg/dL    eGFR Non African Amer 48 (L) >60 mL/min/1.73    Globulin 2.4 gm/dL    A/G Ratio 0.9 g/dL    BUN/Creatinine Ratio 12.9 7.0 - 25.0    Anion Gap 7.0 5.0 - 15.0 mmol/L   Magnesium    Collection Time: 06/18/20  3:43 AM   Result Value Ref Range    Magnesium 2.8 (H) 1.6 - 2.6 mg/dL   Phosphorus    Collection Time: 06/18/20  3:43 AM   Result Value Ref Range    Phosphorus 3.1 2.5 - 4.5 mg/dL   Procalcitonin    Collection Time: 06/18/20  3:43 AM   Result Value Ref Range    Procalcitonin 0.29 (H) 0.10 - 0.25 ng/mL   CBC Auto Differential    Collection Time: 06/18/20  3:43 AM   Result Value Ref Range    WBC 12.86 (H) 3.40 - 10.80 10*3/mm3    RBC 2.40 (L) 3.77 - 5.28 10*6/mm3    Hemoglobin 7.3 (L) 12.0 - 15.9 g/dL    Hematocrit 22.2 (L) 34.0 - 46.6 %    MCV 91.3 79.0 - 97.0 fL    MCH 30.8 26.6 - 33.0 pg    MCHC 33.8 31.5 - 35.7 g/dL    RDW 17.1 (H) 12.3 - 15.4 %    RDW-SD 58.4 (H) 37.0 - 54.0 fl    MPV 10.1 6.0 - 12.0 fL    Platelets 131 (L) 140 - 450 10*3/mm3    Neutrophil % 71.9 42.7 - 76.0 %    Lymphocyte % 13.4 (L) 19.6 - 45.3 %    Monocyte % 9.9 5.0 - 12.0 %    Eosinophil % 2.6 0.3 - 6.2 %    Basophil % 1.4 0.0 - 1.5 %    Immature Grans % 0.8 (H) 0.0 - 0.5 %    Neutrophils, Absolute 9.26 (H) 1.70 - 7.00 10*3/mm3    Lymphocytes,  Absolute 1.72 0.70 - 3.10 10*3/mm3    Monocytes, Absolute 1.27 (H) 0.10 - 0.90 10*3/mm3    Eosinophils, Absolute 0.33 0.00 - 0.40 10*3/mm3    Basophils, Absolute 0.18 0.00 - 0.20 10*3/mm3    Immature Grans, Absolute 0.10 (H) 0.00 - 0.05 10*3/mm3    nRBC 0.0 0.0 - 0.2 /100 WBC   Scan Slide    Collection Time: 06/18/20  3:43 AM   Result Value Ref Range    RBC Morphology Normal Normal    WBC Morphology Normal Normal    Large Platelets Slight/1+ None Seen   Urinalysis With Microscopic If Indicated (No Culture) - Urine, Catheter    Collection Time: 06/18/20  6:10 AM   Result Value Ref Range    Color, UA Yellow Yellow, Straw    Appearance, UA Clear Clear    pH, UA 6.0 5.0 - 8.0    Specific Gravity, UA 1.007 1.001 - 1.030    Glucose,  mg/dL (Trace) (A) Negative    Ketones, UA Negative Negative    Bilirubin, UA Negative Negative    Blood, UA Large (3+) (A) Negative    Protein, UA Negative Negative    Leuk Esterase, UA Negative Negative    Nitrite, UA Negative Negative    Urobilinogen, UA 0.2 E.U./dL 0.2 - 1.0 E.U./dL   Urinalysis, Microscopic Only - Urine, Catheter    Collection Time: 06/18/20  6:10 AM   Result Value Ref Range    RBC, UA 31-50 (A) None Seen, 0-2 /HPF    WBC, UA 3-5 (A) None Seen, 0-2 /HPF    Bacteria, UA None Seen None Seen, Trace /HPF    Squamous Epithelial Cells, UA 0-2 None Seen, 0-2 /HPF    Hyaline Casts, UA 0-6 0 - 6 /LPF    Methodology Automated Microscopy    POC Glucose Once    Collection Time: 06/18/20  8:35 AM   Result Value Ref Range    Glucose 146 (H) 70 - 130 mg/dL   Basic Metabolic Panel    Collection Time: 06/18/20  9:33 AM   Result Value Ref Range    Glucose 156 (H) 65 - 99 mg/dL    BUN 16 6 - 20 mg/dL    Creatinine 1.26 (H) 0.57 - 1.00 mg/dL    Sodium 131 (L) 136 - 145 mmol/L    Potassium 5.6 (H) 3.5 - 5.2 mmol/L    Chloride 100 98 - 107 mmol/L    CO2 23.0 22.0 - 29.0 mmol/L    Calcium 7.9 (L) 8.6 - 10.5 mg/dL    eGFR Non African Amer 44 (L) >60 mL/min/1.73    BUN/Creatinine Ratio 12.7  7.0 - 25.0    Anion Gap 8.0 5.0 - 15.0 mmol/L   Hemoglobin & Hematocrit, Blood    Collection Time: 06/18/20  9:33 AM   Result Value Ref Range    Hemoglobin 11.1 (L) 12.0 - 15.9 g/dL    Hematocrit 33.2 (L) 34.0 - 46.6 %   Ammonia    Collection Time: 06/18/20  9:33 AM   Result Value Ref Range    Ammonia 32 11 - 51 umol/L   POC Glucose Once    Collection Time: 06/18/20 11:34 AM   Result Value Ref Range    Glucose 186 (H) 70 - 130 mg/dL   Prepare RBC, 1 Units    Collection Time: 06/18/20 12:10 PM   Result Value Ref Range    Product Code H4649A75     Unit Number [id number]     UNIT  ABO A     UNIT  RH POS     Crossmatch Interpretation Compatible     Dispense Status PT     Blood Expiration Date 202007202359     Blood Type Barcode 6200    Prepare RBC, 1 Units    Collection Time: 06/18/20 12:10 PM   Result Value Ref Range    Product Code K7442Q11     Unit Number [id number]     UNIT  ABO A     UNIT  RH POS     Crossmatch Interpretation Compatible     Dispense Status PT     Blood Expiration Date 202007202359     Blood Type Barcode 6200    Basic Metabolic Panel    Collection Time: 06/18/20  3:51 PM   Result Value Ref Range    Glucose 200 (H) 65 - 99 mg/dL    BUN 19 6 - 20 mg/dL    Creatinine 1.40 (H) 0.57 - 1.00 mg/dL    Sodium 133 (L) 136 - 145 mmol/L    Potassium 5.9 (H) 3.5 - 5.2 mmol/L    Chloride 104 98 - 107 mmol/L    CO2 21.0 (L) 22.0 - 29.0 mmol/L    Calcium 7.8 (L) 8.6 - 10.5 mg/dL    eGFR Non African Amer 39 (L) >60 mL/min/1.73    BUN/Creatinine Ratio 13.6 7.0 - 25.0    Anion Gap 8.0 5.0 - 15.0 mmol/L   CBC (No Diff)    Collection Time: 06/18/20  3:51 PM   Result Value Ref Range    WBC 16.45 (H) 3.40 - 10.80 10*3/mm3    RBC 3.60 (L) 3.77 - 5.28 10*6/mm3    Hemoglobin 10.9 (L) 12.0 - 15.9 g/dL    Hematocrit 31.7 (L) 34.0 - 46.6 %    MCV 88.1 79.0 - 97.0 fL    MCH 30.3 26.6 - 33.0 pg    MCHC 34.4 31.5 - 35.7 g/dL    RDW 16.4 (H) 12.3 - 15.4 %    RDW-SD 52.2 37.0 - 54.0 fl    MPV 10.4 6.0 - 12.0 fL     Platelets 121 (L) 140 - 450 10*3/mm3   POC Glucose Once    Collection Time: 06/18/20  5:46 PM   Result Value Ref Range    Glucose 197 (H) 70 - 130 mg/dL   CBC (No Diff)    Collection Time: 06/18/20  8:41 PM   Result Value Ref Range    WBC 18.67 (H) 3.40 - 10.80 10*3/mm3    RBC 3.29 (L) 3.77 - 5.28 10*6/mm3    Hemoglobin 10.1 (L) 12.0 - 15.9 g/dL    Hematocrit 30.4 (L) 34.0 - 46.6 %    MCV 92.4 79.0 - 97.0 fL    MCH 30.7 26.6 - 33.0 pg    MCHC 33.2 31.5 - 35.7 g/dL    RDW 17.0 (H) 12.3 - 15.4 %    RDW-SD 57.2 (H) 37.0 - 54.0 fl    MPV 9.8 6.0 - 12.0 fL    Platelets 122 (L) 140 - 450 10*3/mm3   Potassium    Collection Time: 06/18/20  8:41 PM   Result Value Ref Range    Potassium 5.6 (H) 3.5 - 5.2 mmol/L   POC Glucose Once    Collection Time: 06/18/20 11:32 PM   Result Value Ref Range    Glucose 65 (L) 70 - 130 mg/dL   CBC (No Diff)    Collection Time: 06/18/20 11:54 PM   Result Value Ref Range    WBC 15.38 (H) 3.40 - 10.80 10*3/mm3    RBC 3.06 (L) 3.77 - 5.28 10*6/mm3    Hemoglobin 9.3 (L) 12.0 - 15.9 g/dL    Hematocrit 27.8 (L) 34.0 - 46.6 %    MCV 90.8 79.0 - 97.0 fL    MCH 30.4 26.6 - 33.0 pg    MCHC 33.5 31.5 - 35.7 g/dL    RDW 17.2 (H) 12.3 - 15.4 %    RDW-SD 57.1 (H) 37.0 - 54.0 fl    MPV 10.5 6.0 - 12.0 fL    Platelets 130 (L) 140 - 450 10*3/mm3   POC Glucose Once    Collection Time: 06/18/20 11:58 PM   Result Value Ref Range    Glucose 181 (H) 70 - 130 mg/dL   Comprehensive Metabolic Panel    Collection Time: 06/19/20  2:56 AM   Result Value Ref Range    Glucose 80 65 - 99 mg/dL    BUN 24 (H) 6 - 20 mg/dL    Creatinine 1.38 (H) 0.57 - 1.00 mg/dL    Sodium 133 (L) 136 - 145 mmol/L    Potassium 5.6 (H) 3.5 - 5.2 mmol/L    Chloride 104 98 - 107 mmol/L    CO2 22.0 22.0 - 29.0 mmol/L    Calcium 7.5 (L) 8.6 - 10.5 mg/dL    Total Protein 5.1 (L) 6.0 - 8.5 g/dL    Albumin 2.20 (L) 3.50 - 5.20 g/dL    ALT (SGPT) 8 1 - 33 U/L    AST (SGOT) 25 1 - 32 U/L    Alkaline Phosphatase 108 39 - 117 U/L    Total Bilirubin 0.7 0.2  - 1.2 mg/dL    eGFR Non African Amer 39 (L) >60 mL/min/1.73    Globulin 2.9 gm/dL    A/G Ratio 0.8 g/dL    BUN/Creatinine Ratio 17.4 7.0 - 25.0    Anion Gap 7.0 5.0 - 15.0 mmol/L   Magnesium    Collection Time: 06/19/20  2:56 AM   Result Value Ref Range    Magnesium 2.5 1.6 - 2.6 mg/dL   Phosphorus    Collection Time: 06/19/20  2:56 AM   Result Value Ref Range    Phosphorus 4.0 2.5 - 4.5 mg/dL   Troponin    Collection Time: 06/19/20  2:56 AM   Result Value Ref Range    Troponin T 0.138 (C) 0.000 - 0.030 ng/mL   POC Glucose Once    Collection Time: 06/19/20  5:33 AM   Result Value Ref Range    Glucose 84 70 - 130 mg/dL   CBC (No Diff)    Collection Time: 06/19/20  7:05 AM   Result Value Ref Range    WBC 12.12 (H) 3.40 - 10.80 10*3/mm3    RBC 3.25 (L) 3.77 - 5.28 10*6/mm3    Hemoglobin 10.0 (L) 12.0 - 15.9 g/dL    Hematocrit 30.8 (L) 34.0 - 46.6 %    MCV 94.8 79.0 - 97.0 fL    MCH 30.8 26.6 - 33.0 pg    MCHC 32.5 31.5 - 35.7 g/dL    RDW 17.4 (H) 12.3 - 15.4 %    RDW-SD 59.6 (H) 37.0 - 54.0 fl    MPV 9.9 6.0 - 12.0 fL    Platelets 124 (L) 140 - 450 10*3/mm3   POC Glucose Once    Collection Time: 06/19/20 12:02 PM   Result Value Ref Range    Glucose 151 (H) 70 - 130 mg/dL   Prepare RBC, 2 Units    Collection Time: 06/19/20 12:10 PM   Result Value Ref Range    Product Code C7559E33     Unit Number [id number]     UNIT  ABO A     UNIT  RH POS     Crossmatch Interpretation Compatible     Dispense Status PT     Blood Expiration Date [id number]     Blood Type Barcode 6200     Product Code U6409A46     Unit Number [id number]     UNIT  ABO A     UNIT  RH POS     Crossmatch Interpretation Compatible     Dispense Status PT     Blood Expiration Date [id number]     Blood Type Barcode 6200    CBC (No Diff)    Collection Time: 06/19/20 12:15 PM   Result Value Ref Range    WBC 10.94 (H) 3.40 - 10.80 10*3/mm3    RBC 3.17 (L) 3.77 - 5.28 10*6/mm3    Hemoglobin 9.7 (L) 12.0 - 15.9 g/dL    Hematocrit 29.2 (L) 34.0 - 46.6 %     MCV 92.1 79.0 - 97.0 fL    MCH 30.6 26.6 - 33.0 pg    MCHC 33.2 31.5 - 35.7 g/dL    RDW 17.3 (H) 12.3 - 15.4 %    RDW-SD 57.8 (H) 37.0 - 54.0 fl    MPV 9.9 6.0 - 12.0 fL    Platelets 123 (L) 140 - 450 10*3/mm3   CBC (No Diff)    Collection Time: 06/19/20  7:05 PM   Result Value Ref Range    WBC 11.07 (H) 3.40 - 10.80 10*3/mm3    RBC 3.03 (L) 3.77 - 5.28 10*6/mm3    Hemoglobin 9.0 (L) 12.0 - 15.9 g/dL    Hematocrit 28.1 (L) 34.0 - 46.6 %    MCV 92.7 79.0 - 97.0 fL    MCH 29.7 26.6 - 33.0 pg    MCHC 32.0 31.5 - 35.7 g/dL    RDW 17.4 (H) 12.3 - 15.4 %    RDW-SD 58.6 (H) 37.0 - 54.0 fl    MPV 9.9 6.0 - 12.0 fL    Platelets 126 (L) 140 - 450 10*3/mm3   POC Glucose Once    Collection Time: 06/19/20 11:07 PM   Result Value Ref Range    Glucose 192 (H) 70 - 130 mg/dL   CBC (No Diff)    Collection Time: 06/19/20 11:54 PM   Result Value Ref Range    WBC 10.19 3.40 - 10.80 10*3/mm3    RBC 3.22 (L) 3.77 - 5.28 10*6/mm3    Hemoglobin 9.7 (L) 12.0 - 15.9 g/dL    Hematocrit 30.1 (L) 34.0 - 46.6 %    MCV 93.5 79.0 - 97.0 fL    MCH 30.1 26.6 - 33.0 pg    MCHC 32.2 31.5 - 35.7 g/dL    RDW 17.4 (H) 12.3 - 15.4 %    RDW-SD 59.8 (H) 37.0 - 54.0 fl    MPV 9.9 6.0 - 12.0 fL    Platelets 148 140 - 450 10*3/mm3   CBC (No Diff)    Collection Time: 06/20/20  5:14 AM   Result Value Ref Range    WBC 9.56 3.40 - 10.80 10*3/mm3    RBC 3.15 (L) 3.77 - 5.28 10*6/mm3    Hemoglobin 9.5 (L) 12.0 - 15.9 g/dL    Hematocrit 30.4 (L) 34.0 - 46.6 %    MCV 96.5 79.0 - 97.0 fL    MCH 30.2 26.6 - 33.0 pg    MCHC 31.3 (L) 31.5 - 35.7 g/dL    RDW 17.4 (H) 12.3 - 15.4 %    RDW-SD 62.1 (H) 37.0 - 54.0 fl    MPV 9.6 6.0 - 12.0 fL    Platelets 136 (L) 140 - 450 10*3/mm3   Basic Metabolic Panel    Collection Time: 06/20/20  5:14 AM   Result Value Ref Range    Glucose 190 (H) 65 - 99 mg/dL    BUN 29 (H) 6 - 20 mg/dL    Creatinine 1.32 (H) 0.57 - 1.00 mg/dL    Sodium 135 (L) 136 - 145 mmol/L    Potassium 5.7 (H) 3.5 - 5.2 mmol/L    Chloride 106 98 - 107 mmol/L     CO2 18.0 (L) 22.0 - 29.0 mmol/L    Calcium 7.6 (L) 8.6 - 10.5 mg/dL    eGFR Non African Amer 41 (L) >60 mL/min/1.73    BUN/Creatinine Ratio 22.0 7.0 - 25.0    Anion Gap 11.0 5.0 - 15.0 mmol/L   Magnesium    Collection Time: 06/20/20  5:14 AM   Result Value Ref Range    Magnesium 2.4 1.6 - 2.6 mg/dL   Phosphorus    Collection Time: 06/20/20  5:14 AM   Result Value Ref Range    Phosphorus 5.0 (H) 2.5 - 4.5 mg/dL   POC Glucose Once    Collection Time: 06/20/20  5:26 AM   Result Value Ref Range    Glucose 181 (H) 70 - 130 mg/dL   POC Glucose Once    Collection Time: 06/20/20 11:02 AM   Result Value Ref Range    Glucose 170 (H) 70 - 130 mg/dL   CBC (No Diff)    Collection Time: 06/20/20 12:29 PM   Result Value Ref Range    WBC 17.35 (H) 3.40 - 10.80 10*3/mm3    RBC 3.32 (L) 3.77 - 5.28 10*6/mm3    Hemoglobin 10.2 (L) 12.0 - 15.9 g/dL    Hematocrit 31.2 (L) 34.0 - 46.6 %    MCV 94.0 79.0 - 97.0 fL    MCH 30.7 26.6 - 33.0 pg    MCHC 32.7 31.5 - 35.7 g/dL    RDW 17.4 (H) 12.3 - 15.4 %    RDW-SD 58.6 (H) 37.0 - 54.0 fl    MPV 9.7 6.0 - 12.0 fL    Platelets 160 140 - 450 10*3/mm3   POC Glucose Once    Collection Time: 06/20/20  4:50 PM   Result Value Ref Range    Glucose 130 70 - 130 mg/dL   CBC (No Diff)    Collection Time: 06/20/20  6:06 PM   Result Value Ref Range    WBC 22.01 (H) 3.40 - 10.80 10*3/mm3    RBC 3.25 (L) 3.77 - 5.28 10*6/mm3    Hemoglobin 10.0 (L) 12.0 - 15.9 g/dL    Hematocrit 30.2 (L) 34.0 - 46.6 %    MCV 92.9 79.0 - 97.0 fL    MCH 30.8 26.6 - 33.0 pg    MCHC 33.1 31.5 - 35.7 g/dL    RDW 17.6 (H) 12.3 - 15.4 %    RDW-SD 58.8 (H) 37.0 - 54.0 fl    MPV 9.8 6.0 - 12.0 fL    Platelets 155 140 - 450 10*3/mm3   POC Glucose Once    Collection Time: 06/20/20  8:12 PM   Result Value Ref Range    Glucose 174 (H) 70 - 130 mg/dL   CBC (No Diff)    Collection Time: 06/20/20 11:43 PM   Result Value Ref Range    WBC 20.35 (H) 3.40 - 10.80 10*3/mm3    RBC 2.99 (L) 3.77 - 5.28 10*6/mm3    Hemoglobin 9.2 (L) 12.0 - 15.9 g/dL     Hematocrit 28.8 (L) 34.0 - 46.6 %    MCV 96.3 79.0 - 97.0 fL    MCH 30.8 26.6 - 33.0 pg    MCHC 31.9 31.5 - 35.7 g/dL    RDW 17.5 (H) 12.3 - 15.4 %    RDW-SD 61.8 (H) 37.0 - 54.0 fl    MPV 9.4 6.0 - 12.0 fL    Platelets 147 140 - 450 10*3/mm3   Protime-INR    Collection Time: 06/21/20  3:38 AM   Result Value Ref Range    Protime 19.0 (H) 11.5 - 14.0 Seconds    INR 1.63 (H) 0.85 - 1.16   Comprehensive Metabolic Panel    Collection Time: 06/21/20  3:38 AM   Result Value Ref Range    Glucose 172 (H) 65 - 99 mg/dL    BUN 32 (H) 6 - 20 mg/dL    Creatinine 1.35 (H) 0.57 - 1.00 mg/dL    Sodium 135 (L) 136 - 145 mmol/L    Potassium 5.2 3.5 - 5.2 mmol/L    Chloride 108 (H) 98 - 107 mmol/L    CO2 19.0 (L) 22.0 - 29.0 mmol/L    Calcium 7.3 (L) 8.6 - 10.5 mg/dL    Total Protein 4.9 (L) 6.0 - 8.5 g/dL    Albumin 2.20 (L) 3.50 - 5.20 g/dL    ALT (SGPT) 53 (H) 1 - 33 U/L    AST (SGOT) 117 (H) 1 - 32 U/L    Alkaline Phosphatase 131 (H) 39 - 117 U/L    Total Bilirubin 0.5 0.2 - 1.2 mg/dL    eGFR Non African Amer 40 (L) >60 mL/min/1.73    Globulin 2.7 gm/dL    A/G Ratio 0.8 g/dL    BUN/Creatinine Ratio 23.7 7.0 - 25.0    Anion Gap 8.0 5.0 - 15.0 mmol/L   CBC Auto Differential    Collection Time: 06/21/20  3:38 AM   Result Value Ref Range    WBC 18.97 (H) 3.40 - 10.80 10*3/mm3    RBC 2.97 (L) 3.77 - 5.28 10*6/mm3    Hemoglobin 9.0 (L) 12.0 - 15.9 g/dL    Hematocrit 28.1 (L) 34.0 - 46.6 %    MCV 94.6 79.0 - 97.0 fL    MCH 30.3 26.6 - 33.0 pg    MCHC 32.0 31.5 - 35.7 g/dL    RDW 17.7 (H) 12.3 - 15.4 %    RDW-SD 60.9 (H) 37.0 - 54.0 fl    MPV 9.9 6.0 - 12.0 fL    Platelets 137 (L) 140 - 450 10*3/mm3    Neutrophil % 77.2 (H) 42.7 - 76.0 %    Lymphocyte % 11.3 (L) 19.6 - 45.3 %    Monocyte % 8.8 5.0 - 12.0 %    Eosinophil % 0.9 0.3 - 6.2 %    Basophil % 0.6 0.0 - 1.5 %    Immature Grans % 1.2 (H) 0.0 - 0.5 %    Neutrophils, Absolute 14.64 (H) 1.70 - 7.00 10*3/mm3    Lymphocytes, Absolute 2.14 0.70 - 3.10 10*3/mm3    Monocytes, Absolute  1.67 (H) 0.10 - 0.90 10*3/mm3    Eosinophils, Absolute 0.18 0.00 - 0.40 10*3/mm3    Basophils, Absolute 0.11 0.00 - 0.20 10*3/mm3    Immature Grans, Absolute 0.23 (H) 0.00 - 0.05 10*3/mm3    nRBC 0.0 0.0 - 0.2 /100 WBC   POC Glucose Once    Collection Time: 06/21/20  7:54 AM   Result Value Ref Range    Glucose 182 (H) 70 - 130 mg/dL   POC Glucose Once    Collection Time: 06/21/20 11:20 AM   Result Value Ref Range    Glucose 224 (H) 70 - 130 mg/dL   CBC (No Diff)    Collection Time: 06/21/20 12:22 PM   Result Value Ref Range    WBC 17.81 (H) 3.40 - 10.80 10*3/mm3    RBC 2.91 (L) 3.77 - 5.28 10*6/mm3    Hemoglobin 9.0 (L) 12.0 - 15.9 g/dL    Hematocrit 27.9 (L) 34.0 - 46.6 %    MCV 95.9 79.0 - 97.0 fL    MCH 30.9 26.6 - 33.0 pg    MCHC 32.3 31.5 - 35.7 g/dL    RDW 18.0 (H) 12.3 - 15.4 %    RDW-SD 62.2 (H) 37.0 - 54.0 fl    MPV 9.6 6.0 - 12.0 fL    Platelets 120 (L) 140 - 450 10*3/mm3   POC Glucose Once    Collection Time: 06/21/20  4:37 PM   Result Value Ref Range    Glucose 155 (H) 70 - 130 mg/dL   CBC (No Diff)    Collection Time: 06/21/20  5:54 PM   Result Value Ref Range    WBC 16.81 (H) 3.40 - 10.80 10*3/mm3    RBC 2.91 (L) 3.77 - 5.28 10*6/mm3    Hemoglobin 9.0 (L) 12.0 - 15.9 g/dL    Hematocrit 27.7 (L) 34.0 - 46.6 %    MCV 95.2 79.0 - 97.0 fL    MCH 30.9 26.6 - 33.0 pg    MCHC 32.5 31.5 - 35.7 g/dL    RDW 17.8 (H) 12.3 - 15.4 %    RDW-SD 61.1 (H) 37.0 - 54.0 fl    MPV 9.7 6.0 - 12.0 fL    Platelets 106 (L) 140 - 450 10*3/mm3   POC Glucose Once    Collection Time: 06/21/20  9:06 PM   Result Value Ref Range    Glucose 131 (H) 70 - 130 mg/dL   Basic Metabolic Panel    Collection Time: 06/22/20  4:36 AM   Result Value Ref Range    Glucose 125 (H) 65 - 99 mg/dL    BUN 28 (H) 6 - 20 mg/dL    Creatinine 1.25 (H) 0.57 - 1.00 mg/dL    Sodium 137 136 - 145 mmol/L    Potassium 5.1 3.5 - 5.2 mmol/L    Chloride 108 (H) 98 - 107 mmol/L    CO2 19.0 (L) 22.0 - 29.0 mmol/L    Calcium 7.4 (L) 8.6 - 10.5 mg/dL    eGFR Non   Amer 44 (L) >60 mL/min/1.73    BUN/Creatinine Ratio 22.4 7.0 - 25.0    Anion Gap 10.0 5.0 - 15.0 mmol/L   POC Glucose Once    Collection Time: 06/22/20  7:25 AM   Result Value Ref Range    Glucose 193 (H) 70 - 130 mg/dL   CBC (No Diff)    Collection Time: 06/22/20  7:45 AM   Result Value Ref Range    WBC 12.54 (H) 3.40 - 10.80 10*3/mm3    RBC 2.80 (L) 3.77 - 5.28 10*6/mm3    Hemoglobin 8.6 (L) 12.0 - 15.9 g/dL    Hematocrit 27.2 (L) 34.0 - 46.6 %    MCV 97.1 (H) 79.0 - 97.0 fL    MCH 30.7 26.6 - 33.0 pg    MCHC 31.6 31.5 - 35.7 g/dL    RDW 18.2 (H) 12.3 - 15.4 %    RDW-SD 63.6 (H) 37.0 - 54.0 fl    MPV 9.8 6.0 - 12.0 fL    Platelets 102 (L) 140 - 450 10*3/mm3   POC Glucose Once    Collection Time: 06/22/20 12:19 PM   Result Value Ref Range    Glucose 205 (H) 70 - 130 mg/dL   POC Glucose Once    Collection Time: 06/22/20  5:28 PM   Result Value Ref Range    Glucose 180 (H) 70 - 130 mg/dL   CBC (No Diff)    Collection Time: 06/22/20  8:14 PM   Result Value Ref Range    WBC 12.75 (H) 3.40 - 10.80 10*3/mm3    RBC 2.88 (L) 3.77 - 5.28 10*6/mm3    Hemoglobin 8.8 (L) 12.0 - 15.9 g/dL    Hematocrit 27.8 (L) 34.0 - 46.6 %    MCV 96.5 79.0 - 97.0 fL    MCH 30.6 26.6 - 33.0 pg    MCHC 31.7 31.5 - 35.7 g/dL    RDW 18.3 (H) 12.3 - 15.4 %    RDW-SD 63.4 (H) 37.0 - 54.0 fl    MPV 9.3 6.0 - 12.0 fL    Platelets 111 (L) 140 - 450 10*3/mm3   POC Glucose Once    Collection Time: 06/23/20  7:53 AM   Result Value Ref Range    Glucose 156 (H) 70 - 130 mg/dL   Basic Metabolic Panel    Collection Time: 06/23/20  7:58 AM   Result Value Ref Range    Glucose 160 (H) 65 - 99 mg/dL    BUN 25 (H) 6 - 20 mg/dL    Creatinine 1.13 (H) 0.57 - 1.00 mg/dL    Sodium 134 (L) 136 - 145 mmol/L    Potassium 4.8 3.5 - 5.2 mmol/L    Chloride 109 (H) 98 - 107 mmol/L    CO2 21.0 (L) 22.0 - 29.0 mmol/L    Calcium 7.3 (L) 8.6 - 10.5 mg/dL    eGFR Non African Amer 50 (L) >60 mL/min/1.73    BUN/Creatinine Ratio 22.1 7.0 - 25.0    Anion Gap 4.0 (L) 5.0 -  15.0 mmol/L   CBC (No Diff)    Collection Time: 06/23/20  7:58 AM   Result Value Ref Range    WBC 12.96 (H) 3.40 - 10.80 10*3/mm3    RBC 2.95 (L) 3.77 - 5.28 10*6/mm3    Hemoglobin 9.1 (L) 12.0 - 15.9 g/dL    Hematocrit 28.5 (L) 34.0 - 46.6 %    MCV 96.6 79.0 - 97.0 fL    MCH 30.8 26.6 - 33.0 pg    MCHC 31.9 31.5 - 35.7 g/dL    RDW 18.2 (H) 12.3 - 15.4 %    RDW-SD 62.2 (H) 37.0 - 54.0 fl    MPV 9.2 6.0 - 12.0 fL    Platelets 130 (L) 140 - 450 10*3/mm3   COVID-19,[facility](OR EMERGENT/ADD-ON),NP SWAB IN TRANSPORT MEDIA 3-4 HR TAT - Swab, Nasopharynx    Collection Time: 06/23/20 11:57 AM   Result Value Ref Range    COVID19 Not Detected Not Detected - Ref. Range   POC Glucose Once    Collection Time: 06/23/20 12:13 PM   Result Value Ref Range    Glucose 252 (H) 70 - 130 mg/dL   POC Glucose Once    Collection Time: 06/23/20  4:52 PM   Result Value Ref Range    Glucose 156 (H) 70 - 130 mg/dL   Comprehensive Metabolic Panel    Collection Time: 06/24/20  3:52 PM   Result Value Ref Range    Glucose 137 (H) 65 - 99 mg/dL    BUN 21 (H) 6 - 20 mg/dL    Creatinine 1.09 (H) 0.57 - 1.00 mg/dL    Sodium 136 136 - 145 mmol/L    Potassium 4.6 3.5 - 5.2 mmol/L    Chloride 107 98 - 107 mmol/L    CO2 19.0 (L) 22.0 - 29.0 mmol/L    Calcium 7.7 (L) 8.6 - 10.5 mg/dL    Total Protein 5.3 (L) 6.0 - 8.5 g/dL    Albumin 2.40 (L) 3.50 - 5.20 g/dL    ALT (SGPT) 79 (H) 1 - 33 U/L    AST (SGOT) 81 (H) 1 - 32 U/L    Alkaline Phosphatase 144 (H) 39 - 117 U/L    Total Bilirubin 0.9 0.2 - 1.2 mg/dL    eGFR Non African Amer 52 (L) >60 mL/min/1.73    Globulin 2.9 gm/dL    A/G Ratio 0.8 g/dL    BUN/Creatinine Ratio 19.3 7.0 - 25.0    Anion Gap 10.0 5.0 - 15.0 mmol/L   CBC Auto Differential    Collection Time: 06/24/20  3:52 PM   Result Value Ref Range    WBC 14.17 (H) 3.40 - 10.80 10*3/mm3    RBC 3.17 (L) 3.77 - 5.28 10*6/mm3    Hemoglobin 9.6 (L) 12.0 - 15.9 g/dL    Hematocrit 30.5 (L) 34.0 - 46.6 %    MCV 96.2 79.0 - 97.0 fL    MCH 30.3  26.6 - 33.0 pg    MCHC 31.5 31.5 - 35.7 g/dL    RDW 18.9 (H) 12.3 - 15.4 %    RDW-SD 66.2 (H) 37.0 - 54.0 fl    MPV 9.9 6.0 - 12.0 fL    Platelets 151 140 - 450 10*3/mm3    Neutrophil % 73.5 42.7 - 76.0 %    Lymphocyte % 13.2 (L) 19.6 - 45.3 %    Monocyte % 9.8 5.0 - 12.0 %    Eosinophil % 1.4 0.3 - 6.2 %    Basophil % 0.8 0.0 - 1.5 %    Immature Grans % 1.3 (H) 0.0 - 0.5 %    Neutrophils, Absolute 10.42 (H) 1.70 - 7.00 10*3/mm3    Lymphocytes, Absolute 1.87 0.70 - 3.10 10*3/mm3    Monocytes, Absolute 1.39 (H) 0.10 - 0.90 10*3/mm3    Eosinophils, Absolute 0.20 0.00 - 0.40 10*3/mm3    Basophils, Absolute 0.11 0.00 - 0.20 10*3/mm3    Immature Grans, Absolute 0.18 (H) 0.00 - 0.05 10*3/mm3    nRBC 0.0 0.0 - 0.2 /100 WBC   COVID-19,[facility], NP SWAB IN [facility] MEDIA 24-30 HR TAT - Swab, Nasopharynx    Collection Time: 06/25/20  4:30 PM   Result Value Ref Range    Reference Lab Report See attachment     COVID19 Not Detected Not Detected - Ref. Range         EKG: (EKG has been independently visualized by me and summarized below)    Procedures     ASSESSMENT and PLAN    1.Severe bradycardia  in setting of hyperkalemia, acidosis, and possible amio toxicity. Temporary pacing wire placed during admission June 2020. Rates improved off Amiodarone and she was discharged home. Rates stable today. Will place 48 hour holter to rule out any further bradycardia or afib.     2.  Paroxysmal atrial fibrillation-treated in the past with Amiodarone therapy for >3years, Now discontinued- Followed by Dr. Patrick.  [facility] Records indicate patient not good candidate for anticoagulation secondary to fall risk and liver disease.  Consider low dose sotalol in the future if recurrence. Echo w/ LVEF 55%; Mod MR. Placing holter monitor today.     3. Liver cirrhosis, ETOH abuse, chronic hepatitis C. History of paracentesis in past. She does seem to be having increased issues with edema and ascites. She has a normal LVEF and maintaining sinus so I  do not feel this is secondary to cardiac cause. I have instructed her to take a dose of Aldactone today as her BP is stable.         Return in about 4 weeks (around 8/3/2020) for w/ Dr. Carreno .        Idania Hernandez PA-C   Cardiology/Electrophysiology  7/6/2020  12:22

## 2020-07-22 NOTE — PROGRESS NOTES
Nursing Home Progress Note      Brian Rivera DO []  [facility] Lala []  [address] [place] [address]  Phone: [phone]  Fax: [phone] Montana Haas MD[x]  Vinh Matos DO []   [facility]IRGIL[facility] Santiago []    [address] [place] [address]  Phone: [phone]  Fax: [phone]     PATIENT NAME: Gretta Giles                                                                          YOB: 1960           DATE OF SERVICE: 01/29/2020  FACILITY:   [x] [facility] [] [facility]  [] [facility]    [] [facility]      ______________________________________________________________________    CHIEF COMPLAINT:  Initial visit, liver cirrhosis      HISTORY OF PRESENT ILLNESS:   Mrs. Giles is a 69 years old female with history of hypertension, chronic anemia, type 2 diabetes mellitus, chronic obstructive pulmonary disease and advanced liver cirrhosis with ascites and esophageal varices.  Based on record review, patient was hospitalized at [facility] with a diagnosis of suspected intra-abdominal infection and sepsis as well as uncontrolled blood glucose with initial level of 900.  She frequently came hypotensive with diagnosis of septic shock.  She was intubated and started on mechanical ventilation due to acute respiratory failure along with acute kidney injury and subsequent ARDS felt to be secondary to aspiration.  Echocardiogram revealed ejection fraction 65 to 70%.  Abdominal paracentesis was done as well.  She was found to have atrial fibrillation, anticoagulation was deferred due to end-stage liver cirrhosis.  After successful extubation, patient maintained stable condition and was subsequently transferred to this facility for PT and OT.  Since then, she remained in stable condition but eventually elected to be discharged home December 27, 2019.  Meanwhile, she continued to decline and due to her frail condition and increased burden of care, she was readmitted  to this facility for resumption of PT, OT and skilled nursing care.      Since admission, patient remained in stable condition; however on June 17, she was noted to have increasing vomiting followed by streaks of fresh blood then large amounts of hematemesis with blood clots.  She was transferred to [facility] emergency room and was subsequently transferred to [facility].  She received packed RBCs transfusions and underwent EGD which revealed esophageal varices requiring banding.  As her clinical condition stabilized, she was readmitted to this facility yesterday, June 23.  Her vital signs remained stable overnight with no signs of overt GI bleed.  During my assessment, patient was pleased to return and in good spirits.  She complained of weakness, back pain and arthralgias which are unchanged from her baseline.    Given above developments, I had a detailed discussion with Dr. Woo who follows her in hepatitis C clinic at [facility].  I updated him guarding patient's condition and most recent events.  The discussion concluded to discontinuation of Epclusa and avoidance of further side effects and given decompensated liver cirrhosis.  He recommended revisiting this recommendation after 1 to 2 months for further plans.    I also had discussion with patient regarding goals of care; she clearly stated that she wishes to defer further aggressive interventions, hospitalizations or transfers to the emergency room.  She also wishes to maintain her DO NOT RESUSCITATE CODE STATUS.    PAST MEDICAL & SURGICAL HISTORY:   Past Medical History:   Diagnosis Date   • Acute renal failure (ARF) (CMS/HCC)    • Atrial fibrillation (CMS/HCC)    • CHF (congestive heart failure) (CMS/HCC)    • Chronic hyponatremia    • COPD (chronic obstructive pulmonary disease) (CMS/HCC)    • Diabetes mellitus (CMS/HCC)    • Esophageal varices (CMS/HCC)    • Hepatitis C    • History of  abdominal paracentesis    • History of transfusion    • Hyperlipidemia    • Hypertension    • Liver cirrhosis (CMS/HCC)     medical record   • Seizure disorder (CMS/HCC)     grand mal   • Sepsis (CMS/HCC)       Past Surgical History:   Procedure Laterality Date   • ANKLE SURGERY     • CARDIAC ELECTROPHYSIOLOGY PROCEDURE N/A 6/5/2020    Procedure: Temporary Pacemaker;  Surgeon: Josh Dyer MD;  Location: [facility] CATH INVASIVE LOCATION;  Service: Cardiology;  Laterality: N/A;   • CHOLECYSTECTOMY      about 20 years ago per patient   • ENDOSCOPY N/A 6/18/2020    Procedure: ESOPHAGOGASTRODUODENOSCOPY;  Surgeon: Brunner, Mark I, MD;  Location:  [facility];  Service: Gastroenterology;  Laterality: N/A;  with banding of distal esophagus   • GALLBLADDER SURGERY     • HYSTERECTOMY     • HYSTERECTOMY      about 20 years ago   • KNEE SURGERY     • KNEE SURGERY Right    • REPLACEMENT TOTAL KNEE           MEDICATIONS:  I have reviewed and reconciled the patients medication list in the patients chart at the skilled nursing facility today.      ALLERGIES:  Allergies   Allergen Reactions   • Amiodarone Unknown - High Severity     Complete heart block   • Codeine Itching   • Morphine Itching      REVIEW OF SYSTEMS:  Review of Systems   Constitutional: Positive for fatigue.   HENT: Negative.    Respiratory: Negative.    Cardiovascular: Negative.    Gastrointestinal: Positive for abdominal distention.   Musculoskeletal: Positive for arthralgias and myalgias.   Skin: Negative.    Neurological: Negative.      PHYSICAL EXAM:  Vital signs: /80, HR 66/min, RR 20/min, temp 97.9 °F, O2 sat 94% on room air    Physical Exam   Constitutional: She is oriented to person, place, and time.   Patient is thin, pale, calm   HENT:   Head: Normocephalic and atraumatic.   Eyes: Pupils are equal, round, and reactive to light. EOM are normal.   Neck: Normal range of motion. Neck supple.   Cardiovascular: Normal rate, regular rhythm and  normal heart sounds.   Pulmonary/Chest: Effort normal and breath sounds normal.   Abdominal: Soft. She exhibits distension (Ascites  Management from but no rigidity or rebound rigidity noted.,  Bowel sounds are positive.).   Musculoskeletal: Normal range of motion.   Neurological: She is alert and oriented to person, place, and time.   Skin: Skin is warm and dry.   Psychiatric: She has a normal mood and affect.       RECORDS REVIEW:   I have reviewed in detail available records including discharge summary and workup    ASSESSMENT:  · Advanced nonalcoholic fatty liver  · Recurrent ascites  · Esophageal varices, status post upper GI bleed, required esophageal banding  · Status post septic shock  · Status post intubation mechanical ventilation  · Multiple wounds involving right Achilles and left gluteal area, stage II pressure injury  · Chronic obstructive pulmonary disease  · Chronic essential hypertension  · Atrial fibrillation, not on anticoagulation due to liver failure  · Type 2 diabetes mellitus  · Hepatitis C infection    PLAN:  · Preadmission medications reviewed, reconciled and reviewed  · Monitor closely for signs of GI bleed  · PT and OT as clinically indicated  · Fall and aspiration precautions   · Routine baseline labs  · Insulin correction dose coverage, hypoglycemic precautions  · Nutritional support recommended and monitored with interdisciplinary support  · Abdominal paracentesis as clinically indicated  · Strict offloading and pressure relief  · Nutritional support and hydration  · Bowel hygiene  · Patient is being monitored closely, further plans were modified accordingly  · Follow with gastroenterologist for management of hepatitis C, liver cirrhosis, portal hypertension and recent GI bleed.    [x]  Discussed Patient in detail with nursing/staff, addressed all needs today.     [x]  Plan of Care Reviewed   [x]  PT/OT Reviewed     []  Wound assessment and management documentation reviewed    []   Antipsychotic medications and benzodiazepine addressed  []  Order Changes  []  Opioid medications addressed  []  Order Changes  []  Discharge Plans Reviewed   []  Advance Directive on file with Nursing Home.   []  POA on file with Nursing Home.   [x]  Code Status: DO NOT RESUSCITATE listed on patients chart at the nursing home facility.     Total time spent in clinical assessment, record review, coordination of care with gastroenterologist and counseling including advance care planning, 70 minutes.         Montana Haas MD.  6/24/2020

## 2020-07-26 NOTE — PROGRESS NOTES
Telemedicine Nursing Home Progress Note        Brian Rivera DO []  MASSIEL Lala []  [address] [place] [address]  Phone: [phone]  Fax: [phone] Montana Haas MD []  Vinh Matos DO []  Regina Nam PA-C [x]   [address] [place] [address]  Phone: [phone]  Fax: [phone]     PATIENT NAME: Gretta Giles                                                                          YOB: 1960           DATE OF SERVICE: 07/21/2020  FACILITY:  [x] [place]   [] [place]   [] [facility] [place]   [] [facility] [place]   [] Other ______________________________________________________________________     CHIEF COMPLAINT:  Follow up on pain and shortness of breath.      HISTORY OF PRESENT ILLNESS:   Ms. Giles is a 58 y/o female patient who presents today for follow up on shortness of breath and pain.  She describes pain to the mid back, which is chronic for her.  She feels that the pain has increased due to edema.  She has also had some increased shortness of breath, especially when lying flat.  She has paracentesis scheduled for tomorrow.  She continues to have significant diffuse edema.  In addition, staff have noted bilateral LE are edematous and erythematous, neurologically intact.  She does have some associated discomfort.  She has remained afebrile.  Appetite is poor.  Upon exam she is alert and pleasant in conversation.  She expresses that she misses her family, as we discussed visitor restrictions within the facility.      PAST MEDICAL & SURGICAL HISTORY:   Past Medical History:   Diagnosis Date   • Acute renal failure (ARF) (CMS/HCC)    • Atrial fibrillation (CMS/HCC)    • CHF (congestive heart failure) (CMS/HCC)    • Chronic hyponatremia    • COPD (chronic obstructive pulmonary disease) (CMS/HCC)    • Diabetes mellitus (CMS/HCC)    • Esophageal varices (CMS/HCC)    • Hepatitis C    • History of abdominal paracentesis    • History of transfusion    •  Hyperlipidemia    • Hypertension    • Liver cirrhosis (CMS/HCC)     medical record   • Seizure disorder (CMS/HCC)     grand mal   • Sepsis (CMS/HCC)       Past Surgical History:   Procedure Laterality Date   • ANKLE SURGERY     • CARDIAC ELECTROPHYSIOLOGY PROCEDURE N/A 2020    Procedure: Temporary Pacemaker;  Surgeon: Josh Dyer MD;  Location: [facility] CATH INVASIVE LOCATION;  Service: Cardiology;  Laterality: N/A;   • CHOLECYSTECTOMY      about 20 years ago per patient   • ENDOSCOPY N/A 2020    Procedure: ESOPHAGOGASTRODUODENOSCOPY;  Surgeon: Brunner, Mark I, MD;  Location: [facility] ENDOSCOPY;  Service: Gastroenterology;  Laterality: N/A;  with banding of distal esophagus   • GALLBLADDER SURGERY     • HYSTERECTOMY     • HYSTERECTOMY      about 20 years ago   • KNEE SURGERY     • KNEE SURGERY Right    • REPLACEMENT TOTAL KNEE           MEDICATIONS:  I have reviewed and reconciled the patients medication list in the patients chart at the skilled nursing facility today.      ALLERGIES:  Allergies   Allergen Reactions   • Amiodarone Unknown - High Severity     Complete heart block   • Codeine Itching   • Morphine Itching         SOCIAL HISTORY:  Social History     Socioeconomic History   • Marital status:      Spouse name: Not on file   • Number of children: Not on file   • Years of education: Not on file   • Highest education level: Not on file   Tobacco Use   • Smoking status: Former Smoker     Packs/day: 0.25     Types: Cigarettes     Last attempt to quit: 2020     Years since quittin.1   • Smokeless tobacco: Never Used   Substance and Sexual Activity   • Alcohol use: Yes     Frequency: Never     Comment: last drink 1 month ago   • Drug use: Never   • Sexual activity: Defer       FAMILY HISTORY:  Family History   Problem Relation Age of Onset   • Diabetes Father    • Cancer Father         malignant neoplasm of brain   • Cancer Sister         bone   • Heart disease Sister        REVIEW OF  SYSTEMS:  Review of Systems   Constitutional: Positive for activity change, appetite change and fatigue. Negative for chills and fever.   Respiratory: Positive for shortness of breath. Negative for cough.    Cardiovascular: Positive for leg swelling. Negative for chest pain.   Gastrointestinal: Positive for abdominal distention (edema, chronic). Negative for abdominal pain, constipation, diarrhea and vomiting.   Genitourinary: Negative for dysuria and hematuria.   Musculoskeletal: Positive for arthralgias and back pain.   Neurological: Positive for weakness (generalized. ). Negative for dizziness and light-headedness.   Psychiatric/Behavioral: Negative for agitation, behavioral problems and sleep disturbance.       VITAL SIGNS:  /84   Pulse 98   Temp 98.1 °F (36.7 °C)   Resp 20   SpO2 97% Comment: RA    PHYSICAL EXAMINATION:   Constitutional:  Frail, chronically ill.  NAD.  Sitting upright in her wheelchair.  HENT:   Head: Normocephalic and atraumatic.   Eyes: PERRLA.  Conjunctivae are normal.   Neck: Normal range of motion. Neck supple.  Trachea midline.    Musculoskeletal:  Patient points to mid back to pinpoint location of pain.   Pulmonary/Chest: Effort normal.   Neurological: Alert and appropriate in conversation.   Psychiatric: Normal mood and affect. Behavior is normal.   Nursing note and vitals reviewed.  * Limited exam due to telemedicine visit      RECORDS REVIEW:   N/A.    ASSESSMENT     Diagnoses and all orders for this visit:    Chronic hepatitis C virus infection with cirrhosis (CMS/HCC)    Chronic back pain, unspecified back location, unspecified back pain laterality      PLAN  She is not short of breath during exam today.  She appears to have discomfort to her back during the exam.  Her legs are also bothering her at times.  Suspect vasculitis due to chronic hepatitis.  Continue to monitor closely.  Patient goals of care discussed.  She has expressed in the past the desire for conservative  management.  She continues to express this today.  Should would like to continue to focus on comfort.  She has paracentesis scheduled for tomorrow, in hopes to alleviate some of her associated shortness of breath.  She is currently on Oxycodone 10MG Q6, consider increasing frequency to Q4.  Will re-evaluate after paracentesis.  I did call and discuss patient condition with her daughter Jaquelin, she is fully supportive of her mother's wishes.  We discussed hospice care and what is means to focus on patient comfort.  She would like to discuss this further with her mother this evening.  Continue to monitor closely.      [x]  Discussed Patient in detail with nursing/staff, addressed all needs today.     [x]  Plan of Care Reviewed   []  PT/OT Reviewed   []  Order Changes  []  Discharge Plans Reviewed  [x]  Advance Directive on file with Nursing Home.   [x]  POA on file with Nursing Home.    [x]  Code Status listed:  []  Full Code   [x]  DNR    30 minutes was spent with telemedicine visit using Facetime video and audio to communicate with the patient and nurse at bedside.  Patient is located at [facility].  Verbal consent obtained prior to telemedicine visit.       Regina Nam PA-C.  7/27/2020

## 2020-09-01 NOTE — PROGRESS NOTES
Nursing Home Progress Note        Brian Rivera DO []  MASSIEL Lala []  [address] [place] [address]  Phone: [phone]  Fax: [phone] Montana Haas MD []  Vinh Matos DO []  Regina Nam PA-C [x]   [address] [place] [address]  Phone: [phone]  Fax: [phone]     PATIENT NAME: Gretta Giles                                                                          YOB: 1960           DATE OF SERVICE: 08/27/2020  FACILITY:  [x] [place]   [] [place]   [] [facility]   [] [facility]   [] Other ______________________________________________________________________     CHIEF COMPLAINT:  Chronic medical management and long term care needs; Altered mental status.      HISTORY OF PRESENT ILLNESS:   Ms. Giles is a 60 y/o female who presents today for routine coordination of care and long term care needs.  Staff have noted c/o altered mental status.  Associated symptoms of agitation and restlessness.  She is under hospice care for hepatic failure.  In addition, they report she has had decrease in appetite, sleeping more throughout the day.  No reports of any shortness of breath or hypoxia.  Edema has actually improved, abdomen is nondistended.  She has had okay compliance with medications.  No difficulty in swallowing.       PAST MEDICAL & SURGICAL HISTORY:   Past Medical History:   Diagnosis Date   • Acute renal failure (ARF) (CMS/HCC)    • Atrial fibrillation (CMS/HCC)    • CHF (congestive heart failure) (CMS/HCC)    • Chronic hyponatremia    • COPD (chronic obstructive pulmonary disease) (CMS/HCC)    • Diabetes mellitus (CMS/HCC)    • Esophageal varices (CMS/HCC)    • Hepatitis C    • History of abdominal paracentesis    • History of transfusion    • Hyperlipidemia    • Hypertension    • Liver cirrhosis (CMS/HCC)     medical record   • Seizure disorder (CMS/HCC)     grand mal   • Sepsis (CMS/HCC)       Past Surgical History:   Procedure Laterality  Date   • ANKLE SURGERY     • CARDIAC ELECTROPHYSIOLOGY PROCEDURE N/A 2020    Procedure: Temporary Pacemaker;  Surgeon: Josh Dyer MD;  Location: [facility] CATH INVASIVE LOCATION;  Service: Cardiology;  Laterality: N/A;   • CHOLECYSTECTOMY      about 20 years ago per patient   • ENDOSCOPY N/A 2020    Procedure: ESOPHAGOGASTRODUODENOSCOPY;  Surgeon: Brunner, Mark I, MD;  Location: [facility] ENDOSCOPY;  Service: Gastroenterology;  Laterality: N/A;  with banding of distal esophagus   • GALLBLADDER SURGERY     • HYSTERECTOMY     • HYSTERECTOMY      about 20 years ago   • KNEE SURGERY     • KNEE SURGERY Right    • REPLACEMENT TOTAL KNEE           MEDICATIONS:  I have reviewed and reconciled the patients medication list in the patients chart at the skilled nursing facility today.      ALLERGIES:  Allergies   Allergen Reactions   • Amiodarone Unknown - High Severity     Complete heart block   • Codeine Itching   • Morphine Itching         SOCIAL HISTORY:  Social History     Socioeconomic History   • Marital status:      Spouse name: Not on file   • Number of children: Not on file   • Years of education: Not on file   • Highest education level: Not on file   Tobacco Use   • Smoking status: Former Smoker     Packs/day: 0.25     Types: Cigarettes     Last attempt to quit: 2020     Years since quittin.2   • Smokeless tobacco: Never Used   Substance and Sexual Activity   • Alcohol use: Yes     Frequency: Never     Comment: last drink 1 month ago   • Drug use: Never   • Sexual activity: Defer       FAMILY HISTORY:  Family History   Problem Relation Age of Onset   • Diabetes Father    • Cancer Father         malignant neoplasm of brain   • Cancer Sister         bone   • Heart disease Sister        REVIEW OF SYSTEMS:  Review of Systems   Unable to perform ROS: Mental status change (obtained from staff. )   Constitutional: Positive for activity change, appetite change and fatigue.   Respiratory: Negative  for cough and wheezing.    Gastrointestinal: Negative for abdominal distention, constipation, diarrhea and vomiting.   Genitourinary: Negative for dysuria and hematuria.   Neurological: Positive for tremors and weakness (general).   Psychiatric/Behavioral: Positive for confusion. Negative for hallucinations. The patient is nervous/anxious.           PHYSICAL EXAMINATION:     VITAL SIGNS:  /60   Pulse 61   Temp 98.3 °F (36.8 °C)   Resp 18   SpO2 97%     Physical Exam   Constitutional: She appears lethargic. No distress.   Chronically ill appearing, NAD.   HENT:   Head: Normocephalic and atraumatic.   Eyes: Pupils are equal, round, and reactive to light. Conjunctivae and EOM are normal.   Neck: Normal range of motion. Neck supple. No JVD present.   Cardiovascular: Normal rate, regular rhythm, normal heart sounds and intact distal pulses.   Pulmonary/Chest: Effort normal and breath sounds normal. No respiratory distress.   Abdominal: Soft. Bowel sounds are normal. She exhibits no distension. There is no tenderness.   Genitourinary: Rectal exam shows no tenderness and anal tone normal.   Musculoskeletal: Normal range of motion. She exhibits no edema or tenderness.   Neurological: She appears lethargic.   Skin: Skin is warm and dry. Capillary refill takes less than 2 seconds. She is not diaphoretic. No pallor.   Psychiatric: Her mood appears not anxious. She is not agitated. Cognition and memory are impaired.   Nursing note and vitals reviewed.      RECORDS REVIEW:   N/A.    ASSESSMENT     Diagnoses and all orders for this visit:    Chronic hepatitis C virus infection with cirrhosis (CMS/HCC)    Mucopurulent chronic bronchitis (CMS/HCC)    Somnolence    Hospice care patient      PLAN  Per nursing, patient has 2 BM per day.  Feel that increasing lactulose would most likely be more agitating. She is quite confused upon assessment today, quickly returning to sleep.  She appears comfortable, no acute distress.   Nonetheless, staff are reporting at times she is agitated and restless.  Will start Atrivan and Haldol PRN for agitation and restlessness.  Anticipate a continued decline.  Medications reviewed; discontinue Fe. Monitor for dysphagia.   Hold sedating medications if she is lethargic.  Discussed plan with staff.      [x]  Discussed Patient in detail with nursing/staff, addressed all needs today.     [x]  Plan of Care Reviewed   []  PT/OT Reviewed   []  Order Changes  []  Discharge Plans Reviewed  [x]  Advance Directive on file with Nursing Home.   [x]  POA on file with Nursing Home.    [x]  Code Status listed:  []  Full Code   [x]  DNR    I spent 30 minutes in direct patient care including face to face and floor time.        Regina Nam PA-C.  9/1/2020

## (undated) DEVICE — SKIN PREP TRAY W/CHG: Brand: MEDLINE INDUSTRIES, INC.

## (undated) DEVICE — KT ORCA ORCAPOD DISP STRL

## (undated) DEVICE — TUBING, SUCTION, 1/4" X 10', STRAIGHT: Brand: MEDLINE

## (undated) DEVICE — SPNG ENDO BEDSIDE TUB ENZYM

## (undated) DEVICE — CONTN GRAD MEAS TRIANG 32OZ BLK

## (undated) DEVICE — LUBE GEL ENDOGLIDE 1.1OZ

## (undated) DEVICE — CATH PACE PACEL BIOPOLAR HEART CRV 5F 110CM RT

## (undated) DEVICE — INTRO ACCSR BLNT TP

## (undated) DEVICE — THE BITE BLOCK MAXI, LATEX FREE STRAP IS USED TO PROTECT THE ENDOSCOPE INSERTION TUBE FROM BEING BITTEN BY THE PATIENT.

## (undated) DEVICE — MULTIPLE BAND LIGATOR: Brand: SPEEDBAND SUPERVIEW SUPER 7

## (undated) DEVICE — SLV REPOSTNG CATH STRL 60CM

## (undated) DEVICE — CABL PACE ATRIAL PT BLU

## (undated) DEVICE — SYR LUERLOK 50ML

## (undated) DEVICE — HYBRID CO2 TUBING/CAP SET FOR OLYMPUS® SCOPES & CO2 SOURCE: Brand: ERBE

## (undated) DEVICE — PINNACLE INTRODUCER SHEATH: Brand: PINNACLE

## (undated) DEVICE — SOL IRR H2O BTL 1000ML STRL